# Patient Record
Sex: MALE | Race: WHITE | NOT HISPANIC OR LATINO
[De-identification: names, ages, dates, MRNs, and addresses within clinical notes are randomized per-mention and may not be internally consistent; named-entity substitution may affect disease eponyms.]

---

## 2017-04-06 ENCOUNTER — APPOINTMENT (OUTPATIENT)
Dept: OTOLARYNGOLOGY | Facility: CLINIC | Age: 77
End: 2017-04-06

## 2017-09-28 ENCOUNTER — OUTPATIENT (OUTPATIENT)
Dept: OUTPATIENT SERVICES | Facility: HOSPITAL | Age: 77
LOS: 1 days | Discharge: HOME | End: 2017-09-28

## 2017-09-28 DIAGNOSIS — I25.10 ATHEROSCLEROTIC HEART DISEASE OF NATIVE CORONARY ARTERY WITHOUT ANGINA PECTORIS: ICD-10-CM

## 2017-09-28 DIAGNOSIS — Z01.810 ENCOUNTER FOR PREPROCEDURAL CARDIOVASCULAR EXAMINATION: ICD-10-CM

## 2018-03-05 ENCOUNTER — OUTPATIENT (OUTPATIENT)
Dept: OUTPATIENT SERVICES | Facility: HOSPITAL | Age: 78
LOS: 1 days | Discharge: HOME | End: 2018-03-05

## 2018-03-05 DIAGNOSIS — Z79.899 OTHER LONG TERM (CURRENT) DRUG THERAPY: ICD-10-CM

## 2018-03-05 DIAGNOSIS — I25.10 ATHEROSCLEROTIC HEART DISEASE OF NATIVE CORONARY ARTERY WITHOUT ANGINA PECTORIS: ICD-10-CM

## 2018-03-05 DIAGNOSIS — I10 ESSENTIAL (PRIMARY) HYPERTENSION: ICD-10-CM

## 2018-03-05 DIAGNOSIS — E78.00 PURE HYPERCHOLESTEROLEMIA, UNSPECIFIED: ICD-10-CM

## 2018-09-05 ENCOUNTER — OUTPATIENT (OUTPATIENT)
Dept: OUTPATIENT SERVICES | Facility: HOSPITAL | Age: 78
LOS: 1 days | Discharge: HOME | End: 2018-09-05

## 2018-09-05 DIAGNOSIS — I25.10 ATHEROSCLEROTIC HEART DISEASE OF NATIVE CORONARY ARTERY WITHOUT ANGINA PECTORIS: ICD-10-CM

## 2018-09-05 DIAGNOSIS — Z01.810 ENCOUNTER FOR PREPROCEDURAL CARDIOVASCULAR EXAMINATION: ICD-10-CM

## 2018-09-05 DIAGNOSIS — Z79.899 OTHER LONG TERM (CURRENT) DRUG THERAPY: ICD-10-CM

## 2018-09-05 DIAGNOSIS — E78.00 PURE HYPERCHOLESTEROLEMIA, UNSPECIFIED: ICD-10-CM

## 2019-01-22 ENCOUNTER — EMERGENCY (EMERGENCY)
Facility: HOSPITAL | Age: 79
LOS: 0 days | Discharge: HOME | End: 2019-01-22
Attending: EMERGENCY MEDICINE | Admitting: EMERGENCY MEDICINE

## 2019-01-22 VITALS
OXYGEN SATURATION: 97 % | SYSTOLIC BLOOD PRESSURE: 266 MMHG | WEIGHT: 257.06 LBS | DIASTOLIC BLOOD PRESSURE: 127 MMHG | HEART RATE: 92 BPM | TEMPERATURE: 97 F | RESPIRATION RATE: 18 BRPM

## 2019-01-22 VITALS
OXYGEN SATURATION: 97 % | RESPIRATION RATE: 18 BRPM | HEART RATE: 84 BPM | DIASTOLIC BLOOD PRESSURE: 118 MMHG | SYSTOLIC BLOOD PRESSURE: 215 MMHG

## 2019-01-22 DIAGNOSIS — S61.411A LACERATION WITHOUT FOREIGN BODY OF RIGHT HAND, INITIAL ENCOUNTER: ICD-10-CM

## 2019-01-22 DIAGNOSIS — I10 ESSENTIAL (PRIMARY) HYPERTENSION: ICD-10-CM

## 2019-01-22 DIAGNOSIS — S09.90XA UNSPECIFIED INJURY OF HEAD, INITIAL ENCOUNTER: ICD-10-CM

## 2019-01-22 DIAGNOSIS — I25.10 ATHEROSCLEROTIC HEART DISEASE OF NATIVE CORONARY ARTERY WITHOUT ANGINA PECTORIS: ICD-10-CM
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
## 2019-01-22 DIAGNOSIS — Z23 ENCOUNTER FOR IMMUNIZATION: ICD-10-CM
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
## 2019-01-22 RX ORDER — ACETAMINOPHEN 500 MG
650 TABLET ORAL ONCE
Qty: 0 | Refills: 0 | Status: COMPLETED | OUTPATIENT
Start: 2019-01-22 | End: 2019-01-22

## 2019-01-22 RX ORDER — TETANUS TOXOID, REDUCED DIPHTHERIA TOXOID AND ACELLULAR PERTUSSIS VACCINE, ADSORBED 5; 2.5; 8; 8; 2.5 [IU]/.5ML; [IU]/.5ML; UG/.5ML; UG/.5ML; UG/.5ML
0.5 SUSPENSION INTRAMUSCULAR ONCE
Qty: 0 | Refills: 0 | Status: COMPLETED | OUTPATIENT
Start: 2019-01-22 | End: 2019-01-22

## 2019-01-22 RX ADMIN — Medication 650 MILLIGRAM(S): at 21:12

## 2019-01-22 RX ADMIN — TETANUS TOXOID, REDUCED DIPHTHERIA TOXOID AND ACELLULAR PERTUSSIS VACCINE, ADSORBED 0.5 MILLILITER(S): 5; 2.5; 8; 8; 2.5 SUSPENSION INTRAMUSCULAR at 21:12

## 2019-01-22 NOTE — ED ADULT TRIAGE NOTE - CHIEF COMPLAINT QUOTE
Pt came c/o mechanical fall 1 hour ago on concrete outside, hit his face, denies LOC, denies dizziness or headache, denies blood thinners.

## 2019-01-22 NOTE — ED PROVIDER NOTE - MEDICAL DECISION MAKING DETAILS
I personally evaluated the patient. I reviewed the Resident’s or Physician Assistant’s note (as assigned above), and agree with the findings and plan except as documented in my note. Patient iaging unremarkable. I have fully discussed the medical management and delivery of care with the patient. I have discussed any available labs, imaging and treatment options with the patient. Patient confirms understanding and has been given detailed return precautions. Patient instructed to return to the ED should symptoms persist or worsen. Patient has demonstrated capacity and has verbalized understanding. Patient is well appearing upon discharge.

## 2019-01-22 NOTE — ED PROVIDER NOTE - OBJECTIVE STATEMENT
77 yo M with PMHx of HTN and CAD s/p CABG presents to the ED c/o head injury s/p mechanical fall. Pt slipped on ice outside and fell forward hitting his head. Pt denies other areas of injury. Pt is not on anticoagulation medication. Pt denies fever, chills, nausea, vomiting, abdominal pain, headache, dizziness, weakness, chest pain, SOB, back pain, LOC, urinary symptoms, cough.

## 2019-01-22 NOTE — ED PROVIDER NOTE - PHYSICAL EXAMINATION
VITAL SIGNS: I have reviewed nursing notes and confirm.  CONSTITUTIONAL: Well-developed; well-nourished; in no acute distress.  SKIN: Skin exam is warm and dry, no acute rash.  HEAD: Normocephalic; (+) superficial abrasions to nose and forehead.   EYES: PERRL, EOM intact; conjunctiva and sclera clear.  ENT: No nasal discharge; airway clear. TMs clear. No septal hematoma.   NECK: Supple; non tender.  CARD: S1, S2 normal; no murmurs, gallops, or rubs. Regular rate and rhythm.  RESP: No wheezes, rales or rhonchi. Speaking in full sentences.   ABD: Normal bowel sounds; soft; non-distended; non-tender; No rebound or guarding.   BACK: No midline or paraspinal TTP.   EXT: Normal ROM. No clubbing, cyanosis or edema. (+) 3 cm laceration to medial aspect of right hand without active bleeding. FROM. Sensation intact. CR < 2 seconds.   NEURO: Alert, oriented. Grossly unremarkable. No focal deficits. CN II-XII intact. No dysmetria. No ataxia. Sensation intact and equal throughout. Strength 5/5 throughout. Gait steady.

## 2019-01-22 NOTE — ED PROVIDER NOTE - CARE PLAN
Principal Discharge DX:	Head injury Principal Discharge DX:	Head injury  Secondary Diagnosis:	Laceration of hand  Secondary Diagnosis:	Need for tetanus, diphtheria, and acellular pertussis (Tdap) vaccine

## 2019-01-22 NOTE — ED PROVIDER NOTE - ATTENDING CONTRIBUTION TO CARE
78 year old male, pmhx  cabg, comes in with complaint of mechanical trip and fall, + frontal head injury, no loc, no n/v/d, no cp/sob, no loc. patient has a laceration to the palmar aspect of the left hand near the fourth and fifth mcp.     CONSTITUTIONAL: Well-developed; well-nourished; in no acute distress. Sitting up and providing appropriate history and physical examination  TRAUMA: Primary and Secondary surveys intact, GCS 15, no midline CTLS spine tenderness, Pelvis stable, + moving all extremities, FAST Negative   SKIN: skin exam is warm and dry, no acute rash.  HEAD: Normocephalic; + left supra-orbital hematoma  EYES: PERRL, 3 mm bilateral, no nystagmus, EOM intact; conjunctiva and sclera clear.  ENT: No nasal discharge; airway clear.  NECK: Supple; non tender. + full passive ROM in all directions. No JVD  CARD: S1, S2 normal; no murmurs, gallops, or rubs. Regular rate and rhythm. + Symmetric Strong Pulses  RESP: No wheezes, rales or rhonchi. Good air movement bilaterally  ABD: soft; non-distended; non-tender. No Rebound, No Guarding, No signs of peritonitis, No CVA tenderness. No pulsatile abdominal mass. + Strong and Symmetric Pulses  EXT: Normal ROM. No clubbing, cyanosis or edema. Dp and Pt Pulses intact. Cap refill less than 3 seconds  NEURO: CN 2-12 intact, normal finger to nose, normal romberg, stable gait, no sensory or motor deficits, Alert, oriented, grossly unremarkable. No Focal deficits. GCS 15. NIH 0  PSYCH: Cooperative, appropriate.

## 2019-03-06 ENCOUNTER — OUTPATIENT (OUTPATIENT)
Dept: OUTPATIENT SERVICES | Facility: HOSPITAL | Age: 79
LOS: 1 days | Discharge: HOME | End: 2019-03-06

## 2019-03-06 DIAGNOSIS — Z01.810 ENCOUNTER FOR PREPROCEDURAL CARDIOVASCULAR EXAMINATION: ICD-10-CM

## 2019-03-06 DIAGNOSIS — I25.10 ATHEROSCLEROTIC HEART DISEASE OF NATIVE CORONARY ARTERY WITHOUT ANGINA PECTORIS: ICD-10-CM

## 2019-03-06 DIAGNOSIS — Z79.899 OTHER LONG TERM (CURRENT) DRUG THERAPY: ICD-10-CM

## 2019-03-06 DIAGNOSIS — E78.00 PURE HYPERCHOLESTEROLEMIA, UNSPECIFIED: ICD-10-CM

## 2019-06-20 ENCOUNTER — APPOINTMENT (OUTPATIENT)
Dept: NEUROSURGERY | Facility: CLINIC | Age: 79
End: 2019-06-20
Payer: MEDICARE

## 2019-06-20 VITALS — HEIGHT: 70 IN | BODY MASS INDEX: 36.51 KG/M2 | WEIGHT: 255 LBS

## 2019-06-20 DIAGNOSIS — M54.16 RADICULOPATHY, LUMBAR REGION: ICD-10-CM

## 2019-06-20 DIAGNOSIS — Z87.891 PERSONAL HISTORY OF NICOTINE DEPENDENCE: ICD-10-CM

## 2019-06-20 PROCEDURE — 99205 OFFICE O/P NEW HI 60 MIN: CPT

## 2019-06-21 NOTE — PLAN
[FreeTextEntry1] : I disucssed the CT with Mr. Silva in detail.  I believe his pain is from stenosis rather than the compression fracture.  An MRI was ordered.  HE will return to discuss the MRI results.  He was referred for PT and THERESE in the meantime.  I do not believe he would benefit from vertebral augmentation at this time.

## 2019-06-21 NOTE — HISTORY OF PRESENT ILLNESS
[FreeTextEntry1] : right leg pain [de-identified] : Mr. Silva is a 78 year-old male who presents with right leg pain.  It is intense and associated with burning numbness.  He denies weakness.  It is in the L5 distribution.  No bowel or bladder dysfunction.  He has mild back pain.  He had PT and THERESE over 10 years ago but nothing recently.  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

## 2019-11-19 ENCOUNTER — LABORATORY RESULT (OUTPATIENT)
Age: 79
End: 2019-11-19

## 2019-11-19 ENCOUNTER — OUTPATIENT (OUTPATIENT)
Dept: OUTPATIENT SERVICES | Facility: HOSPITAL | Age: 79
LOS: 1 days | Discharge: HOME | End: 2019-11-19

## 2019-11-19 DIAGNOSIS — Z01.810 ENCOUNTER FOR PREPROCEDURAL CARDIOVASCULAR EXAMINATION: ICD-10-CM

## 2019-11-19 DIAGNOSIS — I25.10 ATHEROSCLEROTIC HEART DISEASE OF NATIVE CORONARY ARTERY WITHOUT ANGINA PECTORIS: ICD-10-CM

## 2019-11-27 ENCOUNTER — APPOINTMENT (OUTPATIENT)
Dept: CARDIOLOGY | Facility: CLINIC | Age: 79
End: 2019-11-27
Payer: MEDICARE

## 2019-11-27 PROCEDURE — 93000 ELECTROCARDIOGRAM COMPLETE: CPT

## 2019-11-27 PROCEDURE — 99214 OFFICE O/P EST MOD 30 MIN: CPT

## 2020-09-01 ENCOUNTER — RECORD ABSTRACTING (OUTPATIENT)
Age: 80
End: 2020-09-01

## 2020-09-01 DIAGNOSIS — Z86.79 PERSONAL HISTORY OF OTHER DISEASES OF THE CIRCULATORY SYSTEM: ICD-10-CM

## 2020-09-16 ENCOUNTER — APPOINTMENT (OUTPATIENT)
Dept: CARDIOLOGY | Facility: CLINIC | Age: 80
End: 2020-09-16

## 2020-10-29 ENCOUNTER — RX RENEWAL (OUTPATIENT)
Age: 80
End: 2020-10-29

## 2021-01-24 ENCOUNTER — RX RENEWAL (OUTPATIENT)
Age: 81
End: 2021-01-24

## 2021-08-25 ENCOUNTER — NON-APPOINTMENT (OUTPATIENT)
Age: 81
End: 2021-08-25

## 2021-08-25 ENCOUNTER — APPOINTMENT (OUTPATIENT)
Dept: OTOLARYNGOLOGY | Facility: CLINIC | Age: 81
End: 2021-08-25
Payer: MEDICARE

## 2021-08-25 PROCEDURE — 99205 OFFICE O/P NEW HI 60 MIN: CPT | Mod: 25

## 2021-08-25 PROCEDURE — 92557 COMPREHENSIVE HEARING TEST: CPT

## 2021-08-25 PROCEDURE — 92550 TYMPANOMETRY & REFLEX THRESH: CPT

## 2021-08-25 NOTE — ASSESSMENT
[FreeTextEntry1] : I reviewed, interpreted, and discussed the Audiogram done today. Asymmetric SNHL. \par \par Sudden SNHL.\par \par Part of history and discussion with patient's wife. \par \par I discussed the benefits of oral steroids for patient's current situation, and made the patient aware of side effects of systemic corticosteroids. I recommended a low sugar and low salt diet while on steroid treatment, while alerting patient about potential temporary increase in sugar levels and blood pressure. Patient aware of risk of impact on sleep quality and mood temporarily while on the medication.\par \par RTC in 1 week.

## 2021-08-25 NOTE — HISTORY OF PRESENT ILLNESS
[de-identified] : Patient presents today c/o hearing loss.  Woke up this morning with right ear hearing loss.  No history of ear infections.  Denies any pressure.  Right ear has been feeling clogged for a few weeks.  No recent audiogram . PCP checked ears  and had a normal exam .  Recently changed BP medication by PCP. after two doses patient states hearing loss worsened.

## 2021-09-13 ENCOUNTER — APPOINTMENT (OUTPATIENT)
Dept: OTOLARYNGOLOGY | Facility: CLINIC | Age: 81
End: 2021-09-13

## 2021-09-13 ENCOUNTER — APPOINTMENT (OUTPATIENT)
Dept: NEUROLOGY | Facility: CLINIC | Age: 81
End: 2021-09-13
Payer: MEDICARE

## 2021-09-13 VITALS
SYSTOLIC BLOOD PRESSURE: 167 MMHG | TEMPERATURE: 97.4 F | BODY MASS INDEX: 35.07 KG/M2 | WEIGHT: 245 LBS | HEART RATE: 71 BPM | OXYGEN SATURATION: 99 % | DIASTOLIC BLOOD PRESSURE: 89 MMHG | HEIGHT: 70 IN

## 2021-09-13 PROCEDURE — 99205 OFFICE O/P NEW HI 60 MIN: CPT

## 2021-09-13 NOTE — REVIEW OF SYSTEMS
[As Noted in HPI] : as noted in HPI [Eyesight Problems] : eyesight problems [Loss Of Hearing] : hearing loss [Negative] : Heme/Lymph [FreeTextEntry9] : back pain

## 2021-09-13 NOTE — DISCUSSION/SUMMARY
[FreeTextEntry1] : Pt is a 81 yo M with PMHx of CABG x 4 (2006), porcine aortic valve replacement (2011), HTN, HLD, CKD, who is being evaluated for abnormal MRI brain results. Reviewed pt's imaging, there is subtle restricted diffusion of the inferior anterior right cerebellum and right lateral medulla, [id number] hyperintensity in this same distribution. 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 Symptoms/history consistent with stroke in this region as well. 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 MRA head/neck and 2D Echo ordered. Continue ASA and crestor. Received lab results from PCP office. HbA1c 5.5, . Will increase crestor to 20mg po daily. \par RTC in 3 months.

## 2021-09-13 NOTE — PHYSICAL EXAM
[General Appearance - Alert] : alert [General Appearance - In No Acute Distress] : in no acute distress [General Appearance - Well Nourished] : well nourished [General Appearance - Well Developed] : well developed [Oriented To Time, Place, And Person] : oriented to person, place, and time [Impaired Insight] : insight and judgment were intact [Affect] : the affect was normal [Person] : oriented to person [Place] : oriented to place [Time] : oriented to time [Naming Objects] : no difficulty naming common objects [Fluency] : fluency intact [Comprehension] : comprehension intact [Cranial Nerves Optic (II)] : visual acuity intact bilaterally,  visual fields full to confrontation, pupils equal round and reactive to light [Cranial Nerves Oculomotor (III)] : extraocular motion intact [Cranial Nerves Trigeminal (V)] : facial sensation intact symmetrically [Cranial Nerves Facial (VII)] : face symmetrical [Cranial Nerves Accessory (XI - Cranial And Spinal)] : head turning and shoulder shrug symmetric [Cranial Nerves Glossopharyngeal (IX)] : tongue and palate midline [Cranial Nerves Hypoglossal (XII)] : there was no tongue deviation with protrusion [Nystagmus] : ~T ~M nystagmus was seen [Motor Tone] : muscle tone was normal in all four extremities [Motor Strength] : muscle strength was normal in all four extremities [Motor Handedness Right-Handed] : the patient is right hand dominant [Sensation Tactile Decrease] : light touch was intact [Coordination - Dysmetria Impaired Finger-to-Nose Right Only] : present on the ride side [2+] : Patella left 2+ [Sclera] : the sclera and conjunctiva were normal [PERRL With Normal Accommodation] : pupils were equal in size, round, reactive to light, with normal accommodation [Outer Ear] : the ears and nose were normal in appearance [Hearing Loss Right Only] : diminished [Neck Appearance] : the appearance of the neck was normal [] : the neck was supple [Respiration, Rhythm And Depth] : normal respiratory rhythm and effort [Auscultation Breath Sounds / Voice Sounds] : lungs were clear to auscultation bilaterally [Heart Rate And Rhythm] : heart rate was normal and rhythm regular [Arterial Pulses Carotid] : carotid pulses were normal with no bruits [Skin Turgor] : normal skin turgor [Finger Rub Not Morgan] : finger rub was heard [Whisper Not Bryan] : whispered voice was heard [Paresis Pronator Drift Right-Sided] : no pronator drift on the right [Paresis Pronator Drift Left-Sided] : no pronator drift on the left [Motor Strength Upper Extremities Bilaterally] : strength was normal in both upper extremities [Motor Strength Lower Extremities Bilaterally] : strength was normal in both lower extremities [Coordination - Dysmetria Impaired Finger-to-Nose Left Only] : not present on the left side [Coordination Dysmetria Impaired Heel-Shin Using Right Heel] : not present on the ride side [Coordination Dysmetria Impaired Heel-to-Shin Using Left Heel] : not present on the left side [FreeTextEntry1] : bilateral rotary nystagmus, horizontal and vertical, worse with rightward conjugate gaze [FreeTextEntry8] : antalgic gait, negative Rhomberg [Hearing Loss Left Only] : normal

## 2021-09-13 NOTE — REASON FOR VISIT
[Initial Evaluation] : an initial evaluation [Spouse] : spouse [FreeTextEntry1] : stroke on MRI brain

## 2021-09-13 NOTE — HISTORY OF PRESENT ILLNESS
[FreeTextEntry1] : Pt is a 81 yo M with PMHx of CABG x 4 (2006), porcine aortic valve replacement (2011), HTN, HLD, CKD, who presents c/o abnormal MRI brain. States that he woke up on 7/28 with sudden right sided hearing loss, gait imbalance and vertical binocular diplopia. Symptoms have slowly improved since then, diplopia is nearly resolved, only occurs first thing in the morning. Hearing only returned about 10% in the right ear. Balance has improved, occasionally has difficulty, one fall a few weeks ago. Ambulates without a walking device. no known prior strokes. He was not on any ATP prior to MRI, was started on aspirin and crestor this past week. No known h/o afib/flutter. He saw an ENT specialist when this all first began, was given a steroid taper for possible viral etiology.

## 2021-10-20 ENCOUNTER — APPOINTMENT (OUTPATIENT)
Dept: CARDIOLOGY | Facility: CLINIC | Age: 81
End: 2021-10-20
Payer: MEDICARE

## 2021-10-20 VITALS
DIASTOLIC BLOOD PRESSURE: 80 MMHG | TEMPERATURE: 97.8 F | WEIGHT: 243 LBS | HEIGHT: 70 IN | SYSTOLIC BLOOD PRESSURE: 150 MMHG | OXYGEN SATURATION: 98 % | BODY MASS INDEX: 34.79 KG/M2 | HEART RATE: 77 BPM

## 2021-10-20 DIAGNOSIS — Z00.00 ENCOUNTER FOR GENERAL ADULT MEDICAL EXAMINATION W/OUT ABNORMAL FINDINGS: ICD-10-CM

## 2021-10-20 PROCEDURE — 93000 ELECTROCARDIOGRAM COMPLETE: CPT

## 2021-10-20 PROCEDURE — 99214 OFFICE O/P EST MOD 30 MIN: CPT

## 2021-10-20 RX ORDER — ROSUVASTATIN CALCIUM 20 MG/1
20 TABLET, FILM COATED ORAL DAILY
Qty: 30 | Refills: 2 | Status: DISCONTINUED | COMMUNITY
Start: 2021-09-13 | End: 2021-10-20

## 2021-10-20 RX ORDER — METOPROLOL SUCCINATE 50 MG/1
50 TABLET, EXTENDED RELEASE ORAL
Refills: 0 | Status: DISCONTINUED | COMMUNITY
End: 2021-10-20

## 2021-10-20 NOTE — DISCUSSION/SUMMARY
[FreeTextEntry1] : Pt is a 79 yo M with PMHx of CABG x 4 (2006), porcine aortic valve replacement (2011), HTN, HLD, CKD, who is being evaluated for abnormal MRI brain results. Reviewed pt's imaging, there is subtle restricted diffusion of the inferior anterior right cerebellum and right lateral medulla, [id number] hyperintensity in this same distribution. 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 Symptoms/history consistent with stroke in this region as well. 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 Continue ASA and crestor. Received lab results from PCP office. HbA1c 5.5, LDL is 85 and  as reported by Neurology.  Patient is now on crestor  20 mg po daily.\par Patient is at target goal with respect to his lipid levels.\par He was previously on vytorin 10/20 mg with good  lipid levels as well.\par 2D echo doppler results were reviewed from the Hospital with the patient.\par RV in 3/22.

## 2021-10-20 NOTE — REVIEW OF SYSTEMS
[Dizziness] : dizziness [Numbness (Hypoesthesia)] : numbness [Tingling (Paresthesia)] : tingling [Negative] : Heme/Lymph

## 2021-10-20 NOTE — PHYSICAL EXAM

## 2021-10-20 NOTE — REASON FOR VISIT
[FreeTextEntry1] : Patient presents for an over due follow up visit. He was diagnosed with a stroke and is seeing Neurology.

## 2021-10-20 NOTE — HISTORY OF PRESENT ILLNESS
[FreeTextEntry1] : Pt is a 79 yo M with PMHx of CABG x 4 (2006), porcine aortic valve replacement (2011), HTN, HLD, CKD, who presents c/o abnormal MRI brain. States that he woke up on 7/28 with sudden right sided hearing loss, gait imbalance and vertical binocular diplopia. Symptoms have slowly improved since then, diplopia is nearly resolved, only occurs first thing in the morning.  Balance has improved, occasionally has difficulty and requires a sitting stool in the shower. Ambulates without a walking device. No known prior strokes. He was not on any ATP  is not true. The patient was on ASA 81 mg. on his last Cardiology office visit in 2019 as well as a statin Vytorin.  No known h/o afib/flutter. He saw an ENT specialist when this all first began, was given a steroid taper for possible viral etiology.

## 2021-10-28 ENCOUNTER — RX RENEWAL (OUTPATIENT)
Age: 81
End: 2021-10-28

## 2021-11-17 ENCOUNTER — APPOINTMENT (OUTPATIENT)
Dept: OTOLARYNGOLOGY | Facility: CLINIC | Age: 81
End: 2021-11-17
Payer: MEDICARE

## 2021-11-17 DIAGNOSIS — H90.5 UNSPECIFIED SENSORINEURAL HEARING LOSS: ICD-10-CM

## 2021-11-17 DIAGNOSIS — R42 DIZZINESS AND GIDDINESS: ICD-10-CM

## 2021-11-17 PROCEDURE — 92550 TYMPANOMETRY & REFLEX THRESH: CPT

## 2021-11-17 PROCEDURE — 99214 OFFICE O/P EST MOD 30 MIN: CPT | Mod: 25

## 2021-11-17 PROCEDURE — 92557 COMPREHENSIVE HEARING TEST: CPT

## 2021-11-17 NOTE — HISTORY OF PRESENT ILLNESS
[FreeTextEntry1] : Patient presents today following up on hearing loss. Patient admits in between he has seen neurology. Patient had MRI done. He admits continues to have hearing loss in the right ear. He admits equilibrium is always off. He took steroids from Dr. Whitten which did not help at all. He admits slight increase in hearing since the last visit. Pt has dizziness and unsteadiness.

## 2021-12-12 ENCOUNTER — INPATIENT (INPATIENT)
Facility: HOSPITAL | Age: 81
LOS: 7 days | Discharge: HOME HEALTH | End: 2021-12-20
Attending: STUDENT IN AN ORGANIZED HEALTH CARE EDUCATION/TRAINING PROGRAM | Admitting: STUDENT IN AN ORGANIZED HEALTH CARE EDUCATION/TRAINING PROGRAM
Payer: MEDICARE

## 2021-12-12 VITALS
RESPIRATION RATE: 22 BRPM | OXYGEN SATURATION: 88 % | HEART RATE: 85 BPM | DIASTOLIC BLOOD PRESSURE: 75 MMHG | TEMPERATURE: 96 F | SYSTOLIC BLOOD PRESSURE: 154 MMHG

## 2021-12-12 LAB
ALBUMIN SERPL ELPH-MCNC: 3.3 G/DL — LOW (ref 3.5–5.2)
ALP SERPL-CCNC: 49 U/L — SIGNIFICANT CHANGE UP (ref 30–115)
ALT FLD-CCNC: 30 U/L — SIGNIFICANT CHANGE UP (ref 0–41)
ANION GAP SERPL CALC-SCNC: 18 MMOL/L — HIGH (ref 7–14)
AST SERPL-CCNC: 72 U/L — HIGH (ref 0–41)
BASE EXCESS BLDV CALC-SCNC: 0 MMOL/L — SIGNIFICANT CHANGE UP (ref -2–3)
BASOPHILS # BLD AUTO: 0 K/UL — SIGNIFICANT CHANGE UP (ref 0–0.2)
BASOPHILS NFR BLD AUTO: 0 % — SIGNIFICANT CHANGE UP (ref 0–1)
BILIRUB SERPL-MCNC: 0.5 MG/DL — SIGNIFICANT CHANGE UP (ref 0.2–1.2)
BUN SERPL-MCNC: 59 MG/DL — HIGH (ref 10–20)
CA-I SERPL-SCNC: 1.24 MMOL/L — SIGNIFICANT CHANGE UP (ref 1.15–1.33)
CALCIUM SERPL-MCNC: 9 MG/DL — SIGNIFICANT CHANGE UP (ref 8.5–10.1)
CHLORIDE SERPL-SCNC: 93 MMOL/L — LOW (ref 98–110)
CO2 SERPL-SCNC: 17 MMOL/L — SIGNIFICANT CHANGE UP (ref 17–32)
CREAT SERPL-MCNC: 2.3 MG/DL — HIGH (ref 0.7–1.5)
EOSINOPHIL # BLD AUTO: 0 K/UL — SIGNIFICANT CHANGE UP (ref 0–0.7)
EOSINOPHIL NFR BLD AUTO: 0 % — SIGNIFICANT CHANGE UP (ref 0–8)
GAS PNL BLDV: 124 MMOL/L — LOW (ref 136–145)
GAS PNL BLDV: SIGNIFICANT CHANGE UP
GAS PNL BLDV: SIGNIFICANT CHANGE UP
GIANT PLATELETS BLD QL SMEAR: PRESENT — SIGNIFICANT CHANGE UP
GLUCOSE SERPL-MCNC: 106 MG/DL — HIGH (ref 70–99)
HCO3 BLDV-SCNC: 24 MMOL/L — SIGNIFICANT CHANGE UP (ref 22–29)
HCT VFR BLD CALC: 37 % — LOW (ref 42–52)
HCT VFR BLDA CALC: 38 % — LOW (ref 39–51)
HGB BLD CALC-MCNC: 12.8 G/DL — SIGNIFICANT CHANGE UP (ref 12.6–17.4)
HGB BLD-MCNC: 12.8 G/DL — LOW (ref 14–18)
LACTATE BLDV-MCNC: 1.6 MMOL/L — SIGNIFICANT CHANGE UP (ref 0.5–2)
LYMPHOCYTES # BLD AUTO: 0.34 K/UL — LOW (ref 1.2–3.4)
LYMPHOCYTES # BLD AUTO: 6.1 % — LOW (ref 20.5–51.1)
MANUAL SMEAR VERIFICATION: SIGNIFICANT CHANGE UP
MCHC RBC-ENTMCNC: 30.1 PG — SIGNIFICANT CHANGE UP (ref 27–31)
MCHC RBC-ENTMCNC: 34.6 G/DL — SIGNIFICANT CHANGE UP (ref 32–37)
MCV RBC AUTO: 87.1 FL — SIGNIFICANT CHANGE UP (ref 80–94)
MONOCYTES # BLD AUTO: 0.34 K/UL — SIGNIFICANT CHANGE UP (ref 0.1–0.6)
MONOCYTES NFR BLD AUTO: 6.1 % — SIGNIFICANT CHANGE UP (ref 1.7–9.3)
NEUTROPHILS # BLD AUTO: 4.75 K/UL — SIGNIFICANT CHANGE UP (ref 1.4–6.5)
NEUTROPHILS NFR BLD AUTO: 84.3 % — HIGH (ref 42.2–75.2)
NT-PROBNP SERPL-SCNC: 2172 PG/ML — HIGH (ref 0–300)
PCO2 BLDV: 35 MMHG — LOW (ref 42–55)
PH BLDV: 7.44 — HIGH (ref 7.32–7.43)
PLAT MORPH BLD: NORMAL — SIGNIFICANT CHANGE UP
PLATELET # BLD AUTO: 165 K/UL — SIGNIFICANT CHANGE UP (ref 130–400)
PO2 BLDV: 28 MMHG — SIGNIFICANT CHANGE UP
POLYCHROMASIA BLD QL SMEAR: SLIGHT — SIGNIFICANT CHANGE UP
POTASSIUM BLDV-SCNC: 3.5 MMOL/L — SIGNIFICANT CHANGE UP (ref 3.5–5.1)
POTASSIUM SERPL-MCNC: 4.4 MMOL/L — SIGNIFICANT CHANGE UP (ref 3.5–5)
POTASSIUM SERPL-SCNC: 4.4 MMOL/L — SIGNIFICANT CHANGE UP (ref 3.5–5)
PROT SERPL-MCNC: 6.5 G/DL — SIGNIFICANT CHANGE UP (ref 6–8)
RAPID RVP RESULT: DETECTED
RBC # BLD: 4.25 M/UL — LOW (ref 4.7–6.1)
RBC # FLD: 13.6 % — SIGNIFICANT CHANGE UP (ref 11.5–14.5)
RBC BLD AUTO: NORMAL — SIGNIFICANT CHANGE UP
SAO2 % BLDV: 45.9 % — SIGNIFICANT CHANGE UP
SARS-COV-2 RNA SPEC QL NAA+PROBE: DETECTED
SODIUM SERPL-SCNC: 128 MMOL/L — LOW (ref 135–146)
TROPONIN T SERPL-MCNC: 0.06 NG/ML — CRITICAL HIGH
VARIANT LYMPHS # BLD: 3.5 % — SIGNIFICANT CHANGE UP (ref 0–5)
WBC # BLD: 5.63 K/UL — SIGNIFICANT CHANGE UP (ref 4.8–10.8)
WBC # FLD AUTO: 5.63 K/UL — SIGNIFICANT CHANGE UP (ref 4.8–10.8)

## 2021-12-12 PROCEDURE — 93308 TTE F-UP OR LMTD: CPT | Mod: 26

## 2021-12-12 PROCEDURE — 76604 US EXAM CHEST: CPT | Mod: 26

## 2021-12-12 PROCEDURE — 99497 ADVNCD CARE PLAN 30 MIN: CPT | Mod: 25

## 2021-12-12 PROCEDURE — 99223 1ST HOSP IP/OBS HIGH 75: CPT

## 2021-12-12 PROCEDURE — 99291 CRITICAL CARE FIRST HOUR: CPT | Mod: 25

## 2021-12-12 PROCEDURE — 93010 ELECTROCARDIOGRAM REPORT: CPT

## 2021-12-12 PROCEDURE — 71045 X-RAY EXAM CHEST 1 VIEW: CPT | Mod: 26

## 2021-12-12 RX ORDER — CLOPIDOGREL BISULFATE 75 MG/1
75 TABLET, FILM COATED ORAL DAILY
Refills: 0 | Status: DISCONTINUED | OUTPATIENT
Start: 2021-12-13 | End: 2021-12-13

## 2021-12-12 RX ORDER — SODIUM CHLORIDE 9 MG/ML
500 INJECTION INTRAMUSCULAR; INTRAVENOUS; SUBCUTANEOUS ONCE
Refills: 0 | Status: COMPLETED | OUTPATIENT
Start: 2021-12-12 | End: 2021-12-12

## 2021-12-12 RX ORDER — ACETAMINOPHEN 500 MG
975 TABLET ORAL ONCE
Refills: 0 | Status: COMPLETED | OUTPATIENT
Start: 2021-12-12 | End: 2021-12-12

## 2021-12-12 RX ORDER — MAGNESIUM SULFATE 500 MG/ML
2 VIAL (ML) INJECTION ONCE
Refills: 0 | Status: COMPLETED | OUTPATIENT
Start: 2021-12-12 | End: 2021-12-12

## 2021-12-12 RX ORDER — ASPIRIN/CALCIUM CARB/MAGNESIUM 324 MG
81 TABLET ORAL DAILY
Refills: 0 | Status: DISCONTINUED | OUTPATIENT
Start: 2021-12-12 | End: 2021-12-20

## 2021-12-12 RX ORDER — AMLODIPINE BESYLATE 2.5 MG/1
5 TABLET ORAL DAILY
Refills: 0 | Status: DISCONTINUED | OUTPATIENT
Start: 2021-12-12 | End: 2021-12-12

## 2021-12-12 RX ORDER — ROSUVASTATIN CALCIUM 5 MG/1
0 TABLET ORAL
Qty: 0 | Refills: 2 | DISCHARGE

## 2021-12-12 RX ORDER — INFLUENZA VIRUS VACCINE 15; 15; 15; 15 UG/.5ML; UG/.5ML; UG/.5ML; UG/.5ML
0.7 SUSPENSION INTRAMUSCULAR ONCE
Refills: 0 | Status: DISCONTINUED | OUTPATIENT
Start: 2021-12-12 | End: 2021-12-20

## 2021-12-12 RX ORDER — HEPARIN SODIUM 5000 [USP'U]/ML
5000 INJECTION INTRAVENOUS; SUBCUTANEOUS EVERY 8 HOURS
Refills: 0 | Status: DISCONTINUED | OUTPATIENT
Start: 2021-12-12 | End: 2021-12-13

## 2021-12-12 RX ORDER — CLOPIDOGREL BISULFATE 75 MG/1
0 TABLET, FILM COATED ORAL
Qty: 0 | Refills: 0 | DISCHARGE

## 2021-12-12 RX ORDER — DEXAMETHASONE 0.5 MG/5ML
6 ELIXIR ORAL DAILY
Refills: 0 | Status: DISCONTINUED | OUTPATIENT
Start: 2021-12-12 | End: 2021-12-20

## 2021-12-12 RX ORDER — ACETAMINOPHEN 500 MG
650 TABLET ORAL EVERY 4 HOURS
Refills: 0 | Status: DISCONTINUED | OUTPATIENT
Start: 2021-12-12 | End: 2021-12-20

## 2021-12-12 RX ORDER — METOPROLOL TARTRATE 50 MG
50 TABLET ORAL DAILY
Refills: 0 | Status: DISCONTINUED | OUTPATIENT
Start: 2021-12-12 | End: 2021-12-20

## 2021-12-12 RX ORDER — LATANOPROST 0.05 MG/ML
1 SOLUTION/ DROPS OPHTHALMIC; TOPICAL AT BEDTIME
Refills: 0 | Status: DISCONTINUED | OUTPATIENT
Start: 2021-12-12 | End: 2021-12-20

## 2021-12-12 RX ORDER — ATORVASTATIN CALCIUM 80 MG/1
20 TABLET, FILM COATED ORAL AT BEDTIME
Refills: 0 | Status: DISCONTINUED | OUTPATIENT
Start: 2021-12-12 | End: 2021-12-17

## 2021-12-12 RX ADMIN — HEPARIN SODIUM 5000 UNIT(S): 5000 INJECTION INTRAVENOUS; SUBCUTANEOUS at 21:19

## 2021-12-12 RX ADMIN — Medication 25 GRAM(S): at 13:03

## 2021-12-12 RX ADMIN — Medication 975 MILLIGRAM(S): at 13:28

## 2021-12-12 RX ADMIN — SODIUM CHLORIDE 500 MILLILITER(S): 9 INJECTION INTRAMUSCULAR; INTRAVENOUS; SUBCUTANEOUS at 13:40

## 2021-12-12 RX ADMIN — LATANOPROST 1 DROP(S): 0.05 SOLUTION/ DROPS OPHTHALMIC; TOPICAL at 21:21

## 2021-12-12 RX ADMIN — Medication 6 MILLIGRAM(S): at 17:14

## 2021-12-12 RX ADMIN — Medication 975 MILLIGRAM(S): at 11:52

## 2021-12-12 RX ADMIN — ATORVASTATIN CALCIUM 20 MILLIGRAM(S): 80 TABLET, FILM COATED ORAL at 21:19

## 2021-12-12 NOTE — H&P ADULT - NSHPPHYSICALEXAM_GEN_ALL_CORE
General: well developed, well nourished, NAD  Neuro: alert and oriented, no focal deficits, moves all extremities spontaneously  HEENT: NCAT, EOMI, anicteric, mucosa moist  Respiratory: + b/l rhonchi, no wheezing, + mild tachypnea  CVS: regular rate and rhythm  Abdomen: soft, nontender, nondistended  Extremities: no edema, sensation and movement grossly intact  Skin: warm, dry, appropriate color

## 2021-12-12 NOTE — H&P ADULT - ASSESSMENT
81 y/o M Pmh HTN, DLD, CAD s/p CABG x 4 (2006) on ASA, Aortic Valve replacement (2011) unvaccinated for Flu and COVID 19, CKD presented to the ED with complaint of progressive generalized fatigue, SOB, n/b diarrhea for the past ten days.    Acute Hypoxic Respiratory Failure 2/2 COVID PNA  Sepsis not present on admission  - Patient currently on 4L NC SPO2 95%  - CXR: b/l opacities L>R  - No leucocytosis, Lactate 1.6  - Start Decadron 6 mg daily  - Send inflammatory markers, bcx  - Tylenol PRN for fevers  - Guanfacine PRN for cough  - Supplemental O2, Maintain SPO2 >88%  - Supportive care    NICA on suspected CKD  HAGMA  Likely pre renal vs ATN  - Cr 2.3 on admission, baseline unknown  - per chart review Cr 1.3 in 2019 reported history of CKD  - f/u urine lytes  - s/p IV NS bolus in ED  - Monitor urine output   - hold HCTX    Troponemia likely NSTEMI Type II  Hx of CAD s/p CABG x4  Aortic Valve replacement  HTN  - troponin 0.06, EKG with increased T wave inversions, no recent EKG on file, follows Dr. Fish outpatient, consider cardiology consult should troponin trend up or with cp  - trend trops, repeat EKG, no active cp, likely due to demand ischemia  - c/w metoprolol succinate and amlodipine  - c/w ASA, and plavix (per recent outpatient cardio visit, patient should be on plavix)    Hyponatremia, asymptomatic  - Na 128, patient appears euvolemic on exam  - f/u serum osmolarity and urine lytes  - avoid overcorrection    CODE; full  Diet: DASH  DVT ppx: heparin subq  Dispo: acute   81 y/o M Pmh HTN, DLD, CAD s/p CABG x 4 (2006) on ASA, Aortic Valve replacement (2011) unvaccinated for Flu and COVID 19, CKD presented to the ED with complaint of progressive generalized fatigue, SOB, n/b diarrhea for the past ten days.    Acute Hypoxic Respiratory Failure 2/2 COVID PNA  Sepsis not present on admission  - Patient currently on 4L NC SPO2 95%  - CXR: b/l opacities L>R  - No leucocytosis, Lactate 1.6  - Start Decadron 6 mg daily  - Send inflammatory markers, bcx  - Tylenol PRN for fevers  - Guanfacine PRN for cough  - Supplemental O2, Maintain SPO2 >88%  - Supportive care    NICA on suspected CKD  HAGMA  Likely pre renal vs ATN  - Cr 2.3 on admission, baseline unknown  - per chart review Cr 1.3 in 2019 reported history of CKD  - f/u urine lytes  - s/p IV NS bolus in ED  - Monitor urine output   - hold HCTZ    Troponemia likely NSTEMI Type II  Hx of CAD s/p CABG x4  Aortic Valve replacement  HTN  - troponin 0.06, EKG with increased T wave inversions, no recent EKG on file, follows Dr. Fish outpatient, consider cardiology consult should troponin trend up or with cp  - trend trops, repeat EKG, no active cp, likely due to demand ischemia  - c/w metoprolol succinate and amlodipine  - c/w ASA, and plavix (per recent outpatient cardio visit, patient should be on plavix)    Hyponatremia, asymptomatic  - Na 128, patient appears euvolemic on exam  - f/u serum osmolarity and urine lytes  - avoid overcorrection    CODE; full  Diet: DASH  DVT ppx: heparin subq  Dispo: acute

## 2021-12-12 NOTE — ED PROVIDER NOTE - CLINICAL SUMMARY MEDICAL DECISION MAKING FREE TEXT BOX
.    Pt w/ SOB and Desat.  CXR + b/l infiltrate. + elevated trop, BNP, . Low Na  Clinical picture c/w COVID19.  Pt sating well on 4L NC, no acute ED events.  Pt admitted to Medicine for resp Failure/ COVID19 for cont O2 tx, other tx, specialist consult.    .

## 2021-12-12 NOTE — H&P ADULT - NSHPSOCIALHISTORY_GEN_ALL_CORE
Marital Status:  ( X  )    (   ) Single    (   )    (  )   Lives with: (  ) alone  (  ) children   (  ) spouse   (  ) parents  (  ) other  Recent Travel: No recent travel  Occupation:    Substance Use (street drugs): ( x ) never used  (  ) other:  Tobacco Usage:  ( x  ) never smoked   (   ) former smoker   (   ) current smoker  (     ) pack year  Alcohol Usage: None   Baseline mobility: independant

## 2021-12-12 NOTE — H&P ADULT - ATTENDING COMMENTS
Patient seen and examined on 12/12/2021 on 23: 38    HPI:  79 y/o gentleman with a past medical history of HTN, DLD, CAD s/p CABG, AV replacement, CKD III admitted for COVID PNA. Notes 10 days of progressive SOB, fatigue, diarrhea, and poor oral intake. Didn't note any significant fevers at home. Noted to be hypoxic here, CXR shows b/l infiltrates, O2 improved on 4L NC. Lab work remarkable for NICA, mild hyponatremia, and elevated trop. He denied any chest pain/pressure, palpitations. O2 improved with 4L NC.  Currently he reports feeling slightly better but still fatigued.    REVIEW OF SYSTEMS:  CONSTITUTIONAL:  + weakness, no fevers, chills, night sweats, weight loss  EYES/ENT: No visual changes. No vertigo or dysphagia  NECK: No neck pain or stiffness  RESPIRATORY: +cough, no wheezing, hemoptysis. +shortness of breath  CARDIOVASCULAR: No chest pain or palpitations. No lower extremity edema  GASTROINTESTINAL: No abdominal pain. No nausea, vomiting, + diarrhea, no hematemesis  GENITOURINARY: No dysuria or hematuria   NEUROLOGICAL: No focal numbness or weakness  SKIN: No rashes or itching  HEMATOLOGIC: No easy bruising or prolonged bleeding.      PHYSICAL EXAM:  GENERAL: NAD, well-developed, tired but Non-toxic, stated age   HEAD:  Atraumatic, Normocephalic  EYES: EOMI, Sclera White   NECK: Supple, No JVD  CHEST/LUNG: b/l crackles at lower lobes, L>R; No wheezing, rhonchi. Speaking and breathing comfortably on 4L NC, satting at 94%  HEART: Regular rate and rhythm; s1, s2, No murmurs, rubs, or gallops  ABDOMEN: Soft, Nontender, Nondistended; Bowel sounds present, No rebound or guarding noted   EXTREMITIES:  No lower extremity edema or calf tenderness to palpation.  No clubbing or cyanosis  PSYCH: AAOx3, pleasant, cooperative, not anxious  NEUROLOGY: 5/5 strength in all extremities, no downward drift. Sensation grossly intact.   SKIN: No rashes or lesions      ASSESSMENT AND PLAN:  Acute Hypoxic Respiratory Failure secondary to COVID-19 Pneumonia: SIRS not present on admission.   -ID consulted  -Dexamethasone 6mg qD. Kidney function too low for remdesivir (also had sx for 10 days)  -Follow up inflammatory markers (Ferritin, LDH, CRP, ESR, D-Dimer) and procalcitonin.   -Doubt superimposed bacterial pneumonia at this time. Supplemental O2 as needed.   -Check type and screen and COVID antibodies for possible plasma.   -Consider Tocilizumab if clinically worsening    Elevated D-Dimer: 4600   -Check Va LE Duplex   -Stable on 4L o2 currently and not tachy. Hold off on CT PE protocol given renal function   -If O2 oxygenation rapidly increasing, hypotensive, tachy etc then start heparin GGT until PE ruled out     NICA on CKD III, suspected pre-renal:     Hyponatremia   HAGMA (normal lacate)  -Cont with LR at 75 cc/hr   -Trend Cr, monitor strict I/O  -If worsening then check Urine Na, Cl, urea, urine Pr:Cr, Renal US and may need a Nephrology consult.   -Avoid nephrotoxic medications.  ARB and HCTZ held for now until renal function is stable.    Elevated trops: stable, likely secondary to COVID + renal function not ACS. No chest pain   CAD s/p CABG  AV replacement   HTN / HDL  -Cont DAPT, atorvastatin, and metoprolol  -ARB and HCTZ held for now given NICA   -AM EKG    DVT ppx: Heparin  GI ppx: Not indicated  GOC: Full code for now, though doesn't want to be intubated. Started GOC discussion. See note.     My note supersedes the residents in the event of a discrepancy. Patient seen and examined on 12/12/2021 on 23: 38    HPI:  79 y/o gentleman with a past medical history of HTN, DLD, CAD s/p CABG, AV replacement, CKD III admitted for COVID PNA. Notes 10 days of progressive SOB, fatigue, diarrhea, and poor oral intake. Didn't note any significant fevers at home. Noted to be hypoxic here, CXR shows b/l infiltrates, O2 improved on 4L NC. Lab work remarkable for NICA, mild hyponatremia, and elevated trop. He denied any chest pain/pressure, palpitations. O2 improved with 4L NC.  Currently he reports feeling slightly better but still fatigued.    REVIEW OF SYSTEMS:  CONSTITUTIONAL:  + weakness, no fevers, chills, night sweats, weight loss  EYES/ENT: No visual changes. No vertigo or dysphagia  NECK: No neck pain or stiffness  RESPIRATORY: +cough, no wheezing, hemoptysis. +shortness of breath  CARDIOVASCULAR: No chest pain or palpitations. No lower extremity edema  GASTROINTESTINAL: No abdominal pain. No nausea, vomiting, + diarrhea, no hematemesis  GENITOURINARY: No dysuria or hematuria   NEUROLOGICAL: No focal numbness or weakness  SKIN: No rashes or itching  HEMATOLOGIC: No easy bruising or prolonged bleeding.      PHYSICAL EXAM:  GENERAL: NAD, well-developed, tired but Non-toxic, stated age   HEAD:  Atraumatic, Normocephalic, dry mucus membrane   EYES: EOMI, Sclera White   NECK: Supple, No JVD  CHEST/LUNG: b/l crackles at lower lobes, L>R; No wheezing, rhonchi. Speaking and breathing comfortably on 4L NC, satting at 94%  HEART: Regular rate and rhythm; s1, s2, No murmurs, rubs, or gallops  ABDOMEN: Soft, Nontender, Nondistended; Bowel sounds present, No rebound or guarding noted   EXTREMITIES:  No lower extremity edema or calf tenderness to palpation.  No clubbing or cyanosis  PSYCH: AAOx3, pleasant, cooperative, not anxious  NEUROLOGY: 5/5 strength in all extremities, no downward drift. Sensation grossly intact.   SKIN: No rashes or lesions      ASSESSMENT AND PLAN:  Acute Hypoxic Respiratory Failure secondary to COVID-19 Pneumonia: SIRS not present on admission.   -ID consulted  -Dexamethasone 6mg qD. Kidney function too low for remdesivir (also had sx for 10 days)  -Follow up inflammatory markers (Ferritin, LDH, CRP, ESR, D-Dimer) and procalcitonin.   -Doubt superimposed bacterial pneumonia at this time. Supplemental O2 as needed.   -Check type and screen and COVID antibodies for possible plasma.   -Consider Tocilizumab if clinically worsening    Elevated D-Dimer: 4600   -Check Va LE Duplex   -Stable on 4L o2 currently and not tachy. Hold off on CT PE protocol given renal function   -If O2 oxygenation rapidly increasing, hypotensive, tachy etc then start heparin GGT until PE ruled out     NICA on CKD III, suspected pre-renal (+/-ATN from ARB)  Hyponatremia --> likely hypovolemic, poor solute intake, and HCTZ  HAGMA (normal lactate)  -Cont with LR at 75 cc/hr   -If bicarb continue to trend down the start sodium bicarb 650 q8  -Trend Cr, monitor strict I/O  -If worsening then check Urine Na, Cl, urea, urine Pr:Cr, Renal US and may need a Nephrology consult.   -Avoid nephrotoxic medications.  ARB and HCTZ held for now until renal function is stable.    Elevated trops: stable, likely secondary to COVID + renal function not ACS. No chest pain   CAD s/p CABG  AV replacement   HTN / HDL  -Cont DAPT, atorvastatin, and metoprolol  -ARB and HCTZ held for now given NICA   -AM EKG    DVT ppx: Heparin  GI ppx: Not indicated  GOC: Full code for now, though doesn't want to be intubated. Started GOC discussion. See note.     My note supersedes the residents in the event of a discrepancy.

## 2021-12-12 NOTE — H&P ADULT - NSHPLABSRESULTS_GEN_ALL_CORE
12.8   5.63  )-----------( 165      ( 12 Dec 2021 11:40 )             37.0       12-12    128<L>  |  93<L>  |  59<H>  ----------------------------<  106<H>  4.4   |  17  |  2.3<H>    Ca    9.0      12 Dec 2021 11:40    TPro  6.5  /  Alb  3.3<L>  /  TBili  0.5  /  DBili  x   /  AST  72<H>  /  ALT  30  /  AlkPhos  49  12-12                      Lactate Trend      CARDIAC MARKERS ( 12 Dec 2021 11:40 )  x     / 0.06 ng/mL / x     / x     / x            CAPILLARY BLOOD GLUCOSE      POCT Blood Glucose.: 113 mg/dL (12 Dec 2021 12:24)

## 2021-12-12 NOTE — ED PROVIDER NOTE - PHYSICAL EXAMINATION
CONSTITUTIONAL: tired  SKIN: Warm dry  HEAD: NCAT  EYES: NL inspection  ENT: Dry MM  NECK: Supple; non tender.  CARD: RRR  RESP: CTAB, RR 24-26, + mild increase WOB  ABD: S/NT no R/G  EXT: + pedal edema  NEURO: Grossly unremarkable  PSYCH: Cooperative, appropriate

## 2021-12-12 NOTE — H&P ADULT - NSHPREVIEWOFSYSTEMS_GEN_ALL_CORE
REVIEW OF SYSTEMS:    CONSTITUTIONAL: + fatigue wakeness  EYES/ENT: No visual changes;  No vertigo or throat pain   NECK: No pain or stiffness  RESPIRATORY: + shortness of breath  CARDIOVASCULAR: No chest pain or palpitations  GASTROINTESTINAL: No abdominal or epigastric pain. No nausea, vomiting, or hematemesis; No diarrhea or constipation. No melena or hematochezia.  GENITOURINARY: No dysuria, frequency or hematuria  NEUROLOGICAL: No numbness or weakness  SKIN: No itching, rashes

## 2021-12-12 NOTE — ED PROVIDER NOTE - OBJECTIVE STATEMENT
81 y/o M pmh HTN, DLD, CAD s/p CABG no AC, p/w progressive generalized fatigue, SOB, n/b diarrhea x 10d. No cough, change in taste or smell, travel or sick contact. No leg swelling or chest pain. No hx prior COVID infection nor vaccination. On home O2 that his friend bought for him. Desat 86% RA. 79 y/o M pmh HTN, DLD, CAD s/p CABG no AC, p/w progressive generalized fatigue, SOB, n/b diarrhea x 10d. + decreased sense of smell and taste, + decreased PO. No cough, travel or sick contact. No leg swelling or chest pain. No hx prior COVID infection nor vaccination. On home O2 that his friend bought for him. Desat 86% RA.

## 2021-12-12 NOTE — ED PROVIDER NOTE - CARE PLAN
1 Principal Discharge DX:	2019 novel coronavirus disease (COVID-19)  Secondary Diagnosis:	NICA (acute kidney injury)  Secondary Diagnosis:	Elevated troponin  Secondary Diagnosis:	Hyponatremia

## 2021-12-12 NOTE — ED ADULT TRIAGE NOTE - CHIEF COMPLAINT QUOTE
Patient BIBA from home for difficulty breathing. pt sat 89% on nasal cannula. 94% on non rebreather. fever at home

## 2021-12-12 NOTE — ED PROVIDER NOTE - NS ED ROS FT
Constitutional:  See HPI  Eyes:  No visual changes  ENMT: No neck pain or stiffness  Cardiac:  No chest pain  Respiratory:  See HPI  GI:  + diarrhea, no n/v or abdominal pain.  :  No dysuria, frequency or burning.  MS:  No back pain.  Neuro:  No headache   Skin:  No skin rash  Except as documented in the HPI,  all other systems are negative

## 2021-12-12 NOTE — ED PROCEDURE NOTE - ATTENDING CONTRIBUTION TO CARE
I supervised resident or ACP on key aspects of procedure and was available at any time during procedure. I personally supervised the study.  I reviewed the images and interpretation by the resident/ACP and have edited where appropriate.

## 2021-12-12 NOTE — PATIENT PROFILE ADULT - FALL HARM RISK - HARM RISK INTERVENTIONS

## 2021-12-12 NOTE — H&P ADULT - HISTORY OF PRESENT ILLNESS
81 y/o M Pmh HTN, DLD, CAD s/p CABG x 4 (2206) on ASA, Aortic Valve replacement (2011) unvaccinated for Flu and COVID 19, CKD presented to the ED with complaint of progressive generalized fatigue, SOB, n/b diarrhea for the past ten days. Patient also endorses decreased sense of smell and taste and overall decreased PO intake over the past week. He denies any fevers or cough, travel and or sick contacts. Patient endorses that his wife has been feeling fatigued as well without any other associated symptom He stated that he as not been evaluated by a physician in some time but has been compliant with his medications. No leg swelling or chest pain, but has been hypoxic per the wife as a family friend nurse has evaluated him couple of days ago and has brought him home Oxygen per family.     ED course: /72, HR: 83, SPO2 86% on room air, placed on 4L NC. CXR: B/L lung opacities L>R. COVID PCR +, Cr: 2.3, Na 129, AG 18, BNP 2176, Trop 0.06

## 2021-12-13 LAB
A1C WITH ESTIMATED AVERAGE GLUCOSE RESULT: 5.6 % — SIGNIFICANT CHANGE UP (ref 4–5.6)
ALBUMIN SERPL ELPH-MCNC: 3 G/DL — LOW (ref 3.5–5.2)
ALBUMIN SERPL ELPH-MCNC: 3.3 G/DL — LOW (ref 3.5–5.2)
ALP SERPL-CCNC: 46 U/L — SIGNIFICANT CHANGE UP (ref 30–115)
ALP SERPL-CCNC: 52 U/L — SIGNIFICANT CHANGE UP (ref 30–115)
ALT FLD-CCNC: 28 U/L — SIGNIFICANT CHANGE UP (ref 0–41)
ALT FLD-CCNC: 31 U/L — SIGNIFICANT CHANGE UP (ref 0–41)
ANION GAP SERPL CALC-SCNC: 17 MMOL/L — HIGH (ref 7–14)
ANION GAP SERPL CALC-SCNC: 19 MMOL/L — HIGH (ref 7–14)
ANION GAP SERPL CALC-SCNC: 20 MMOL/L — HIGH (ref 7–14)
APPEARANCE UR: CLEAR — SIGNIFICANT CHANGE UP
AST SERPL-CCNC: 49 U/L — HIGH (ref 0–41)
AST SERPL-CCNC: 60 U/L — HIGH (ref 0–41)
BACTERIA # UR AUTO: NEGATIVE — SIGNIFICANT CHANGE UP
BILIRUB SERPL-MCNC: 0.4 MG/DL — SIGNIFICANT CHANGE UP (ref 0.2–1.2)
BILIRUB SERPL-MCNC: 0.5 MG/DL — SIGNIFICANT CHANGE UP (ref 0.2–1.2)
BILIRUB UR-MCNC: NEGATIVE — SIGNIFICANT CHANGE UP
BUN SERPL-MCNC: 56 MG/DL — HIGH (ref 10–20)
BUN SERPL-MCNC: 56 MG/DL — HIGH (ref 10–20)
BUN SERPL-MCNC: 58 MG/DL — HIGH (ref 10–20)
CALCIUM SERPL-MCNC: 8.5 MG/DL — SIGNIFICANT CHANGE UP (ref 8.5–10.1)
CALCIUM SERPL-MCNC: 8.7 MG/DL — SIGNIFICANT CHANGE UP (ref 8.5–10.1)
CALCIUM SERPL-MCNC: 8.8 MG/DL — SIGNIFICANT CHANGE UP (ref 8.5–10.1)
CHLORIDE SERPL-SCNC: 94 MMOL/L — LOW (ref 98–110)
CHLORIDE SERPL-SCNC: 97 MMOL/L — LOW (ref 98–110)
CHLORIDE SERPL-SCNC: 97 MMOL/L — LOW (ref 98–110)
CK SERPL-CCNC: 354 U/L — HIGH (ref 0–225)
CO2 SERPL-SCNC: 14 MMOL/L — LOW (ref 17–32)
CO2 SERPL-SCNC: 16 MMOL/L — LOW (ref 17–32)
CO2 SERPL-SCNC: 16 MMOL/L — LOW (ref 17–32)
COLOR SPEC: YELLOW — SIGNIFICANT CHANGE UP
COMMENT - URINE: SIGNIFICANT CHANGE UP
CREAT ?TM UR-MCNC: 151 MG/DL — SIGNIFICANT CHANGE UP
CREAT SERPL-MCNC: 1.9 MG/DL — HIGH (ref 0.7–1.5)
CREAT SERPL-MCNC: 2 MG/DL — HIGH (ref 0.7–1.5)
CREAT SERPL-MCNC: 2.1 MG/DL — HIGH (ref 0.7–1.5)
CRP SERPL-MCNC: 85 MG/L — HIGH
D DIMER BLD IA.RAPID-MCNC: 4602 NG/ML DDU — HIGH (ref 0–230)
DIFF PNL FLD: ABNORMAL
EPI CELLS # UR: 4 /HPF — SIGNIFICANT CHANGE UP (ref 0–5)
ESTIMATED AVERAGE GLUCOSE: 114 MG/DL — SIGNIFICANT CHANGE UP (ref 68–114)
GLUCOSE SERPL-MCNC: 107 MG/DL — HIGH (ref 70–99)
GLUCOSE SERPL-MCNC: 108 MG/DL — HIGH (ref 70–99)
GLUCOSE SERPL-MCNC: 86 MG/DL — SIGNIFICANT CHANGE UP (ref 70–99)
GLUCOSE UR QL: NEGATIVE — SIGNIFICANT CHANGE UP
HYALINE CASTS # UR AUTO: 7 /LPF — SIGNIFICANT CHANGE UP (ref 0–7)
KETONES UR-MCNC: SIGNIFICANT CHANGE UP
LEUKOCYTE ESTERASE UR-ACNC: NEGATIVE — SIGNIFICANT CHANGE UP
MAGNESIUM SERPL-MCNC: 2.4 MG/DL — SIGNIFICANT CHANGE UP (ref 1.8–2.4)
NITRITE UR-MCNC: NEGATIVE — SIGNIFICANT CHANGE UP
OSMOLALITY SERPL: 282 MOS/KG — SIGNIFICANT CHANGE UP (ref 280–301)
OSMOLALITY SERPL: 285 MOS/KG — SIGNIFICANT CHANGE UP (ref 280–301)
OSMOLALITY UR: 558 MOS/KG — SIGNIFICANT CHANGE UP (ref 50–1200)
PH UR: 6 — SIGNIFICANT CHANGE UP (ref 5–8)
PHOSPHATE SERPL-MCNC: 3.4 MG/DL — SIGNIFICANT CHANGE UP (ref 2.1–4.9)
POTASSIUM SERPL-MCNC: 3.4 MMOL/L — LOW (ref 3.5–5)
POTASSIUM SERPL-MCNC: 3.5 MMOL/L — SIGNIFICANT CHANGE UP (ref 3.5–5)
POTASSIUM SERPL-MCNC: 3.7 MMOL/L — SIGNIFICANT CHANGE UP (ref 3.5–5)
POTASSIUM SERPL-SCNC: 3.4 MMOL/L — LOW (ref 3.5–5)
POTASSIUM SERPL-SCNC: 3.5 MMOL/L — SIGNIFICANT CHANGE UP (ref 3.5–5)
POTASSIUM SERPL-SCNC: 3.7 MMOL/L — SIGNIFICANT CHANGE UP (ref 3.5–5)
PROCALCITONIN SERPL-MCNC: 0.25 NG/ML — HIGH (ref 0.02–0.1)
PROT ?TM UR-MCNC: 87 MG/DLG/24H — SIGNIFICANT CHANGE UP
PROT SERPL-MCNC: 5.6 G/DL — LOW (ref 6–8)
PROT SERPL-MCNC: 6.1 G/DL — SIGNIFICANT CHANGE UP (ref 6–8)
PROT UR-MCNC: ABNORMAL
PROT/CREAT UR-RTO: 0.6 RATIO — HIGH (ref 0–0.2)
RBC CASTS # UR COMP ASSIST: 6 /HPF — HIGH (ref 0–4)
SODIUM SERPL-SCNC: 130 MMOL/L — LOW (ref 135–146)
SODIUM UR-SCNC: <20 MMOL/L — SIGNIFICANT CHANGE UP
SP GR SPEC: 1.02 — SIGNIFICANT CHANGE UP (ref 1.01–1.03)
TROPONIN T SERPL-MCNC: 0.05 NG/ML — CRITICAL HIGH
UROBILINOGEN FLD QL: SIGNIFICANT CHANGE UP
WBC UR QL: 8 /HPF — HIGH (ref 0–5)

## 2021-12-13 PROCEDURE — 78580 LUNG PERFUSION IMAGING: CPT | Mod: 26

## 2021-12-13 PROCEDURE — 76770 US EXAM ABDO BACK WALL COMP: CPT | Mod: 26

## 2021-12-13 PROCEDURE — 93970 EXTREMITY STUDY: CPT | Mod: 26

## 2021-12-13 PROCEDURE — 99233 SBSQ HOSP IP/OBS HIGH 50: CPT

## 2021-12-13 PROCEDURE — 71045 X-RAY EXAM CHEST 1 VIEW: CPT | Mod: 26

## 2021-12-13 RX ORDER — POTASSIUM CHLORIDE 20 MEQ
20 PACKET (EA) ORAL ONCE
Refills: 0 | Status: COMPLETED | OUTPATIENT
Start: 2021-12-13 | End: 2021-12-13

## 2021-12-13 RX ORDER — FUROSEMIDE 40 MG
40 TABLET ORAL DAILY
Refills: 0 | Status: DISCONTINUED | OUTPATIENT
Start: 2021-12-13 | End: 2021-12-16

## 2021-12-13 RX ORDER — TOCILIZUMAB 20 MG/ML
800 INJECTION, SOLUTION, CONCENTRATE INTRAVENOUS ONCE
Refills: 0 | Status: COMPLETED | OUTPATIENT
Start: 2021-12-13 | End: 2021-12-14

## 2021-12-13 RX ORDER — APIXABAN 2.5 MG/1
5 TABLET, FILM COATED ORAL EVERY 12 HOURS
Refills: 0 | Status: DISCONTINUED | OUTPATIENT
Start: 2021-12-13 | End: 2021-12-20

## 2021-12-13 RX ORDER — SODIUM BICARBONATE 1 MEQ/ML
650 SYRINGE (ML) INTRAVENOUS THREE TIMES A DAY
Refills: 0 | Status: DISCONTINUED | OUTPATIENT
Start: 2021-12-13 | End: 2021-12-14

## 2021-12-13 RX ORDER — SODIUM CHLORIDE 9 MG/ML
1000 INJECTION, SOLUTION INTRAVENOUS
Refills: 0 | Status: DISCONTINUED | OUTPATIENT
Start: 2021-12-13 | End: 2021-12-13

## 2021-12-13 RX ADMIN — APIXABAN 5 MILLIGRAM(S): 2.5 TABLET, FILM COATED ORAL at 12:27

## 2021-12-13 RX ADMIN — Medication 40 MILLIGRAM(S): at 12:35

## 2021-12-13 RX ADMIN — Medication 650 MILLIGRAM(S): at 12:36

## 2021-12-13 RX ADMIN — Medication 81 MILLIGRAM(S): at 12:24

## 2021-12-13 RX ADMIN — Medication 50 MILLIEQUIVALENT(S): at 13:04

## 2021-12-13 RX ADMIN — SODIUM CHLORIDE 75 MILLILITER(S): 9 INJECTION, SOLUTION INTRAVENOUS at 06:36

## 2021-12-13 RX ADMIN — Medication 50 MILLIGRAM(S): at 05:24

## 2021-12-13 RX ADMIN — HEPARIN SODIUM 5000 UNIT(S): 5000 INJECTION INTRAVENOUS; SUBCUTANEOUS at 05:23

## 2021-12-13 RX ADMIN — Medication 6 MILLIGRAM(S): at 05:23

## 2021-12-13 NOTE — GOALS OF CARE CONVERSATION - ADVANCED CARE PLANNING - CONVERSATION DETAILS
Discussed goals of care in the event of worsening COVID / and emergency   We discussed the need for intubation and resuscitation as a last resort should his COVID continue to progress.  I explained the risks and benefits of intubation as well as having to forgo cardiac resuscitation. He expressed he does not want to be intubated or resuscitated.
Discussed goals of care in the event of worsening COVID / and emergency   We discussed the need for intubation as a last resort should his COVID continue to progress. He expressed not wanted to be intubated. I explained the risks and benefits of intubation as well as having to forgo cardiac resuscitation should be DNI.    He understood but was ultimately unsure of what to do. We discussed the option for him to think more about this and discuss it further with his family before coming to a firm decision. MOLST at bedside for him to review should he need.

## 2021-12-13 NOTE — CONSULT NOTE ADULT - SUBJECTIVE AND OBJECTIVE BOX
REMA, SAÚL  80y, Male  Allergy: No Known Allergies      CHIEF COMPLAINT: COVID PNA (12 Dec 2021 16:27)      LOS  1d    HPI:  81 y/o M Pmh HTN, DLD, CAD s/p CABG x 4 (2206) on ASA, Aortic Valve replacement (2011) unvaccinated for Flu and COVID 19, CKD presented to the ED with complaint of progressive generalized fatigue, SOB, n/b diarrhea for the past ten days. Patient also endorses decreased sense of smell and taste and overall decreased PO intake over the past week. He denies any fevers or cough, travel and or sick contacts. Patient endorses that his wife has been feeling fatigued as well without any other associated symptom He stated that he as not been evaluated by a physician in some time but has been compliant with his medications. No leg swelling or chest pain, but has been hypoxic per the wife as a family friend nurse has evaluated him couple of days ago and has brought him home Oxygen per family.     ED course: /72, HR: 83, SPO2 86% on room air, placed on 4L NC. CXR: B/L lung opacities L>R. COVID PCR +, Cr: 2.3, Na 129, AG 18, BNP 2176, Trop 0.06 (12 Dec 2021 16:27)      INFECTIOUS DISEASE HISTORY:  History as above.   Currently on 4L NC.    PAST MEDICAL & SURGICAL HISTORY:  As above.     Chronic systolic congestive heart failure    HTN (hypertension)    Dyslipidemia        FAMILY HISTORY  Family Hx reviewed and non-contributory     SOCIAL HISTORY  Social History:  Marital Status:  ( X  )    (   ) Single    (   )    (  )   Lives with: (  ) alone  (  ) children   (  ) spouse   (  ) parents  (  ) other  Recent Travel: No recent travel  Occupation:    Substance Use (street drugs): ( x ) never used  (  ) other:  Tobacco Usage:  ( x  ) never smoked   (   ) former smoker   (   ) current smoker  (     ) pack year  Alcohol Usage: None   Baseline mobility: independant (12 Dec 2021 16:27)        ROS  General: Denies rigors, nightsweats  HEENT: Denies headache, rhinorrhea, sore throat, eye pain  CV: Denies CP, palpitations  PULM: Denies wheezing, hemoptysis  GI: Denies hematemesis, hematochezia, melena  : Denies discharge, hematuria  MSK: Denies arthralgias, myalgias  SKIN: Denies rash, lesions  NEURO: Denies paresthesias, weakness  PSYCH: Denies depression, anxiety    VITALS:  T(F): 96.6, Max: 100.2 (12-12-21 @ 11:48)  HR: 62  BP: 113/63  RR: 18Vital Signs Last 24 Hrs  T(C): 35.9 (13 Dec 2021 05:45), Max: 37.9 (12 Dec 2021 11:48)  T(F): 96.6 (13 Dec 2021 05:45), Max: 100.2 (12 Dec 2021 11:48)  HR: 62 (13 Dec 2021 05:45) (50 - 85)  BP: 113/63 (13 Dec 2021 05:45) (113/63 - 154/75)  BP(mean): --  RR: 18 (13 Dec 2021 05:45) (18 - 24)  SpO2: 91% (13 Dec 2021 08:16) (88% - 94%)    PHYSICAL EXAM:  Gen: NAD, resting in bed  HEENT: Normocephalic, atraumatic  Neck: supple, no lymphadenopathy  CV: Regular rate & regular rhythm  Lungs: decreased BS at bases, no fremitus  Abdomen: Soft, BS present  Ext: Warm, well perfused  Neuro: non focal, awake  Skin: no rash, no erythema  Lines: no phlebitis    TESTS & MEASUREMENTS:                        12.8   5.63  )-----------( 165      ( 12 Dec 2021 11:40 )             37.0     12-13    130<L>  |  97<L>  |  56<H>  ----------------------------<  107<H>  3.4<L>   |  16<L>  |  1.9<H>    Ca    8.7      13 Dec 2021 01:34  Mg     2.4     12-12    TPro  6.1  /  Alb  3.3<L>  /  TBili  0.5  /  DBili  x   /  AST  60<H>  /  ALT  31  /  AlkPhos  52  12-12    eGFR if Non African American: 33 mL/min/1.73M2 (12-13-21 @ 01:34)  eGFR if African American: 38 mL/min/1.73M2 (12-13-21 @ 01:34)  eGFR if Non African American: 29 mL/min/1.73M2 (12-12-21 @ 20:00)  eGFR if African American: 34 mL/min/1.73M2 (12-12-21 @ 20:00)  eGFR if Non African American: 26 mL/min/1.73M2 (12-12-21 @ 11:40)  eGFR if : 30 mL/min/1.73M2 (12-12-21 @ 11:40)    LIVER FUNCTIONS - ( 12 Dec 2021 20:00 )  Alb: 3.3 g/dL / Pro: 6.1 g/dL / ALK PHOS: 52 U/L / ALT: 31 U/L / AST: 60 U/L / GGT: x                 Blood Gas Venous - Lactate: 1.60 mmol/L (12-12-21 @ 12:10)      INFECTIOUS DISEASES TESTING      RADIOLOGY & ADDITIONAL TESTS:  I have personally reviewed the last Chest xray  CXR      CT      CARDIOLOGY TESTING  12 Lead ECG:   Ventricular Rate 77 BPM    Atrial Rate 250 BPM    QRS Duration 138 ms    Q-T Interval 444 ms    QTC Calculation(Bazett) 502 ms    R Axis 89 degrees    T Axis -73 degrees    Diagnosis Line Sinus rhythm with frequent Premature ventricular complexes  Left bundle branch block  Abnormal ECG    Confirmed by Shari Khan MD ([id number]) on 12/12/2021 6:35:14 PM (12-12-21 @ 12:43)      MEDICATIONS  apixaban 5 Oral every 12 hours  aspirin  chewable 81 Oral daily  atorvastatin 20 Oral at bedtime  clopidogrel Tablet 75 Oral daily  dexAMETHasone  Injectable 6 IV Push daily  influenza  Vaccine (HIGH DOSE) 0.7 IntraMuscular once  latanoprost 0.005% Ophthalmic Solution 1 Both EYES at bedtime  metoprolol succinate ER 50 Oral daily      Weight  Weight (kg): 104.326 (12-12-21 @ 11:48)    ANTIBIOTICS:      ALLERGIES:  No Known Allergies     REMA, SAÚL  80y, Male  Allergy: No Known Allergies      CHIEF COMPLAINT: COVID PNA (12 Dec 2021 16:27)      LOS  1d    HPI:  81 y/o M Pmh HTN, DLD, CAD s/p CABG x 4 (2206) on ASA, Aortic Valve replacement (2011) unvaccinated for Flu and COVID 19, CKD presented to the ED with complaint of progressive generalized fatigue, SOB, n/b diarrhea for the past ten days. Patient also endorses decreased sense of smell and taste and overall decreased PO intake over the past week. He denies any fevers or cough, travel and or sick contacts. Patient endorses that his wife has been feeling fatigued as well without any other associated symptom He stated that he as not been evaluated by a physician in some time but has been compliant with his medications. No leg swelling or chest pain, but has been hypoxic per the wife as a family friend nurse has evaluated him couple of days ago and has brought him home Oxygen per family.     ED course: /72, HR: 83, SPO2 86% on room air, placed on 4L NC. CXR: B/L lung opacities L>R. COVID PCR +, Cr: 2.3, Na 129, AG 18, BNP 2176, Trop 0.06 (12 Dec 2021 16:27)      INFECTIOUS DISEASE HISTORY:  History as above.   Currently on 4L NC.  Continues to feel short of breath.   Denies any fevers, nausea, vomiting.     PAST MEDICAL & SURGICAL HISTORY:  As above.     Chronic systolic congestive heart failure    HTN (hypertension)    Dyslipidemia        FAMILY HISTORY  Family Hx reviewed and non-contributory     SOCIAL HISTORY  Social History:  Marital Status:  ( X  )    (   ) Single    (   )    (  )   Lives with: (  ) alone  (  ) children   (  ) spouse   (  ) parents  (  ) other  Recent Travel: No recent travel  Occupation:    Substance Use (street drugs): ( x ) never used  (  ) other:  Tobacco Usage:  ( x  ) never smoked   (   ) former smoker   (   ) current smoker  (     ) pack year  Alcohol Usage: None   Baseline mobility: independant (12 Dec 2021 16:27)        ROS  General: Denies rigors, nightsweats  HEENT: Denies headache, rhinorrhea, sore throat, eye pain  CV: Denies CP, palpitations  PULM: Denies wheezing, hemoptysis  GI: Denies hematemesis, hematochezia, melena  : Denies discharge, hematuria  MSK: Denies arthralgias, myalgias  SKIN: Denies rash, lesions  NEURO: Denies paresthesias, weakness  PSYCH: Denies depression, anxiety    VITALS:  T(F): 96.6, Max: 100.2 (12-12-21 @ 11:48)  HR: 62  BP: 113/63  RR: 18Vital Signs Last 24 Hrs  T(C): 35.9 (13 Dec 2021 05:45), Max: 37.9 (12 Dec 2021 11:48)  T(F): 96.6 (13 Dec 2021 05:45), Max: 100.2 (12 Dec 2021 11:48)  HR: 62 (13 Dec 2021 05:45) (50 - 85)  BP: 113/63 (13 Dec 2021 05:45) (113/63 - 154/75)  BP(mean): --  RR: 18 (13 Dec 2021 05:45) (18 - 24)  SpO2: 91% (13 Dec 2021 08:16) (88% - 94%)    PHYSICAL EXAM:  Gen: NAD, resting in bed  HEENT: Normocephalic, atraumatic  Neck: supple, no lymphadenopathy  CV: Regular rate & regular rhythm  Lungs: decreased BS at bases, no fremitus  Abdomen: Soft, BS present  Ext: Warm, well perfused  Neuro: non focal, awake  Skin: no rash, no erythema  Lines: no phlebitis    TESTS & MEASUREMENTS:                        12.8   5.63  )-----------( 165      ( 12 Dec 2021 11:40 )             37.0     12-13    130<L>  |  97<L>  |  56<H>  ----------------------------<  107<H>  3.4<L>   |  16<L>  |  1.9<H>    Ca    8.7      13 Dec 2021 01:34  Mg     2.4     12-12    TPro  6.1  /  Alb  3.3<L>  /  TBili  0.5  /  DBili  x   /  AST  60<H>  /  ALT  31  /  AlkPhos  52  12-12    eGFR if Non African American: 33 mL/min/1.73M2 (12-13-21 @ 01:34)  eGFR if African American: 38 mL/min/1.73M2 (12-13-21 @ 01:34)  eGFR if Non African American: 29 mL/min/1.73M2 (12-12-21 @ 20:00)  eGFR if African American: 34 mL/min/1.73M2 (12-12-21 @ 20:00)  eGFR if Non African American: 26 mL/min/1.73M2 (12-12-21 @ 11:40)  eGFR if : 30 mL/min/1.73M2 (12-12-21 @ 11:40)    LIVER FUNCTIONS - ( 12 Dec 2021 20:00 )  Alb: 3.3 g/dL / Pro: 6.1 g/dL / ALK PHOS: 52 U/L / ALT: 31 U/L / AST: 60 U/L / GGT: x                 Blood Gas Venous - Lactate: 1.60 mmol/L (12-12-21 @ 12:10)      INFECTIOUS DISEASES TESTING      RADIOLOGY & ADDITIONAL TESTS:  I have personally reviewed the last Chest xray  CXR      CT      CARDIOLOGY TESTING  12 Lead ECG:   Ventricular Rate 77 BPM    Atrial Rate 250 BPM    QRS Duration 138 ms    Q-T Interval 444 ms    QTC Calculation(Bazett) 502 ms    R Axis 89 degrees    T Axis -73 degrees    Diagnosis Line Sinus rhythm with frequent Premature ventricular complexes  Left bundle branch block  Abnormal ECG    Confirmed by Shari Khan MD ([id number]) on 12/12/2021 6:35:14 PM (12-12-21 @ 12:43)      MEDICATIONS  apixaban 5 Oral every 12 hours  aspirin  chewable 81 Oral daily  atorvastatin 20 Oral at bedtime  clopidogrel Tablet 75 Oral daily  dexAMETHasone  Injectable 6 IV Push daily  influenza  Vaccine (HIGH DOSE) 0.7 IntraMuscular once  latanoprost 0.005% Ophthalmic Solution 1 Both EYES at bedtime  metoprolol succinate ER 50 Oral daily      Weight  Weight (kg): 104.326 (12-12-21 @ 11:48)    ANTIBIOTICS:      ALLERGIES:  No Known Allergies

## 2021-12-13 NOTE — CHART NOTE - NSCHARTNOTEFT_GEN_A_CORE
VQ scan with high probability of PE, pt already on therapeutic AC oxygen requirements remain stable.
Got a call from Dr. Thacker to give 1 dose of toci.   Giving it now.

## 2021-12-13 NOTE — CONSULT NOTE ADULT - ASSESSMENT
Acute kidney injury   chronic kidney disease stage 3  (baseline cr 1.3 in 2019)  COVID-19 PNA  hyponatremia  metabolic acidosis (renal failure) and respiratory alkalosis (PNA)  CAD / AVR      plan:    incomplete note   Acute kidney injury   chronic kidney disease stage 3  (baseline cr 1.3 in 2019, 1.45 10/2021)  proteinuria  normal renal sono   oLrie+ < 20, Uosm 558  COVID-19 PNA  hyponatremia from thiazide  metabolic acidosis (renal failure) and respiratory alkalosis (PNA)  CAD / AVR  HTN with low side BP's       plan:        Acute kidney injury due to ischemic kidney injury (COVID infection, ARB, diuretics)   chronic kidney disease stage 3  (baseline cr 1.3 in 2019, 1.45 10/2021)  proteinuria with few fine granular casts  normal renal sono   Lorie+ < 20, Uosm 558  COVID-19 PNA  hyponatremia from thiazide  metabolic acidosis (renal failure) and respiratory alkalosis (PNA)  CAD / AVR  HTN with low side BP's       plan:    keep off ARB and thiazide  off amlodipine, would only restart if SBP > 160's  start sodium bicarbonate 650mg po tid  small iv bolus and short course IVF given, now lasix 40mg po qd started by primary team  understanding avoiding volume overload complicating COVID PNA is important, careful with diuresis with close monitoring of renal function and lytes  KCl given  d/w Dr. Murphy and ID, Dr. Larkin

## 2021-12-13 NOTE — CONSULT NOTE ADULT - ASSESSMENT
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    IMPRESSION  #COVID-19, severe  - D-Dimer Assay, Quantitative: 4602: Manufacturers recommended Cut off for VTE is 230 ng/ml D-DU ng/mL DDU (12.12.21 @ 20:00)    #Hyponatremia  #CKD     #CAD s/p CABG  #s/p AVR 2011  #Abx allergy: NKDa      RECOMMENDATIONS  This is a preliminary incomplete pended note, all final recommendations to follow after interview and examination of the patient.    Spectra [phone]       ASSESSMENT  79 y/o M Pmh HTN, DLD, CAD s/p CABG x 4 (2206) on ASA, Aortic Valve replacement (2011) unvaccinated for Flu and COVID 19, CKD presented to the ED with complaint of progressive generalized fatigue, SOB, n/b diarrhea for the past ten days.    IMPRESSION  #COVID-19, severe  - D-Dimer Assay, Quantitative: 4602: Manufacturers recommended Cut off for VTE is 230 ng/ml D-DU ng/mL DDU (12.12.21 @ 20:00)    #Hyponatremia  #CKD     #CAD s/p CABG  #s/p AVR 2011  #Abx allergy: NKDa      RECOMMENDATIONS  - continue dex 6 mg daily   - monitor off antibiotics   - follow-up LE ultrasound   - trend inflammatory markers -- Procal may be elevated in setting of CKD -- do not suspec bacterial pneumonia at this time   - please call if with rapidly progressing hypoxia     Please call or message on [employer] Teams if with any questions.  Spectra [phone]

## 2021-12-13 NOTE — CONSULT NOTE ADULT - SUBJECTIVE AND OBJECTIVE BOX
NEPHROLOGY CONSULTATION NOTE    81 yo male with PMH as below, including HTN, CAD, CABG, s/p AVR 2011, CKD (Cr 1.3 2019), unvaccinated for COVID, presents with hypoxia and admitted with COVID-19 PNA.  Renal consulted for abnormal renal function on admission with electrolyte derangements.      PAST MEDICAL & SURGICAL HISTORY:  Chronic systolic congestive heart failure    HTN (hypertension)    Dyslipidemia      Allergies:  No Known Allergies    Home Medications Reviewed    SOCIAL HISTORY:  Denies ETOH,Smoking,   FAMILY HISTORY:        REVIEW OF SYSTEMS:  CONSTITUTIONAL: No weakness, fevers or chills  EYES/ENT: No visual changes;  No vertigo or throat pain   NECK: No pain or stiffness  RESPIRATORY: No cough, wheezing, hemoptysis; No shortness of breath  CARDIOVASCULAR: No chest pain or palpitations.  GASTROINTESTINAL: No abdominal or epigastric pain. No nausea, vomiting, or hematemesis; No diarrhea or constipation. No melena or hematochezia.  GENITOURINARY: No dysuria, frequency, foamy urine, urinary urgency, incontinence or hematuria  NEUROLOGICAL: No numbness or weakness  SKIN: No itching, burning, rashes, or lesions   VASCULAR: No bilateral lower extremity edema.   All other review of systems is negative unless indicated above.    PHYSICAL EXAM:  Constitutional: NAD  HEENT: anicteric sclera, oropharynx clear, MMM  Neck: No JVD  Respiratory: CTAB, no wheezes, rales or rhonchi  Cardiovascular: S1, S2, RRR  Gastrointestinal: BS+, soft, NT/ND  Extremities: No cyanosis or clubbing. No peripheral edema  Neurological: A/O x 3, no focal deficits  Psychiatric: Normal mood, normal affect  : No CVA tenderness. No covarrubias.   Skin: No rashes    Hospital Medications:   MEDICATIONS  (STANDING):  apixaban 5 milliGRAM(s) Oral every 12 hours  aspirin  chewable 81 milliGRAM(s) Oral daily  atorvastatin 20 milliGRAM(s) Oral at bedtime  clopidogrel Tablet 75 milliGRAM(s) Oral daily  dexAMETHasone  Injectable 6 milliGRAM(s) IV Push daily  influenza  Vaccine (HIGH DOSE) 0.7 milliLiter(s) IntraMuscular once  latanoprost 0.005% Ophthalmic Solution 1 Drop(s) Both EYES at bedtime  metoprolol succinate ER 50 milliGRAM(s) Oral daily  potassium chloride  20 mEq/100 mL IVPB 20 milliEquivalent(s) IV Intermittent once        VITALS:  T(F): 96.6 (12-13-21 @ 05:45), Max: 100.2 (12-12-21 @ 11:48)  HR: 62 (12-13-21 @ 05:45)  BP: 113/63 (12-13-21 @ 05:45)  RR: 18 (12-13-21 @ 05:45)  SpO2: 91% (12-13-21 @ 08:16)  Wt(kg): --      Weight (kg): 104.326 (12-12 @ 11:48)    LABS:  12-13    130<L>  |  97<L>  |  56<H>  ----------------------------<  108<H>  3.7   |  14<L>  |  2.0<H>    Ca    8.5      13 Dec 2021 07:30  Phos  3.4     12-13  Mg     2.4     12-12    TPro  5.6<L>  /  Alb  3.0<L>  /  TBili  0.4  /  DBili      /  AST  49<H>  /  ALT  28  /  AlkPhos  46  12-13                          12.8   5.63  )-----------( 165      ( 12 Dec 2021 11:40 )             37.0       Urine Studies:        RADIOLOGY & ADDITIONAL STUDIES:   NEPHROLOGY CONSULTATION NOTE    81 yo male with PMH as below, including HTN, CAD, CABG, s/p AVR 2011, CKD (Cr 1.3 2019), unvaccinated for COVID, presents with hypoxia and admitted with COVID-19 PNA.  Renal consulted for abnormal renal function on admission with electrolyte derangements.      PAST MEDICAL & SURGICAL HISTORY:  Chronic systolic congestive heart failure    HTN (hypertension)    Dyslipidemia      Allergies:  No Known Allergies    Home Medications Reviewed    SOCIAL HISTORY:  Denies ETOH,Smoking,   FAMILY HISTORY:        REVIEW OF SYSTEMS:  CONSTITUTIONAL: No weakness, fevers or chills  EYES/ENT: No visual changes;  No vertigo or throat pain   NECK: No pain or stiffness  RESPIRATORY: No cough, wheezing, hemoptysis; + shortness of breath  CARDIOVASCULAR: No chest pain or palpitations.  GASTROINTESTINAL: No abdominal or epigastric pain. No nausea, vomiting, or hematemesis; No diarrhea or constipation. No melena or hematochezia.  GENITOURINARY: No dysuria, frequency, foamy urine, urinary urgency, incontinence or hematuria  NEUROLOGICAL: No numbness or weakness  SKIN: No itching, burning, rashes, or lesions   VASCULAR: No bilateral lower extremity edema.   All other review of systems is negative unless indicated above.    PHYSICAL EXAM:  Constitutional: NAD  HEENT: anicteric sclera, oropharynx clear, MMM O2 NC  Neck: No JVD  Respiratory: CTAB   Cardiovascular: S1, S2, RRR  Gastrointestinal: BS+, soft, NT/ND  Extremities: No cyanosis or clubbing. + peripheral edema  Neurological: A/O x 3, no focal deficits  Psychiatric: Normal mood, normal affect  : No CVA tenderness. No covarrubias.   Skin: No rashes    Hospital Medications:   MEDICATIONS  (STANDING):  apixaban 5 milliGRAM(s) Oral every 12 hours  aspirin  chewable 81 milliGRAM(s) Oral daily  atorvastatin 20 milliGRAM(s) Oral at bedtime  clopidogrel Tablet 75 milliGRAM(s) Oral daily  dexAMETHasone  Injectable 6 milliGRAM(s) IV Push daily  influenza  Vaccine (HIGH DOSE) 0.7 milliLiter(s) IntraMuscular once  latanoprost 0.005% Ophthalmic Solution 1 Drop(s) Both EYES at bedtime  metoprolol succinate ER 50 milliGRAM(s) Oral daily  potassium chloride  20 mEq/100 mL IVPB 20 milliEquivalent(s) IV Intermittent once        VITALS:  T(F): 96.6 (12-13-21 @ 05:45), Max: 100.2 (12-12-21 @ 11:48)  HR: 62 (12-13-21 @ 05:45)  BP: 113/63 (12-13-21 @ 05:45)  RR: 18 (12-13-21 @ 05:45)  SpO2: 91% (12-13-21 @ 08:16)  Wt(kg): --      Weight (kg): 104.326 (12-12 @ 11:48)    LABS:  12-13    130<L>  |  97<L>  |  56<H>  ----------------------------<  108<H>  3.7   |  14<L>  |  2.0<H>    Ca    8.5      13 Dec 2021 07:30  Phos  3.4     12-13  Mg     2.4     12-12    TPro  5.6<L>  /  Alb  3.0<L>  /  TBili  0.4  /  DBili      /  AST  49<H>  /  ALT  28  /  AlkPhos  46  12-13                          12.8   5.63  )-----------( 165      ( 12 Dec 2021 11:40 )             37.0       Urine Studies:        RADIOLOGY & ADDITIONAL STUDIES:

## 2021-12-13 NOTE — PROGRESS NOTE ADULT - SUBJECTIVE AND OBJECTIVE BOX
----------Daily Progress Note----------    HISTORY OF PRESENT ILLNESS:  Patient is a 80y old Male who presents with a chief complaint of COVID PNA (13 Dec 2021 09:11)    Currently admitted to medicine with the primary diagnosis of 2019 novel coronavirus disease (COVID-19)    81 y/o M Pmh HTN, DLD, CAD s/p CABG x 4 (2206) on ASA, Aortic Valve replacement (2011) unvaccinated for Flu and COVID 19, CKD presented to the ED with complaint of progressive generalized fatigue, SOB, n/b diarrhea for the past ten days. Patient also endorses decreased sense of smell and taste and overall decreased PO intake over the past week. He denies any fevers or cough, travel and or sick contacts. Patient endorses that his wife has been feeling fatigued as well without any other associated symptom He stated that he as not been evaluated by a physician in some time but has been compliant with his medications. No leg swelling or chest pain, but has been hypoxic per the wife as a family friend nurse has evaluated him couple of days ago and has brought him home Oxygen per family.     ED course: /72, HR: 83, SPO2 86% on room air, placed on 4L NC. CXR: B/L lung opacities L>R. COVID PCR +, Cr: 2.3, Na 129, AG 18, BNP 2176, Trop 0.06       Today is hospital day 1d.     INTERVAL HOSPITAL COURSE / OVERNIGHT EVENTS:    Patient was examined and seen at bedside. This morning he is resting comfortably in bed and reports no new issues or overnight events.     Review of Systems: Patient is endorsing SOB. Patient denies any fever, chills, headache, dizziness. Patient denies chest pain, palpitation, SOB     <<<<<PAST MEDICAL & SURGICAL HISTORY>>>>>  Chronic systolic congestive heart failure    HTN (hypertension)    Dyslipidemia      ALLERGIES  No Known Allergies      Home Medications:  LATANOPROST  0.005 % SOLN:  (12 Dec 2021 16:26)  METOPROLOL ER SUCCINATE 50MG TABS: TAKE 1 TABLET BY MOUTH AT BEDTIME (12 Dec 2021 16:26)  OLMESARTAN MEDOXOMIL/AMLODIPINE/HYD ROCHLOROTHIAZIDE 40-5-25 MG TABS:  (12 Dec 2021 16:26)  rosuvastatin 5 mg oral tablet: 1 tab(s) orally once a day (12 Dec 2021 16:26)        MEDICATIONS  STANDING MEDICATIONS  apixaban 5 milliGRAM(s) Oral every 12 hours  aspirin  chewable 81 milliGRAM(s) Oral daily  atorvastatin 20 milliGRAM(s) Oral at bedtime  clopidogrel Tablet 75 milliGRAM(s) Oral daily  dexAMETHasone  Injectable 6 milliGRAM(s) IV Push daily  influenza  Vaccine (HIGH DOSE) 0.7 milliLiter(s) IntraMuscular once  latanoprost 0.005% Ophthalmic Solution 1 Drop(s) Both EYES at bedtime  metoprolol succinate ER 50 milliGRAM(s) Oral daily  potassium chloride  20 mEq/100 mL IVPB 20 milliEquivalent(s) IV Intermittent once    PRN MEDICATIONS  acetaminophen     Tablet .. 650 milliGRAM(s) Oral every 4 hours PRN    VITALS:  T(F): 96.6  HR: 62  BP: 113/63  RR: 18  SpO2: 91%    <<<<<LABS>>>>>                        12.8   5.63  )-----------( 165      ( 12 Dec 2021 11:40 )             37.0     12-13    130<L>  |  97<L>  |  56<H>  ----------------------------<  108<H>  3.7   |  14<L>  |  2.0<H>    Ca    8.5      13 Dec 2021 07:30  Phos  3.4     12-13  Mg     2.4     12-12    TPro  5.6<L>  /  Alb  3.0<L>  /  TBili  0.4  /  DBili  x   /  AST  49<H>  /  ALT  28  /  AlkPhos  46  12-13          Creatine Kinase, Serum: 354 U/L *H* (12-13-21 @ 07:30)  Troponin T, Serum: 0.05 ng/mL *HH* (12-12-21 @ 20:00)  Troponin T, Serum: 0.06 ng/mL *HH* (12-12-21 @ 11:40)    [id number]  CARDIAC MARKERS ( 13 Dec 2021 07:30 )  x     / x     / 354 U/L / x     / x      CARDIAC MARKERS ( 12 Dec 2021 20:00 )  x     / 0.05 ng/mL / x     / x     / x      CARDIAC MARKERS ( 12 Dec 2021 11:40 )  x     / 0.06 ng/mL / x     / x     / x            <<<<<RADIOLOGY>>>>>  < from: Xray Chest 1 View-PORTABLE IMMEDIATE (12.12.21 @ 12:53) >  Impression:    New bilateral lung opacities, left greater than right. Findings are   concerning for multifocal pneumonia        --- End of Report ---    < end of copied text >      <<<<<PHYSICAL EXAM>>>>>  GENERAL: Well developed, well nourished and in no acute distress. Resting comfortably in bed.  PULMONARY: wheezing on auscultation bilaterally. No rales, rhonchi  CARDIOVASCULAR: Regular rate and rhythm, S1-S2, no murmurs  GASTROINTESTINAL: Soft, non-tender, non-distended, no guarding.  SKIN/EXTREMITIES: 2+ pitting edema  NEUROLOGIC/MUSCULOSKELETAL: AOx4, grossly moving all extremities, no focal deficits.      ----------------------------------------------------------------------------------------------------------------------------------------------------------------------------------------------- ----------Daily Progress Note----------    HISTORY OF PRESENT ILLNESS:  Patient is a 80y old Male who presents with a chief complaint of COVID PNA (13 Dec 2021 09:11)    Currently admitted to medicine with the primary diagnosis of 2019 novel coronavirus disease (COVID-19)    79 y/o M Pmh HTN, DLD, CAD s/p CABG x 4 (2206) on ASA, Aortic Valve replacement (2011) unvaccinated for Flu and COVID 19, CKD presented to the ED with complaint of progressive generalized fatigue, SOB, n/b diarrhea for the past ten days. Patient also endorses decreased sense of smell and taste and overall decreased PO intake over the past week. He denies any fevers or cough, travel and or sick contacts. Patient endorses that his wife has been feeling fatigued as well without any other associated symptom He stated that he as not been evaluated by a physician in some time but has been compliant with his medications. No leg swelling or chest pain, but has been hypoxic per the wife as a family friend nurse has evaluated him couple of days ago and has brought him home Oxygen per family.     ED course: /72, HR: 83, SPO2 86% on room air, placed on 4L NC. CXR: B/L lung opacities L>R. COVID PCR +, Cr: 2.3, Na 129, AG 18, BNP 2176, Trop 0.06       Today is hospital day 1d.     INTERVAL HOSPITAL COURSE / OVERNIGHT EVENTS:    Patient was examined and seen at bedside. This morning he is resting comfortably in bed and reports no new issues or overnight events.     Review of Systems: Patient is endorsing SOB. Patient denies any fever, chills, headache, dizziness. Patient denies chest pain, palpitation, SOB     <<<<<PAST MEDICAL & SURGICAL HISTORY>>>>>  Chronic systolic congestive heart failure    HTN (hypertension)    Dyslipidemia      ALLERGIES  No Known Allergies      Home Medications:  LATANOPROST  0.005 % SOLN:  (12 Dec 2021 16:26)  METOPROLOL ER SUCCINATE 50MG TABS: TAKE 1 TABLET BY MOUTH AT BEDTIME (12 Dec 2021 16:26)  OLMESARTAN MEDOXOMIL/AMLODIPINE/HYD ROCHLOROTHIAZIDE 40-5-25 MG TABS:  (12 Dec 2021 16:26)  rosuvastatin 5 mg oral tablet: 1 tab(s) orally once a day (12 Dec 2021 16:26)        MEDICATIONS  STANDING MEDICATIONS  apixaban 5 milliGRAM(s) Oral every 12 hours  aspirin  chewable 81 milliGRAM(s) Oral daily  atorvastatin 20 milliGRAM(s) Oral at bedtime  clopidogrel Tablet 75 milliGRAM(s) Oral daily  dexAMETHasone  Injectable 6 milliGRAM(s) IV Push daily  influenza  Vaccine (HIGH DOSE) 0.7 milliLiter(s) IntraMuscular once  latanoprost 0.005% Ophthalmic Solution 1 Drop(s) Both EYES at bedtime  metoprolol succinate ER 50 milliGRAM(s) Oral daily  potassium chloride  20 mEq/100 mL IVPB 20 milliEquivalent(s) IV Intermittent once    PRN MEDICATIONS  acetaminophen     Tablet .. 650 milliGRAM(s) Oral every 4 hours PRN    VITALS:  T(F): 96.6  HR: 62  BP: 113/63  RR: 18  SpO2: 91%    <<<<<LABS>>>>>                        12.8   5.63  )-----------( 165      ( 12 Dec 2021 11:40 )             37.0     12-13    130<L>  |  97<L>  |  56<H>  ----------------------------<  108<H>  3.7   |  14<L>  |  2.0<H>    Ca    8.5      13 Dec 2021 07:30  Phos  3.4     12-13  Mg     2.4     12-12    TPro  5.6<L>  /  Alb  3.0<L>  /  TBili  0.4  /  DBili  x   /  AST  49<H>  /  ALT  28  /  AlkPhos  46  12-13          Creatine Kinase, Serum: 354 U/L *H* (12-13-21 @ 07:30)  Troponin T, Serum: 0.05 ng/mL *HH* (12-12-21 @ 20:00)  Troponin T, Serum: 0.06 ng/mL *HH* (12-12-21 @ 11:40)    [id number]  CARDIAC MARKERS ( 13 Dec 2021 07:30 )  x     / x     / 354 U/L / x     / x      CARDIAC MARKERS ( 12 Dec 2021 20:00 )  x     / 0.05 ng/mL / x     / x     / x      CARDIAC MARKERS ( 12 Dec 2021 11:40 )  x     / 0.06 ng/mL / x     / x     / x            <<<<<RADIOLOGY>>>>>  < from: Xray Chest 1 View-PORTABLE IMMEDIATE (12.12.21 @ 12:53) >  Impression:    New bilateral lung opacities, left greater than right. Findings are   concerning for multifocal pneumonia        --- End of Report ---    < end of copied text >    < from: US Retroperitoneal Complete (12.13.21 @ 12:11) >  IMPRESSION:    Normal renal ultrasound.        --- End of Report ---    < end of copied text >    <<<<<PHYSICAL EXAM>>>>>  GENERAL: Well developed, well nourished and in no acute distress. Resting comfortably in bed.  PULMONARY: wheezing on auscultation bilaterally. No rales, rhonchi  CARDIOVASCULAR: Regular rate and rhythm, S1-S2, no murmurs  GASTROINTESTINAL: Soft, non-tender, non-distended, no guarding.  SKIN/EXTREMITIES: 2+ pitting edema  NEUROLOGIC/MUSCULOSKELETAL: AOx4, grossly moving all extremities, no focal deficits.      -----------------------------------------------------------------------------------------------------------------------------------------------------------------------------------------------

## 2021-12-13 NOTE — PROGRESS NOTE ADULT - ASSESSMENT
79 y/o gentleman with a past medical history of HTN, DLD, CAD s/p CABG, AV replacement, CKD III admitted for COVID PNA. Notes 10 days of progressive SOB, fatigue, diarrhea, and poor oral intake. Didn't note any significant fevers at home. Noted to be hypoxic here, CXR shows b/l infiltrates, O2 improved on 4L NC. Lab work remarkable for NICA, mild hyponatremia, and elevated trop. He denied any chest pain/pressure, palpitations. O2 improved with 4L NC.    #Acute Hypoxic Respiratory Failure secondary to COVID-19 Pneumonia  - SIRS not present on admission.   - Symptoms more than 10 days wont be a candidate for Remdesivir  - c/w Dexamethasone 6mg QD.   - Follow up inflammatory markers (Ferritin, CRP, ESR, LDH) and procalcitonin.   - D-dimer elevated 4601  - F/u ID recs   - F/u chest xray     #Elevated D-Dimer: [id number]  - Stable on 4L o2 currently and not tachy.  - Hold off on CT PE protocol given renal function   - F/u LE duplex to r/o DVT   - F/u V/Q scan   - Starting patient on Eliquis 5mg BID (12/13/2021)    #NICA on CKD III, suspected pre-renal (+/-ATN from ARB)  #Hyponatremia --> likely hypovolemic  #HAGMA (normal lactate)  - s/p LR at 75 cc/hr, stopped 12/13  - If bicarb continue to trend down the start sodium bicarb 650 q8  - Trend Cr, monitor strict I/O  - ARB and HCTZ held for now until renal function is stable.  - Avoid nephrotoxic medications.    - F/u Urine osm, Urine lytes, Serum osm     Elevated trops: stable, likely secondary to COVID + renal function not ACS. No chest pain   CAD s/p CABG  AV replacement   HTN / HDL  -Cont DAPT, atorvastatin, and metoprolol  -ARB and HCTZ held for now given NICA   -AM EKG    DVT ppx: Heparin  GI ppx: Not indicated  GOC: Full code for now, though doesn't want to be intubated. Started GOC discussion. See note.     My note supersedes the residents in the event of a discrepancy.  81 y/o gentleman with a past medical history of HTN, DLD, CAD s/p CABG, AV replacement, CKD III admitted for COVID PNA. Notes 10 days of progressive SOB, fatigue, diarrhea, and poor oral intake. Didn't note any significant fevers at home. Noted to be hypoxic here, CXR shows b/l infiltrates, O2 improved on 4L NC. Lab work remarkable for NICA, mild hyponatremia, and elevated trop. He denied any chest pain/pressure, palpitations. O2 improved with 4L NC.    #Acute Hypoxic Respiratory Failure secondary to COVID-19 Pneumonia  - SIRS not present on admission.   - Symptoms more than 10 days wont be a candidate for Remdesivir  - c/w Dexamethasone 6mg QD.   - Follow up inflammatory markers (Ferritin, CRP, ESR, LDH) and procalcitonin.   - D-dimer elevated 4601  - F/u ID recs   - F/u chest xray     #Elevated D-Dimer: [id number]  - Stable on 4L o2 currently and not tachy.  - Hold off on CT PE protocol given renal function   - F/u LE duplex to r/o DVT   - F/u V/Q scan   - Starting patient on Eliquis 5mg BID (12/13/2021)    #NICA on CKD III, suspected pre-renal (+/-ATN from ARB)  #Hyponatremia --> likely hypovolemic  #HAGMA (normal lactate)  - s/p LR at 75 cc/hr, stopped 12/13  - If bicarb continue to trend down the start sodium bicarb 650 q8  - Trend Cr, monitor strict I/O  - ARB and HCTZ held for now until renal function is stable.  - Avoid nephrotoxic medications.    - F/u Urine osm, Urine lytes, Serum osm     #CAD s/p CABG  #AV replacement   #HTN / HDL  #Elevated trops: stable, likely secondary to COVID PNA   - No chest pain   - C/w DAPT, atorvastatin, and metoprolol  - ARB and HCTZ held for now given NICA     #Misc   - DVT prophylaxis: Eliquis   - GI prophylaxis: Not indicated  - Diet: DASH/TLC   - Activity status: AAT   - Code status: Full code for now, though doesn't want to be intubated. f/u on GOC   - Disposition: acute   Pending:  inflammatory markers (Ferritin, CRP, ESR, LDH) and procalcitonin, Urine osm, Urine lytes, Serum osm, chest xray, LE duplex to r/o DVT, V/Q scan, ID recs   81 y/o gentleman with a past medical history of HTN, DLD, CAD s/p CABG, AV replacement, CKD III admitted for COVID PNA. Notes 10 days of progressive SOB, fatigue, diarrhea, and poor oral intake. Didn't note any significant fevers at home. Noted to be hypoxic here, CXR shows b/l infiltrates, O2 improved on 4L NC. Lab work remarkable for NICA, mild hyponatremia, and elevated trop. He denied any chest pain/pressure, palpitations. O2 improved with 4L NC.    #Acute Hypoxic Respiratory Failure secondary to COVID-19 Pneumonia  - SIRS not present on admission.   - Symptoms more than 10 days wont be a candidate for Remdesivir  - c/w Dexamethasone 6mg QD.   - Follow up inflammatory markers (Ferritin, CRP, ESR, LDH) and procalcitonin.   - D-dimer elevated 4601  - F/u ID recs   - F/u chest xray     #Elevated D-Dimer: [id number]  - Stable on 4L o2 currently and not tachy.  - Hold off on CT PE protocol given renal function   - F/u LE duplex to r/o DVT   - F/u V/Q scan   - Starting patient on Eliquis 5mg BID (12/13/2021)    #NICA on CKD III, suspected pre-renal (+/-ATN from ARB)  #Hyponatremia --> likely hypovolemic  #HAGMA (normal lactate)  - s/p LR at 75 cc/hr, stopped 12/13  - If bicarb continue to trend down the start sodium bicarb 650 q8  - Trend Cr, monitor strict I/O  - ARB and HCTZ held for now until renal function is stable.  - Avoid nephrotoxic medications.    - F/u Urine osm, Urine lytes, Serum osm     #CAD s/p CABG  #AV replacement   #HTN / HDL  #Elevated trops: stable, likely secondary to COVID PNA   - No chest pain   - C/w DAPT, atorvastatin, and metoprolol  - ARB and HCTZ held for now given NICA     #Misc   - DVT prophylaxis: Eliquis   - GI prophylaxis: Not indicated  - Diet: DASH/TLC   - Activity status: AAT   - Code status: DNR/DNI  - Disposition: acute   Pending:  inflammatory markers (Ferritin, CRP, ESR, LDH) and procalcitonin, Urine osm, Urine lytes, Serum osm, chest xray, LE duplex to r/o DVT, V/Q scan, ID recs   79 y/o gentleman with a past medical history of HTN, DLD, CAD s/p CABG, AV replacement, CKD III admitted for COVID PNA. Notes 10 days of progressive SOB, fatigue, diarrhea, and poor oral intake. Didn't note any significant fevers at home. Noted to be hypoxic here, CXR shows b/l infiltrates, O2 improved on 4L NC. Lab work remarkable for NICA, mild hyponatremia, and elevated trop. He denied any chest pain/pressure, palpitations. O2 improved with 4L NC.    #Acute Hypoxic Respiratory Failure secondary to COVID-19 Pneumonia  - SIRS not present on admission.   - Symptoms more than 10 days wont be a candidate for Remdesivir  - c/w Dexamethasone 6mg QD.   - Follow up inflammatory markers (Ferritin, CRP, ESR, LDH) and procalcitonin.   - D-dimer elevated 4601  - F/u ID recs   - Chest xray 12/13 noted, starting patient on 40mg Lasix PO QD    #Elevated D-Dimer: [id number]  - Stable on 4L o2 currently and not tachy.  - Hold off on CT PE protocol given renal function   - F/u LE duplex to r/o DVT   - F/u V/Q scan   - Starting patient on Eliquis 5mg BID (12/13/2021)    #NICA on CKD III, suspected pre-renal (+/-ATN from ARB)  #Hyponatremia --> likely hypovolemic  #HAGMA (normal lactate)  - s/p LR at 75 cc/hr, stopped 12/13  - If bicarb continue to trend down the start sodium bicarb 650 q8  - Trend Cr, monitor strict I/O  - ARB and HCTZ held for now until renal function is stable.  - Avoid nephrotoxic medications.    - F/u Urine osm, Urine lytes  - Serum osm 282    #CAD s/p CABG  #AV replacement   #HTN / HDL  #Elevated trops: stable, likely secondary to COVID PNA   - No chest pain   - C/w DAPT, atorvastatin, and metoprolol  - ARB and HCTZ held for now given NICA     #Misc   - DVT prophylaxis: Eliquis   - GI prophylaxis: Not indicated  - Diet: DASH/TLC   - Activity status: AAT   - Code status: DNR/DNI  - Disposition: acute   Pending:  inflammatory markers (Ferritin, CRP, ESR, LDH) and procalcitonin, Urine osm, Urine lytes, LE duplex to r/o DVT, V/Q scan, ID recs   79 y/o gentleman with a past medical history of HTN, DLD, CAD s/p CABG, AV replacement, CKD III admitted for COVID PNA. Notes 10 days of progressive SOB, fatigue, diarrhea, and poor oral intake. Didn't note any significant fevers at home. Noted to be hypoxic here, CXR shows b/l infiltrates, O2 improved on 4L NC. Lab work remarkable for NICA, mild hyponatremia, and elevated trop. He denied any chest pain/pressure, palpitations. O2 improved with 4L NC.    #Acute Hypoxic Respiratory Failure secondary to COVID-19 Pneumonia  - SIRS not present on admission.   - Symptoms more than 10 days wont be a candidate for Remdesivir  - c/w Dexamethasone 6mg QD.   - Follow up inflammatory markers (Ferritin, CRP, ESR, LDH) and procalcitonin.   - D-dimer elevated 4601  - ID recs appreciated   - monitor off abx   - Chest xray 12/13 noted, starting patient on 40mg Lasix PO QD    #Elevated D-Dimer: [id number]  - Stable on 4L o2 currently and not tachy.  - Hold off on CT PE protocol given renal function   - F/u LE duplex to r/o DVT   - F/u V/Q scan   - Starting patient on Eliquis 5mg BID (12/13/2021)    #NICA on CKD III, suspected pre-renal (+/-ATN from ARB)  #Hyponatremia --> likely hypovolemic  #HAGMA (normal lactate)  - s/p LR at 75 cc/hr, stopped 12/13  - If bicarb continue to trend down the start sodium bicarb 650 q8  - Trend Cr, monitor strict I/O  - ARB and HCTZ held for now until renal function is stable.  - Avoid nephrotoxic medications.    - F/u Urine osm, Urine lytes  - Serum osm 282  - Retroperitoneal US wnl   - Nephro recs appreciated   - Starting sodium bicarb 650mg TID     #CAD s/p CABG  #AV replacement   #HTN / HDL  #Elevated trops: stable, likely secondary to COVID PNA   - No chest pain   - C/w DAPT, atorvastatin, and metoprolol  - ARB and HCTZ held for now given NICA     #Misc   - DVT prophylaxis: Eliquis   - GI prophylaxis: Not indicated  - Diet: DASH/TLC   - Activity status: AAT   - Code status: DNR/DNI  - Disposition: acute   Pending:  inflammatory markers (Ferritin, CRP, ESR, LDH) and procalcitonin, Urine osm, Urine lytes, LE duplex to r/o DVT, V/Q scan   81 y/o gentleman with a past medical history of HTN, DLD, CAD s/p CABG, AV replacement, CKD III admitted for COVID PNA. Notes 10 days of progressive SOB, fatigue, diarrhea, and poor oral intake. Didn't note any significant fevers at home. Noted to be hypoxic here, CXR shows b/l infiltrates, O2 improved on 4L NC. Lab work remarkable for NICA, mild hyponatremia, and elevated trop. He denied any chest pain/pressure, palpitations. O2 improved with 4L NC.    #Acute Hypoxic Respiratory Failure secondary to COVID-19 Pneumonia  - SIRS not present on admission.   - Symptoms more than 10 days wont be a candidate for Remdesivir  - c/w Dexamethasone 6mg QD.   - Follow up inflammatory markers (Ferritin, CRP, ESR, LDH) and procalcitonin.   - D-dimer elevated 4601  - ID recs appreciated   - monitor off abx   - Chest xray 12/13 noted, starting patient on 40mg Lasix PO QD    #Elevated D-Dimer: [id number]  - Stable on 4L o2 currently and not tachy.  - Hold off on CT PE protocol given renal function   - LE duplex is positive for DVT   - F/u V/Q scan   - Starting patient on Eliquis 5mg BID (12/13/2021)  - Spoke to patient's cardiologist Dr. Sebastian Mendoza. will hold Plavix while the patient is on Eliquis     #NICA on CKD III, suspected pre-renal (+/-ATN from ARB)  #Hyponatremia --> likely hypovolemic  #HAGMA (normal lactate)  - s/p LR at 75 cc/hr, stopped 12/13  - If bicarb continue to trend down the start sodium bicarb 650 q8  - Trend Cr, monitor strict I/O  - ARB and HCTZ held for now until renal function is stable.  - Avoid nephrotoxic medications.    - F/u Urine osm, Urine lytes  - Serum osm 282  - Retroperitoneal US wnl   - Nephro recs appreciated   - Starting sodium bicarb 650mg TID     #CAD s/p CABG  #AV replacement   #HTN / HDL  #Elevated trops: stable, likely secondary to COVID PNA   - No chest pain   - C/w DAPT, atorvastatin, and metoprolol  - ARB and HCTZ held for now given NICA     #Misc   - DVT prophylaxis: Eliquis   - GI prophylaxis: Not indicated  - Diet: DASH/TLC   - Activity status: AAT   - Code status: DNR/DNI  - Disposition: acute   Pending:  inflammatory markers (Ferritin, CRP, ESR, LDH) and procalcitonin, Urine osm, Urine lytes, V/Q scan

## 2021-12-14 ENCOUNTER — APPOINTMENT (OUTPATIENT)
Dept: NEUROLOGY | Facility: CLINIC | Age: 81
End: 2021-12-14

## 2021-12-14 LAB
ERYTHROCYTE [SEDIMENTATION RATE] IN BLOOD: 88 MM/HR — HIGH (ref 0–10)
HCT VFR BLD CALC: 36.4 % — LOW (ref 42–52)
HGB BLD-MCNC: 12.6 G/DL — LOW (ref 14–18)
MCHC RBC-ENTMCNC: 30.1 PG — SIGNIFICANT CHANGE UP (ref 27–31)
MCHC RBC-ENTMCNC: 34.6 G/DL — SIGNIFICANT CHANGE UP (ref 32–37)
MCV RBC AUTO: 87.1 FL — SIGNIFICANT CHANGE UP (ref 80–94)
NRBC # BLD: 0 /100 WBCS — SIGNIFICANT CHANGE UP (ref 0–0)
PLATELET # BLD AUTO: 197 K/UL — SIGNIFICANT CHANGE UP (ref 130–400)
RBC # BLD: 4.18 M/UL — LOW (ref 4.7–6.1)
RBC # FLD: 13.3 % — SIGNIFICANT CHANGE UP (ref 11.5–14.5)
WBC # BLD: 5.53 K/UL — SIGNIFICANT CHANGE UP (ref 4.8–10.8)
WBC # FLD AUTO: 5.53 K/UL — SIGNIFICANT CHANGE UP (ref 4.8–10.8)

## 2021-12-14 PROCEDURE — 71045 X-RAY EXAM CHEST 1 VIEW: CPT | Mod: 26

## 2021-12-14 PROCEDURE — 99233 SBSQ HOSP IP/OBS HIGH 50: CPT

## 2021-12-14 RX ORDER — POTASSIUM CHLORIDE 20 MEQ
40 PACKET (EA) ORAL ONCE
Refills: 0 | Status: COMPLETED | OUTPATIENT
Start: 2021-12-14 | End: 2021-12-14

## 2021-12-14 RX ORDER — SODIUM BICARBONATE 1 MEQ/ML
1300 SYRINGE (ML) INTRAVENOUS THREE TIMES A DAY
Refills: 0 | Status: DISCONTINUED | OUTPATIENT
Start: 2021-12-14 | End: 2021-12-17

## 2021-12-14 RX ADMIN — APIXABAN 5 MILLIGRAM(S): 2.5 TABLET, FILM COATED ORAL at 06:58

## 2021-12-14 RX ADMIN — Medication 1300 MILLIGRAM(S): at 21:31

## 2021-12-14 RX ADMIN — Medication 650 MILLIGRAM(S): at 06:58

## 2021-12-14 RX ADMIN — APIXABAN 5 MILLIGRAM(S): 2.5 TABLET, FILM COATED ORAL at 00:02

## 2021-12-14 RX ADMIN — Medication 6 MILLIGRAM(S): at 06:58

## 2021-12-14 RX ADMIN — ATORVASTATIN CALCIUM 20 MILLIGRAM(S): 80 TABLET, FILM COATED ORAL at 21:31

## 2021-12-14 RX ADMIN — TOCILIZUMAB 100 MILLIGRAM(S): 20 INJECTION, SOLUTION, CONCENTRATE INTRAVENOUS at 01:09

## 2021-12-14 RX ADMIN — Medication 650 MILLIGRAM(S): at 00:02

## 2021-12-14 RX ADMIN — Medication 40 MILLIGRAM(S): at 06:58

## 2021-12-14 RX ADMIN — Medication 1300 MILLIGRAM(S): at 13:17

## 2021-12-14 RX ADMIN — APIXABAN 5 MILLIGRAM(S): 2.5 TABLET, FILM COATED ORAL at 18:19

## 2021-12-14 RX ADMIN — Medication 40 MILLIEQUIVALENT(S): at 18:19

## 2021-12-14 RX ADMIN — Medication 50 MILLIGRAM(S): at 06:58

## 2021-12-14 RX ADMIN — LATANOPROST 1 DROP(S): 0.05 SOLUTION/ DROPS OPHTHALMIC; TOPICAL at 21:32

## 2021-12-14 RX ADMIN — Medication 81 MILLIGRAM(S): at 13:20

## 2021-12-14 RX ADMIN — ATORVASTATIN CALCIUM 20 MILLIGRAM(S): 80 TABLET, FILM COATED ORAL at 00:02

## 2021-12-14 RX ADMIN — LATANOPROST 1 DROP(S): 0.05 SOLUTION/ DROPS OPHTHALMIC; TOPICAL at 00:02

## 2021-12-14 NOTE — PROGRESS NOTE ADULT - ASSESSMENT
79 y/o gentleman with a past medical history of HTN, DLD, CAD s/p CABG, AV replacement, CKD III admitted for COVID PNA. Notes 10 days of progressive SOB, fatigue, diarrhea, and poor oral intake. Didn't note any significant fevers at home. Noted to be hypoxic here, CXR shows b/l infiltrates, O2 improved on 4L NC. Lab work remarkable for NICA, mild hyponatremia, and elevated trop. He denied any chest pain/pressure, palpitations. O2 improved with 4L NC.    #Acute Hypoxic Respiratory Failure secondary to COVID-19 Pneumonia  - SIRS not present on admission.   - Symptoms more than 10 days wont be a candidate for Remdesivir  - c/w Dexamethasone 6mg QD.   - Follow up inflammatory markers (Ferritin, ESR, LDH).   - D-dimer elevated 4601  - Procalcitonin .25, CRP 85  - ID recs appreciated   - monitor off abx   - Chest xray 12/13 noted, starting patient on 40mg Lasix PO QD  - s/p tocilizumab 800mg 12/13  - O2 requirement increased from 4L to 6L, now saturating 93% on 6L NC   - F/u repeat chest xray 12/14    #Elevated D-Dimer: 12737  - Stable on 4L o2 currently and not tachy.  - Hold off on CT PE protocol given renal function   - LE duplex is positive for DVT   - V/Q scan shows high probability for PE   - Starting patient on Eliquis 5mg BID (12/13/2021)  - Spoke to patient's cardiologist Dr. Sebastian Mendoza. will hold Plavix while the patient is on Eliquis     #NICA on CKD III, suspected pre-renal (+/-ATN from ARB)  #Hyponatremia --> likely hypovolemic  #HAGMA (normal lactate)  - s/p LR at 75 cc/hr, stopped 12/13  - If bicarb continue to trend down the start sodium bicarb 650 q8  - Trend Cr, monitor strict I/O  - ARB and HCTZ held for now until renal function is stable.  - Avoid nephrotoxic medications.    - F/u Urine osm, Urine lytes  - Serum osm 282  - Retroperitoneal US wnl   - Nephro recs appreciated   - Starting sodium bicarb 650mg TID     #CAD s/p CABG  #AV replacement   #HTN / HDL  #Elevated trops: stable, likely secondary to COVID PNA   - No chest pain   - C/w DAPT, atorvastatin, and metoprolol  - ARB and HCTZ held for now given NICA     #Misc   - DVT prophylaxis: Eliquis   - GI prophylaxis: Not indicated  - Diet: DASH/TLC   - Activity status: AAT   - Code status: DNR/DNI  - Disposition: acute   Pending:  inflammatory markers (Ferritin, ESR, LDH), Urine osm, Urine lytes, f/u repeat chest xray

## 2021-12-14 NOTE — PROGRESS NOTE ADULT - ASSESSMENT
Acute kidney injury due to ischemic kidney injury (COVID infection, ARB, diuretics)   chronic kidney disease stage 3  (baseline cr 1.3 in 2019, 1.45 10/2021)  proteinuria with few fine granular casts  normal renal sono   Lorie+ < 20, Uosm 558  COVID-19 PNA s/p toci / decadron   hyponatremia from thiazide  metabolic acidosis (renal failure) and respiratory alkalosis (PNA)  CAD / AVR  HTN   PE       plan:    cont sodium bicarbonate 650mg po tid  off ARB, thiazide and amlodipine  cont lasix 40mg po qd  KCl PO PRN  decadron / O2 / s/p toci  AC per primary team  full code

## 2021-12-14 NOTE — PHYSICAL THERAPY INITIAL EVALUATION ADULT - BALANCE DISTURBANCE, SYSTEM IMPAIRMENT CONTRIBUTE, REHAB EVAL
-chronic, stable  - holding home quinapril and amlodipine for now  - continue to monitor, AM team to assess need to restart BP meds musculoskeletal

## 2021-12-14 NOTE — PHYSICAL THERAPY INITIAL EVALUATION ADULT - DIAGNOSIS, PT EVAL
debility secondary 2019 novel coronavirus disease (COVID-19); NICA (acute kidney injury); Elevated troponin; Hyponatremia

## 2021-12-14 NOTE — PROGRESS NOTE ADULT - SUBJECTIVE AND OBJECTIVE BOX
----------Daily Progress Note----------    HISTORY OF PRESENT ILLNESS:  Patient is a 80y old Male who presents with a chief complaint of COVID PNA (13 Dec 2021 09:29)    Currently admitted to medicine with the primary diagnosis of 2019 novel coronavirus disease (COVID-19)      79 y/o M Pmh HTN, DLD, CAD s/p CABG x 4 () on ASA, Aortic Valve replacement () unvaccinated for Flu and COVID 19, CKD presented to the ED with complaint of progressive generalized fatigue, SOB, n/b diarrhea for the past ten days. Patient also endorses decreased sense of smell and taste and overall decreased PO intake over the past week. He denies any fevers or cough, travel and or sick contacts. Patient endorses that his wife has been feeling fatigued as well without any other associated symptom He stated that he as not been evaluated by a physician in some time but has been compliant with his medications. No leg swelling or chest pain, but has been hypoxic per the wife as a family friend nurse has evaluated him couple of days ago and has brought him home Oxygen per family.     ED course: /72, HR: 83, SPO2 86% on room air, placed on 4L NC. CXR: B/L lung opacities L>R. COVID PCR +, Cr: 2.3, Na 129, AG 18, BNP 2176, Trop 0.06         Today is hospital day 2d.     INTERVAL HOSPITAL COURSE / OVERNIGHT EVENTS:    Patient was examined and seen at bedside. This morning he is resting comfortably in bed and reports no new issues or overnight events.     Review of Systems: Patient is endorsing SOB on 4L NC. Patient denies any fever, chills, headache, dizziness. Patient denies chest pain, palpitation, SOB       <<<<<PAST MEDICAL & SURGICAL HISTORY>>>>>  Chronic systolic congestive heart failure    HTN (hypertension)    Dyslipidemia      ALLERGIES  No Known Allergies      Home Medications:  LATANOPROST  0.005 % SOLN:  (12 Dec 2021 16:26)  METOPROLOL ER SUCCINATE 50MG TABS: TAKE 1 TABLET BY MOUTH AT BEDTIME (12 Dec 2021 16:26)  OLMESARTAN MEDOXOMIL/AMLODIPINE/HYD ROCHLOROTHIAZIDE 40-5-25 MG TABS:  (12 Dec 2021 16:26)  rosuvastatin 5 mg oral tablet: 1 tab(s) orally once a day (12 Dec 2021 16:26)        MEDICATIONS  STANDING MEDICATIONS  apixaban 5 milliGRAM(s) Oral every 12 hours  aspirin  chewable 81 milliGRAM(s) Oral daily  atorvastatin 20 milliGRAM(s) Oral at bedtime  dexAMETHasone  Injectable 6 milliGRAM(s) IV Push daily  furosemide    Tablet 40 milliGRAM(s) Oral daily  influenza  Vaccine (HIGH DOSE) 0.7 milliLiter(s) IntraMuscular once  latanoprost 0.005% Ophthalmic Solution 1 Drop(s) Both EYES at bedtime  metoprolol succinate ER 50 milliGRAM(s) Oral daily  sodium bicarbonate 650 milliGRAM(s) Oral three times a day    PRN MEDICATIONS  acetaminophen     Tablet .. 650 milliGRAM(s) Oral every 4 hours PRN    VITALS:  T(F): 96  HR: 63  BP: 157/79  RR: 18  SpO2: 93%    <<<<<LABS>>>>>                        12.8   5.63  )-----------( 165      ( 12 Dec 2021 11:40 )             37.0     12-13    130<L>  |  97<L>  |  56<H>  ----------------------------<  108<H>  3.7   |  14<L>  |  2.0<H>    Ca    8.5      13 Dec 2021 07:30  Phos  3.4     12-13  Mg     2.4     12-12    TPro  5.6<L>  /  Alb  3.0<L>  /  TBili  0.4  /  DBili  x   /  AST  49<H>  /  ALT  28  /  AlkPhos  46  12-13      Urinalysis Basic - ( 13 Dec 2021 10:58 )    Color: Yellow / Appearance: Clear / S.021 / pH: x  Gluc: x / Ketone: Trace  / Bili: Negative / Urobili: <2 mg/dL   Blood: x / Protein: 100 mg/dL / Nitrite: Negative   Leuk Esterase: Negative / RBC: 6 /HPF / WBC 8 /HPF   Sq Epi: x / Non Sq Epi: 4 /HPF / Bacteria: Negative            Culture - Blood (collected 12 Dec 2021 13:28)  Source: .Blood Blood  Preliminary Report (13 Dec 2021 19:02):    No growth to date.    Culture - Blood (collected 12 Dec 2021 13:28)  Source: .Blood Blood  Preliminary Report (13 Dec 2021 19:02):    No growth to date.    [id number]  CARDIAC MARKERS ( 13 Dec 2021 07:30 )  x     / x     / 354 U/L / x     / x      CARDIAC MARKERS ( 12 Dec 2021 20:00 )  x     / 0.05 ng/mL / x     / x     / x      CARDIAC MARKERS ( 12 Dec 2021 11:40 )  x     / 0.06 ng/mL / x     / x     / x            <<<<<RADIOLOGY>>>>>  < from: Xray Chest 1 View-PORTABLE IMMEDIATE (21 @ 12:53) >  Impression:    New bilateral lung opacities, left greater than right. Findings are   concerning for multifocal pneumonia        --- End of Report ---    < end of copied text >    < from: US Retroperitoneal Complete (21 @ 12:11) >  IMPRESSION:    Normal renal ultrasound.        --- End of Report ---    < end of copied text >    < from: [facility] Duplex Lower Ext Vein Scan, Bilat (21 @ 15:02) >  FINDINGS:    RIGHT:  Normal compressibility of the RIGHT common femoral, femoral  veins.  Doppler examination shows normal spontaneous and phasic flow.  DVT noted in the right popliteal, peroneal and posterior tibial veins    LEFT:  Normal compressibility of the LEFT common femoral, femoral and popliteal   veins.  Doppler examination shows normal spontaneous and phasic flow.  Thrombus noted in the left posterior tibial vein    IMPRESSION:  Acute DVT noted in the right popliteal peroneal and posterior tibial vein.    Thrombus noted in the right posterior tibial vein.          --- End of Report ---    < end of copied text >    < from: NM Pulmonary Perfusion Scan (21 @ 16:21) >  IMPRESSION:    HIGH PROBABILITY FOR PULMONARY EMBOLISM.    SEGMENTAL IN CHARACTER PERFUSION DEFECTS IN THE POSTERIOR LATERAL AND   ANTERIOR BASAL SEGMENTS AND SUPERIOR SEGMENT RIGHT LOWER LOBE, MEDIAL   SEGMENT OF RIGHT MIDDLE LOBE, AND ANTERIOR SEGMENT LEFT UPPER LOBE NOT   MATCHED BY CHEST XRAY ABNORMALITIES.    --- End of Report ---    < end of copied text >      <<<<<PHYSICAL EXAM>>>>>  GENERAL: Well developed, well nourished and in no acute distress. Resting comfortably in bed.  PULMONARY: wheezing on auscultation bilaterally. No rales, rhonchi  CARDIOVASCULAR: Regular rate and rhythm, S1-S2, no murmurs  GASTROINTESTINAL: Soft, non-tender, non-distended, no guarding.  SKIN/EXTREMITIES: 2+ pitting edema  NEUROLOGIC/MUSCULOSKELETAL: AOx4, grossly moving all extremities, no focal deficits.        -----------------------------------------------------------------------------------------------------------------------------------------------------------------------------------------------

## 2021-12-14 NOTE — PROGRESS NOTE ADULT - ASSESSMENT
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    IMPRESSION  #COVID-19, severe  - D-Dimer Assay, Quantitative: 4602: Manufacturers recommended Cut off for VTE is 230 ng/ml D-DU ng/mL DDU (12.12.21 @ 20:00)  - C-Reactive Protein, Serum: 85 mg/L (12.12.21 @ 20:00)  - Procalcitonin, Serum: 0.25 (12.12.21 @ 20:00)  - VQ scan with high probability   - s/p tocilizumab 12/13     #Hyponatremia  #CKD     #CAD s/p CABG  #s/p AVR 2011  #Abx allergy: NKDa      RECOMMENDATIONS  - continue dex 6 mg daily   - monitor off antibiotics   - therapeutic anticoagulation   - wean O2 as tolerated  - repeat CRP in 48 hours     Please call or message on [employer] Teams if with any questions.  Spectra [phone]

## 2021-12-14 NOTE — PHYSICAL THERAPY INITIAL EVALUATION ADULT - GENERAL OBSERVATIONS, REHAB EVAL
Pt encountered in semifowler position in bed in NAD, +O2 6L via NC saturating 97% resting, no c/o pain and agreeable to participate with PT. Pt performed bed moblity wth CGA, transfer mobility with CGA, and ambulated 3 ft CGA using RW. Pt demonstrated limited mobility secondary inc SOB/poor endurance desating to 76%. Pt is resating back to 93% after resting and breathing ex. Pt left in bed as found, RN(Rina) aware.

## 2021-12-14 NOTE — PROGRESS NOTE ADULT - SUBJECTIVE AND OBJECTIVE BOX
REMA, SAÚL  80y, Male  Allergy: No Known Allergies      LOS  2d    CHIEF COMPLAINT: COVID PNA (14 Dec 2021 15:32)      INTERVAL EVENTS/HPI  - No acute events overnight  - T(F): , Max: 97 (21 @ 12:42)  - on 6L NC -- received tocilizumab last night   - WBC Count: 5.53 (21 @ 04:30)  WBC Count: 5.63 (21 @ 11:40)     - Creatinine, Serum: 1.8 (21 @ 04:30)  Creatinine, Serum: 2.0 (21 @ 07:30)       ROS  General: Denies rigors, nightsweats  HEENT: Denies headache, rhinorrhea, sore throat, eye pain  CV: Denies CP, palpitations  PULM: Denies wheezing, hemoptysis  GI: Denies hematemesis, hematochezia, melena  : Denies discharge, hematuria  MSK: Denies arthralgias, myalgias  SKIN: Denies rash, lesions  NEURO: Denies paresthesias, weakness  PSYCH: Denies depression, anxiety    VITALS:  T(F): 97, Max: 97 (21 @ 12:42)  HR: 70  BP: 148/71  RR: 18Vital Signs Last 24 Hrs  T(C): 36.1 (14 Dec 2021 12:42), Max: 36.1 (14 Dec 2021 12:42)  T(F): 97 (14 Dec 2021 12:42), Max: 97 (14 Dec 2021 12:42)  HR: 70 (14 Dec 2021 12:42) (63 - 70)  BP: 148/71 (14 Dec 2021 12:42) (133/65 - 157/79)  BP(mean): --  RR: 18 (14 Dec 2021 12:42) (18 - 18)  SpO2: 96% (14 Dec 2021 12:42) (85% - 99%)    PHYSICAL EXAM:  Gen: NAD, resting in bed  HEENT: Normocephalic, atraumatic  Neck: supple, no lymphadenopathy  CV: Regular rate & regular rhythm  Lungs: decreased BS at bases, no fremitus  Abdomen: Soft, BS present  Ext: Warm, well perfused  Neuro: non focal, awake  Skin: no rash, no erythema  Lines: no phlebitis    FH: Non-contributory  Social Hx: Non-contributory    TESTS & MEASUREMENTS:                        12.6   5.53  )-----------( 197      ( 14 Dec 2021 04:30 )             36.4         133<L>  |  97<L>  |  63<HH>  ----------------------------<  117<H>  3.4<L>   |  16<L>  |  1.8<H>    Ca    9.1      14 Dec 2021 04:30  Phos  3.4       Mg     2.7         TPro  6.2  /  Alb  3.4<L>  /  TBili  0.4  /  DBili  x   /  AST  38  /  ALT  30  /  AlkPhos  54      eGFR if Non African American: 35 mL/min/1.73M2 (21 @ 04:30)  eGFR if African American: 41 mL/min/1.73M2 (21 @ 04:30)    LIVER FUNCTIONS - ( 14 Dec 2021 04:30 )  Alb: 3.4 g/dL / Pro: 6.2 g/dL / ALK PHOS: 54 U/L / ALT: 30 U/L / AST: 38 U/L / GGT: x           Urinalysis Basic - ( 13 Dec 2021 10:58 )    Color: Yellow / Appearance: Clear / S.021 / pH: x  Gluc: x / Ketone: Trace  / Bili: Negative / Urobili: <2 mg/dL   Blood: x / Protein: 100 mg/dL / Nitrite: Negative   Leuk Esterase: Negative / RBC: 6 /HPF / WBC 8 /HPF   Sq Epi: x / Non Sq Epi: 4 /HPF / Bacteria: Negative        Culture - Blood (collected 21 @ 13:28)  Source: .Blood Blood  Preliminary Report (21 @ 19:02):    No growth to date.    Culture - Blood (collected 21 @ 13:28)  Source: .Blood Blood  Preliminary Report (21 @ 19:02):    No growth to date.        Blood Gas Venous - Lactate: 1.60 mmol/L (21 @ 12:10)      INFECTIOUS DISEASES TESTING  Procalcitonin, Serum: 0.25 (21 @ 20:00)  Rapid RVP Result: Detected (21 @ 11:40)      INFLAMMATORY MARKERS  Sedimentation Rate, Erythrocyte: 88 mm/Hr (21 @ 04:30)  C-Reactive Protein, Serum: 85 mg/L (21 @ 20:00)      RADIOLOGY & ADDITIONAL TESTS:  I have personally reviewed the last available Chest xray  CXR  Xray Chest 1 View- PORTABLE-Urgent:   ACC: [id number] EXAM:  XR CHEST PORTABLE URGENT 1V                          PROCEDURE DATE:  2021          INTERPRETATION:  STUDY INDICATION: worsening SOB    TECHNIQUE:  Portable frontal view of the chest obtained.    COMPARISON: 2021.    FINDINGS/  IMPRESSION:    Increased left mid to lower lung opacities. Apparently decreased right   basal opacity which may be in part secondary to differences in   diaphragmatic positioning.    Stable cardiomediastinal silhouette. Unchanged osseous structures.      --- End of Report ---            NOEMI MOREIRA MD; Attending Radiologist  This document has been electronically signed. Dec 14 2021  2:16PM (21 @ 10:47)      CT      CARDIOLOGY TESTING  12 Lead ECG:   Ventricular Rate 77 BPM    Atrial Rate 250 BPM    QRS Duration 138 ms    Q-T Interval 444 ms    QTC Calculation(Bazett) 502 ms    R Axis 89 degrees    T Axis -73 degrees    Diagnosis Line Sinus rhythm with frequent Premature ventricular complexes  Left bundle branch block  Abnormal ECG    Confirmed by Lyn Khan MDfer ([id number]) on 2021 6:35:14 PM (21 @ 12:43)      MEDICATIONS  apixaban 5 Oral every 12 hours  aspirin  chewable 81 Oral daily  atorvastatin 20 Oral at bedtime  dexAMETHasone  Injectable 6 IV Push daily  furosemide    Tablet 40 Oral daily  influenza  Vaccine (HIGH DOSE) 0.7 IntraMuscular once  latanoprost 0.005% Ophthalmic Solution 1 Both EYES at bedtime  metoprolol succinate ER 50 Oral daily  potassium chloride   Powder 40 Oral once  sodium bicarbonate 1300 Oral three times a day      WEIGHT  Weight (kg): 104.326 (21 @ 11:48)  Creatinine, Serum: 1.8 mg/dL (21 @ 04:30)      ANTIBIOTICS:      All available historical records have been reviewed

## 2021-12-14 NOTE — PROGRESS NOTE ADULT - SUBJECTIVE AND OBJECTIVE BOX
NEPHROLOGY FOLLOW UP NOTE    s/p toci  on 6l O2  no fever  BP's fair  no new complaints    PAST MEDICAL & SURGICAL HISTORY:  Chronic systolic congestive heart failure    HTN (hypertension)    Dyslipidemia      Allergies:  No Known Allergies    Home Medications Reviewed    SOCIAL HISTORY:  Denies ETOH,Smoking,   FAMILY HISTORY:        REVIEW OF SYSTEMS:  CONSTITUTIONAL: No weakness, fevers or chills  EYES/ENT: No visual changes;  No vertigo or throat pain   NECK: No pain or stiffness  RESPIRATORY: No cough, wheezing, hemoptysis; + shortness of breath  CARDIOVASCULAR: No chest pain or palpitations.  GASTROINTESTINAL: No abdominal or epigastric pain. No nausea, vomiting, or hematemesis; No diarrhea or constipation. No melena or hematochezia.  GENITOURINARY: No dysuria, frequency, foamy urine, urinary urgency, incontinence or hematuria  NEUROLOGICAL: No numbness or weakness  SKIN: No itching, burning, rashes, or lesions   VASCULAR: No bilateral lower extremity edema.   All other review of systems is negative unless indicated above.    PHYSICAL EXAM:  Constitutional: NAD  HEENT: anicteric sclera, oropharynx clear, MMM O2 NC  Neck: No JVD  Respiratory: CTAB   Cardiovascular: S1, S2, RRR  Gastrointestinal: BS+, soft, NT/ND  Extremities: No cyanosis or clubbing. + peripheral edema  Neurological: A/O x 3, no focal deficits  Psychiatric: Normal mood, normal affect  : No CVA tenderness. No covarrubias.   Skin: No rashes    Hospital Medications:   MEDICATIONS  (STANDING):  apixaban 5 milliGRAM(s) Oral every 12 hours  aspirin  chewable 81 milliGRAM(s) Oral daily  atorvastatin 20 milliGRAM(s) Oral at bedtime  dexAMETHasone  Injectable 6 milliGRAM(s) IV Push daily  furosemide    Tablet 40 milliGRAM(s) Oral daily  influenza  Vaccine (HIGH DOSE) 0.7 milliLiter(s) IntraMuscular once  latanoprost 0.005% Ophthalmic Solution 1 Drop(s) Both EYES at bedtime  metoprolol succinate ER 50 milliGRAM(s) Oral daily  potassium chloride   Powder 40 milliEquivalent(s) Oral once  sodium bicarbonate 1300 milliGRAM(s) Oral three times a day        VITALS:  T(F): 97 (21 @ 12:42), Max: 97 (21 @ 12:42)  HR: 70 (21 @ 12:42)  BP: 148/71 (21 @ 12:42)  RR: 18 (21 @ 12:42)  SpO2: 96% (21 @ 12:42)  Wt(kg): --        LABS:      133<L>  |  97<L>  |  63<HH>  ----------------------------<  117<H>  3.4<L>   |  16<L>  |  1.8<H>    Ca    9.1      14 Dec 2021 04:30  Phos  3.4       Mg     2.7         TPro  6.2  /  Alb  3.4<L>  /  TBili  0.4  /  DBili      /  AST  38  /  ALT  30  /  AlkPhos  54                            12.6   5.53  )-----------( 197      ( 14 Dec 2021 04:30 )             36.4       Urine Studies:  Urinalysis Basic - ( 13 Dec 2021 10:58 )    Color: Yellow / Appearance: Clear / S.021 / pH:   Gluc:  / Ketone: Trace  / Bili: Negative / Urobili: <2 mg/dL   Blood:  / Protein: 100 mg/dL / Nitrite: Negative   Leuk Esterase: Negative / RBC: 6 /HPF / WBC 8 /HPF   Sq Epi:  / Non Sq Epi: 4 /HPF / Bacteria: Negative      Creatinine, Random Urine: 151 mg/dL ( @ 10:58)  Protein/Creatinine Ratio Calculation: 0.6 Ratio ( @ 10:58)  Sodium, Random Urine: <20.0 mmoL/L ( @ 10:57)  Osmolality, Random Urine: 558 mos/kg ( @ 10:57)      RADIOLOGY & ADDITIONAL STUDIES:

## 2021-12-14 NOTE — PHYSICAL THERAPY INITIAL EVALUATION ADULT - PERTINENT HX OF CURRENT PROBLEM, REHAB EVAL
79 y/o M Pmh HTN, DLD, CAD s/p CABG x 4 (2006) on ASA, Aortic Valve replacement (2011) unvaccinated for Flu and COVID 19, CKD presented to the ED with complaint of progressive generalized fatigue, SOB, n/b diarrhea for the past ten days.

## 2021-12-15 ENCOUNTER — APPOINTMENT (OUTPATIENT)
Dept: NEUROLOGY | Facility: CLINIC | Age: 81
End: 2021-12-15

## 2021-12-15 LAB
ALBUMIN SERPL ELPH-MCNC: 3.2 G/DL — LOW (ref 3.5–5.2)
ALP SERPL-CCNC: 54 U/L — SIGNIFICANT CHANGE UP (ref 30–115)
ALT FLD-CCNC: 28 U/L — SIGNIFICANT CHANGE UP (ref 0–41)
ANION GAP SERPL CALC-SCNC: 19 MMOL/L — HIGH (ref 7–14)
AST SERPL-CCNC: 35 U/L — SIGNIFICANT CHANGE UP (ref 0–41)
BILIRUB SERPL-MCNC: 0.5 MG/DL — SIGNIFICANT CHANGE UP (ref 0.2–1.2)
BUN SERPL-MCNC: 70 MG/DL — CRITICAL HIGH (ref 10–20)
CALCIUM SERPL-MCNC: 8.9 MG/DL — SIGNIFICANT CHANGE UP (ref 8.5–10.1)
CHLORIDE SERPL-SCNC: 99 MMOL/L — SIGNIFICANT CHANGE UP (ref 98–110)
CO2 SERPL-SCNC: 19 MMOL/L — SIGNIFICANT CHANGE UP (ref 17–32)
CREAT SERPL-MCNC: 1.7 MG/DL — HIGH (ref 0.7–1.5)
D DIMER BLD IA.RAPID-MCNC: 2877 NG/ML DDU — HIGH (ref 0–230)
FERRITIN SERPL-MCNC: 3522 NG/ML — HIGH (ref 30–400)
GLUCOSE SERPL-MCNC: 102 MG/DL — HIGH (ref 70–99)
HCT VFR BLD CALC: 36.4 % — LOW (ref 42–52)
HGB BLD-MCNC: 12.6 G/DL — LOW (ref 14–18)
MAGNESIUM SERPL-MCNC: 2.6 MG/DL — HIGH (ref 1.8–2.4)
MCHC RBC-ENTMCNC: 30.1 PG — SIGNIFICANT CHANGE UP (ref 27–31)
MCHC RBC-ENTMCNC: 34.6 G/DL — SIGNIFICANT CHANGE UP (ref 32–37)
MCV RBC AUTO: 86.9 FL — SIGNIFICANT CHANGE UP (ref 80–94)
NRBC # BLD: 0 /100 WBCS — SIGNIFICANT CHANGE UP (ref 0–0)
PLATELET # BLD AUTO: 223 K/UL — SIGNIFICANT CHANGE UP (ref 130–400)
POTASSIUM SERPL-MCNC: 3.5 MMOL/L — SIGNIFICANT CHANGE UP (ref 3.5–5)
POTASSIUM SERPL-SCNC: 3.5 MMOL/L — SIGNIFICANT CHANGE UP (ref 3.5–5)
PROT SERPL-MCNC: 6.2 G/DL — SIGNIFICANT CHANGE UP (ref 6–8)
RBC # BLD: 4.19 M/UL — LOW (ref 4.7–6.1)
RBC # FLD: 13.4 % — SIGNIFICANT CHANGE UP (ref 11.5–14.5)
SODIUM SERPL-SCNC: 137 MMOL/L — SIGNIFICANT CHANGE UP (ref 135–146)
WBC # BLD: 7.86 K/UL — SIGNIFICANT CHANGE UP (ref 4.8–10.8)
WBC # FLD AUTO: 7.86 K/UL — SIGNIFICANT CHANGE UP (ref 4.8–10.8)

## 2021-12-15 PROCEDURE — 99233 SBSQ HOSP IP/OBS HIGH 50: CPT

## 2021-12-15 PROCEDURE — 71045 X-RAY EXAM CHEST 1 VIEW: CPT | Mod: 26

## 2021-12-15 RX ORDER — TOCILIZUMAB 20 MG/ML
800 INJECTION, SOLUTION, CONCENTRATE INTRAVENOUS ONCE
Refills: 0 | Status: COMPLETED | OUTPATIENT
Start: 2021-12-15 | End: 2021-12-15

## 2021-12-15 RX ADMIN — Medication 50 MILLIGRAM(S): at 05:30

## 2021-12-15 RX ADMIN — Medication 1300 MILLIGRAM(S): at 21:31

## 2021-12-15 RX ADMIN — Medication 40 MILLIGRAM(S): at 05:30

## 2021-12-15 RX ADMIN — Medication 81 MILLIGRAM(S): at 11:54

## 2021-12-15 RX ADMIN — Medication 6 MILLIGRAM(S): at 05:30

## 2021-12-15 RX ADMIN — LATANOPROST 1 DROP(S): 0.05 SOLUTION/ DROPS OPHTHALMIC; TOPICAL at 21:31

## 2021-12-15 RX ADMIN — APIXABAN 5 MILLIGRAM(S): 2.5 TABLET, FILM COATED ORAL at 05:29

## 2021-12-15 RX ADMIN — ATORVASTATIN CALCIUM 20 MILLIGRAM(S): 80 TABLET, FILM COATED ORAL at 21:32

## 2021-12-15 RX ADMIN — Medication 1300 MILLIGRAM(S): at 13:20

## 2021-12-15 RX ADMIN — TOCILIZUMAB 100 MILLIGRAM(S): 20 INJECTION, SOLUTION, CONCENTRATE INTRAVENOUS at 21:32

## 2021-12-15 RX ADMIN — APIXABAN 5 MILLIGRAM(S): 2.5 TABLET, FILM COATED ORAL at 17:04

## 2021-12-15 RX ADMIN — Medication 1300 MILLIGRAM(S): at 05:30

## 2021-12-15 NOTE — PROGRESS NOTE ADULT - SUBJECTIVE AND OBJECTIVE BOX
REMA, SAÚL  80y  Male      Patient is a 80y old  Male who presents with a chief complaint of COVID PNA.       INTERVAL HPI/OVERNIGHT EVENTS:      ******************************* REVIEW OF SYSTEMS:**********************************************    All other review of systems negative    *********************** VITALS ******************************************    T(F): 96.3 (12-15-21 @ 05:05)  HR: 66 (12-15-21 @ 05:05) (66 - 66)  BP: 144/60 (12-15-21 @ 05:05) (113/66 - 144/60)  RR: 18 (12-15-21 @ 05:05) (18 - 18)  SpO2: 93% (12-15-21 @ 08:31) (93% - 97%)            ******************************** PHYSICAL EXAM:**************************************************  GENERAL: NAD    PSYCH: no agitation, baseline mentation  HEENT:     NERVOUS SYSTEM:  Alert & Oriented X3,     PULMONARY: JACK, Decreased L>R    CARDIOVASCULAR: S1S2 RRR    GI: Soft, NT, ND; BS present.    EXTREMITIES:  2+ Peripheral Pulses, No clubbing, cyanosis, or edema    LYMPH: No lymphadenopathy noted    SKIN: No rashes or lesions      **************************** LABS *******************************************************                          12.6   7.86  )-----------( 223      ( 15 Dec 2021 04:30 )             36.4     12-15    137  |  99  |  70<HH>  ----------------------------<  102<H>  3.5   |  19  |  1.7<H>    Ca    8.9      15 Dec 2021 04:30  Mg     2.6     12-15    TPro  6.2  /  Alb  3.2<L>  /  TBili  0.5  /  DBili  x   /  AST  35  /  ALT  28  /  AlkPhos  54  12-15          Lactate Trend    CARDIAC MARKERS ( 14 Dec 2021 04:30 )  x     / 0.02 ng/mL / x     / x     / x          CAPILLARY BLOOD GLUCOSE              **************************Active Medications *******************************************  No Known Allergies      acetaminophen     Tablet .. 650 milliGRAM(s) Oral every 4 hours PRN  apixaban 5 milliGRAM(s) Oral every 12 hours  aspirin  chewable 81 milliGRAM(s) Oral daily  atorvastatin 20 milliGRAM(s) Oral at bedtime  dexAMETHasone  Injectable 6 milliGRAM(s) IV Push daily  furosemide    Tablet 40 milliGRAM(s) Oral daily  influenza  Vaccine (HIGH DOSE) 0.7 milliLiter(s) IntraMuscular once  latanoprost 0.005% Ophthalmic Solution 1 Drop(s) Both EYES at bedtime  metoprolol succinate ER 50 milliGRAM(s) Oral daily  sodium bicarbonate 1300 milliGRAM(s) Oral three times a day      ***************************************************  RADIOLOGY & ADDITIONAL TESTS:    Imaging Personally Reviewed:  [ ] YES  [ ] NO    HEALTH ISSUES - PROBLEM Dx:

## 2021-12-15 NOTE — PROGRESS NOTE ADULT - ASSESSMENT
79 y/o gentleman with a past medical history of HTN, DLD, CAD s/p CABG, AV replacement, CKD III admitted for COVID PNA. Notes 10 days of progressive SOB, fatigue, diarrhea, and poor oral intake. Didn't note any significant fevers at home. Noted to be hypoxic here, CXR shows b/l infiltrates, O2 improved on 4L NC. Lab work remarkable for NICA, mild hyponatremia, and elevated trop. He denied any chest pain/pressure, palpitations. O2 improved with 4L NC.    #Acute Hypoxic Respiratory Failure secondary to COVID-19 Pneumonia  - SIRS not present on admission.   - Symptoms more than 10 days wont be a candidate for Remdesivir  - c/w Dexamethasone 6mg QD.   - Ferritin 3522, ESR 88,  12/14  - D-dimer elevated 4601  - Procalcitonin .25, CRP 85  - ID recs appreciated   - monitor off abx   - Chest xray 12/13 noted, starting patient on 40mg Lasix PO QD  - s/p tocilizumab 800mg 12/13  - O2 requirement increased from 4L to 6L, now saturating 93% on 6L NC   - Repeat chest xray 12/14 shows Increased left mid to lower lung opacities.  - F/u inflammatory markers from 12/15    #Elevated D-Dimer: 64566  - Stable on 4L o2 currently and not tachy.  - Hold off on CT PE protocol given renal function   - LE duplex is positive for DVT   - V/Q scan shows high probability for PE   - Starting patient on Eliquis 5mg BID (12/13/2021)  - Spoke to patient's cardiologist Dr. Sebastian Mendoza. will hold Plavix while the patient is on Eliquis     #NICA on CKD III, suspected pre-renal (+/-ATN from ARB)  #Hyponatremia --> likely hypovolemic  #HAGMA (normal lactate)  - s/p LR at 75 cc/hr, stopped 12/13  - If bicarb continue to trend down the start sodium bicarb 650 q8  - Trend Cr, monitor strict I/O  - ARB and HCTZ held for now until renal function is stable.  - Avoid nephrotoxic medications.    - F/u Urine osm, Urine lytes  - Serum osm 282  - Retroperitoneal US wnl   - Nephro recs appreciated   - Starting sodium bicarb 1300mg TID     #CAD s/p CABG  #AV replacement   #HTN / HDL  #Elevated trops: stable, likely secondary to COVID PNA   - No chest pain   - C/w DAPT, atorvastatin, and metoprolol  - ARB and HCTZ held for now given NICA     #Misc   - DVT prophylaxis: Eliquis   - GI prophylaxis: Not indicated  - Diet: DASH/TLC   - Activity status: AAT   - Code status: DNR/DNI  - Disposition: acute   Pending: Inflammatory markers from 12/15   79 y/o gentleman with a past medical history of HTN, DLD, CAD s/p CABG, AV replacement, CKD III admitted for COVID PNA. Notes 10 days of progressive SOB, fatigue, diarrhea, and poor oral intake. Didn't note any significant fevers at home. Noted to be hypoxic here, CXR shows b/l infiltrates, O2 improved on 4L NC. Lab work remarkable for NICA, mild hyponatremia, and elevated trop. He denied any chest pain/pressure, palpitations. O2 improved with 4L NC.    #Acute Hypoxic Respiratory Failure secondary to COVID-19 Pneumonia  - SIRS not present on admission.   - Symptoms more than 10 days wont be a candidate for Remdesivir  - c/w Dexamethasone 6mg QD.   - Ferritin 3522, ESR 88,  12/14  - D-dimer elevated 4601 >2877  - Procalcitonin .25, CRP 85  - ID recs appreciated   - monitor off abx   - Chest xray 12/13 noted, starting patient on 40mg Lasix PO QD  - s/p tocilizumab 800mg 12/13  - O2 requirement increased from 4L to 6L, now saturating 93% on 6L NC   - Repeat chest xray 12/14 shows Increased left mid to lower lung opacities.  - F/u inflammatory markers from 12/15    #Elevated D-Dimer: 13399  - Stable on 4L o2 currently and not tachy.  - Hold off on CT PE protocol given renal function   - LE duplex is positive for DVT   - V/Q scan shows high probability for PE   - Starting patient on Eliquis 5mg BID (12/13/2021)  - Spoke to patient's cardiologist Dr. Sebastian Mendoza. will hold Plavix while the patient is on Eliquis     #NICA on CKD III, suspected pre-renal (+/-ATN from ARB)  #Hyponatremia --> likely hypovolemic  #HAGMA (normal lactate)  - s/p LR at 75 cc/hr, stopped 12/13  - If bicarb continue to trend down the start sodium bicarb 650 q8  - Trend Cr, monitor strict I/O  - ARB and HCTZ held for now until renal function is stable.  - Avoid nephrotoxic medications.    - F/u Urine osm, Urine lytes  - Serum osm 282  - Retroperitoneal US wnl   - Nephro recs appreciated   - Starting sodium bicarb 1300mg TID     #CAD s/p CABG  #AV replacement   #HTN / HDL  #Elevated trops: stable, likely secondary to COVID PNA   - No chest pain   - C/w DAPT, atorvastatin, and metoprolol  - ARB and HCTZ held for now given NICA     #Misc   - DVT prophylaxis: Eliquis   - GI prophylaxis: Not indicated  - Diet: DASH/TLC   - Activity status: AAT   - Code status: DNR/DNI  - Disposition: acute   Pending: Inflammatory markers from 12/15

## 2021-12-15 NOTE — PROGRESS NOTE ADULT - SUBJECTIVE AND OBJECTIVE BOX
----------Daily Progress Note----------    HISTORY OF PRESENT ILLNESS:  Patient is a 80y old Male who presents with a chief complaint of COVID PNA (14 Dec 2021 15:50)    Currently admitted to medicine with the primary diagnosis of 2019 novel coronavirus disease (COVID-19)    81 y/o M Pmh HTN, DLD, CAD s/p CABG x 4 () on ASA, Aortic Valve replacement () unvaccinated for Flu and COVID 19, CKD presented to the ED with complaint of progressive generalized fatigue, SOB, n/b diarrhea for the past ten days. Patient also endorses decreased sense of smell and taste and overall decreased PO intake over the past week. He denies any fevers or cough, travel and or sick contacts. Patient endorses that his wife has been feeling fatigued as well without any other associated symptom He stated that he as not been evaluated by a physician in some time but has been compliant with his medications. No leg swelling or chest pain, but has been hypoxic per the wife as a family friend nurse has evaluated him couple of days ago and has brought him home Oxygen per family.     ED course: /72, HR: 83, SPO2 86% on room air, placed on 4L NC. CXR: B/L lung opacities L>R. COVID PCR +, Cr: 2.3, Na 129, AG 18, BNP 2176, Trop 0.06      Today is hospital day 3d.     INTERVAL HOSPITAL COURSE / OVERNIGHT EVENTS:    Patient was examined and seen at bedside. This morning he is resting comfortably in bed and reports no new issues or overnight events.     Review of Systems: Patient is endorsing SOB on 6L NC. Patient denies any fever, chills, headache, dizziness. Patient denies chest pain, palpitation, SOB       <<<<<PAST MEDICAL & SURGICAL HISTORY>>>>>  Chronic systolic congestive heart failure    HTN (hypertension)    Dyslipidemia      ALLERGIES  No Known Allergies      Home Medications:  LATANOPROST  0.005 % SOLN:  (12 Dec 2021 16:26)  METOPROLOL ER SUCCINATE 50MG TABS: TAKE 1 TABLET BY MOUTH AT BEDTIME (12 Dec 2021 16:26)  OLMESARTAN MEDOXOMIL/AMLODIPINE/HYD ROCHLOROTHIAZIDE 40-5-25 MG TABS:  (12 Dec 2021 16:26)  rosuvastatin 5 mg oral tablet: 1 tab(s) orally once a day (12 Dec 2021 16:26)        MEDICATIONS  STANDING MEDICATIONS  apixaban 5 milliGRAM(s) Oral every 12 hours  aspirin  chewable 81 milliGRAM(s) Oral daily  atorvastatin 20 milliGRAM(s) Oral at bedtime  dexAMETHasone  Injectable 6 milliGRAM(s) IV Push daily  furosemide    Tablet 40 milliGRAM(s) Oral daily  influenza  Vaccine (HIGH DOSE) 0.7 milliLiter(s) IntraMuscular once  latanoprost 0.005% Ophthalmic Solution 1 Drop(s) Both EYES at bedtime  metoprolol succinate ER 50 milliGRAM(s) Oral daily  sodium bicarbonate 1300 milliGRAM(s) Oral three times a day    PRN MEDICATIONS  acetaminophen     Tablet .. 650 milliGRAM(s) Oral every 4 hours PRN    VITALS:  T(F): 96.3  HR: 66  BP: 144/60  RR: 18  SpO2: 93%    <<<<<LABS>>>>>                        12.6   7.86  )-----------( 223      ( 15 Dec 2021 04:30 )             36.4     12-15    137  |  99  |  70<HH>  ----------------------------<  102<H>  3.5   |  19  |  1.7<H>    Ca    8.9      15 Dec 2021 04:30  Mg     2.6     12-15    TPro  6.2  /  Alb  3.2<L>  /  TBili  0.5  /  DBili  x   /  AST  35  /  ALT  28  /  AlkPhos  54  12-15      Urinalysis Basic - ( 13 Dec 2021 10:58 )    Color: Yellow / Appearance: Clear / S.021 / pH: x  Gluc: x / Ketone: Trace  / Bili: Negative / Urobili: <2 mg/dL   Blood: x / Protein: 100 mg/dL / Nitrite: Negative   Leuk Esterase: Negative / RBC: 6 /HPF / WBC 8 /HPF   Sq Epi: x / Non Sq Epi: 4 /HPF / Bacteria: Negative            Culture - Blood (collected 12 Dec 2021 13:28)  Source: .Blood Blood  Preliminary Report (13 Dec 2021 19:02):    No growth to date.    Culture - Blood (collected 12 Dec 2021 13:28)  Source: .Blood Blood  Preliminary Report (13 Dec 2021 19:02):    No growth to date.    [id number]  CARDIAC MARKERS ( 14 Dec 2021 04:30 )  x     / 0.02 ng/mL / x     / x     / x            <<<<<RADIOLOGY>>>>>    < from: Xray Chest 1 View-PORTABLE IMMEDIATE (21 @ 12:53) >  Impression:    New bilateral lung opacities, left greater than right. Findings are   concerning for multifocal pneumonia        --- End of Report ---    < end of copied text >    < from: US Retroperitoneal Complete (21 @ 12:11) >  IMPRESSION:    Normal renal ultrasound.        --- End of Report ---    < end of copied text >    < from: [facility] Duplex Lower Ext Vein Scan, Bilat (21 @ 15:02) >  FINDINGS:    RIGHT:  Normal compressibility of the RIGHT common femoral, femoral  veins.  Doppler examination shows normal spontaneous and phasic flow.  DVT noted in the right popliteal, peroneal and posterior tibial veins    LEFT:  Normal compressibility of the LEFT common femoral, femoral and popliteal   veins.  Doppler examination shows normal spontaneous and phasic flow.  Thrombus noted in the left posterior tibial vein    IMPRESSION:  Acute DVT noted in the right popliteal peroneal and posterior tibial vein.    Thrombus noted in the right posterior tibial vein.          --- End of Report ---    < end of copied text >    < from: [facility] Pulmonary Perfusion Scan (21 @ 16:21) >  IMPRESSION:    HIGH PROBABILITY FOR PULMONARY EMBOLISM.    SEGMENTAL IN CHARACTER PERFUSION DEFECTS IN THE POSTERIOR LATERAL AND   ANTERIOR BASAL SEGMENTS AND SUPERIOR SEGMENT RIGHT LOWER LOBE, MEDIAL   SEGMENT OF RIGHT MIDDLE LOBE, AND ANTERIOR SEGMENT LEFT UPPER LOBE NOT   MATCHED BY CHEST XRAY ABNORMALITIES.    --- End of Report ---    < end of copied text >      < from: Xray Chest 1 View- PORTABLE-Urgent (Xray Chest 1 View- PORTABLE-Urgent .) (21 @ 10:47) >  IMPRESSION:    Increased left mid to lower lung opacities. Apparently decreased right   basal opacity which may be in part secondary to differences in   diaphragmatic positioning.    Stable cardiomediastinal silhouette. Unchanged osseous structures.      --- End of Report ---    < end of copied text >    <<<<<PHYSICAL EXAM>>>>>  GENERAL: Well developed, well nourished and in no acute distress. Resting comfortably in bed.  PULMONARY: wheezing on auscultation bilaterally. No rales, rhonchi  CARDIOVASCULAR: Regular rate and rhythm, S1-S2, no murmurs  GASTROINTESTINAL: Soft, non-tender, non-distended, no guarding.  SKIN/EXTREMITIES: trace edema  NEUROLOGIC/MUSCULOSKELETAL: AOx4, grossly moving all extremities, no focal deficits.      -----------------------------------------------------------------------------------------------------------------------------------------------------------------------------------------------

## 2021-12-15 NOTE — PROGRESS NOTE ADULT - SUBJECTIVE AND OBJECTIVE BOX
NEPHROLOGY FOLLOW UP NOTE    cr better  still on high O2 requirements  no fevers  BP's acceptable    PAST MEDICAL & SURGICAL HISTORY:  Chronic systolic congestive heart failure    HTN (hypertension)    Dyslipidemia      Allergies:  No Known Allergies    Home Medications Reviewed    SOCIAL HISTORY:  Denies ETOH,Smoking,   FAMILY HISTORY:        REVIEW OF SYSTEMS:  CONSTITUTIONAL: No weakness, fevers or chills  EYES/ENT: No visual changes;  No vertigo or throat pain   NECK: No pain or stiffness  RESPIRATORY: No cough, wheezing, hemoptysis; + shortness of breath  CARDIOVASCULAR: No chest pain or palpitations.  GASTROINTESTINAL: No abdominal or epigastric pain. No nausea, vomiting, or hematemesis; No diarrhea or constipation. No melena or hematochezia.  GENITOURINARY: No dysuria, frequency, foamy urine, urinary urgency, incontinence or hematuria  NEUROLOGICAL: No numbness or weakness  SKIN: No itching, burning, rashes, or lesions   VASCULAR: No bilateral lower extremity edema.   All other review of systems is negative unless indicated above.    PHYSICAL EXAM:  Constitutional: NAD  HEENT: anicteric sclera, oropharynx clear, MMM O2 NC  Neck: No JVD  Respiratory: CTAB   Cardiovascular: S1, S2, RRR  Gastrointestinal: BS+, soft, NT/ND  Extremities: No cyanosis or clubbing. + peripheral edema  Neurological: A/O x 3, no focal deficits  Psychiatric: Normal mood, normal affect  : No CVA tenderness. No covarrubias.   Skin: No rashes    Hospital Medications:   MEDICATIONS  (STANDING):  apixaban 5 milliGRAM(s) Oral every 12 hours  aspirin  chewable 81 milliGRAM(s) Oral daily  atorvastatin 20 milliGRAM(s) Oral at bedtime  dexAMETHasone  Injectable 6 milliGRAM(s) IV Push daily  furosemide    Tablet 40 milliGRAM(s) Oral daily  influenza  Vaccine (HIGH DOSE) 0.7 milliLiter(s) IntraMuscular once  latanoprost 0.005% Ophthalmic Solution 1 Drop(s) Both EYES at bedtime  metoprolol succinate ER 50 milliGRAM(s) Oral daily  sodium bicarbonate 1300 milliGRAM(s) Oral three times a day        VITALS:  T(F): 98.6 (12-15-21 @ 12:44), Max: 98.6 (12-15-21 @ 12:44)  HR: 62 (12-15-21 @ 12:44)  BP: 119/60 (12-15-21 @ 12:44)  RR: 18 (12-15-21 @ 12:44)  SpO2: 93% (12-15-21 @ 08:31)  Wt(kg): --        LABS:  12-15    137  |  99  |  70<HH>  ----------------------------<  102<H>  3.5   |  19  |  1.7<H>    Ca    8.9      15 Dec 2021 04:30  Mg     2.6     12-15    TPro  6.2  /  Alb  3.2<L>  /  TBili  0.5  /  DBili      /  AST  35  /  ALT  28  /  AlkPhos  54  12-15                          12.6   7.86  )-----------( 223      ( 15 Dec 2021 04:30 )             36.4       Urine Studies:  Urinalysis Basic - ( 13 Dec 2021 10:58 )    Color: Yellow / Appearance: Clear / S.021 / pH:   Gluc:  / Ketone: Trace  / Bili: Negative / Urobili: <2 mg/dL   Blood:  / Protein: 100 mg/dL / Nitrite: Negative   Leuk Esterase: Negative / RBC: 6 /HPF / WBC 8 /HPF   Sq Epi:  / Non Sq Epi: 4 /HPF / Bacteria: Negative      Creatinine, Random Urine: 151 mg/dL ( @ 10:58)  Protein/Creatinine Ratio Calculation: 0.6 Ratio ( @ 10:58)  Sodium, Random Urine: <20.0 mmoL/L ( @ 10:57)  Osmolality, Random Urine: 558 mos/kg ( @ 10:57)      RADIOLOGY & ADDITIONAL STUDIES:

## 2021-12-15 NOTE — PROGRESS NOTE ADULT - ASSESSMENT
Acute kidney injury    - improving  chronic kidney disease stage 3  (baseline cr 1.45 10/2021)  COVID-19 PNA s/p toci / decadron   hyponatremia   metabolic acidosis    CAD / AVR  HTN   PE       plan:    cont sodium bicarbonate 650mg po tid  off ARB, thiazide and amlodipine  cont lasix 40mg po qd  decadron / O2 / s/p toci  AC per primary team  covid isolation / protocol

## 2021-12-15 NOTE — PROGRESS NOTE ADULT - ASSESSMENT
79 y/o gentleman with a past medical history of HTN, DLD, CAD s/p CABG, AV replacement, CKD III admitted for COVID PNA. Notes 10 days of progressive SOB, fatigue, diarrhea, and poor oral intake. Didn't note any significant fevers at home. Noted to be hypoxic here, CXR shows b/l infiltrates, O2 improved on 4L NC. Lab work remarkable for NICA, mild hyponatremia, and elevated trop. He denied any chest pain/pressure, palpitations. O2 improved with 4L NC.    #Acute Hypoxic Respiratory Failure secondary to COVID-19 Pneumonia  - SIRS not present on admission.   - Symptoms more than 10 days wont be a candidate for Remdesivir  - c/w Dexamethasone 6mg QD.   - Ferritin 3522, ESR 88,  12/14  - D-dimer elevated 4601  - Procalcitonin .25, CRP 85  - ID recs appreciated   - monitor off abx   - Chest xray 12/13 noted, starting patient on 40mg Lasix PO QD  - s/p tocilizumab 800mg 12/13  - O2 requirement increased from 4L to 6L, now saturating 93% on 6L NC   - Repeat chest xray 12/14 shows Increased left mid to lower lung opacities.  - F/u inflammatory markers from 12/15    #Elevated D-Dimer: 4600 >> 2877  - Hold off on CT PE protocol given renal function   - LE duplex is positive for DVT   - V/Q scan shows high probability for PE   - Starting patient on Eliquis 5mg BID (12/13/2021)  - Spoke to patient's cardiologist Dr. Sebastian Mendoza. will hold Plavix while the patient is on Eliquis     #NICA on CKD III, suspected pre-renal (+/-ATN from ARB)   >> Improving  2.0 >> 1.8 >> 1.7   #Hyponatremia --> likely hypovolemic  #HAGMA (normal lactate)  - s/p LR at 75 cc/hr, stopped 12/13  - If bicarb continue to trend down the start sodium bicarb 650 q8  - Trend Cr, monitor strict I/O  - ARB and HCTZ held for now until renal function is stable.  - Avoid nephrotoxic medications.    - F/u Urine osm, Urine lytes  - Serum osm 282  - Retroperitoneal US wnl   - Nephro recs appreciated   - Starting sodium bicarb 1300mg TID     #CAD s/p CABG  #AV replacement   #HTN / HDL  #Elevated trops: stable, likely secondary to COVID PNA   - No chest pain   - C/w DAPT, atorvastatin, and metoprolol  - ARB and HCTZ held for now given NICA     #Misc   - DVT prophylaxis: Eliquis   - GI prophylaxis: Not indicated  - Diet: DASH/TLC   - Activity status: AAT   - Code status: DNR/DNI    #Progress Note Handoff  Pending (specify):  AHRF/ NICA/   Family discussion:  Disposition: Home_

## 2021-12-16 LAB
ALBUMIN SERPL ELPH-MCNC: 3.4 G/DL — LOW (ref 3.5–5.2)
ALP SERPL-CCNC: 59 U/L — SIGNIFICANT CHANGE UP (ref 30–115)
ALT FLD-CCNC: 65 U/L — HIGH (ref 0–41)
ANION GAP SERPL CALC-SCNC: 23 MMOL/L — HIGH (ref 7–14)
AST SERPL-CCNC: 80 U/L — HIGH (ref 0–41)
BILIRUB SERPL-MCNC: 0.6 MG/DL — SIGNIFICANT CHANGE UP (ref 0.2–1.2)
BUN SERPL-MCNC: 71 MG/DL — CRITICAL HIGH (ref 10–20)
CALCIUM SERPL-MCNC: 9.5 MG/DL — SIGNIFICANT CHANGE UP (ref 8.5–10.1)
CHLORIDE SERPL-SCNC: 103 MMOL/L — SIGNIFICANT CHANGE UP (ref 98–110)
CO2 SERPL-SCNC: 21 MMOL/L — SIGNIFICANT CHANGE UP (ref 17–32)
CREAT SERPL-MCNC: 1.9 MG/DL — HIGH (ref 0.7–1.5)
CRP SERPL-MCNC: 42 MG/L — HIGH
D DIMER BLD IA.RAPID-MCNC: 3961 NG/ML DDU — HIGH (ref 0–230)
FERRITIN SERPL-MCNC: 2797 NG/ML — HIGH (ref 30–400)
GLUCOSE SERPL-MCNC: 119 MG/DL — HIGH (ref 70–99)
HCT VFR BLD CALC: 39 % — LOW (ref 42–52)
HGB BLD-MCNC: 13.3 G/DL — LOW (ref 14–18)
MAGNESIUM SERPL-MCNC: 2.4 MG/DL — SIGNIFICANT CHANGE UP (ref 1.8–2.4)
MCHC RBC-ENTMCNC: 30 PG — SIGNIFICANT CHANGE UP (ref 27–31)
MCHC RBC-ENTMCNC: 34.1 G/DL — SIGNIFICANT CHANGE UP (ref 32–37)
MCV RBC AUTO: 88 FL — SIGNIFICANT CHANGE UP (ref 80–94)
NRBC # BLD: 0 /100 WBCS — SIGNIFICANT CHANGE UP (ref 0–0)
PLATELET # BLD AUTO: 267 K/UL — SIGNIFICANT CHANGE UP (ref 130–400)
POTASSIUM SERPL-MCNC: 3.9 MMOL/L — SIGNIFICANT CHANGE UP (ref 3.5–5)
POTASSIUM SERPL-SCNC: 3.9 MMOL/L — SIGNIFICANT CHANGE UP (ref 3.5–5)
PROCALCITONIN SERPL-MCNC: 0.21 NG/ML — HIGH (ref 0.02–0.1)
PROT SERPL-MCNC: 6.5 G/DL — SIGNIFICANT CHANGE UP (ref 6–8)
RBC # BLD: 4.43 M/UL — LOW (ref 4.7–6.1)
RBC # FLD: 13.4 % — SIGNIFICANT CHANGE UP (ref 11.5–14.5)
SODIUM SERPL-SCNC: 147 MMOL/L — HIGH (ref 135–146)
WBC # BLD: 6.6 K/UL — SIGNIFICANT CHANGE UP (ref 4.8–10.8)
WBC # FLD AUTO: 6.6 K/UL — SIGNIFICANT CHANGE UP (ref 4.8–10.8)

## 2021-12-16 PROCEDURE — 99233 SBSQ HOSP IP/OBS HIGH 50: CPT

## 2021-12-16 RX ADMIN — Medication 1300 MILLIGRAM(S): at 21:34

## 2021-12-16 RX ADMIN — APIXABAN 5 MILLIGRAM(S): 2.5 TABLET, FILM COATED ORAL at 05:40

## 2021-12-16 RX ADMIN — Medication 40 MILLIGRAM(S): at 05:40

## 2021-12-16 RX ADMIN — ATORVASTATIN CALCIUM 20 MILLIGRAM(S): 80 TABLET, FILM COATED ORAL at 21:34

## 2021-12-16 RX ADMIN — Medication 1300 MILLIGRAM(S): at 05:40

## 2021-12-16 RX ADMIN — LATANOPROST 1 DROP(S): 0.05 SOLUTION/ DROPS OPHTHALMIC; TOPICAL at 21:46

## 2021-12-16 RX ADMIN — APIXABAN 5 MILLIGRAM(S): 2.5 TABLET, FILM COATED ORAL at 17:33

## 2021-12-16 RX ADMIN — Medication 50 MILLIGRAM(S): at 05:41

## 2021-12-16 RX ADMIN — Medication 6 MILLIGRAM(S): at 05:41

## 2021-12-16 RX ADMIN — Medication 1300 MILLIGRAM(S): at 13:21

## 2021-12-16 RX ADMIN — Medication 81 MILLIGRAM(S): at 12:13

## 2021-12-16 NOTE — PROGRESS NOTE ADULT - SUBJECTIVE AND OBJECTIVE BOX
----------Daily Progress Note----------    HISTORY OF PRESENT ILLNESS:  Patient is a 80y old Male who presents with a chief complaint of COVID PNA (15 Dec 2021 17:24)    Currently admitted to medicine with the primary diagnosis of 2019 novel coronavirus disease (COVID-19)    81 y/o M Pmh HTN, DLD, CAD s/p CABG x 4 (2206) on ASA, Aortic Valve replacement (2011) unvaccinated for Flu and COVID 19, CKD presented to the ED with complaint of progressive generalized fatigue, SOB, n/b diarrhea for the past ten days. Patient also endorses decreased sense of smell and taste and overall decreased PO intake over the past week. He denies any fevers or cough, travel and or sick contacts. Patient endorses that his wife has been feeling fatigued as well without any other associated symptom He stated that he as not been evaluated by a physician in some time but has been compliant with his medications. No leg swelling or chest pain, but has been hypoxic per the wife as a family friend nurse has evaluated him couple of days ago and has brought him home Oxygen per family.     ED course: /72, HR: 83, SPO2 86% on room air, placed on 4L NC. CXR: B/L lung opacities L>R. COVID PCR +, Cr: 2.3, Na 129, AG 18, BNP 2176, Trop 0.06       Today is hospital day 4d.     INTERVAL HOSPITAL COURSE / OVERNIGHT EVENTS:    Patient was examined and seen at bedside. This morning he is resting comfortably in bed and reports no new issues or overnight events.     Review of Systems: Patient is endorsing SOB on 6L NC. Patient denies any fever, chills, headache, dizziness. Patient denies chest pain, palpitation, SOB       <<<<<PAST MEDICAL & SURGICAL HISTORY>>>>>  Chronic systolic congestive heart failure    HTN (hypertension)    Dyslipidemia      ALLERGIES  No Known Allergies      Home Medications:  LATANOPROST  0.005 % SOLN:  (12 Dec 2021 16:26)  METOPROLOL ER SUCCINATE 50MG TABS: TAKE 1 TABLET BY MOUTH AT BEDTIME (12 Dec 2021 16:26)  OLMESARTAN MEDOXOMIL/AMLODIPINE/HYD ROCHLOROTHIAZIDE 40-5-25 MG TABS:  (12 Dec 2021 16:26)  rosuvastatin 5 mg oral tablet: 1 tab(s) orally once a day (12 Dec 2021 16:26)        MEDICATIONS  STANDING MEDICATIONS  apixaban 5 milliGRAM(s) Oral every 12 hours  aspirin  chewable 81 milliGRAM(s) Oral daily  atorvastatin 20 milliGRAM(s) Oral at bedtime  dexAMETHasone  Injectable 6 milliGRAM(s) IV Push daily  furosemide    Tablet 40 milliGRAM(s) Oral daily  influenza  Vaccine (HIGH DOSE) 0.7 milliLiter(s) IntraMuscular once  latanoprost 0.005% Ophthalmic Solution 1 Drop(s) Both EYES at bedtime  metoprolol succinate ER 50 milliGRAM(s) Oral daily  sodium bicarbonate 1300 milliGRAM(s) Oral three times a day    PRN MEDICATIONS  acetaminophen     Tablet .. 650 milliGRAM(s) Oral every 4 hours PRN    VITALS:  T(F): 96.7  HR: 79  BP: 167/71  RR: 18  SpO2: 93%    <<<<<LABS>>>>>                        12.6   7.86  )-----------( 223      ( 15 Dec 2021 04:30 )             36.4     12-15    137  |  99  |  70<HH>  ----------------------------<  102<H>  3.5   |  19  |  1.7<H>    Ca    8.9      15 Dec 2021 04:30  Mg     2.6     12-15    TPro  6.2  /  Alb  3.2<L>  /  TBili  0.5  /  DBili  x   /  AST  35  /  ALT  28  /  AlkPhos  54  12-15            [id number]        <<<<<RADIOLOGY>>>>>    < from: Xray Chest 1 View-PORTABLE IMMEDIATE (12.12.21 @ 12:53) >  Impression:    New bilateral lung opacities, left greater than right. Findings are   concerning for multifocal pneumonia        --- End of Report ---    < end of copied text >    < from: US Retroperitoneal Complete (12.13.21 @ 12:11) >  IMPRESSION:    Normal renal ultrasound.        --- End of Report ---    < end of copied text >    < from: [facility] Duplex Lower Ext Vein Scan, Bilat (12.13.21 @ 15:02) >  FINDINGS:    RIGHT:  Normal compressibility of the RIGHT common femoral, femoral  veins.  Doppler examination shows normal spontaneous and phasic flow.  DVT noted in the right popliteal, peroneal and posterior tibial veins    LEFT:  Normal compressibility of the LEFT common femoral, femoral and popliteal   veins.  Doppler examination shows normal spontaneous and phasic flow.  Thrombus noted in the left posterior tibial vein    IMPRESSION:  Acute DVT noted in the right popliteal peroneal and posterior tibial vein.    Thrombus noted in the right posterior tibial vein.          --- End of Report ---    < end of copied text >    < from: NM Pulmonary Perfusion Scan (12.13.21 @ 16:21) >  IMPRESSION:    HIGH PROBABILITY FOR PULMONARY EMBOLISM.    SEGMENTAL IN CHARACTER PERFUSION DEFECTS IN THE POSTERIOR LATERAL AND   ANTERIOR BASAL SEGMENTS AND SUPERIOR SEGMENT RIGHT LOWER LOBE, MEDIAL   SEGMENT OF RIGHT MIDDLE LOBE, AND ANTERIOR SEGMENT LEFT UPPER LOBE NOT   MATCHED BY CHEST XRAY ABNORMALITIES.    --- End of Report ---    < end of copied text >      < from: Xray Chest 1 View- PORTABLE-Urgent (Xray Chest 1 View- PORTABLE-Urgent .) (12.14.21 @ 10:47) >  IMPRESSION:    Increased left mid to lower lung opacities. Apparently decreased right   basal opacity which may be in part secondary to differences in   diaphragmatic positioning.    Stable cardiomediastinal silhouette. Unchanged osseous structures.      --- End of Report ---    < end of copied text >    <<<<<PHYSICAL EXAM>>>>>  GENERAL: Well developed, well nourished and in no acute distress. Resting comfortably in bed.  PULMONARY: wheezing on auscultation bilaterally. No rales, rhonchi  CARDIOVASCULAR: Regular rate and rhythm, S1-S2, no murmurs  GASTROINTESTINAL: Soft, non-tender, non-distended, no guarding.  SKIN/EXTREMITIES: trace edema  NEUROLOGIC/MUSCULOSKELETAL: AOx4, grossly moving all extremities, no focal deficits.      -----------------------------------------------------------------------------------------------------------------------------------------------------------------------------------------------

## 2021-12-16 NOTE — PROGRESS NOTE ADULT - ASSESSMENT
81 y/o gentleman with a past medical history of HTN, DLD, CAD s/p CABG, AV replacement, CKD III admitted for COVID PNA. Notes 10 days of progressive SOB, fatigue, diarrhea, and poor oral intake. Didn't note any significant fevers at home. Noted to be hypoxic here, CXR shows b/l infiltrates, O2 improved on 4L NC. Lab work remarkable for NICA, mild hyponatremia, and elevated trop. He denied any chest pain/pressure, palpitations. O2 improved with 4L NC.    #Acute Hypoxic Respiratory Failure secondary to COVID-19 Pneumonia  - SIRS not present on admission.   - Symptoms more than 10 days wont be a candidate for Remdesivir  - c/w Dexamethasone 6mg QD.   - Ferritin 3522>2797, ESR 88,  12/14  - D-dimer elevated 4601 >2877  - Procalcitonin .25>.21, CRP 85>.42  - ID recs appreciated   - monitor off abx   - Chest xray 12/13 noted, starting patient on 40mg Lasix PO QD  - s/p tocilizumab x2 800mg 12/13, 12/15  - O2 requirement increased from 4L to 6L, now saturating 93% on 6L NC   - Repeat chest xray 12/14 shows Increased left mid to lower lung opacities.  - Trend Inflammatory markers Q48    #Elevated D-Dimer: 23576  - Stable on 4L o2 currently and not tachy.  - Hold off on CT PE protocol given renal function   - LE duplex is positive for DVT   - V/Q scan shows high probability for PE   - Starting patient on Eliquis 5mg BID (12/13/2021)  - Spoke to patient's cardiologist Dr. Sebastian Mendoza. will hold Plavix while the patient is on Eliquis     #NICA on CKD III, suspected pre-renal (+/-ATN from ARB)  #Hyponatremia --> likely hypovolemic  #HAGMA (normal lactate)  - s/p LR at 75 cc/hr, stopped 12/13  - If bicarb continue to trend down the start sodium bicarb 650 q8  - Trend Cr, monitor strict I/O  - ARB and HCTZ held for now until renal function is stable.  - Avoid nephrotoxic medications.    - F/u Urine osm, Urine lytes  - Serum osm 282  - Retroperitoneal US wnl   - Nephro recs appreciated   - Starting sodium bicarb 1300mg TID     #CAD s/p CABG  #AV replacement   #HTN / HDL  #Elevated trops: stable, likely secondary to COVID PNA   - No chest pain   - C/w DAPT, atorvastatin, and metoprolol  - ARB and HCTZ held for now given NICA     #Misc   - DVT prophylaxis: Eliquis   - GI prophylaxis: Not indicated  - Diet: DASH/TLC   - Activity status: AAT   - Code status: DNR/DNI  - Disposition: acute   Pending: Inflammatory markers from 12/15   79 y/o gentleman with a past medical history of HTN, DLD, CAD s/p CABG, AV replacement, CKD III admitted for COVID PNA. Notes 10 days of progressive SOB, fatigue, diarrhea, and poor oral intake. Didn't note any significant fevers at home. Noted to be hypoxic here, CXR shows b/l infiltrates, O2 improved on 4L NC. Lab work remarkable for NICA, mild hyponatremia, and elevated trop. He denied any chest pain/pressure, palpitations. O2 improved with 4L NC.    #Acute Hypoxic Respiratory Failure secondary to COVID-19 Pneumonia  - SIRS not present on admission.   - Symptoms more than 10 days wont be a candidate for Remdesivir  - c/w Dexamethasone 6mg QD.   - Ferritin 3522>2797, ESR 88,  12/14  - D-dimer elevated 4601 >2877  - Procalcitonin .25>.21, CRP 85>.42  - ID recs appreciated   - monitor off abx   - Chest xray 12/13 noted, starting patient on 40mg Lasix PO QD  - s/p tocilizumab x2 800mg 12/13, 12/15  - O2 requirement increased from 4L to 6L, now saturating 93% on 6L NC   - Repeat chest xray 12/14 shows Increased left mid to lower lung opacities.  - Trend Inflammatory markers Q48    #Elevated D-Dimer: 66566  - Stable on 4L o2 currently and not tachy.  - Hold off on CT PE protocol given renal function   - LE duplex is positive for DVT   - V/Q scan shows high probability for PE   - Starting patient on Eliquis 5mg BID (12/13/2021)  - Spoke to patient's cardiologist Dr. Sebastian Mendoza. will hold Plavix while the patient is on Eliquis     #NICA on CKD III, suspected pre-renal (+/-ATN from ARB)  #Hyponatremia --> likely hypovolemic  #HAGMA (normal lactate)  - s/p LR at 75 cc/hr, stopped 12/13  - If bicarb continue to trend down the start sodium bicarb 650 q8  - Trend Cr, monitor strict I/O  - ARB and HCTZ held for now until renal function is stable.  - Avoid nephrotoxic medications.    - F/u Urine osm, Urine lytes  - Serum osm 282  - Retroperitoneal US wnl   - Nephro recs appreciated   - Starting sodium bicarb 1300mg TID     #CAD s/p CABG  #AV replacement   #HTN / HDL  #Elevated trops: stable, likely secondary to COVID PNA   - No chest pain   - C/w DAPT, atorvastatin, and metoprolol  - ARB and HCTZ held for now given NICA     #Misc   - DVT prophylaxis: Eliquis   - GI prophylaxis: Not indicated  - Diet: DASH/TLC   - Activity status: AAT   - Code status: DNR/DNI  - Disposition: acute   Pending: clinical improvement  79 y/o gentleman with a past medical history of HTN, DLD, CAD s/p CABG, AV replacement, CKD III admitted for COVID PNA. Notes 10 days of progressive SOB, fatigue, diarrhea, and poor oral intake. Didn't note any significant fevers at home. Noted to be hypoxic here, CXR shows b/l infiltrates, O2 improved on 4L NC. Lab work remarkable for NICA, mild hyponatremia, and elevated trop. He denied any chest pain/pressure, palpitations. O2 improved with 4L NC.    #Acute Hypoxic Respiratory Failure secondary to COVID-19 Pneumonia  - SIRS not present on admission.   - Symptoms more than 10 days wont be a candidate for Remdesivir  - c/w Dexamethasone 6mg QD.   - Ferritin 3522>2797, ESR 88,  12/14  - D-dimer elevated 4601 >2877  - Procalcitonin .25>.21, CRP 85>.42  - ID recs appreciated   - monitor off abx   - Chest xray 12/13 noted, starting patient on 40mg Lasix PO QD  - s/p tocilizumab x2 800mg 12/13, 12/15  - O2 requirement increased from 4L to 6L, now saturating 93% on 6L NC   - Repeat chest xray 12/14 shows Increased left mid to lower lung opacities.  - Trend Inflammatory markers Q48  - F/u inflammatory marker from 12/16    #Elevated D-Dimer: 23941  - Stable on 4L o2 currently and not tachy.  - Hold off on CT PE protocol given renal function   - LE duplex is positive for DVT   - V/Q scan shows high probability for PE   - Starting patient on Eliquis 5mg BID (12/13/2021)  - Spoke to patient's cardiologist Dr. Sebastian Mendoza. will hold Plavix while the patient is on Eliquis     #NICA on CKD III, suspected pre-renal (+/-ATN from ARB)  #Hyponatremia --> likely hypovolemic  #HAGMA (normal lactate)  - s/p LR at 75 cc/hr, stopped 12/13  - If bicarb continue to trend down the start sodium bicarb 650 q8  - Trend Cr, monitor strict I/O  - ARB and HCTZ held for now until renal function is stable.  - Avoid nephrotoxic medications.    - F/u Urine osm, Urine lytes  - Serum osm 282  - Retroperitoneal US wnl   - Nephro recs appreciated   - Starting sodium bicarb 1300mg TID     #CAD s/p CABG  #AV replacement   #HTN / HDL  #Elevated trops: stable, likely secondary to COVID PNA   - No chest pain   - C/w DAPT, atorvastatin, and metoprolol  - ARB and HCTZ held for now given NICA     #Misc   - DVT prophylaxis: Eliquis   - GI prophylaxis: Not indicated  - Diet: DASH/TLC   - Activity status: AAT   - Code status: DNR/DNI  - Disposition: acute   Pending: clinical improvement, inflammatory marker from 12/16

## 2021-12-16 NOTE — PROGRESS NOTE ADULT - ASSESSMENT
Acute kidney injury    - improving  chronic kidney disease stage 3  (baseline cr 1.45 10/2021)  COVID-19 PNA s/p toci / decadron   hyponatremia   metabolic acidosis    CAD / AVR  HTN   PE       plan:    cont sodium bicarbonate 650mg po tid  off ARB, thiazide and amlodipine  dc lasix 40mg po qd  decadron /  s/p toci  AC per primary team  covid isolation / protocol   wean O2 as tolerated / monitor sats  f/u ID  d/w PMD and Hospitalist

## 2021-12-16 NOTE — PROGRESS NOTE ADULT - SUBJECTIVE AND OBJECTIVE BOX
NEPHROLOGY FOLLOW UP NOTE    still on O2 6l NC  no fever  on po lasix    PAST MEDICAL & SURGICAL HISTORY:  Chronic systolic congestive heart failure    HTN (hypertension)    Dyslipidemia      Allergies:  No Known Allergies    Home Medications Reviewed    SOCIAL HISTORY:  Denies ETOH,Smoking,   FAMILY HISTORY:        REVIEW OF SYSTEMS:  CONSTITUTIONAL: No weakness, fevers or chills  EYES/ENT: No visual changes;  No vertigo or throat pain   NECK: No pain or stiffness  RESPIRATORY: No cough, wheezing, hemoptysis; + shortness of breath  CARDIOVASCULAR: No chest pain or palpitations.  GASTROINTESTINAL: No abdominal or epigastric pain. No nausea, vomiting, or hematemesis; No diarrhea or constipation. No melena or hematochezia.  GENITOURINARY: No dysuria, frequency, foamy urine, urinary urgency, incontinence or hematuria  NEUROLOGICAL: No numbness or weakness  SKIN: No itching, burning, rashes, or lesions   VASCULAR: No bilateral lower extremity edema.   All other review of systems is negative unless indicated above.    PHYSICAL EXAM:  Constitutional: NAD  HEENT: anicteric sclera, oropharynx clear, MMM O2 NC  Neck: No JVD  Respiratory: CTAB   Cardiovascular: S1, S2, RRR  Gastrointestinal: BS+, soft, NT/ND  Extremities: No cyanosis or clubbing. + peripheral edema  Neurological: A/O x 3, no focal deficits  Psychiatric: Normal mood, normal affect  : No CVA tenderness. No covarrubias.   Skin: No rashes    Hospital Medications:   MEDICATIONS  (STANDING):  apixaban 5 milliGRAM(s) Oral every 12 hours  aspirin  chewable 81 milliGRAM(s) Oral daily  atorvastatin 20 milliGRAM(s) Oral at bedtime  dexAMETHasone  Injectable 6 milliGRAM(s) IV Push daily  influenza  Vaccine (HIGH DOSE) 0.7 milliLiter(s) IntraMuscular once  latanoprost 0.005% Ophthalmic Solution 1 Drop(s) Both EYES at bedtime  metoprolol succinate ER 50 milliGRAM(s) Oral daily  sodium bicarbonate 1300 milliGRAM(s) Oral three times a day        VITALS:  T(F): 97.5 (21 @ 13:00), Max: 98.6 (12-15-21 @ 20:30)  HR: 80 (21 @ 13:00)  BP: 138/68 (21 @ 13:00)  RR: 20 (21 @ 13:00)  SpO2: 98% (21 @ 13:00)  Wt(kg): --        LABS:      147<H>  |  103  |  71<HH>  ----------------------------<  119<H>  3.9   |  21  |  1.9<H>    Ca    9.5      16 Dec 2021 04:30  Mg     2.4         TPro  6.5  /  Alb  3.4<L>  /  TBili  0.6  /  DBili      /  AST  80<H>  /  ALT  65<H>  /  AlkPhos  59                            13.3   6.60  )-----------( 267      ( 16 Dec 2021 04:30 )             39.0       Urine Studies:  Urinalysis Basic - ( 13 Dec 2021 10:58 )    Color: Yellow / Appearance: Clear / S.021 / pH:   Gluc:  / Ketone: Trace  / Bili: Negative / Urobili: <2 mg/dL   Blood:  / Protein: 100 mg/dL / Nitrite: Negative   Leuk Esterase: Negative / RBC: 6 /HPF / WBC 8 /HPF   Sq Epi:  / Non Sq Epi: 4 /HPF / Bacteria: Negative      Creatinine, Random Urine: 151 mg/dL ( @ 10:58)  Protein/Creatinine Ratio Calculation: 0.6 Ratio ( @ 10:58)  Sodium, Random Urine: <20.0 mmoL/L ( @ 10:57)  Osmolality, Random Urine: 558 mos/kg ( @ 10:57)      RADIOLOGY & ADDITIONAL STUDIES:

## 2021-12-16 NOTE — PROGRESS NOTE ADULT - ASSESSMENT
ASSESSMENT  81 y/o M Pmh HTN, DLD, CAD s/p CABG x 4 (2206) on ASA, Aortic Valve replacement (2011) unvaccinated for Flu and COVID 19, CKD presented to the ED with complaint of progressive generalized fatigue, SOB, n/b diarrhea for the past ten days.    IMPRESSION  #COVID-19, severe  - D-Dimer Assay, Quantitative: 4602: Manufacturers recommended Cut off for VTE is 230 ng/ml D-DU ng/mL DDU (12.12.21 @ 20:00)  - C-Reactive Protein, Serum: 85 mg/L (12.12.21 @ 20:00)  - Procalcitonin, Serum: 0.25 (12.12.21 @ 20:00)  - VQ scan with high probability   - s/p tocilizumab 12/13, 12/15    #Hyponatremia  #CKD     #CAD s/p CABG  #s/p AVR 2011  #Abx allergy: NKDa      RECOMMENDATIONS  - continue dex 6 mg daily   - monitor off antibiotics   - therapeutic anticoagulation   - wean O2 as tolerated  - repeat CRP in 48 hours     Please call or message on Microsoft Teams if with any questions.  Spectra [phone]       ASSESSMENT  79 y/o M Pmh HTN, DLD, CAD s/p CABG x 4 (2206) on ASA, Aortic Valve replacement (2011) unvaccinated for Flu and COVID 19, CKD presented to the ED with complaint of progressive generalized fatigue, SOB, n/b diarrhea for the past ten days.    IMPRESSION  #COVID-19, severe  - D-Dimer Assay, Quantitative: 4602: Manufacturers recommended Cut off for VTE is 230 ng/ml D-DU ng/mL DDU (12.12.21 @ 20:00)  - C-Reactive Protein, Serum: 85 mg/L (12.12.21 @ 20:00)  - Procalcitonin, Serum: 0.25 (12.12.21 @ 20:00)  - VQ scan with high probability   - s/p tocilizumab 12/13, 12/15    #Hyponatremia  #CKD     #CAD s/p CABG  #s/p AVR 2011  #Abx allergy: NKDa      RECOMMENDATIONS  - continue dex 6 mg daily   - monitor off antibiotics   - therapeutic anticoagulation   - wean O2 as tolerated  - supportive care otherwise     Please call or message on Microsoft Teams if with any questions.  Spectra [phone]

## 2021-12-17 LAB
ALBUMIN SERPL ELPH-MCNC: 3.4 G/DL — LOW (ref 3.5–5.2)
ALP SERPL-CCNC: 56 U/L — SIGNIFICANT CHANGE UP (ref 30–115)
ALT FLD-CCNC: 140 U/L — HIGH (ref 0–41)
ANION GAP SERPL CALC-SCNC: 22 MMOL/L — HIGH (ref 7–14)
AST SERPL-CCNC: 123 U/L — HIGH (ref 0–41)
BILIRUB SERPL-MCNC: 0.6 MG/DL — SIGNIFICANT CHANGE UP (ref 0.2–1.2)
BUN SERPL-MCNC: 78 MG/DL — CRITICAL HIGH (ref 10–20)
CALCIUM SERPL-MCNC: 9.6 MG/DL — SIGNIFICANT CHANGE UP (ref 8.5–10.1)
CHLORIDE SERPL-SCNC: 102 MMOL/L — SIGNIFICANT CHANGE UP (ref 98–110)
CO2 SERPL-SCNC: 23 MMOL/L — SIGNIFICANT CHANGE UP (ref 17–32)
CREAT SERPL-MCNC: 2.1 MG/DL — HIGH (ref 0.7–1.5)
CRP SERPL-MCNC: 22 MG/L — HIGH
CULTURE RESULTS: SIGNIFICANT CHANGE UP
CULTURE RESULTS: SIGNIFICANT CHANGE UP
FERRITIN SERPL-MCNC: 2280 NG/ML — HIGH (ref 30–400)
GLUCOSE SERPL-MCNC: 113 MG/DL — HIGH (ref 70–99)
HCT VFR BLD CALC: 37.4 % — LOW (ref 42–52)
HGB BLD-MCNC: 12.5 G/DL — LOW (ref 14–18)
MAGNESIUM SERPL-MCNC: 2.4 MG/DL — SIGNIFICANT CHANGE UP (ref 1.8–2.4)
MCHC RBC-ENTMCNC: 29.8 PG — SIGNIFICANT CHANGE UP (ref 27–31)
MCHC RBC-ENTMCNC: 33.4 G/DL — SIGNIFICANT CHANGE UP (ref 32–37)
MCV RBC AUTO: 89.3 FL — SIGNIFICANT CHANGE UP (ref 80–94)
NRBC # BLD: 0 /100 WBCS — SIGNIFICANT CHANGE UP (ref 0–0)
PLATELET # BLD AUTO: 268 K/UL — SIGNIFICANT CHANGE UP (ref 130–400)
POTASSIUM SERPL-MCNC: 3.8 MMOL/L — SIGNIFICANT CHANGE UP (ref 3.5–5)
POTASSIUM SERPL-SCNC: 3.8 MMOL/L — SIGNIFICANT CHANGE UP (ref 3.5–5)
PROT SERPL-MCNC: 6.2 G/DL — SIGNIFICANT CHANGE UP (ref 6–8)
RBC # BLD: 4.19 M/UL — LOW (ref 4.7–6.1)
RBC # FLD: 13.3 % — SIGNIFICANT CHANGE UP (ref 11.5–14.5)
SODIUM SERPL-SCNC: 147 MMOL/L — HIGH (ref 135–146)
SPECIMEN SOURCE: SIGNIFICANT CHANGE UP
SPECIMEN SOURCE: SIGNIFICANT CHANGE UP
WBC # BLD: 7.48 K/UL — SIGNIFICANT CHANGE UP (ref 4.8–10.8)
WBC # FLD AUTO: 7.48 K/UL — SIGNIFICANT CHANGE UP (ref 4.8–10.8)

## 2021-12-17 PROCEDURE — 99233 SBSQ HOSP IP/OBS HIGH 50: CPT

## 2021-12-17 RX ORDER — SODIUM BICARBONATE 1 MEQ/ML
650 SYRINGE (ML) INTRAVENOUS THREE TIMES A DAY
Refills: 0 | Status: DISCONTINUED | OUTPATIENT
Start: 2021-12-17 | End: 2021-12-17

## 2021-12-17 RX ORDER — SODIUM BICARBONATE 1 MEQ/ML
650 SYRINGE (ML) INTRAVENOUS
Refills: 0 | Status: DISCONTINUED | OUTPATIENT
Start: 2021-12-17 | End: 2021-12-20

## 2021-12-17 RX ORDER — SODIUM CHLORIDE 9 MG/ML
1000 INJECTION, SOLUTION INTRAVENOUS
Refills: 0 | Status: DISCONTINUED | OUTPATIENT
Start: 2021-12-17 | End: 2021-12-18

## 2021-12-17 RX ADMIN — LATANOPROST 1 DROP(S): 0.05 SOLUTION/ DROPS OPHTHALMIC; TOPICAL at 21:40

## 2021-12-17 RX ADMIN — Medication 50 MILLIGRAM(S): at 05:24

## 2021-12-17 RX ADMIN — APIXABAN 5 MILLIGRAM(S): 2.5 TABLET, FILM COATED ORAL at 17:16

## 2021-12-17 RX ADMIN — SODIUM CHLORIDE 75 MILLILITER(S): 9 INJECTION, SOLUTION INTRAVENOUS at 13:16

## 2021-12-17 RX ADMIN — Medication 1300 MILLIGRAM(S): at 05:24

## 2021-12-17 RX ADMIN — Medication 81 MILLIGRAM(S): at 11:58

## 2021-12-17 RX ADMIN — APIXABAN 5 MILLIGRAM(S): 2.5 TABLET, FILM COATED ORAL at 05:24

## 2021-12-17 RX ADMIN — Medication 650 MILLIGRAM(S): at 13:13

## 2021-12-17 RX ADMIN — Medication 6 MILLIGRAM(S): at 05:24

## 2021-12-17 NOTE — PROGRESS NOTE ADULT - ASSESSMENT
Acute kidney injury   chronic kidney disease stage 3  (baseline cr 1.45 10/2021)  hypernatremia  COVID-19 PNA s/p toci / decadron   worsening LFT's  metabolic acidosis    CAD / AVR  HTN   PE       plan:    D5W @ 75 cc/hr given, dc after 24 hours  lower sodium bicarbonate 650mg po bid  off ARB, thiazide and amlodipine  off lasix   decadron /  s/p toci  trend inflammatory markers and LFT's  dc statin  AC per primary team  covid isolation / protocol   wean O2 as tolerated / monitor sats  f/u ID  d/w PMD and ID

## 2021-12-17 NOTE — PROGRESS NOTE ADULT - SUBJECTIVE AND OBJECTIVE BOX
----------Daily Progress Note----------    HISTORY OF PRESENT ILLNESS:  Patient is a 80y old Male who presents with a chief complaint of COVID PNA (16 Dec 2021 16:36)    Currently admitted to medicine with the primary diagnosis of 2019 novel coronavirus disease (COVID-19)    79 y/o M Pmh HTN, DLD, CAD s/p CABG x 4 (2206) on ASA, Aortic Valve replacement (2011) unvaccinated for Flu and COVID 19, CKD presented to the ED with complaint of progressive generalized fatigue, SOB, n/b diarrhea for the past ten days. Patient also endorses decreased sense of smell and taste and overall decreased PO intake over the past week. He denies any fevers or cough, travel and or sick contacts. Patient endorses that his wife has been feeling fatigued as well without any other associated symptom He stated that he as not been evaluated by a physician in some time but has been compliant with his medications. No leg swelling or chest pain, but has been hypoxic per the wife as a family friend nurse has evaluated him couple of days ago and has brought him home Oxygen per family.     ED course: /72, HR: 83, SPO2 86% on room air, placed on 4L NC. CXR: B/L lung opacities L>R. COVID PCR +, Cr: 2.3, Na 129, AG 18, BNP 2176, Trop 0.06       Today is hospital day 5d.     INTERVAL HOSPITAL COURSE / OVERNIGHT EVENTS:    Patient was examined and seen at bedside. This morning he is resting comfortably in bed and reports no new issues or overnight events.     Review of Systems: Patient is endorsing SOB on 6L NC. Patient denies any fever, chills, headache, dizziness. Patient denies chest pain, palpitation, SOB     <<<<<PAST MEDICAL & SURGICAL HISTORY>>>>>  Chronic systolic congestive heart failure    HTN (hypertension)    Dyslipidemia      ALLERGIES  No Known Allergies      Home Medications:  LATANOPROST  0.005 % SOLN:  (12 Dec 2021 16:26)  METOPROLOL ER SUCCINATE 50MG TABS: TAKE 1 TABLET BY MOUTH AT BEDTIME (12 Dec 2021 16:26)  OLMESARTAN MEDOXOMIL/AMLODIPINE/HYD ROCHLOROTHIAZIDE 40-5-25 MG TABS:  (12 Dec 2021 16:26)  rosuvastatin 5 mg oral tablet: 1 tab(s) orally once a day (12 Dec 2021 16:26)        MEDICATIONS  STANDING MEDICATIONS  apixaban 5 milliGRAM(s) Oral every 12 hours  aspirin  chewable 81 milliGRAM(s) Oral daily  atorvastatin 20 milliGRAM(s) Oral at bedtime  dexAMETHasone  Injectable 6 milliGRAM(s) IV Push daily  influenza  Vaccine (HIGH DOSE) 0.7 milliLiter(s) IntraMuscular once  latanoprost 0.005% Ophthalmic Solution 1 Drop(s) Both EYES at bedtime  metoprolol succinate ER 50 milliGRAM(s) Oral daily  sodium bicarbonate 650 milliGRAM(s) Oral three times a day    PRN MEDICATIONS  acetaminophen     Tablet .. 650 milliGRAM(s) Oral every 4 hours PRN    VITALS:  T(F): 97.6  HR: 74  BP: 151/68  RR: 19  SpO2: 96%    <<<<<LABS>>>>>                        13.3   6.60  )-----------( 267      ( 16 Dec 2021 04:30 )             39.0     12-16    147<H>  |  103  |  71<HH>  ----------------------------<  119<H>  3.9   |  21  |  1.9<H>    Ca    9.5      16 Dec 2021 04:30  Mg     2.4     12-16    TPro  6.5  /  Alb  3.4<L>  /  TBili  0.6  /  DBili  x   /  AST  80<H>  /  ALT  65<H>  /  AlkPhos  59  12-16            [id number]        <<<<<RADIOLOGY>>>>>      < from: Xray Chest 1 View-PORTABLE IMMEDIATE (12.12.21 @ 12:53) >  Impression:    New bilateral lung opacities, left greater than right. Findings are   concerning for multifocal pneumonia        --- End of Report ---    < end of copied text >    < from: US Retroperitoneal Complete (12.13.21 @ 12:11) >  IMPRESSION:    Normal renal ultrasound.        --- End of Report ---    < end of copied text >    < from: [facility] Duplex Lower Ext Vein Scan, Bilat (12.13.21 @ 15:02) >  FINDINGS:    RIGHT:  Normal compressibility of the RIGHT common femoral, femoral  veins.  Doppler examination shows normal spontaneous and phasic flow.  DVT noted in the right popliteal, peroneal and posterior tibial veins    LEFT:  Normal compressibility of the LEFT common femoral, femoral and popliteal   veins.  Doppler examination shows normal spontaneous and phasic flow.  Thrombus noted in the left posterior tibial vein    IMPRESSION:  Acute DVT noted in the right popliteal peroneal and posterior tibial vein.    Thrombus noted in the right posterior tibial vein.          --- End of Report ---    < end of copied text >    < from: NM Pulmonary Perfusion Scan (12.13.21 @ 16:21) >  IMPRESSION:    HIGH PROBABILITY FOR PULMONARY EMBOLISM.    SEGMENTAL IN CHARACTER PERFUSION DEFECTS IN THE POSTERIOR LATERAL AND   ANTERIOR BASAL SEGMENTS AND SUPERIOR SEGMENT RIGHT LOWER LOBE, MEDIAL   SEGMENT OF RIGHT MIDDLE LOBE, AND ANTERIOR SEGMENT LEFT UPPER LOBE NOT   MATCHED BY CHEST XRAY ABNORMALITIES.    --- End of Report ---    < end of copied text >      < from: Xray Chest 1 View- PORTABLE-Urgent (Xray Chest 1 View- PORTABLE-Urgent .) (12.14.21 @ 10:47) >  IMPRESSION:    Increased left mid to lower lung opacities. Apparently decreased right   basal opacity which may be in part secondary to differences in   diaphragmatic positioning.    Stable cardiomediastinal silhouette. Unchanged osseous structures.      --- End of Report ---    < end of copied text >    <<<<<PHYSICAL EXAM>>>>>  GENERAL: Well developed, well nourished and in no acute distress. Resting comfortably in bed.  PULMONARY: wheezing on auscultation bilaterally. No rales, rhonchi  CARDIOVASCULAR: Regular rate and rhythm, S1-S2, no murmurs  GASTROINTESTINAL: Soft, non-tender, non-distended, no guarding.  SKIN/EXTREMITIES: trace edema  NEUROLOGIC/MUSCULOSKELETAL: AOx4, grossly moving all extremities, no focal deficits.      -----------------------------------------------------------------------------------------------------------------------------------------------------------------------------------------------

## 2021-12-17 NOTE — PROGRESS NOTE ADULT - ASSESSMENT
81 y/o gentleman with a past medical history of HTN, DLD, CAD s/p CABG, AV replacement, CKD III admitted for COVID PNA. Notes 10 days of progressive SOB, fatigue, diarrhea, and poor oral intake. Didn't note any significant fevers at home. Noted to be hypoxic here, CXR shows b/l infiltrates, O2 improved on 4L NC. Lab work remarkable for NICA, mild hyponatremia, and elevated trop. He denied any chest pain/pressure, palpitations. O2 improved with 4L NC.    #Acute Hypoxic Respiratory Failure secondary to COVID-19 Pneumonia  - SIRS not present on admission.   - Symptoms more than 10 days wont be a candidate for Remdesivir  - c/w Dexamethasone 6mg QD.   - Ferritin 3522>2797 >2280, ESR 88,  12/14  - D-dimer elevated 4601 >2877>3961  - Procalcitonin .25>.21, CRP 85>.42>.22  - ID recs appreciated   - monitor off abx   - Chest xray 12/13 noted  - s/p 40mg Lasix PO QD, discontinued 12/15  - s/p tocilizumab x2 800mg 12/13, 12/15  - O2 requirement increased from 4L to 6L, now saturating 93% on 6L NC   - Repeat chest xray 12/14 shows Increased left mid to lower lung opacities.  - Trend Inflammatory markers Q48  - F/u inflammatory marker from 12/18    #Elevated D-Dimer: 45319  - Hold off on CT PE protocol given renal function   - LE duplex is positive for DVT   - V/Q scan shows high probability for PE   - Starting patient on Eliquis 5mg BID (12/13/2021)  - Spoke to patient's cardiologist Dr. Sebastian Mendoza. will hold Plavix while the patient is on Eliquis     #NICA on CKD III, suspected pre-renal (+/-ATN from ARB)  #Hyponatremia --> likely hypovolemic  #HAGMA (normal lactate)  - s/p LR at 75 cc/hr, stopped 12/13  - Trend Cr, monitor strict I/O  - ARB and HCTZ held for now until renal function is stable.  - Avoid nephrotoxic medications.    - Serum osm 282  - Retroperitoneal US wnl   - Nephro recs appreciated   - Starting sodium bicarb 650mg TID   - Stopped Lasix   - Monitor cr and Sodium     #CAD s/p CABG  #AV replacement   #HTN / HDL  #Elevated trops: stable, likely secondary to COVID PNA   - No chest pain   - C/w DAPT, atorvastatin, and metoprolol  - ARB and HCTZ held for now given NICA     #Misc   - DVT prophylaxis: Eliquis   - GI prophylaxis: Not indicated  - Diet: DASH/TLC   - Activity status: AAT   - Code status: DNR/DNI  - Disposition: acute   Pending: clinical improvement, inflammatory marker from 12/18, monitor Na and Cr

## 2021-12-17 NOTE — PROGRESS NOTE ADULT - SUBJECTIVE AND OBJECTIVE BOX
NEPHROLOGY FOLLOW UP NOTE    pt seen and examined  still on 6l o2  wants to go home  no fever  voiding on own    PAST MEDICAL & SURGICAL HISTORY:  Chronic systolic congestive heart failure    HTN (hypertension)    Dyslipidemia      Allergies:  No Known Allergies    Home Medications Reviewed    SOCIAL HISTORY:  Denies ETOH,Smoking,   FAMILY HISTORY:        REVIEW OF SYSTEMS:  CONSTITUTIONAL: No weakness, fevers or chills  EYES/ENT: No visual changes;  No vertigo or throat pain   NECK: No pain or stiffness  RESPIRATORY: No cough, wheezing, hemoptysis; + shortness of breath  CARDIOVASCULAR: No chest pain or palpitations.  GASTROINTESTINAL: No abdominal or epigastric pain. No nausea, vomiting, or hematemesis; No diarrhea or constipation. No melena or hematochezia.  GENITOURINARY: No dysuria, frequency, foamy urine, urinary urgency, incontinence or hematuria  NEUROLOGICAL: No numbness or weakness  SKIN: No itching, burning, rashes, or lesions   VASCULAR: No bilateral lower extremity edema.   All other review of systems is negative unless indicated above.    PHYSICAL EXAM:  Constitutional: NAD  HEENT: anicteric sclera, oropharynx clear, MMM O2 NC  Neck: No JVD  Respiratory: CTAB   Cardiovascular: S1, S2, RRR  Gastrointestinal: BS+, soft, NT/ND  Extremities: No cyanosis or clubbing. + peripheral edema  Neurological: A/O x 3, no focal deficits  Psychiatric: Normal mood, normal affect  : No CVA tenderness. No covarrubias.   Skin: No rashes    Hospital Medications:   MEDICATIONS  (STANDING):  apixaban 5 milliGRAM(s) Oral every 12 hours  aspirin  chewable 81 milliGRAM(s) Oral daily  dexAMETHasone  Injectable 6 milliGRAM(s) IV Push daily  dextrose 5%. 1000 milliLiter(s) (75 mL/Hr) IV Continuous <Continuous>  influenza  Vaccine (HIGH DOSE) 0.7 milliLiter(s) IntraMuscular once  latanoprost 0.005% Ophthalmic Solution 1 Drop(s) Both EYES at bedtime  metoprolol succinate ER 50 milliGRAM(s) Oral daily  sodium bicarbonate 650 milliGRAM(s) Oral three times a day        VITALS:  T(F): 96.1 (21 @ 13:00), Max: 97.6 (21 @ 05:00)  HR: 71 (21 @ 13:00)  BP: 143/68 (21 @ 13:00)  RR: 20 (21 @ 13:00)  SpO2: 100% (21 @ 13:00)  Wt(kg): --        LABS:      147<H>  |  102  |  78<HH>  ----------------------------<  113<H>  3.8   |  23  |  2.1<H>    Ca    9.6      17 Dec 2021 04:30  Mg     2.4         TPro  6.2  /  Alb  3.4<L>  /  TBili  0.6  /  DBili      /  AST  123<H>  /  ALT  140<H>  /  AlkPhos  56                            12.5   7.48  )-----------( 268      ( 17 Dec 2021 04:30 )             37.4       Urine Studies:  Urinalysis Basic - ( 13 Dec 2021 10:58 )    Color: Yellow / Appearance: Clear / S.021 / pH:   Gluc:  / Ketone: Trace  / Bili: Negative / Urobili: <2 mg/dL   Blood:  / Protein: 100 mg/dL / Nitrite: Negative   Leuk Esterase: Negative / RBC: 6 /HPF / WBC 8 /HPF   Sq Epi:  / Non Sq Epi: 4 /HPF / Bacteria: Negative      Creatinine, Random Urine: 151 mg/dL ( @ 10:58)  Protein/Creatinine Ratio Calculation: 0.6 Ratio ( @ 10:58)  Sodium, Random Urine: <20.0 mmoL/L ( @ 10:57)  Osmolality, Random Urine: 558 mos/kg ( @ 10:57)      RADIOLOGY & ADDITIONAL STUDIES:

## 2021-12-18 ENCOUNTER — TRANSCRIPTION ENCOUNTER (OUTPATIENT)
Age: 81
End: 2021-12-18

## 2021-12-18 LAB
ALBUMIN SERPL ELPH-MCNC: 3.4 G/DL — LOW (ref 3.5–5.2)
ALP SERPL-CCNC: 52 U/L — SIGNIFICANT CHANGE UP (ref 30–115)
ALT FLD-CCNC: 147 U/L — HIGH (ref 0–41)
ANION GAP SERPL CALC-SCNC: 19 MMOL/L — HIGH (ref 7–14)
AST SERPL-CCNC: 84 U/L — HIGH (ref 0–41)
BILIRUB SERPL-MCNC: 0.6 MG/DL — SIGNIFICANT CHANGE UP (ref 0.2–1.2)
BUN SERPL-MCNC: 79 MG/DL — CRITICAL HIGH (ref 10–20)
CALCIUM SERPL-MCNC: 9.6 MG/DL — SIGNIFICANT CHANGE UP (ref 8.5–10.1)
CHLORIDE SERPL-SCNC: 100 MMOL/L — SIGNIFICANT CHANGE UP (ref 98–110)
CO2 SERPL-SCNC: 22 MMOL/L — SIGNIFICANT CHANGE UP (ref 17–32)
CREAT SERPL-MCNC: 1.8 MG/DL — HIGH (ref 0.7–1.5)
CRP SERPL-MCNC: 8 MG/L — HIGH
D DIMER BLD IA.RAPID-MCNC: 2646 NG/ML DDU — HIGH (ref 0–230)
FERRITIN SERPL-MCNC: 1432 NG/ML — HIGH (ref 30–400)
GLUCOSE SERPL-MCNC: 101 MG/DL — HIGH (ref 70–99)
HCT VFR BLD CALC: 38.1 % — LOW (ref 42–52)
HGB BLD-MCNC: 12.8 G/DL — LOW (ref 14–18)
MAGNESIUM SERPL-MCNC: 2.3 MG/DL — SIGNIFICANT CHANGE UP (ref 1.8–2.4)
MCHC RBC-ENTMCNC: 30.1 PG — SIGNIFICANT CHANGE UP (ref 27–31)
MCHC RBC-ENTMCNC: 33.6 G/DL — SIGNIFICANT CHANGE UP (ref 32–37)
MCV RBC AUTO: 89.6 FL — SIGNIFICANT CHANGE UP (ref 80–94)
NRBC # BLD: 0 /100 WBCS — SIGNIFICANT CHANGE UP (ref 0–0)
PLATELET # BLD AUTO: 268 K/UL — SIGNIFICANT CHANGE UP (ref 130–400)
POTASSIUM SERPL-MCNC: 3.5 MMOL/L — SIGNIFICANT CHANGE UP (ref 3.5–5)
POTASSIUM SERPL-SCNC: 3.5 MMOL/L — SIGNIFICANT CHANGE UP (ref 3.5–5)
PROT SERPL-MCNC: 6.1 G/DL — SIGNIFICANT CHANGE UP (ref 6–8)
RBC # BLD: 4.25 M/UL — LOW (ref 4.7–6.1)
RBC # FLD: 13.4 % — SIGNIFICANT CHANGE UP (ref 11.5–14.5)
SODIUM SERPL-SCNC: 141 MMOL/L — SIGNIFICANT CHANGE UP (ref 135–146)
WBC # BLD: 7.26 K/UL — SIGNIFICANT CHANGE UP (ref 4.8–10.8)
WBC # FLD AUTO: 7.26 K/UL — SIGNIFICANT CHANGE UP (ref 4.8–10.8)

## 2021-12-18 PROCEDURE — 99233 SBSQ HOSP IP/OBS HIGH 50: CPT

## 2021-12-18 RX ORDER — AMLODIPINE BESYLATE 2.5 MG/1
5 TABLET ORAL DAILY
Refills: 0 | Status: DISCONTINUED | OUTPATIENT
Start: 2021-12-18 | End: 2021-12-20

## 2021-12-18 RX ADMIN — Medication 650 MILLIGRAM(S): at 06:23

## 2021-12-18 RX ADMIN — Medication 50 MILLIGRAM(S): at 06:24

## 2021-12-18 RX ADMIN — APIXABAN 5 MILLIGRAM(S): 2.5 TABLET, FILM COATED ORAL at 06:24

## 2021-12-18 RX ADMIN — APIXABAN 5 MILLIGRAM(S): 2.5 TABLET, FILM COATED ORAL at 17:24

## 2021-12-18 RX ADMIN — Medication 81 MILLIGRAM(S): at 12:35

## 2021-12-18 RX ADMIN — Medication 6 MILLIGRAM(S): at 06:23

## 2021-12-18 RX ADMIN — LATANOPROST 1 DROP(S): 0.05 SOLUTION/ DROPS OPHTHALMIC; TOPICAL at 21:37

## 2021-12-18 RX ADMIN — Medication 650 MILLIGRAM(S): at 17:24

## 2021-12-18 NOTE — PROGRESS NOTE ADULT - ASSESSMENT
Acute kidney injury   chronic kidney disease stage 3  (baseline cr 1.45 10/2021)  hypernatremia  COVID-19 PNA s/p toci / decadron   worsening LFT's  metabolic acidosis    CAD / AVR  HTN   PE       plan:    dc IVF  cont sodium bicarbonate 650mg po bid  keep off ARB, thiazide, lasix  would resume amlodipine  off statin due to LFT rise, but now improving  AC per primary team  wean O2 and dc plan with home O2  d/w ID / hospitalist

## 2021-12-18 NOTE — PROGRESS NOTE ADULT - SUBJECTIVE AND OBJECTIVE BOX
----------Daily Progress Note----------    HISTORY OF PRESENT ILLNESS:  Patient is a 81y old Male who presents with a chief complaint of COVID PNA (17 Dec 2021 15:41)    Currently admitted to medicine with the primary diagnosis of 2019 novel coronavirus disease (COVID-19)  79 y/o M Pmh HTN, DLD, CAD s/p CABG x 4 (2206) on ASA, Aortic Valve replacement (2011) unvaccinated for Flu and COVID 19, CKD presented to the ED with complaint of progressive generalized fatigue, SOB, n/b diarrhea for the past ten days. Patient also endorses decreased sense of smell and taste and overall decreased PO intake over the past week. He denies any fevers or cough, travel and or sick contacts. Patient endorses that his wife has been feeling fatigued as well without any other associated symptom He stated that he as not been evaluated by a physician in some time but has been compliant with his medications. No leg swelling or chest pain, but has been hypoxic per the wife as a family friend nurse has evaluated him couple of days ago and has brought him home Oxygen per family.     ED course: /72, HR: 83, SPO2 86% on room air, placed on 4L NC. CXR: B/L lung opacities L>R. COVID PCR +, Cr: 2.3, Na 129, AG 18, BNP 2176, Trop 0.06        Today is hospital day 6d.     INTERVAL HOSPITAL COURSE / OVERNIGHT EVENTS:      Patient was examined and seen at bedside. This morning he is resting comfortably in bed and reports no new issues or overnight events.     Review of Systems: Patient is endorsing SOB on 6L NC. Patient denies any fever, chills, headache, dizziness. Patient denies chest pain, palpitation, SOB     <<<<<PAST MEDICAL & SURGICAL HISTORY>>>>>  Chronic systolic congestive heart failure    HTN (hypertension)    Dyslipidemia      ALLERGIES  No Known Allergies      Home Medications:  LATANOPROST  0.005 % SOLN:  (12 Dec 2021 16:26)  METOPROLOL ER SUCCINATE 50MG TABS: TAKE 1 TABLET BY MOUTH AT BEDTIME (12 Dec 2021 16:26)  OLMESARTAN MEDOXOMIL/AMLODIPINE/HYD ROCHLOROTHIAZIDE 40-5-25 MG TABS:  (12 Dec 2021 16:26)  rosuvastatin 5 mg oral tablet: 1 tab(s) orally once a day (12 Dec 2021 16:26)        MEDICATIONS  STANDING MEDICATIONS  apixaban 5 milliGRAM(s) Oral every 12 hours  aspirin  chewable 81 milliGRAM(s) Oral daily  dexAMETHasone  Injectable 6 milliGRAM(s) IV Push daily  dextrose 5%. 1000 milliLiter(s) IV Continuous <Continuous>  influenza  Vaccine (HIGH DOSE) 0.7 milliLiter(s) IntraMuscular once  latanoprost 0.005% Ophthalmic Solution 1 Drop(s) Both EYES at bedtime  metoprolol succinate ER 50 milliGRAM(s) Oral daily  sodium bicarbonate 650 milliGRAM(s) Oral two times a day    PRN MEDICATIONS  acetaminophen     Tablet .. 650 milliGRAM(s) Oral every 4 hours PRN    VITALS:  T(F): 96.6  HR: 68  BP: 159/79  RR: 18  SpO2: 97%    <<<<<LABS>>>>>                        12.8   7.26  )-----------( 268      ( 18 Dec 2021 04:30 )             38.1     12-17    147<H>  |  102  |  78<HH>  ----------------------------<  113<H>  3.8   |  23  |  2.1<H>    Ca    9.6      17 Dec 2021 04:30  Mg     2.4     12-17    TPro  6.2  /  Alb  3.4<L>  /  TBili  0.6  /  DBili  x   /  AST  123<H>  /  ALT  140<H>  /  AlkPhos  56  12-17            [id number]        <<<<<RADIOLOGY>>>>>    < from: Xray Chest 1 View-PORTABLE IMMEDIATE (12.12.21 @ 12:53) >  Impression:    New bilateral lung opacities, left greater than right. Findings are   concerning for multifocal pneumonia        --- End of Report ---    < end of copied text >    < from: US Retroperitoneal Complete (12.13.21 @ 12:11) >  IMPRESSION:    Normal renal ultrasound.        --- End of Report ---    < end of copied text >    < from: [facility] Duplex Lower Ext Vein Scan, Bilat (12.13.21 @ 15:02) >  FINDINGS:    RIGHT:  Normal compressibility of the RIGHT common femoral, femoral  veins.  Doppler examination shows normal spontaneous and phasic flow.  DVT noted in the right popliteal, peroneal and posterior tibial veins    LEFT:  Normal compressibility of the LEFT common femoral, femoral and popliteal   veins.  Doppler examination shows normal spontaneous and phasic flow.  Thrombus noted in the left posterior tibial vein    IMPRESSION:  Acute DVT noted in the right popliteal peroneal and posterior tibial vein.    Thrombus noted in the right posterior tibial vein.          --- End of Report ---    < end of copied text >    < from: NM Pulmonary Perfusion Scan (12.13.21 @ 16:21) >  IMPRESSION:    HIGH PROBABILITY FOR PULMONARY EMBOLISM.    SEGMENTAL IN CHARACTER PERFUSION DEFECTS IN THE POSTERIOR LATERAL AND   ANTERIOR BASAL SEGMENTS AND SUPERIOR SEGMENT RIGHT LOWER LOBE, MEDIAL   SEGMENT OF RIGHT MIDDLE LOBE, AND ANTERIOR SEGMENT LEFT UPPER LOBE NOT   MATCHED BY CHEST XRAY ABNORMALITIES.    --- End of Report ---    < end of copied text >      < from: Xray Chest 1 View- PORTABLE-Urgent (Xray Chest 1 View- PORTABLE-Urgent .) (12.14.21 @ 10:47) >  IMPRESSION:    Increased left mid to lower lung opacities. Apparently decreased right   basal opacity which may be in part secondary to differences in   diaphragmatic positioning.    Stable cardiomediastinal silhouette. Unchanged osseous structures.      --- End of Report ---    < end of copied text >    <<<<<PHYSICAL EXAM>>>>>  GENERAL: Well developed, well nourished and in no acute distress. Resting comfortably in bed.  PULMONARY: wheezing on auscultation bilaterally. No rales, rhonchi  CARDIOVASCULAR: Regular rate and rhythm, S1-S2, no murmurs  GASTROINTESTINAL: Soft, non-tender, non-distended, no guarding.  SKIN/EXTREMITIES: trace edema  NEUROLOGIC/MUSCULOSKELETAL: AOx4, grossly moving all extremities, no focal deficits.        -----------------------------------------------------------------------------------------------------------------------------------------------------------------------------------------------

## 2021-12-18 NOTE — PROGRESS NOTE ADULT - ATTENDING COMMENTS
81 y/o male with a past medical history of HTN, DLD, CAD s/p CABG, AV replacement, CKD III admitted for COVID PNA. Notes 10 days of progressive SOB, fatigue, diarrhea, and poor oral intake. Didn't note any significant fevers at home. Noted to be hypoxic here, CXR shows b/l infiltrates, O2 improved on 4L NC. Lab work remarkable for NICA, mild hyponatremia, and elevated trop. He denied any chest pain/pressure, palpitations. O2 improved with 4L NC. Covid positive.     # Acute Hypoxic Respiratory Failure secondary to COVID-19 Pneumonia: SIRS not present on admission.   -ID on board/ unvaccinated /maintain isolation/repeated CXR- 12/13- worsening pulmonary congestion  -Dexamethasone 6mg IV once daily  -s/p post Toci dose on 12/13(approved by ID)  -trend  inflammatory markers (Ferritin- , LDH-512, CRP- 85, [id number]) and procalcitonin- 0.25.   -12/13- on 4 L sat 91%  12/14- on 6 L sat 96%, at rest      Elevated D-Dimer: 4600  c/w covid induced thrombosis state  -Acute DVT / V/Q scan- high prob. for PE  -started on  Eliquis tx.        NICA on CKD III, suspected pre-renal (+/-ATN from ARB)  Hyponatremia -- improved sodium levels to 133  - start  lasix tx. po once daily,, strict I and O chart  -renal US- normal  Mild hypokalemia- supplemented      Elevated trops: stable, likely secondary to COVID + renal function not ACS. No chest pain   CARDIAC MARKERS ( 14 Dec 2021 04:30 )  x     / 0.02 ng/mL / x     / x     / x      CARDIAC MARKERS ( 12 Dec 2021 20:00 )  x     / 0.05 ng/mL / x     / x     / x      CARDIAC MARKERS ( 12 Dec 2021 11:40 )  x     / 0.06 ng/mL / x     / x     / x            CAD s/p CABG  AV replacement   HTN / HDL  -Cont DAPT( ok by Cardiology to hold plavix, meanwhile pt. is on full dose a/c tx. , continue asa, atorvastatin, and metoprolol  -ARB held due to NICA       DVT ppx: Eliquis     Code status: full code    #Progress Note Handoff: trend inflammatory markers, monitor renal function, dexa tx.   Family discussion: yes, medical team Disposition: Home___once medically stable    Total time spent to complete patient's bedside assessment, review medical chart, discuss medical plan of care with covering medical team was more than 35 minutes.
as below
81 y/o male with a past medical history of HTN, DLD, CAD s/p CABG, AV replacement, CKD III admitted for COVID PNA. Notes 10 days of progressive SOB, fatigue, diarrhea, and poor oral intake. Didn't note any significant fevers at home. Noted to be hypoxic here, CXR shows b/l infiltrates, O2 improved on 4L NC. Lab work remarkable for NICA, mild hyponatremia, and elevated trop. He denied any chest pain/pressure, palpitations. O2 improved with 4L NC. Covid positive.     # Acute Hypoxic Respiratory Failure secondary to COVID-19 Pneumonia: SIRS not present on admission.   -ID consulted/ unvaccinated /maintain isolation/repeated CXR- 12/13- worsening pulmonary congestion  -Dexamethasone 6mg IV once daily  -trend  inflammatory markers (Ferritin, LDH, CRP, ESR, D-Dimer) and procalcitonin.   -12/13- on 4 L sat 91%      Elevated D-Dimer: 4600  c/w covid induced thrombosis state  -Check Va LE Duplex /obtain V/Q scan( cant do CTA due to advanced CKD)  -start on  Eliquis tx. , monitor Ddimer level        NICA on CKD III, suspected pre-renal (+/-ATN from ARB)  Hyponatremia --> likely due to volume OD status, obtain serum and urine osm, urine sodium levels  -d/c IVF  - start IV lasix tx,, strict I and O chart      Elevated trops: stable, likely secondary to COVID + renal function not ACS. No chest pain   CARDIAC MARKERS ( 13 Dec 2021 07:30 )  x     / x     / 354 U/L / x     / x      CARDIAC MARKERS ( 12 Dec 2021 20:00 )  x     / 0.05 ng/mL / x     / x     / x      CARDIAC MARKERS ( 12 Dec 2021 11:40 )  x     / 0.06 ng/mL / x     / x     / x          CAD s/p CABG  AV replacement   HTN / HDL  -Cont DAPT, atorvastatin, and metoprolol  -ARB and HCTZ held for now given NICA       DVT ppx: Eliquis     Code status: full code    #Progress Note Handoff: trend inflammatory markers, monitor renal function, dexa tx.   Family discussion: yes, medical team Disposition: Home___once medically stable    Total time spent to complete patient's bedside assessment, review medical chart, discuss medical plan of care with covering medical team was more than 35 minutes
# Covid PNA-- s/p dexa  tocix2-- 1213 and 12/15  not a candidate for RDV  on 6L nasal cannula-- 98% try to reduce to 4L    # Ac DVT and PE ON V/Q scan on eliquis  # Ckd 3--creatinine 1.9-- DC lasix fro now as per nephrology.  # hypernatremia-- holding lasix  # aortic valve replacement--bioprosthetic.    monitor for clinical improvement.
REVIEW OF SYSTEMS and PHYSICAL EXAM as above    ASSESSMENT AND PLAN:    #Acute Hypoxic Respiratory Failure secondary to COVID-19 Pneumonia  - pt is sating 96% on 5 L NC this AM  - c/w Dexamethasone 6mg QD (day 7)  - s/p tocilizumab x2 800mg 12/13, 12/15  - Trend Inflammatory markers Q48, F/u inflammatory marker from 12/18    # Acute DVT noted in the right popliteal peroneal and posterior tibial vein.  # high probability for PE on V/Q scan   - Started patient on Eliquis 5mg BID (12/13/2021)  - per charts, cardiologist Dr. Sebastian Mendoza agreed to hold Plavix while the patient is on Eliquis     #NICA on CKD III  / Hyponatremia  / HAGMA    - Nephrology is following , ARB/  HCTZ / lasix on hold   - Avoid nephrotoxic medications.    - c/w sodium bicarb 650mg BID     #CAD s/p CABG / AV replacement / HTN- C/w DAPT, and metoprolol, restart amlodipine 5 mg , Statin on hold for worsening LFTs     Rest as above  discussed with patient and house staff

## 2021-12-18 NOTE — DISCHARGE NOTE NURSING/CASE MANAGEMENT/SOCIAL WORK - NSDCPEFALRISK_GEN_ALL_CORE
For information on Fall & Injury Prevention, visit: [email or web address] OR  [email or web address] OR  https://www.cdc.gov/steadi/patient.html

## 2021-12-18 NOTE — PROGRESS NOTE ADULT - SUBJECTIVE AND OBJECTIVE BOX
NEPHROLOGY FOLLOW UP NOTE    cr better  on D5W  still on O2  bp's fair  wants to go home    PAST MEDICAL & SURGICAL HISTORY:  Chronic systolic congestive heart failure    HTN (hypertension)    Dyslipidemia      Allergies:  No Known Allergies    Home Medications Reviewed    SOCIAL HISTORY:  Denies ETOH,Smoking,   FAMILY HISTORY:        REVIEW OF SYSTEMS:  CONSTITUTIONAL: No weakness, fevers or chills  EYES/ENT: No visual changes;  No vertigo or throat pain   NECK: No pain or stiffness  RESPIRATORY: No cough, wheezing, hemoptysis; + shortness of breath  CARDIOVASCULAR: No chest pain or palpitations.  GASTROINTESTINAL: No abdominal or epigastric pain. No nausea, vomiting, or hematemesis; No diarrhea or constipation. No melena or hematochezia.  GENITOURINARY: No dysuria, frequency, foamy urine, urinary urgency, incontinence or hematuria  NEUROLOGICAL: No numbness or weakness  SKIN: No itching, burning, rashes, or lesions   VASCULAR: No bilateral lower extremity edema.   All other review of systems is negative unless indicated above.    PHYSICAL EXAM:  Constitutional: NAD  HEENT: anicteric sclera, oropharynx clear, MMM O2 NC  Neck: No JVD  Respiratory: CTAB   Cardiovascular: S1, S2, RRR  Gastrointestinal: BS+, soft, NT/ND  Extremities: No cyanosis or clubbing. + peripheral edema  Neurological: A/O x 3, no focal deficits  Psychiatric: Normal mood, normal affect  : No CVA tenderness. No covarrubias.   Skin: No rashes    Hospital Medications:   MEDICATIONS  (STANDING):  apixaban 5 milliGRAM(s) Oral every 12 hours  aspirin  chewable 81 milliGRAM(s) Oral daily  dexAMETHasone  Injectable 6 milliGRAM(s) IV Push daily  influenza  Vaccine (HIGH DOSE) 0.7 milliLiter(s) IntraMuscular once  latanoprost 0.005% Ophthalmic Solution 1 Drop(s) Both EYES at bedtime  metoprolol succinate ER 50 milliGRAM(s) Oral daily  sodium bicarbonate 650 milliGRAM(s) Oral two times a day        VITALS:  T(F): 96.6 (21 @ 05:42), Max: 96.6 (21 @ 05:42)  HR: 68 (21 @ 05:42)  BP: 159/79 (21 @ 05:42)  RR: 18 (21 @ 05:42)  SpO2: 97% (21 @ 05:42)  Wt(kg): --        LABS:      141  |  100  |  79<HH>  ----------------------------<  101<H>  3.5   |  22  |  1.8<H>    Ca    9.6      18 Dec 2021 04:30  Mg     2.3         TPro  6.1  /  Alb  3.4<L>  /  TBili  0.6  /  DBili      /  AST  84<H>  /  ALT  147<H>  /  AlkPhos  52                            12.8   7.26  )-----------( 268      ( 18 Dec 2021 04:30 )             38.1       Urine Studies:  Urinalysis Basic - ( 13 Dec 2021 10:58 )    Color: Yellow / Appearance: Clear / S.021 / pH:   Gluc:  / Ketone: Trace  / Bili: Negative / Urobili: <2 mg/dL   Blood:  / Protein: 100 mg/dL / Nitrite: Negative   Leuk Esterase: Negative / RBC: 6 /HPF / WBC 8 /HPF   Sq Epi:  / Non Sq Epi: 4 /HPF / Bacteria: Negative      Creatinine, Random Urine: 151 mg/dL ( @ 10:58)  Protein/Creatinine Ratio Calculation: 0.6 Ratio ( @ 10:58)  Sodium, Random Urine: <20.0 mmoL/L ( @ 10:57)  Osmolality, Random Urine: 558 mos/kg ( @ 10:57)      RADIOLOGY & ADDITIONAL STUDIES:

## 2021-12-18 NOTE — PROGRESS NOTE ADULT - ASSESSMENT
79 y/o gentleman with a past medical history of HTN, DLD, CAD s/p CABG, AV replacement, CKD III admitted for COVID PNA. Notes 10 days of progressive SOB, fatigue, diarrhea, and poor oral intake. Didn't note any significant fevers at home. Noted to be hypoxic here, CXR shows b/l infiltrates, O2 improved on 4L NC. Lab work remarkable for NICA, mild hyponatremia, and elevated trop. He denied any chest pain/pressure, palpitations. O2 improved with 4L NC.    #Acute Hypoxic Respiratory Failure secondary to COVID-19 Pneumonia  - SIRS not present on admission.   - Symptoms more than 10 days wont be a candidate for Remdesivir  - c/w Dexamethasone 6mg QD.   - Ferritin 3522>2797 >2280, ESR 88,  12/14  - D-dimer elevated 4601 >2877>3961  - Procalcitonin .25>.21, CRP 85>.42>.22  - ID recs appreciated   - monitor off abx   - Chest xray 12/13 noted  - s/p 40mg Lasix PO QD, discontinued 12/15  - s/p tocilizumab x2 800mg 12/13, 12/15  - O2 requirement increased from 4L to 6L, now saturating 93% on 6L NC   - Repeat chest xray 12/14 shows Increased left mid to lower lung opacities.  - Trend Inflammatory markers Q48  - F/u inflammatory marker from 12/18    #Elevated D-Dimer: 96640  - Hold off on CT PE protocol given renal function   - LE duplex is positive for DVT   - V/Q scan shows high probability for PE   - Starting patient on Eliquis 5mg BID (12/13/2021)  - Spoke to patient's cardiologist Dr. Sebastian Mendoza. will hold Plavix while the patient is on Eliquis     #NICA on CKD III, suspected pre-renal (+/-ATN from ARB)  #Hyponatremia --> likely hypovolemic  #HAGMA (normal lactate)  - s/p LR at 75 cc/hr, stopped 12/13  - Trend Cr, monitor strict I/O  - ARB and HCTZ held for now until renal function is stable.  - Avoid nephrotoxic medications.    - Serum osm 282  - Retroperitoneal US wnl   - Nephro recs appreciated   - Starting sodium bicarb 650mg BID   - Stopped Lasix   - Monitor cr and Sodium     #CAD s/p CABG  #AV replacement   #HTN / HDL  #Elevated trops: stable, likely secondary to COVID PNA   - No chest pain   - C/w DAPT, and metoprolol, Statin was d/ronal per nephro  - ARB and HCTZ held for now given NICA     #Misc   - DVT prophylaxis: Eliquis   - GI prophylaxis: Not indicated  - Diet: DASH/TLC   - Activity status: AAT   - Code status: DNR/DNI  - Disposition: acute   Pending: clinical improvement, inflammatory marker from 12/18, monitor Na and Cr

## 2021-12-18 NOTE — DISCHARGE NOTE NURSING/CASE MANAGEMENT/SOCIAL WORK - PATIENT PORTAL LINK FT
You can access the FollowMyHealth Patient Portal offered by [facility] by registering at the following website: [email or web address] By joining [email or web address] FollowMyHealth portal, you will also be able to view your health information using other applications (apps) compatible with our system.

## 2021-12-18 NOTE — DISCHARGE NOTE NURSING/CASE MANAGEMENT/SOCIAL WORK - NSDCVIVACCINE_GEN_ALL_CORE_FT
Tdap; 22-Jan-2019 21:12; Nathaniel Rodriguez (JACQUELINE); [employer]; [id number] (Exp. Date: 02-Nov-2020); IntraMuscular; Deltoid Right.; 0.5 milliLiter(s); VIS (VIS Published: 09-May-2013, VIS Presented: 22-Jan-2019);

## 2021-12-19 LAB
ALBUMIN SERPL ELPH-MCNC: 3.6 G/DL — SIGNIFICANT CHANGE UP (ref 3.5–5.2)
ALP SERPL-CCNC: 57 U/L — SIGNIFICANT CHANGE UP (ref 30–115)
ALT FLD-CCNC: 127 U/L — HIGH (ref 0–41)
ANION GAP SERPL CALC-SCNC: 18 MMOL/L — HIGH (ref 7–14)
AST SERPL-CCNC: 55 U/L — HIGH (ref 0–41)
BILIRUB SERPL-MCNC: 0.6 MG/DL — SIGNIFICANT CHANGE UP (ref 0.2–1.2)
BUN SERPL-MCNC: 67 MG/DL — CRITICAL HIGH (ref 10–20)
CALCIUM SERPL-MCNC: 9.6 MG/DL — SIGNIFICANT CHANGE UP (ref 8.5–10.1)
CHLORIDE SERPL-SCNC: 100 MMOL/L — SIGNIFICANT CHANGE UP (ref 98–110)
CO2 SERPL-SCNC: 23 MMOL/L — SIGNIFICANT CHANGE UP (ref 17–32)
CREAT SERPL-MCNC: 1.7 MG/DL — HIGH (ref 0.7–1.5)
GLUCOSE SERPL-MCNC: 91 MG/DL — SIGNIFICANT CHANGE UP (ref 70–99)
HCT VFR BLD CALC: 39.9 % — LOW (ref 42–52)
HGB BLD-MCNC: 13.1 G/DL — LOW (ref 14–18)
MAGNESIUM SERPL-MCNC: 2.5 MG/DL — HIGH (ref 1.8–2.4)
MCHC RBC-ENTMCNC: 30.1 PG — SIGNIFICANT CHANGE UP (ref 27–31)
MCHC RBC-ENTMCNC: 32.8 G/DL — SIGNIFICANT CHANGE UP (ref 32–37)
MCV RBC AUTO: 91.7 FL — SIGNIFICANT CHANGE UP (ref 80–94)
NRBC # BLD: 0 /100 WBCS — SIGNIFICANT CHANGE UP (ref 0–0)
PLATELET # BLD AUTO: 299 K/UL — SIGNIFICANT CHANGE UP (ref 130–400)
POTASSIUM SERPL-MCNC: 4.1 MMOL/L — SIGNIFICANT CHANGE UP (ref 3.5–5)
POTASSIUM SERPL-SCNC: 4.1 MMOL/L — SIGNIFICANT CHANGE UP (ref 3.5–5)
PROT SERPL-MCNC: 6.3 G/DL — SIGNIFICANT CHANGE UP (ref 6–8)
RBC # BLD: 4.35 M/UL — LOW (ref 4.7–6.1)
RBC # FLD: 13.2 % — SIGNIFICANT CHANGE UP (ref 11.5–14.5)
SODIUM SERPL-SCNC: 141 MMOL/L — SIGNIFICANT CHANGE UP (ref 135–146)
WBC # BLD: 11.08 K/UL — HIGH (ref 4.8–10.8)
WBC # FLD AUTO: 11.08 K/UL — HIGH (ref 4.8–10.8)

## 2021-12-19 PROCEDURE — 99233 SBSQ HOSP IP/OBS HIGH 50: CPT

## 2021-12-19 RX ADMIN — Medication 650 MILLIGRAM(S): at 18:11

## 2021-12-19 RX ADMIN — APIXABAN 5 MILLIGRAM(S): 2.5 TABLET, FILM COATED ORAL at 18:10

## 2021-12-19 RX ADMIN — APIXABAN 5 MILLIGRAM(S): 2.5 TABLET, FILM COATED ORAL at 06:14

## 2021-12-19 RX ADMIN — AMLODIPINE BESYLATE 5 MILLIGRAM(S): 2.5 TABLET ORAL at 06:14

## 2021-12-19 RX ADMIN — LATANOPROST 1 DROP(S): 0.05 SOLUTION/ DROPS OPHTHALMIC; TOPICAL at 21:48

## 2021-12-19 RX ADMIN — Medication 650 MILLIGRAM(S): at 09:44

## 2021-12-19 RX ADMIN — Medication 50 MILLIGRAM(S): at 06:14

## 2021-12-19 RX ADMIN — Medication 81 MILLIGRAM(S): at 11:54

## 2021-12-19 RX ADMIN — Medication 6 MILLIGRAM(S): at 06:17

## 2021-12-19 NOTE — PROGRESS NOTE ADULT - SUBJECTIVE AND OBJECTIVE BOX
NEPHROLOGY FOLLOW UP NOTE    pt anxious to go home  still on O2  cr better  tolerating PO  no fever      PAST MEDICAL & SURGICAL HISTORY:  Chronic systolic congestive heart failure    HTN (hypertension)    Dyslipidemia      Allergies:  No Known Allergies    Home Medications Reviewed    SOCIAL HISTORY:  Denies ETOH,Smoking,   FAMILY HISTORY:        REVIEW OF SYSTEMS:  CONSTITUTIONAL: No weakness, fevers or chills  EYES/ENT: No visual changes;  No vertigo or throat pain   NECK: No pain or stiffness  RESPIRATORY: No cough, wheezing, hemoptysis; + shortness of breath  CARDIOVASCULAR: No chest pain or palpitations.  GASTROINTESTINAL: No abdominal or epigastric pain. No nausea, vomiting, or hematemesis; No diarrhea or constipation. No melena or hematochezia.  GENITOURINARY: No dysuria, frequency, foamy urine, urinary urgency, incontinence or hematuria  NEUROLOGICAL: No numbness or weakness  SKIN: No itching, burning, rashes, or lesions   VASCULAR: No bilateral lower extremity edema.   All other review of systems is negative unless indicated above.    PHYSICAL EXAM:  Constitutional: NAD  HEENT: anicteric sclera, oropharynx clear, MMM O2 NC  Neck: No JVD  Respiratory: CTAB   Cardiovascular: S1, S2, RRR  Gastrointestinal: BS+, soft, NT/ND  Extremities: No cyanosis or clubbing. + peripheral edema  Neurological: A/O x 3, no focal deficits  Psychiatric: Normal mood, normal affect  : No CVA tenderness. No covarrubias.   Skin: No rashes    Hospital Medications:   MEDICATIONS  (STANDING):  amLODIPine   Tablet 5 milliGRAM(s) Oral daily  apixaban 5 milliGRAM(s) Oral every 12 hours  aspirin  chewable 81 milliGRAM(s) Oral daily  dexAMETHasone  Injectable 6 milliGRAM(s) IV Push daily  influenza  Vaccine (HIGH DOSE) 0.7 milliLiter(s) IntraMuscular once  latanoprost 0.005% Ophthalmic Solution 1 Drop(s) Both EYES at bedtime  metoprolol succinate ER 50 milliGRAM(s) Oral daily  sodium bicarbonate 650 milliGRAM(s) Oral two times a day        VITALS:  T(F): 98 (21 @ 05:23), Max: 98 (21 @ 05:23)  HR: 73 (21 @ 05:23)  BP: 153/63 (21 @ 05:23)  RR: 18 (21 @ 05:23)  SpO2: 93% (21 @ 20:40)  Wt(kg): --        LABS:      141  |  100  |  79<HH>  ----------------------------<  101<H>  3.5   |  22  |  1.8<H>    Ca    9.6      18 Dec 2021 04:30  Mg     2.3         TPro  6.1  /  Alb  3.4<L>  /  TBili  0.6  /  DBili      /  AST  84<H>  /  ALT  147<H>  /  AlkPhos  52                            13.1   11.08 )-----------( 299      ( 19 Dec 2021 04:30 )             39.9       Urine Studies:  Urinalysis Basic - ( 13 Dec 2021 10:58 )    Color: Yellow / Appearance: Clear / S.021 / pH:   Gluc:  / Ketone: Trace  / Bili: Negative / Urobili: <2 mg/dL   Blood:  / Protein: 100 mg/dL / Nitrite: Negative   Leuk Esterase: Negative / RBC: 6 /HPF / WBC 8 /HPF   Sq Epi:  / Non Sq Epi: 4 /HPF / Bacteria: Negative      Creatinine, Random Urine: 151 mg/dL ( @ 10:58)  Protein/Creatinine Ratio Calculation: 0.6 Ratio ( @ 10:58)  Sodium, Random Urine: <20.0 mmoL/L ( @ 10:57)  Osmolality, Random Urine: 558 mos/kg ( @ 10:57)      RADIOLOGY & ADDITIONAL STUDIES:

## 2021-12-19 NOTE — PROGRESS NOTE ADULT - SUBJECTIVE AND OBJECTIVE BOX
SUBJECTIVE:    Patient is a 81y old Male who presents with a chief complaint of COVID PNA (19 Dec 2021 10:19)  Currently admitted to medicine with the primary diagnosis of 2019 novel coronavirus disease (COVID-19)  Today is hospital day 7d. This morning he is resting comfortably in bed and reports no new issues or overnight events.     PAST MEDICAL & SURGICAL HISTORY  Chronic systolic congestive heart failure    HTN (hypertension)    Dyslipidemia      SOCIAL HISTORY:  Negative for smoking/alcohol/drug use.     ALLERGIES:  No Known Allergies    MEDICATIONS:  STANDING MEDICATIONS  amLODIPine   Tablet 5 milliGRAM(s) Oral daily  apixaban 5 milliGRAM(s) Oral every 12 hours  aspirin  chewable 81 milliGRAM(s) Oral daily  dexAMETHasone  Injectable 6 milliGRAM(s) IV Push daily  influenza  Vaccine (HIGH DOSE) 0.7 milliLiter(s) IntraMuscular once  latanoprost 0.005% Ophthalmic Solution 1 Drop(s) Both EYES at bedtime  metoprolol succinate ER 50 milliGRAM(s) Oral daily  sodium bicarbonate 650 milliGRAM(s) Oral two times a day    PRN MEDICATIONS  acetaminophen     Tablet .. 650 milliGRAM(s) Oral every 4 hours PRN    VITALS:   T(F): 98  HR: 73  BP: 153/63  RR: 18  SpO2: 93%    LABS:                        13.1   11.08 )-----------( 299      ( 19 Dec 2021 04:30 )             39.9     12-19    141  |  100  |  x   ----------------------------<  91  4.1   |  23  |  1.7<H>    Ca    9.6      19 Dec 2021 04:30  Mg     2.5     12-19    TPro  6.3  /  Alb  3.6  /  TBili  0.6  /  DBili  x   /  AST  55<H>  /  ALT  127<H>  /  AlkPhos  57  12-19      RADIOLOGY:    PHYSICAL EXAM:  GEN: No acute distress  LUNGS: Clear to auscultation bilaterally   HEART: S1/S2 present. RRR.   ABD: Soft, non-tender, non-distended. Bowel sounds present  EXT: No LE edema  NEURO: AAOX3

## 2021-12-19 NOTE — PROGRESS NOTE ADULT - ASSESSMENT
79 y/o gentleman with a past medical history of HTN, DLD, CAD s/p CABG, AV replacement, CKD III admitted for COVID PNA. Notes 10 days of progressive SOB, fatigue, diarrhea, and poor oral intake. Didn't note any significant fevers at home. Noted to be hypoxic here, CXR shows b/l infiltrates, Lab work remarkable for NICA, mild hyponatremia, and elevated trop.    #Acute Hypoxic Respiratory Failure secondary to COVID-19 Pneumonia  - pt is sating 96% on 5-6 L NC this AM  - wean off oxygen back to baseline of 2L, target SaO2 94%  - c/w Dexamethasone 6mg QD (day 8)  - s/p tocilizumab x2 800mg 12/13, 12/15  - Trend Inflammatory markers Q48, F/u inflammatory marker from 12/18    # Acute DVT noted in the right popliteal peroneal and posterior tibial vein.  # high probability for PE on V/Q scan   - Started patient on Eliquis 5mg BID (12/13/2021)  - per charts, cardiologist Dr. Sebastian eMndoza agreed to hold Plavix while the patient is on Eliquis     #NICA on CKD III  / Hyponatremia  / HAGMA : stable   - Nephrology is following , ARB/  HCTZ / lasix on hold   - Avoid nephrotoxic medications.    - c/w sodium bicarb 650mg BID     #CAD s/p CABG / AV replacement / HTN  - C/w ASA, metoprolol, and amlodipine 5 mg ,   - Statin on hold for worsening LFTs (stable), restart on d/c     DVT prophylaxis: Eliquis   GI prophylaxis: Not indicated  Diet: DASH/TLC   Activity status: AAT   Code status: DNR/DNI  Disposition: acute, wean off oxygen back to baseline, plan to go home with oxygen and home PT

## 2021-12-19 NOTE — PROGRESS NOTE ADULT - TIME BILLING
I have personally seen and examined this patient.    I have reviewed all pertinent clinical information and reviewed all relevant imaging and diagnostic studies personally.   I counseled the patient about diagnostic testing and treatment plan. All questions were answered.   I discussed recommendations with the primary team.
#Acute hypoxic resp failure, due to covid  cxr bl opacities  c/b suspected PE, R post tibial, pop, peroneal DVT  v/q high prob pe  requiring 6l nc  eliquis  decadron 6  s/p toci 12/13, 12/15  appreciate id  #NICA on CKD III  baseline scr 1.45 per renal  no hydro on us  trend  d5w 75 cc/hr  arb, lasix on hold    #Progress Note Handoff:  Pending (specify):  Consults_________, Tests________, Test Results_______, Other___hypoxia______  Family discussion: d/w pt at bedside re: treatment plan, primary dx  Disposition: Home___/SNF___/Other________/Unknown at this time___x_____
I have personally seen and examined this patient.    I have reviewed all pertinent clinical information and reviewed all relevant imaging and diagnostic studies personally.   I counseled the patient about diagnostic testing and treatment plan. All questions were answered.   I discussed recommendations with the primary team.
time spent on review of labs and imaging studies, obtaining history, personally examining patient, counselling and communicating with patient/ family, entering orders for medications/tests/etc, discussions with other health care providers, documentation in electronic health records, independent interpretation of labs, imaging/procedure results and care coordination.
time spent on review of labs and imaging studies, obtaining history, personally examining patient, counselling and communicating with patient/ family, entering orders for medications/tests/etc, discussions with other health care providers, documentation in electronic health records, independent interpretation of labs, imaging/procedure results and care coordination.

## 2021-12-19 NOTE — PROGRESS NOTE ADULT - ASSESSMENT
Acute kidney injury   chronic kidney disease stage 3  (baseline cr 1.45 10/2021)  hypernatremia / metabolic acidosis resolved  COVID-19 PNA s/p toci / decadron   CAD / AVR  HTN   PE       plan:    cont sodium bicarbonate 650mg po bid  cont amlodipine 5mg po qd  keep off ARB, thiazide  AC per primary team  physical therapy  dc planning to home with O2  outpt renal f/u

## 2021-12-20 ENCOUNTER — TRANSCRIPTION ENCOUNTER (OUTPATIENT)
Age: 81
End: 2021-12-20

## 2021-12-20 VITALS
SYSTOLIC BLOOD PRESSURE: 157 MMHG | OXYGEN SATURATION: 93 % | HEART RATE: 84 BPM | DIASTOLIC BLOOD PRESSURE: 84 MMHG | TEMPERATURE: 96 F | RESPIRATION RATE: 16 BRPM

## 2021-12-20 LAB
ALBUMIN SERPL ELPH-MCNC: 3.4 G/DL — LOW (ref 3.5–5.2)
ALP SERPL-CCNC: 51 U/L — SIGNIFICANT CHANGE UP (ref 30–115)
ALT FLD-CCNC: 106 U/L — HIGH (ref 0–41)
ANION GAP SERPL CALC-SCNC: 16 MMOL/L — HIGH (ref 7–14)
AST SERPL-CCNC: 42 U/L — HIGH (ref 0–41)
BILIRUB SERPL-MCNC: 0.6 MG/DL — SIGNIFICANT CHANGE UP (ref 0.2–1.2)
BUN SERPL-MCNC: 57 MG/DL — HIGH (ref 10–20)
CALCIUM SERPL-MCNC: 9.6 MG/DL — SIGNIFICANT CHANGE UP (ref 8.5–10.1)
CHLORIDE SERPL-SCNC: 101 MMOL/L — SIGNIFICANT CHANGE UP (ref 98–110)
CO2 SERPL-SCNC: 24 MMOL/L — SIGNIFICANT CHANGE UP (ref 17–32)
CREAT SERPL-MCNC: 1.4 MG/DL — SIGNIFICANT CHANGE UP (ref 0.7–1.5)
D DIMER BLD IA.RAPID-MCNC: 2334 NG/ML DDU — HIGH (ref 0–230)
GLUCOSE SERPL-MCNC: 91 MG/DL — SIGNIFICANT CHANGE UP (ref 70–99)
HCT VFR BLD CALC: 38.9 % — LOW (ref 42–52)
HGB BLD-MCNC: 12.8 G/DL — LOW (ref 14–18)
MAGNESIUM SERPL-MCNC: 2.1 MG/DL — SIGNIFICANT CHANGE UP (ref 1.8–2.4)
MCHC RBC-ENTMCNC: 30.2 PG — SIGNIFICANT CHANGE UP (ref 27–31)
MCHC RBC-ENTMCNC: 32.9 G/DL — SIGNIFICANT CHANGE UP (ref 32–37)
MCV RBC AUTO: 91.7 FL — SIGNIFICANT CHANGE UP (ref 80–94)
NRBC # BLD: 0 /100 WBCS — SIGNIFICANT CHANGE UP (ref 0–0)
PLATELET # BLD AUTO: 250 K/UL — SIGNIFICANT CHANGE UP (ref 130–400)
POTASSIUM SERPL-MCNC: 3.8 MMOL/L — SIGNIFICANT CHANGE UP (ref 3.5–5)
POTASSIUM SERPL-SCNC: 3.8 MMOL/L — SIGNIFICANT CHANGE UP (ref 3.5–5)
PROT SERPL-MCNC: 5.9 G/DL — LOW (ref 6–8)
RBC # BLD: 4.24 M/UL — LOW (ref 4.7–6.1)
RBC # FLD: 13.2 % — SIGNIFICANT CHANGE UP (ref 11.5–14.5)
SODIUM SERPL-SCNC: 141 MMOL/L — SIGNIFICANT CHANGE UP (ref 135–146)
WBC # BLD: 9.19 K/UL — SIGNIFICANT CHANGE UP (ref 4.8–10.8)
WBC # FLD AUTO: 9.19 K/UL — SIGNIFICANT CHANGE UP (ref 4.8–10.8)

## 2021-12-20 PROCEDURE — 99239 HOSP IP/OBS DSCHRG MGMT >30: CPT

## 2021-12-20 RX ORDER — APIXABAN 2.5 MG/1
1 TABLET, FILM COATED ORAL
Qty: 60 | Refills: 2
Start: 2021-12-20 | End: 2022-03-19

## 2021-12-20 RX ORDER — APIXABAN 2.5 MG/1
1 TABLET, FILM COATED ORAL
Qty: 60 | Refills: 0
Start: 2021-12-20 | End: 2022-01-18

## 2021-12-20 RX ORDER — SODIUM BICARBONATE 1 MEQ/ML
1 SYRINGE (ML) INTRAVENOUS
Qty: 30 | Refills: 0
Start: 2021-12-20

## 2021-12-20 RX ORDER — AMLODIPINE BESYLATE 2.5 MG/1
1 TABLET ORAL
Qty: 30 | Refills: 0
Start: 2021-12-20 | End: 2022-01-18

## 2021-12-20 RX ORDER — ASPIRIN/CALCIUM CARB/MAGNESIUM 324 MG
1 TABLET ORAL
Qty: 30 | Refills: 0
Start: 2021-12-20 | End: 2022-01-18

## 2021-12-20 RX ORDER — OLMESARTAN MEDOXOMIL / AMLODIPINE BESYLATE / HYDROCHLOROTHIAZIDE 40; 10; 25 MG/1; MG/1; MG/1
0 TABLET, FILM COATED ORAL
Qty: 0 | Refills: 0 | DISCHARGE

## 2021-12-20 RX ADMIN — Medication 650 MILLIGRAM(S): at 17:02

## 2021-12-20 RX ADMIN — APIXABAN 5 MILLIGRAM(S): 2.5 TABLET, FILM COATED ORAL at 05:11

## 2021-12-20 RX ADMIN — Medication 6 MILLIGRAM(S): at 05:11

## 2021-12-20 RX ADMIN — Medication 50 MILLIGRAM(S): at 05:11

## 2021-12-20 RX ADMIN — APIXABAN 5 MILLIGRAM(S): 2.5 TABLET, FILM COATED ORAL at 17:02

## 2021-12-20 RX ADMIN — Medication 650 MILLIGRAM(S): at 05:11

## 2021-12-20 RX ADMIN — Medication 81 MILLIGRAM(S): at 11:56

## 2021-12-20 RX ADMIN — AMLODIPINE BESYLATE 5 MILLIGRAM(S): 2.5 TABLET ORAL at 05:10

## 2021-12-20 NOTE — PROGRESS NOTE ADULT - SUBJECTIVE AND OBJECTIVE BOX
NEPHROLOGY FOLLOW UP NOTE    less O2 requirements  no fever  improving        PAST MEDICAL & SURGICAL HISTORY:  Chronic systolic congestive heart failure    HTN (hypertension)    Dyslipidemia      Allergies:  No Known Allergies    Home Medications Reviewed    SOCIAL HISTORY:  Denies ETOH,Smoking,   FAMILY HISTORY:        REVIEW OF SYSTEMS:  CONSTITUTIONAL: No weakness, fevers or chills  EYES/ENT: No visual changes;  No vertigo or throat pain   NECK: No pain or stiffness  RESPIRATORY: No cough, wheezing, hemoptysis; + shortness of breath  CARDIOVASCULAR: No chest pain or palpitations.  GASTROINTESTINAL: No abdominal or epigastric pain. No nausea, vomiting, or hematemesis; No diarrhea or constipation. No melena or hematochezia.  GENITOURINARY: No dysuria, frequency, foamy urine, urinary urgency, incontinence or hematuria  NEUROLOGICAL: No numbness or weakness  SKIN: No itching, burning, rashes, or lesions   VASCULAR: No bilateral lower extremity edema.   All other review of systems is negative unless indicated above.    PHYSICAL EXAM:  Constitutional: NAD  HEENT: anicteric sclera, oropharynx clear, MMM O2 NC  Neck: No JVD  Respiratory: CTAB   Cardiovascular: S1, S2, RRR  Gastrointestinal: BS+, soft, NT/ND  Extremities: No cyanosis or clubbing. + peripheral edema  Neurological: A/O x 3, no focal deficits  Psychiatric: Normal mood, normal affect  : No CVA tenderness. No covarrubias.   Skin: No rashes    Hospital Medications:   MEDICATIONS  (STANDING):  amLODIPine   Tablet 5 milliGRAM(s) Oral daily  apixaban 5 milliGRAM(s) Oral every 12 hours  aspirin  chewable 81 milliGRAM(s) Oral daily  dexAMETHasone  Injectable 6 milliGRAM(s) IV Push daily  influenza  Vaccine (HIGH DOSE) 0.7 milliLiter(s) IntraMuscular once  latanoprost 0.005% Ophthalmic Solution 1 Drop(s) Both EYES at bedtime  metoprolol succinate ER 50 milliGRAM(s) Oral daily  sodium bicarbonate 650 milliGRAM(s) Oral two times a day        VITALS:  T(F): 96.2 (12-20-21 @ 13:20), Max: 97 (12-20-21 @ 05:31)  HR: 84 (12-20-21 @ 13:20)  BP: 157/84 (12-20-21 @ 13:20)  RR: 16 (12-20-21 @ 13:20)  SpO2: 93% (12-20-21 @ 13:20)  Wt(kg): --      Weight (kg): 104.326 (12-20 @ 15:05)    LABS:  12-20    141  |  101  |  57<H>  ----------------------------<  91  3.8   |  24  |  1.4    Ca    9.6      20 Dec 2021 06:58  Mg     2.1     12-20    TPro  5.9<L>  /  Alb  3.4<L>  /  TBili  0.6  /  DBili      /  AST  42<H>  /  ALT  106<H>  /  AlkPhos  51  12-20                          12.8   9.19  )-----------( 250      ( 20 Dec 2021 06:58 )             38.9       Urine Studies:        RADIOLOGY & ADDITIONAL STUDIES:

## 2021-12-20 NOTE — PROGRESS NOTE ADULT - REASON FOR ADMISSION
COVID PNA

## 2021-12-20 NOTE — DISCHARGE NOTE PROVIDER - NSDCCPCAREPLAN_GEN_ALL_CORE_FT
PRINCIPAL DISCHARGE DIAGNOSIS  Diagnosis: 2019 novel coronavirus disease (COVID-19)  Assessment and Plan of Treatment: Coronavirus disease 2019 (COVID-19) is a respiratory illness  that can spread from person to person. The virus that causes  COVID-19 is a novel coronavirus that was first identified during  an investigation into an outbreak in [place], [place].  The virus that causes COVID-19 probably emerged from an  animal source, but is now spreading from person to person.  The virus is thought to spread mainly between people who  are in close contact with one another (within about 6 feet)  through respiratory droplets produced when an infected  person coughs or sneezes. It also may be possible that a person  can get COVID-19 by touching a surface or object that has  the virus on it and then touching their own mouth, nose, or  possibly their eyes, but this is not thought to be the main  way the virus spreads.  Please stay home and avoid contact with others for at least a week after symptoms resolve and follow government guidelines.   Patients with COVID-19 have had mild to severe respiratory  illness with symptoms of  • fever  • cough  • shortness of breath  People can help protect themselves from respiratory illness with  everyday preventive actions.    • Avoid close contact with people who are sick.  • Avoid touching your eyes, nose, and mouth with  unwashed hands.  • Wash your hands often with soap and water for at least 20   seconds. Use an alcohol-based hand  that contains at  least 60% alcohol if soap and water are not available.   Stay home when you are sick.  • Cover your cough or sneeze with a tissue, then throw the  tissue in the trash.  • Clean and disinfect frequently touched objects  and surfaces.  Call 911 and inform them you are covid positive before you decide to go to the emergency room if you have chest pain, difficulty breathing, high fevers, worsening of your symptoms, feel unwell, or have nausea and vomiting.        SECONDARY DISCHARGE DIAGNOSES  Diagnosis: NICA (acute kidney injury)  Assessment and Plan of Treatment: You were noted to have a temporary insult to your kidney function either at the time that you arrived at the hospital or during your stay here. We have monitored your kidney function with blood work during your time here and you are at a level that no longer requires continued hospital level care, but we do recommend that you follow up to continually have your kidney function checked. You can follow up with your primary care doctor, or, if recommended in the discharge paperwork, you should follow up with a Kidney specialist called a Nephrologist.      Diagnosis: Elevated troponin  Assessment and Plan of Treatment:     Diagnosis: Hyponatremia  Assessment and Plan of Treatment:      PRINCIPAL DISCHARGE DIAGNOSIS  Diagnosis: 2019 novel coronavirus disease (COVID-19)  Assessment and Plan of Treatment: Coronavirus disease 2019 (COVID-19) is a respiratory illness  that can spread from person to person. The virus that causes  COVID-19 is a novel coronavirus that was first identified during  an investigation into an outbreak in [place], [place].  The virus that causes COVID-19 probably emerged from an  animal source, but is now spreading from person to person.  The virus is thought to spread mainly between people who  are in close contact with one another (within about 6 feet)  through respiratory droplets produced when an infected  person coughs or sneezes. It also may be possible that a person  can get COVID-19 by touching a surface or object that has  the virus on it and then touching their own mouth, nose, or  possibly their eyes, but this is not thought to be the main  way the virus spreads.  Please stay home and avoid contact with others for at least a week after symptoms resolve and follow government guidelines.   Patients with COVID-19 have had mild to severe respiratory  illness with symptoms of  • fever  • cough  • shortness of breath  People can help protect themselves from respiratory illness with  everyday preventive actions.    Call 911 and inform them you are covid positive before you decide to go to the emergency room if you have chest pain, difficulty breathing, high fevers, worsening of your symptoms, feel unwell, or have nausea and vomiting.  Covid PNA: s/p Toci x 2. complete dexa course.   Eliquis started for new diagnosis DVT/PE. To be taken for at least 3-4 months.  1st month sample provided. f/u PCP for further supplies.   Walking with roller walker.   d/c home w/ Home PT, Home O2.      SECONDARY DISCHARGE DIAGNOSES  Diagnosis: NICA (acute kidney injury)  Assessment and Plan of Treatment: You were noted to have a temporary insult to your kidney function either at the time that you arrived at the hospital or during your stay here. We have monitored your kidney function with blood work during your time here and you are at a level that no longer requires continued hospital level care, but we do recommend that you follow up to continually have your kidney function checked. You can follow up with your primary care doctor, or, if recommended in the discharge paperwork, you should follow up with a Kidney specialist called a Nephrologist.      Diagnosis: Elevated troponin  Assessment and Plan of Treatment:     Diagnosis: Hyponatremia  Assessment and Plan of Treatment:

## 2021-12-20 NOTE — DISCHARGE NOTE PROVIDER - NSDCMRMEDTOKEN_GEN_ALL_CORE_FT
amLODIPine 5 mg oral tablet: 1 tab(s) orally once a day  apixaban 5 mg oral tablet: 1 tab(s) orally every 12 hours  aspirin 81 mg oral tablet, chewable: 1 tab(s) orally once a day  LATANOPROST  0.005 % SOLN:   METOPROLOL ER SUCCINATE 50MG TABS: TAKE 1 TABLET BY MOUTH AT BEDTIME  OLMESARTAN MEDOXOMIL/AMLODIPINE/HYD ROCHLOROTHIAZIDE 40-5-25 MG TABS:   rosuvastatin 5 mg oral tablet: 1 tab(s) orally once a day   amLODIPine 5 mg oral tablet: 1 tab(s) orally once a day  apixaban 5 mg oral tablet: 1 tab(s) orally every 12 hours  aspirin 81 mg oral tablet, chewable: 1 tab(s) orally once a day  LATANOPROST  0.005 % SOLN:   METOPROLOL ER SUCCINATE 50MG TABS: TAKE 1 TABLET BY MOUTH AT BEDTIME  predniSONE 20 mg oral tablet: 2 tab(s) orally once a day   rosuvastatin 5 mg oral tablet: 1 tab(s) orally once a day  sodium bicarbonate 650 mg oral tablet: 1 tab(s) orally 2 times a day

## 2021-12-20 NOTE — DISCHARGE NOTE PROVIDER - CARE PROVIDER_API CALL
Brando Murphy)  Internal Medicine  [address] [place] [address]  Phone: [phone]  Fax: [phone]  Follow Up Time:    Brando Murphy)  Internal Medicine  [address] [place] [address]  Phone: [phone]  Fax: [phone]  Follow Up Time:     José Luis Pimentel)  Internal Medicine; Nephrology  [address] [place] [address]  Phone: [phone]  Fax: [phone]  Follow Up Time:

## 2021-12-20 NOTE — PROGRESS NOTE ADULT - PROVIDER SPECIALTY LIST ADULT
Internal Medicine
Hospitalist
Internal Medicine
Internal Medicine
Nephrology
Nephrology
Hospitalist
Infectious Disease
Infectious Disease
Internal Medicine
Nephrology

## 2021-12-20 NOTE — DISCHARGE NOTE PROVIDER - HOSPITAL COURSE
81 y/o gentleman with a past medical history of HTN, DLD, CAD s/p CABG, AV replacement, CKD III admitted for COVID PNA. Notes 10 days of progressive SOB, fatigue, diarrhea, and poor oral intake. Didn't note any significant fevers at home. Noted to be hypoxic here, CXR shows b/l infiltrates, Patient received IV steroids, and 2 doses of tociluzumab on 12/13 and 12/15. Patient desaturates to 87% with ambulation and needs to be discharged with home oxygen. Patient found to have acute DVT in right pop, peroneal and posterior tibial vein and started on eliquis.  cardiologist Dr. Sebastian Mendoza agreed to hold Plavix while the patient is on eliquis      81 y/o gentleman with a past medical history of HTN, DLD, CAD s/p CABG, AV replacement, CKD III admitted for COVID PNA. Notes 10 days of progressive SOB, fatigue, diarrhea, and poor oral intake. Didn't note any significant fevers at home. Noted to be hypoxic here, CXR shows b/l infiltrates, Patient received IV steroids, and 2 doses of tociluzumab on 12/13 and 12/15. Patient desaturates to 87% with ambulation and needs to be discharged with home oxygen. Patient found to have acute DVT in right pop, peroneal and posterior tibial vein and started on eliquis.  cardiologist Dr. Sebastian Mendoza agreed to hold Plavix while the patient is on eliquis     Attending Note:  Patient seen and examined independently. I personally had a face-to-face encounter with the patient, examined the patient myself, personally reviewed labs & Radiology images,  and reviewed the plan of care with the housestaff. Agree with resident's note but my note supersedes that of the resident in the matters hereby listed.   Covid PNA: s/p Toci x 2. complete dexa course.   Eliquis started for new diagnosis DVT/PE. To be taken for at least 3-4 months.  1st month sample provided. f/u PCP for further supplies.   Walking with roller walker.   d/c home w/ Home PT, Home O2.

## 2021-12-20 NOTE — DISCHARGE NOTE PROVIDER - PROVIDER TOKENS
PROVIDER:[TOKEN:[[id number]]] PROVIDER:[TOKEN:[[id number]]],PROVIDER:[TOKEN:[[id number]]]

## 2021-12-20 NOTE — PROGRESS NOTE ADULT - ASSESSMENT
Acute kidney injury   chronic kidney disease stage 3  (baseline cr 1.45 10/2021)  hypernatremia / metabolic acidosis resolved  COVID-19 PNA s/p toci / decadron   CAD / AVR  HTN   PE       plan:    cont sodium bicarbonate 650mg po bid  cont amlodipine 5mg po qd  off ARB, thiazide, may resume later as outpt  AC per primary team  physical therapy  dc planning to home with O2  outpt renal f/u

## 2021-12-21 LAB
CRP SERPL-MCNC: <3 MG/L — SIGNIFICANT CHANGE UP
FERRITIN SERPL-MCNC: 1218 NG/ML — HIGH (ref 30–400)

## 2022-01-03 DIAGNOSIS — I25.10 ATHEROSCLEROTIC HEART DISEASE OF NATIVE CORONARY ARTERY WITHOUT ANGINA PECTORIS: ICD-10-CM

## 2022-01-03 DIAGNOSIS — I50.22 CHRONIC SYSTOLIC (CONGESTIVE) HEART FAILURE: ICD-10-CM

## 2022-01-03 DIAGNOSIS — I82.431 ACUTE EMBOLISM AND THROMBOSIS OF RIGHT POPLITEAL VEIN: ICD-10-CM

## 2022-01-03 DIAGNOSIS — E87.3 ALKALOSIS: ICD-10-CM

## 2022-01-03 DIAGNOSIS — E78.5 HYPERLIPIDEMIA, UNSPECIFIED: ICD-10-CM

## 2022-01-03 DIAGNOSIS — E87.6 HYPOKALEMIA: ICD-10-CM

## 2022-01-03 DIAGNOSIS — E87.1 HYPO-OSMOLALITY AND HYPONATREMIA: ICD-10-CM

## 2022-01-03 DIAGNOSIS — J12.82 PNEUMONIA DUE TO CORONAVIRUS DISEASE 2019: ICD-10-CM

## 2022-01-03 DIAGNOSIS — J96.01 ACUTE RESPIRATORY FAILURE WITH HYPOXIA: ICD-10-CM

## 2022-01-03 DIAGNOSIS — N17.9 ACUTE KIDNEY FAILURE, UNSPECIFIED: ICD-10-CM

## 2022-01-03 DIAGNOSIS — I82.451 ACUTE EMBOLISM AND THROMBOSIS OF RIGHT PERONEAL VEIN: ICD-10-CM

## 2022-01-03 DIAGNOSIS — Z87.891 PERSONAL HISTORY OF NICOTINE DEPENDENCE: ICD-10-CM

## 2022-01-03 DIAGNOSIS — E87.2 ACIDOSIS: ICD-10-CM

## 2022-01-03 DIAGNOSIS — I26.99 OTHER PULMONARY EMBOLISM WITHOUT ACUTE COR PULMONALE: ICD-10-CM

## 2022-01-03 DIAGNOSIS — I13.0 HYPERTENSIVE HEART AND CHRONIC KIDNEY DISEASE WITH HEART FAILURE AND STAGE 1 THROUGH STAGE 4 CHRONIC KIDNEY DISEASE, OR UNSPECIFIED CHRONIC KIDNEY DISEASE: ICD-10-CM

## 2022-01-03 DIAGNOSIS — N18.30 CHRONIC KIDNEY DISEASE, STAGE 3 UNSPECIFIED: ICD-10-CM

## 2022-01-03 DIAGNOSIS — I82.441 ACUTE EMBOLISM AND THROMBOSIS OF RIGHT TIBIAL VEIN: ICD-10-CM

## 2022-01-03 DIAGNOSIS — Z66 DO NOT RESUSCITATE: ICD-10-CM

## 2022-01-03 DIAGNOSIS — Z95.1 PRESENCE OF AORTOCORONARY BYPASS GRAFT: ICD-10-CM

## 2022-01-03 DIAGNOSIS — U07.1 COVID-19: ICD-10-CM

## 2022-01-03 DIAGNOSIS — Z95.2 PRESENCE OF PROSTHETIC HEART VALVE: ICD-10-CM

## 2022-01-03 DIAGNOSIS — I44.7 LEFT BUNDLE-BRANCH BLOCK, UNSPECIFIED: ICD-10-CM

## 2022-01-06 PROBLEM — I50.22 CHRONIC SYSTOLIC (CONGESTIVE) HEART FAILURE: Chronic | Status: ACTIVE | Noted: 2021-12-12

## 2022-01-06 PROBLEM — E78.5 HYPERLIPIDEMIA, UNSPECIFIED: Chronic | Status: ACTIVE | Noted: 2021-12-12

## 2022-01-06 PROBLEM — I10 ESSENTIAL (PRIMARY) HYPERTENSION: Chronic | Status: ACTIVE | Noted: 2021-12-12

## 2022-01-25 ENCOUNTER — NON-APPOINTMENT (OUTPATIENT)
Age: 82
End: 2022-01-25

## 2022-01-26 ENCOUNTER — RX RENEWAL (OUTPATIENT)
Age: 82
End: 2022-01-26

## 2022-01-27 ENCOUNTER — INPATIENT (INPATIENT)
Facility: HOSPITAL | Age: 82
LOS: 7 days | Discharge: HOME HEALTH | End: 2022-02-04
Attending: STUDENT IN AN ORGANIZED HEALTH CARE EDUCATION/TRAINING PROGRAM | Admitting: STUDENT IN AN ORGANIZED HEALTH CARE EDUCATION/TRAINING PROGRAM
Payer: MEDICARE

## 2022-01-27 VITALS
OXYGEN SATURATION: 97 % | RESPIRATION RATE: 18 BRPM | TEMPERATURE: 96 F | SYSTOLIC BLOOD PRESSURE: 111 MMHG | DIASTOLIC BLOOD PRESSURE: 71 MMHG | HEART RATE: 150 BPM

## 2022-01-27 DIAGNOSIS — R09.89 OTHER SPECIFIED SYMPTOMS AND SIGNS INVOLVING THE CIRCULATORY AND RESPIRATORY SYSTEMS: ICD-10-CM

## 2022-01-27 LAB
ALBUMIN SERPL ELPH-MCNC: 3.6 G/DL — SIGNIFICANT CHANGE UP (ref 3.5–5.2)
ALP SERPL-CCNC: 45 U/L — SIGNIFICANT CHANGE UP (ref 30–115)
ALT FLD-CCNC: 47 U/L — HIGH (ref 0–41)
ANION GAP SERPL CALC-SCNC: 18 MMOL/L — HIGH (ref 7–14)
AST SERPL-CCNC: 28 U/L — SIGNIFICANT CHANGE UP (ref 0–41)
BASE EXCESS BLDV CALC-SCNC: -0.4 MMOL/L — SIGNIFICANT CHANGE UP (ref -2–3)
BASOPHILS # BLD AUTO: 0.04 K/UL — SIGNIFICANT CHANGE UP (ref 0–0.2)
BASOPHILS NFR BLD AUTO: 0.6 % — SIGNIFICANT CHANGE UP (ref 0–1)
BILIRUB SERPL-MCNC: 0.6 MG/DL — SIGNIFICANT CHANGE UP (ref 0.2–1.2)
BUN SERPL-MCNC: 46 MG/DL — HIGH (ref 10–20)
CA-I SERPL-SCNC: 1.23 MMOL/L — SIGNIFICANT CHANGE UP (ref 1.15–1.33)
CALCIUM SERPL-MCNC: 9.6 MG/DL — SIGNIFICANT CHANGE UP (ref 8.5–10.1)
CHLORIDE SERPL-SCNC: 105 MMOL/L — SIGNIFICANT CHANGE UP (ref 98–110)
CO2 SERPL-SCNC: 18 MMOL/L — SIGNIFICANT CHANGE UP (ref 17–32)
CREAT SERPL-MCNC: 2.4 MG/DL — HIGH (ref 0.7–1.5)
EOSINOPHIL # BLD AUTO: 0.02 K/UL — SIGNIFICANT CHANGE UP (ref 0–0.7)
EOSINOPHIL NFR BLD AUTO: 0.3 % — SIGNIFICANT CHANGE UP (ref 0–8)
GAS PNL BLDV: 137 MMOL/L — SIGNIFICANT CHANGE UP (ref 136–145)
GAS PNL BLDV: SIGNIFICANT CHANGE UP
GLUCOSE SERPL-MCNC: 120 MG/DL — HIGH (ref 70–99)
HCO3 BLDV-SCNC: 25 MMOL/L — SIGNIFICANT CHANGE UP (ref 22–29)
HCT VFR BLD CALC: 37.1 % — LOW (ref 42–52)
HCT VFR BLDA CALC: 38 % — LOW (ref 39–51)
HGB BLD CALC-MCNC: 12.6 G/DL — SIGNIFICANT CHANGE UP (ref 12.6–17.4)
HGB BLD-MCNC: 12.1 G/DL — LOW (ref 14–18)
IMM GRANULOCYTES NFR BLD AUTO: 0.4 % — HIGH (ref 0.1–0.3)
LACTATE BLDV-MCNC: 2.3 MMOL/L — HIGH (ref 0.5–2)
LIDOCAIN IGE QN: 16 U/L — SIGNIFICANT CHANGE UP (ref 7–60)
LYMPHOCYTES # BLD AUTO: 1.02 K/UL — LOW (ref 1.2–3.4)
LYMPHOCYTES # BLD AUTO: 14.3 % — LOW (ref 20.5–51.1)
MAGNESIUM SERPL-MCNC: 2.1 MG/DL — SIGNIFICANT CHANGE UP (ref 1.8–2.4)
MCHC RBC-ENTMCNC: 31.2 PG — HIGH (ref 27–31)
MCHC RBC-ENTMCNC: 32.6 G/DL — SIGNIFICANT CHANGE UP (ref 32–37)
MCV RBC AUTO: 95.6 FL — HIGH (ref 80–94)
MONOCYTES # BLD AUTO: 0.65 K/UL — HIGH (ref 0.1–0.6)
MONOCYTES NFR BLD AUTO: 9.1 % — SIGNIFICANT CHANGE UP (ref 1.7–9.3)
NEUTROPHILS # BLD AUTO: 5.38 K/UL — SIGNIFICANT CHANGE UP (ref 1.4–6.5)
NEUTROPHILS NFR BLD AUTO: 75.3 % — HIGH (ref 42.2–75.2)
NRBC # BLD: 0 /100 WBCS — SIGNIFICANT CHANGE UP (ref 0–0)
NT-PROBNP SERPL-SCNC: 8852 PG/ML — HIGH (ref 0–300)
PCO2 BLDV: 42 MMHG — SIGNIFICANT CHANGE UP (ref 42–55)
PH BLDV: 7.38 — SIGNIFICANT CHANGE UP (ref 7.32–7.43)
PLATELET # BLD AUTO: 147 K/UL — SIGNIFICANT CHANGE UP (ref 130–400)
PO2 BLDV: 37 MMHG — SIGNIFICANT CHANGE UP
POTASSIUM BLDV-SCNC: 3.3 MMOL/L — LOW (ref 3.5–5.1)
POTASSIUM SERPL-MCNC: 3.9 MMOL/L — SIGNIFICANT CHANGE UP (ref 3.5–5)
POTASSIUM SERPL-SCNC: 3.9 MMOL/L — SIGNIFICANT CHANGE UP (ref 3.5–5)
PROT SERPL-MCNC: 5.7 G/DL — LOW (ref 6–8)
RBC # BLD: 3.88 M/UL — LOW (ref 4.7–6.1)
RBC # FLD: 17.4 % — HIGH (ref 11.5–14.5)
SAO2 % BLDV: 62 % — SIGNIFICANT CHANGE UP
SARS-COV-2 RNA SPEC QL NAA+PROBE: SIGNIFICANT CHANGE UP
SODIUM SERPL-SCNC: 141 MMOL/L — SIGNIFICANT CHANGE UP (ref 135–146)
TROPONIN T SERPL-MCNC: 0.06 NG/ML — CRITICAL HIGH
WBC # BLD: 7.14 K/UL — SIGNIFICANT CHANGE UP (ref 4.8–10.8)
WBC # FLD AUTO: 7.14 K/UL — SIGNIFICANT CHANGE UP (ref 4.8–10.8)

## 2022-01-27 PROCEDURE — 93308 TTE F-UP OR LMTD: CPT | Mod: 26

## 2022-01-27 PROCEDURE — 71045 X-RAY EXAM CHEST 1 VIEW: CPT | Mod: 26

## 2022-01-27 PROCEDURE — 93010 ELECTROCARDIOGRAM REPORT: CPT | Mod: 76

## 2022-01-27 PROCEDURE — 99285 EMERGENCY DEPT VISIT HI MDM: CPT | Mod: 25

## 2022-01-27 PROCEDURE — 76604 US EXAM CHEST: CPT | Mod: 26

## 2022-01-27 RX ORDER — APIXABAN 2.5 MG/1
2.5 TABLET, FILM COATED ORAL
Refills: 0 | Status: DISCONTINUED | OUTPATIENT
Start: 2022-01-28 | End: 2022-01-31

## 2022-01-27 RX ORDER — BUMETANIDE 0.25 MG/ML
1 INJECTION INTRAMUSCULAR; INTRAVENOUS ONCE
Refills: 0 | Status: COMPLETED | OUTPATIENT
Start: 2022-01-27 | End: 2022-01-27

## 2022-01-27 RX ORDER — FUROSEMIDE 40 MG
20 TABLET ORAL ONCE
Refills: 0 | Status: COMPLETED | OUTPATIENT
Start: 2022-01-27 | End: 2022-01-27

## 2022-01-27 RX ORDER — FUROSEMIDE 40 MG
80 TABLET ORAL ONCE
Refills: 0 | Status: DISCONTINUED | OUTPATIENT
Start: 2022-01-27 | End: 2022-01-27

## 2022-01-27 RX ORDER — METOPROLOL TARTRATE 50 MG
25 TABLET ORAL
Qty: 0 | Refills: 0 | DISCHARGE

## 2022-01-27 RX ORDER — BUMETANIDE 0.25 MG/ML
2 INJECTION INTRAMUSCULAR; INTRAVENOUS
Refills: 0 | Status: DISCONTINUED | OUTPATIENT
Start: 2022-01-27 | End: 2022-01-31

## 2022-01-27 RX ORDER — INFLUENZA VIRUS VACCINE 15; 15; 15; 15 UG/.5ML; UG/.5ML; UG/.5ML; UG/.5ML
0.7 SUSPENSION INTRAMUSCULAR ONCE
Refills: 0 | Status: DISCONTINUED | OUTPATIENT
Start: 2022-01-27 | End: 2022-01-31

## 2022-01-27 RX ORDER — METOPROLOL TARTRATE 50 MG
12.5 TABLET ORAL
Refills: 0 | Status: DISCONTINUED | OUTPATIENT
Start: 2022-01-27 | End: 2022-01-28

## 2022-01-27 RX ORDER — ATORVASTATIN CALCIUM 80 MG/1
20 TABLET, FILM COATED ORAL AT BEDTIME
Refills: 0 | Status: DISCONTINUED | OUTPATIENT
Start: 2022-01-27 | End: 2022-02-04

## 2022-01-27 RX ORDER — DILTIAZEM HCL 120 MG
5 CAPSULE, EXT RELEASE 24 HR ORAL
Qty: 125 | Refills: 0 | Status: DISCONTINUED | OUTPATIENT
Start: 2022-01-27 | End: 2022-01-27

## 2022-01-27 RX ORDER — ASPIRIN/CALCIUM CARB/MAGNESIUM 324 MG
81 TABLET ORAL DAILY
Refills: 0 | Status: DISCONTINUED | OUTPATIENT
Start: 2022-01-27 | End: 2022-02-04

## 2022-01-27 RX ORDER — DILTIAZEM HCL 120 MG
15 CAPSULE, EXT RELEASE 24 HR ORAL
Qty: 125 | Refills: 0 | Status: DISCONTINUED | OUTPATIENT
Start: 2022-01-27 | End: 2022-01-28

## 2022-01-27 RX ORDER — APIXABAN 2.5 MG/1
2.5 TABLET, FILM COATED ORAL ONCE
Refills: 0 | Status: COMPLETED | OUTPATIENT
Start: 2022-01-27 | End: 2022-01-27

## 2022-01-27 RX ORDER — PANTOPRAZOLE SODIUM 20 MG/1
40 TABLET, DELAYED RELEASE ORAL
Refills: 0 | Status: DISCONTINUED | OUTPATIENT
Start: 2022-01-27 | End: 2022-02-04

## 2022-01-27 RX ORDER — METOPROLOL TARTRATE 50 MG
0 TABLET ORAL
Qty: 0 | Refills: 0 | DISCHARGE

## 2022-01-27 RX ORDER — DILTIAZEM HCL 120 MG
20 CAPSULE, EXT RELEASE 24 HR ORAL ONCE
Refills: 0 | Status: COMPLETED | OUTPATIENT
Start: 2022-01-27 | End: 2022-01-27

## 2022-01-27 RX ORDER — CHLORHEXIDINE GLUCONATE 213 G/1000ML
1 SOLUTION TOPICAL
Refills: 0 | Status: DISCONTINUED | OUTPATIENT
Start: 2022-01-27 | End: 2022-02-04

## 2022-01-27 RX ORDER — METOPROLOL TARTRATE 50 MG
25 TABLET ORAL
Refills: 0 | Status: DISCONTINUED | OUTPATIENT
Start: 2022-01-27 | End: 2022-01-28

## 2022-01-27 RX ORDER — ACETAMINOPHEN 500 MG
650 TABLET ORAL EVERY 6 HOURS
Refills: 0 | Status: DISCONTINUED | OUTPATIENT
Start: 2022-01-27 | End: 2022-02-04

## 2022-01-27 RX ADMIN — BUMETANIDE 2 MILLIGRAM(S): 0.25 INJECTION INTRAMUSCULAR; INTRAVENOUS at 23:18

## 2022-01-27 RX ADMIN — Medication 15 MG/HR: at 18:17

## 2022-01-27 RX ADMIN — APIXABAN 2.5 MILLIGRAM(S): 2.5 TABLET, FILM COATED ORAL at 21:22

## 2022-01-27 RX ADMIN — ATORVASTATIN CALCIUM 20 MILLIGRAM(S): 80 TABLET, FILM COATED ORAL at 23:19

## 2022-01-27 RX ADMIN — Medication 20 MILLIGRAM(S): at 13:14

## 2022-01-27 RX ADMIN — Medication 5 MG/HR: at 14:44

## 2022-01-27 RX ADMIN — Medication 20 MILLIGRAM(S): at 15:51

## 2022-01-27 RX ADMIN — BUMETANIDE 1 MILLIGRAM(S): 0.25 INJECTION INTRAMUSCULAR; INTRAVENOUS at 21:23

## 2022-01-27 NOTE — CONSULT NOTE ADULT - ASSESSMENT
Acute kidney injury / Cardiorenal syndrome   chronic kidney disease stage 3  prior normal renal sono (12/21) and subnephrotic proteinuria   acute on chronic systolic CHF  Afib with RVR / s/p AVR (procine) / CAD / CABG  DVT / PE on eliquis  recent severe COVID PNA  HTN         plan:    hold ARB (olmesartan)  bumex 2mg iv q12h  monitor UOP with urinal / avoid covarrubias  obtain UA, U protein / Cr - quantify proteinuria  trend renal function, lytes with iv diuresis   nl renal sono on 12/21, repeat if renal function worsens  check SPEP, SFLC, C3, C4, ANCA, AUDRA, PO4, PTH  eliquis renal dosed  may need alkali  therapy, will monitor HCO3   cardio eval / consider heart failure and EP eval  d/w wife, Dr. Murphy and Dr. Clancy

## 2022-01-27 NOTE — ED ADULT NURSE NOTE - NSICDXPASTMEDICALHX_GEN_ALL_CORE_FT
PAST MEDICAL HISTORY:  Chronic systolic congestive heart failure     Dyslipidemia     HTN (hypertension)

## 2022-01-27 NOTE — ED PROVIDER NOTE - ATTENDING CONTRIBUTION TO CARE
82 y/o male with hx of HTN, CAD s/p CABG x4 in 2006, CHF, s/p AVR 2011, CKD III, recent admission at [facility] from 12/12-12/20 for COVID Pneumonia s/p IV Dexamethasone and IV RDV, also found to have Acute Right Peroneal, Popliteal, and PT DVT and discharged on Eliquis presents to the ED from PMD office for new onset afib with RVR.  Noted with HR in the 150's and irregular.  Denies palpitations but reports a 2 week history of worsening shortness of breath on minimal exertion, worsening bilateral leg swelling, orthopnea, and PND.   Denies chest pain, palpitations, light headedness, nausea, or pre/syncope.  No fever, chills, night sweats, cough, rhinorrhea, sore throat, urinary symptoms, abdominal pain, headache, or vomiting. PE:  agree with above.  A/P:  New Onset Afib.  Rate control, already anticoagulated.  ADMIT TELE.

## 2022-01-27 NOTE — ED PROVIDER NOTE - CLINICAL SUMMARY MEDICAL DECISION MAKING FREE TEXT BOX
bpm -> confirmed afib with RVR on ECG -> s/p cardizem drip at 15/hour -> most recent .  Discussed with cardiology.  ADMIT TELE.

## 2022-01-27 NOTE — ED ADULT NURSE NOTE - CHIEF COMPLAINT QUOTE
BIBA- c/o SOB, weakness x 2 weeks. Denies any chest pain. As per EMS, pt was in Rapid AFIB upon arrival to scene, was given Cardizem 13 mg IV. Pt currently in AFIB RVR in triage. [facility]

## 2022-01-27 NOTE — ED ADULT NURSE NOTE - OBJECTIVE STATEMENT
82 y/o male brought in from [facility] for rapid AFIB and lower extremity edema.

## 2022-01-27 NOTE — H&P ADULT - ASSESSMENT
Assessment and Plan  Case of an 81 year old male patient with history of CAD s/p CABG, CHF, s/p AVR, HTN, CKD III, and recently diagnosed right LE DVT who was referred to the ED by Dr Murphy for new onset afib with RVR in setting of volume overload, found to be tachycardic in afib with RVR on presentation s/p cardizem drip, found to have elevated pro BNP and evidence of NICA on labs and congested lungs on imaging s/p IV diuretics, to be admitted to  for telemetry monitoring and further investigations and management of acute on chronic CHF exacerbation in setting of new onset afib with RVR. Currently still tachycardic  bpm.      Acute on Chronic CHF Exacerbation  s/p AVR 2011  * No TTE in chart  * Cardiologist Dr Sebastian Mendoza  * Home med PO Lasix 40mg QD   * Pro BNP 8852 01/27  * PE leg swelling and diffuse crackles  * CXR 01/27 diffuse bilateral interstitial opacities  * S/P IV Lasix 20mg x1 and IV Bumex 1mg x1 doses in ED    - Cardiology Team on board on 3C  - Monitor accurate in/out  - Daily weight (standing)  - Monitor SaO2 and O2 requirements: Currently SAO2 98% on RA  - Start IV Bumex 1mg BID. S/P IV Lasix 20mg x1 and IV Bumex 1mg x1 doses in ED  - Salt restricted diet and Fluid restriction to 1.2L per 24 hours  - Monitor telemetry   - Trend electrolytes and keep K>4 and Mg>2  - Check TTE  - Check Iron Studies  - VA duplex LE       New Onset Atrial Fibrillation with RVR  In Setting of Acute CHF Decompensation  * Referred from Dr Murphy's office for 's bpm and ECG showing afib with RVR  * ED Vitals 111/71mmHg and  bpm  * ED ECG afib with RVR  * S/P ELiquis 2.5mg x1 dose  * S/P Initiation of Cardizem drip at a rate of 15/hour with improvement in HR to 115 bpm    - Cardiology Team on board on 3C  - Monitor HR  - Monitor telemetry  - Check TSH  - Keep K>4 and Mg>2  - For AC: resume home eliquis (ordered 2.5mg BID; takes 5mg in AM and 2.5mg in PM at home) originally prescribed for DVT  - For rate control: continue Cardizem drip at rate 15 for now. Resume home dose of lopressor 25mg in AM and 12.5mg in PM (originally prescribed for HTN)      Acute Kidney Injury on CKD III  Likely Cardiorenal in Setting of CHF Decompensation  BUN:CR almost 20:1 but Less Likely Pre-Renal  * Baseline Cr 1.3  * ED BUN 46, Cr 2.4  * S/P IV diuretics as above    - Trend BUN and Cr daily  - Start IV diuretics as above for CHF decompensation  - Check urine studies  - Avoid nephrotoxic agents  - Hold Olmesartan 20mg QD for now      Elevated Troponin in Setting of History of CAD s/p CABG  Possibly in Setting of NICA  * Hx CAD s/p CABG x4 in 2006  * Home Lopressor 25mg AM and 12.5mg PM, Aspirin 81mg QD, Rosuvastatin 5mg QD    * Cardiologist Dr Sebastian Mendoza  * No active chest pain  * Troponin 0.06  * ECG noted with afib with RVR    - Cardiology Team on board on 3C  - Trend CE  - Check lipid profile  - Repeat ECG if patient complains of chest pain  - Resume Lopressor 25mg AM and 12.5mg PM, Aspirin 81mg QD, and Statin       Recent Right Peroneal, Popliteal, and Posterior Tibial DVT  * Recently found to have Acute Right Peroneal, Popliteal, and PT DVT during recent admission on 12/12  * VA duplex LE Venous from 12/12 noted  * Discharged on Eliquis -> Most recent home dose per wife is 5mg in AM and 2.5mg in PM (not 5mg BID) due to nose bleed  - Resume AC as ordered   - Repeat VA duplex LE  - PT/OT       HTN  * Home meds Lopressor as above, Olmesartan 20mg QD  * ED /71 mmHg  - Monitor BP closely  - Resume Lopressor 25mg in AM and 12.5mg in PM  - Hold home Olmesartan 20mg QD in setting of NIAC      Recent admission from 12/12-12/20 for COVID Pneumonia   * s/p IV Dexamethasone and IV RDV  * COVID 01/27 negative  - Resolved      Others  - DVT Prophylaxis: as detailed above  - GI Prophylaxis: Pantoprazole 40mg PO QD  - Diet: DASH/ TLC  - Code Status:  - Med rec confirmed with wife over phone      Barriers to learning: NO  Discharge Planning: Patient will be discharged once stable   Plan was communicated with patient, wife over phone, and medical team      Leeann Vale MD  PGY - 1 Internal Medicine   [facility]   Assessment and Plan  Case of an 81 year old male patient with history of CAD s/p CABG, CHF, s/p AVR, HTN, CKD III, and recently diagnosed right LE DVT who was referred to the ED by Dr Murphy for new onset afib with RVR in setting of volume overload, found to be tachycardic in afib with RVR on presentation s/p cardizem drip, found to have elevated pro BNP and evidence of NICA on labs and congested lungs on imaging s/p IV diuretics, to be admitted to  for telemetry monitoring and further investigations and management of acute on chronic CHF exacerbation in setting of new onset afib with RVR. Currently still tachycardic  bpm.      Acute on Chronic CHF Exacerbation  s/p AVR 2011  * No TTE in chart  * Cardiologist Dr Sebastian Mendoza  * Home med PO Lasix 40mg QD   * Pro BNP 8852 01/27  * PE leg swelling and diffuse crackles  * CXR 01/27 diffuse bilateral interstitial opacities  * S/P IV Lasix 20mg x1 and IV Bumex 1mg x1 doses in ED    - Cardiology Team on board on 3C  - Monitor accurate in/out  - Daily weight (standing)  - Monitor SaO2 and O2 requirements: Currently SAO2 98% on RA  - Start IV Bumex 1mg BID. S/P IV Lasix 20mg x1 and IV Bumex 1mg x1 doses in ED  - Salt restricted diet and Fluid restriction to 1.2L per 24 hours  - Monitor telemetry   - Trend electrolytes and keep K>4 and Mg>2  - Check TTE  - Check Iron Studies  - VA duplex LE       New Onset Atrial Fibrillation with RVR  In Setting of Acute CHF Decompensation  * Referred from Dr Murphy's office for 's bpm and ECG showing afib with RVR  * ED Vitals 111/71mmHg and  bpm  * ED ECG afib with RVR  * S/P ELiquis 2.5mg x1 dose  * S/P Initiation of Cardizem drip at a rate of 15/hour with improvement in HR to 115 bpm    - Cardiology Team on board on 3C  - Monitor HR  - Monitor telemetry  - Check TSH  - Keep K>4 and Mg>2  - For AC: resume home eliquis (ordered 2.5mg BID; takes 5mg in AM and 2.5mg in PM at home) originally prescribed for DVT  - For rate control: continue Cardizem drip at rate 15 for now. Resume home dose of lopressor 25mg in AM and 12.5mg in PM (originally prescribed for HTN)      Acute Kidney Injury on CKD III  Likely Cardiorenal in Setting of CHF Decompensation  BUN:CR almost 20:1 but Less Likely Pre-Renal  * Baseline Cr 1.3  * ED BUN 46, Cr 2.4  * S/P IV diuretics as above    - Trend BUN and Cr daily  - Start IV diuretics as above for CHF decompensation  - Check urine studies  - Avoid nephrotoxic agents  - Hold Olmesartan 20mg QD for now      Elevated Troponin in Setting of History of CAD s/p CABG  Possibly in Setting of NICA  * Hx CAD s/p CABG x4 in 2006  * Home Lopressor 25mg AM and 12.5mg PM, Aspirin 81mg QD, Rosuvastatin 5mg QD    * Cardiologist Dr Sebastian Mendoza  * No active chest pain  * Troponin 0.06  * ECG noted with afib with RVR    - Cardiology Team on board on 3C  - Trend CE  - Check lipid profile  - Repeat ECG if patient complains of chest pain  - Resume Lopressor 25mg AM and 12.5mg PM, Aspirin 81mg QD, and Statin       Recent Right Peroneal, Popliteal, and Posterior Tibial DVT  * Recently found to have Acute Right Peroneal, Popliteal, and PT DVT during recent admission on 12/12  * VA duplex LE Venous from 12/12 noted  * Discharged on Eliquis -> Most recent home dose per wife is 5mg in AM and 2.5mg in PM (not 5mg BID) due to nose bleed  - Resume AC as ordered   - Repeat VA duplex LE  - PT/OT       HTN  * Home meds Lopressor as above, Olmesartan 20mg QD  * ED /71 mmHg  - Monitor BP closely  - Resume Lopressor 25mg in AM and 12.5mg in PM  - Hold home Olmesartan 20mg QD in setting of NICA      Recent admission from 12/12-12/20 for COVID Pneumonia   * s/p IV Dexamethasone and IV RDV  * COVID 01/27 negative  - Resolved      Others  - DVT Prophylaxis: as detailed above  - GI Prophylaxis: Pantoprazole 40mg PO QD  - Diet: DASH/ TLC  - Code Status: DNR DNI   - Med rec confirmed with wife over phone      Barriers to learning: NO  Discharge Planning: Patient will be discharged once stable   Plan was communicated with patient, wife over phone, and medical team      Leeann Vale MD  PGY - 1 Internal Medicine   [facility]

## 2022-01-27 NOTE — ED ADULT NURSE NOTE - NSIMPLEMENTINTERV_GEN_ALL_ED
Implemented All Fall with Harm Risk Interventions:  [place] to call system. Call bell, personal items and telephone within reach. Instruct patient to call for assistance. Room bathroom lighting operational. Non-slip footwear when patient is off stretcher. Physically safe environment: no spills, clutter or unnecessary equipment. Stretcher in lowest position, wheels locked, appropriate side rails in place. Provide visual cue, wrist band, yellow gown, etc. Monitor gait and stability. Monitor for mental status changes and reorient to person, place, and time. Review medications for side effects contributing to fall risk. Reinforce activity limits and safety measures with patient and family. Provide visual clues: red socks.

## 2022-01-27 NOTE — H&P ADULT - NSHPPHYSICALEXAM_GEN_ALL_CORE
- Physical Exam in ED  * General Appearance: Alert, cooperative, interactive, oriented to time, place, and person, in no acute distress  * Head: Normocephalic, without obvious abnormality, atraumatic  * Eyes: PERRL, conjunctiva/corneas clear, EOM's intact, fundi benign, both eyes  * Neck: Supple, symmetrical, trachea midline, no adenopathy;   * Thyroid:  No enlargement/tenderness/nodules; no carotid bruit or JVD  * Lungs: Respirations unlabored, Good bilateral air entry, audible crackles bilaterally, no audible wheezes or rhonchi  * Heart: Regular Rate and Rhythm, normal S1 and S2, no audible murmur, rub, or gallop  * Abdomen: Symmetric, non-distended, no scar, soft, non-tender, bowel sounds active all four quadrants, no masses, no organomegaly (no hepatosplenomegaly)  * Extremities: Extremities normal, atraumatic, no cyanosis, +3 lower extremity pitting edema bilaterally, adequate dorsalis pedis pulses  * Pulses: 2+ and symmetric all extremities  * Skin: Skin color, texture, turgor normal, no rashes or lesions  * Lymph nodes: Cervical, supraclavicular, and axillary nodes normal  * Neurologic: CNII-XII intact, normal strength, sensation and reflexes  throughout

## 2022-01-27 NOTE — ED ADULT NURSE REASSESSMENT NOTE - NS ED NURSE REASSESS COMMENT FT1
Educated patient on medical treatment plan and medications. patient verbalized understanding on topics covered during teaching.

## 2022-01-27 NOTE — ED PROVIDER NOTE - PHYSICAL EXAMINATION
CONSTITUTIONAL: well-appearing, in NAD  SKIN: Warm dry, normal skin turgor  HEAD: NCAT  EYES: EOMI, PERRLA, no scleral icterus, conjunctiva pink  ENT: normal pharynx with no erythema or exudates  NECK: Supple; non tender. Full ROM.  CARD: tachycardic, irregular rhythm, no murmurs.  RESP: clear to ausculation b/l. No crackles or wheezing.  ABD: soft, non-tender, non-distended, no rebound or guarding.  EXT: Full ROM, no bony tenderness, no pedal edema, no calf tenderness  NEURO: normal motor. normal sensory. CN II-XII intact. Cerebellar testing normal. Normal gait.  PSYCH: Cooperative, appropriate.

## 2022-01-27 NOTE — PATIENT PROFILE ADULT - FALL HARM RISK - HARM RISK INTERVENTIONS

## 2022-01-27 NOTE — ED ADULT TRIAGE NOTE - CHIEF COMPLAINT QUOTE
BIBA- c/o SOB, weakness x 2 weeks. Denies any chest pain. As per EMS, pt was in Rapid AFIB upon arrival, was given Cardizem 13 mg IV. BIBA- c/o SOB, weakness x 2 weeks. Denies any chest pain. As per EMS, pt was in Rapid AFIB upon arrival to scene, was given Cardizem 13 mg IV. Pt currently in AFIB RVR in triage. Moved to [facility].

## 2022-01-27 NOTE — ED PROVIDER NOTE - CARE PLAN
1 Principal Discharge DX:	Atrial fibrillation with rapid ventricular response  Secondary Diagnosis:	NICA (acute kidney injury)  Secondary Diagnosis:	Elevated troponin  Secondary Diagnosis:	Elevated brain natriuretic peptide (BNP) level

## 2022-01-27 NOTE — ED PROVIDER NOTE - OBJECTIVE STATEMENT
82 yo M with PMH of HTN, HLD, CAD s/p CABG x 4 on ASA, Aortic Valve replacement (2011) on eliquis, recent covid infection Dec BIBEMS for new onset afib. Patient was at his PCPs office when he had EKG showing afib RVR. Patient endorses progressively worsening b/l LE edema over the past two weeks. Denies fever, cold/flu symptoms, headache, vision changes, dizziness, n/w/t, CP, SOB, NVD, dysuria, hematuria, melena, hematochezia.

## 2022-01-27 NOTE — H&P ADULT - DOES THIS PATIENT HAVE A HISTORY OF OR HAS BEEN DX WITH HEART FAILURE?
yes
no nausea/no vomiting/no diarrhea/no constipation/no change in bowel habits/no abdominal pain/no melena/no hematochezia

## 2022-01-27 NOTE — H&P ADULT - HISTORY OF PRESENT ILLNESS
History of Present Illness    Mr. Silva is an 81 year old male patient known to have:  - Baseline AO3, comes from home, lives with wife  - CAD s/p CABG x4 in 2006. Cardiologist Dr Sebastian Mendoza  - CHF (no TTE in chart) and s/p AVR 2011. Cardiologist Dr Sebastian Mendoza. Home med PO Lasix 40mg QD per wife  - CKD III (Baseline Cr 1.3)  - Recent admission from 12/12-12/20 for COVID Pneumonia s/p IV Dexamethasone and IV RDV  - Recently found to have Acute Right Peroneal, Popliteal, and PT DVT during recent admission on 12/12. Discharged on Eliquis. Most recent home dose per wife is 5mg in AM and 2.5mg in PM (not 5mg BID) due to nose bleed  - HTN      He presented to the ED on 01/27 after being referred by Dr Murphy for new onset afib with RVR.  History goes back to 01/27 when the patient was following up with Dr Murphy.  During the visit, patient was found to be volume overloaded.  His HR was in 150bpm and an ECG performed revealed new onset Afib with RVR.  As a result, patient was referred by Dr Murphy to ED for evaluation.  On history taking, patient reports a 2 week history of worsening shortness of breath on minimal exertion (even walking few steps make him feel out of breath).   This has been associated with worsening bilateral leg swelling, orthopnea (sleeps in a seated position), and PNDs in the absence of any chest pain, palpitations, light headedness, nausea, or pre/syncope.      On review of systems, patient denies any recent fever, chills, night sweats, URTI symptoms (cough, rhinorrhea, sore throat), urinary symptoms (urinary frequency, urgency, intermittence, dysuria, foul smelling urine, cloudy urine), change in bowel movements (diarrhea or constipation), abdominal pain, headache, or vomiting.   No sick contacts.  No recent travel or exposure to recent travelers.      Upon presentation to the ED, the patient was tachycardic in afib with RVR as below  Vital Signs in ED   - /71 mmHg  -  bpm -> confirmed afib with RVR on ECG -> s/p cardizem drip at 15/hour -> most recent  during my evaluation  - RR 18 bpm  - T 35.6  - SaO2 97% on RA      Investigations   Laboratory Workup  - CBC:                        12.1   7.14  )-----------( 147      ( 27 Jan 2022 13:01 )             37.1     - Chemistry:  01-27    141  |  105  |  46<H>  ----------------------------<  120<H>  3.9   |  18  |  2.4<H>    Ca    9.6      27 Jan 2022 13:01  Mg     2.1     01-27    TPro  5.7<L>  /  Alb  3.6  /  TBili  0.6  /  DBili  x   /  AST  28  /  ALT  47<H>  /  AlkPhos  45  01-27      - Cardiac Markers:  CARDIAC MARKERS ( 27 Jan 2022 13:01 )  x     / 0.06 ng/mL / x     / x     / x          Microbiological Workup  * COVID negative      Radiological Workup  * CXR 01/27 bilateral opacities      - Patient was given IV Lasix 20mg x1 and IV Bumex 1mg x1 doses for volume overload in ED  - He was also given Eliquis 2.5mg x1 and started on IV Cardizem drip at 15/hour for afib with RVR  - He will be admitted to 3C for further monitoring, investigations, and management of acute on chronic CHF exacerbation in setting of new onset afib with RVR

## 2022-01-27 NOTE — H&P ADULT - ATTENDING COMMENTS
Mr. Silva is an 81yoM with CAD sp CABG, bioprosthetic AVR (2011), HTN, CKD (baseline Cr 1.3), and recent hospitalization in 12/2021 for COVID-19 infection c/b RLE DVT and PE for which patient required 6L nc and has since been weaned off who presented with dyspnea, orthopnea, PND, and edema, found to be in Afib with RVR.     Patient was seen by his PCP Dr. Murphy, and reported has an echo and ECG and was sent to the hospital. Start on diltiazem gtt by the ED, remains tachycardic.     Plan:   - Start Bumex 1 mg BID  - Continue diltiazem gtt  - TTE tomorrow  - Check A1c, lipid panel, TSH

## 2022-01-28 LAB
A1C WITH ESTIMATED AVERAGE GLUCOSE RESULT: 5.8 % — HIGH (ref 4–5.6)
ALBUMIN SERPL ELPH-MCNC: 3.6 G/DL — SIGNIFICANT CHANGE UP (ref 3.5–5.2)
ALBUMIN SERPL ELPH-MCNC: 3.6 G/DL — SIGNIFICANT CHANGE UP (ref 3.5–5.2)
ALP SERPL-CCNC: 51 U/L — SIGNIFICANT CHANGE UP (ref 30–115)
ALP SERPL-CCNC: 51 U/L — SIGNIFICANT CHANGE UP (ref 30–115)
ALT FLD-CCNC: 34 U/L — SIGNIFICANT CHANGE UP (ref 0–41)
ALT FLD-CCNC: 35 U/L — SIGNIFICANT CHANGE UP (ref 0–41)
ANION GAP SERPL CALC-SCNC: 17 MMOL/L — HIGH (ref 7–14)
ANION GAP SERPL CALC-SCNC: 18 MMOL/L — HIGH (ref 7–14)
ANION GAP SERPL CALC-SCNC: 20 MMOL/L — HIGH (ref 7–14)
APPEARANCE UR: CLEAR — SIGNIFICANT CHANGE UP
AST SERPL-CCNC: 15 U/L — SIGNIFICANT CHANGE UP (ref 0–41)
AST SERPL-CCNC: 16 U/L — SIGNIFICANT CHANGE UP (ref 0–41)
BACTERIA # UR AUTO: NEGATIVE — SIGNIFICANT CHANGE UP
BILIRUB SERPL-MCNC: 0.5 MG/DL — SIGNIFICANT CHANGE UP (ref 0.2–1.2)
BILIRUB SERPL-MCNC: 0.6 MG/DL — SIGNIFICANT CHANGE UP (ref 0.2–1.2)
BILIRUB UR-MCNC: NEGATIVE — SIGNIFICANT CHANGE UP
BUN SERPL-MCNC: 43 MG/DL — HIGH (ref 10–20)
BUN SERPL-MCNC: 44 MG/DL — HIGH (ref 10–20)
BUN SERPL-MCNC: 44 MG/DL — HIGH (ref 10–20)
CALCIUM SERPL-MCNC: 9.2 MG/DL — SIGNIFICANT CHANGE UP (ref 8.5–10.1)
CALCIUM SERPL-MCNC: 9.4 MG/DL — SIGNIFICANT CHANGE UP (ref 8.5–10.1)
CALCIUM SERPL-MCNC: 9.4 MG/DL — SIGNIFICANT CHANGE UP (ref 8.5–10.1)
CHLORIDE SERPL-SCNC: 101 MMOL/L — SIGNIFICANT CHANGE UP (ref 98–110)
CHLORIDE SERPL-SCNC: 104 MMOL/L — SIGNIFICANT CHANGE UP (ref 98–110)
CHLORIDE SERPL-SCNC: 106 MMOL/L — SIGNIFICANT CHANGE UP (ref 98–110)
CHLORIDE UR-SCNC: 107 — SIGNIFICANT CHANGE UP
CHOLEST SERPL-MCNC: 140 MG/DL — SIGNIFICANT CHANGE UP
CK MB CFR SERPL CALC: 2.5 NG/ML — SIGNIFICANT CHANGE UP (ref 0.6–6.3)
CK SERPL-CCNC: 30 U/L — SIGNIFICANT CHANGE UP (ref 0–225)
CO2 SERPL-SCNC: 18 MMOL/L — SIGNIFICANT CHANGE UP (ref 17–32)
CO2 SERPL-SCNC: 19 MMOL/L — SIGNIFICANT CHANGE UP (ref 17–32)
CO2 SERPL-SCNC: 19 MMOL/L — SIGNIFICANT CHANGE UP (ref 17–32)
COLOR SPEC: COLORLESS — SIGNIFICANT CHANGE UP
CREAT ?TM UR-MCNC: 23 MG/DL — SIGNIFICANT CHANGE UP
CREAT ?TM UR-MCNC: 25 MG/DL — SIGNIFICANT CHANGE UP
CREAT SERPL-MCNC: 2.1 MG/DL — HIGH (ref 0.7–1.5)
CREAT SERPL-MCNC: 2.2 MG/DL — HIGH (ref 0.7–1.5)
CREAT SERPL-MCNC: 2.3 MG/DL — HIGH (ref 0.7–1.5)
D DIMER BLD IA.RAPID-MCNC: 165 NG/ML DDU — SIGNIFICANT CHANGE UP (ref 0–230)
DIFF PNL FLD: ABNORMAL
EPI CELLS # UR: 0 /HPF — SIGNIFICANT CHANGE UP (ref 0–5)
ESTIMATED AVERAGE GLUCOSE: 120 MG/DL — HIGH (ref 68–114)
GLUCOSE SERPL-MCNC: 107 MG/DL — HIGH (ref 70–99)
GLUCOSE SERPL-MCNC: 113 MG/DL — HIGH (ref 70–99)
GLUCOSE SERPL-MCNC: 133 MG/DL — HIGH (ref 70–99)
GLUCOSE UR QL: NEGATIVE — SIGNIFICANT CHANGE UP
HCT VFR BLD CALC: 37.1 % — LOW (ref 42–52)
HDLC SERPL-MCNC: 37 MG/DL — LOW
HGB BLD-MCNC: 12 G/DL — LOW (ref 14–18)
HYALINE CASTS # UR AUTO: 2 /LPF — SIGNIFICANT CHANGE UP (ref 0–7)
IRON SATN MFR SERPL: 15 % — SIGNIFICANT CHANGE UP (ref 15–50)
IRON SATN MFR SERPL: 34 UG/DL — LOW (ref 35–150)
KETONES UR-MCNC: NEGATIVE — SIGNIFICANT CHANGE UP
LACTATE SERPL-SCNC: 1.8 MMOL/L — SIGNIFICANT CHANGE UP (ref 0.7–2)
LEUKOCYTE ESTERASE UR-ACNC: NEGATIVE — SIGNIFICANT CHANGE UP
LIPID PNL WITH DIRECT LDL SERPL: 90 MG/DL — SIGNIFICANT CHANGE UP
MAGNESIUM SERPL-MCNC: 2 MG/DL — SIGNIFICANT CHANGE UP (ref 1.8–2.4)
MCHC RBC-ENTMCNC: 30.8 PG — SIGNIFICANT CHANGE UP (ref 27–31)
MCHC RBC-ENTMCNC: 32.3 G/DL — SIGNIFICANT CHANGE UP (ref 32–37)
MCV RBC AUTO: 95.1 FL — HIGH (ref 80–94)
NITRITE UR-MCNC: NEGATIVE — SIGNIFICANT CHANGE UP
NON HDL CHOLESTEROL: 103 MG/DL — SIGNIFICANT CHANGE UP
NRBC # BLD: 0 /100 WBCS — SIGNIFICANT CHANGE UP (ref 0–0)
PH UR: 6 — SIGNIFICANT CHANGE UP (ref 5–8)
PHOSPHATE SERPL-MCNC: 4.4 MG/DL — SIGNIFICANT CHANGE UP (ref 2.1–4.9)
PLATELET # BLD AUTO: 157 K/UL — SIGNIFICANT CHANGE UP (ref 130–400)
POTASSIUM SERPL-MCNC: 3.3 MMOL/L — LOW (ref 3.5–5)
POTASSIUM SERPL-MCNC: 3.6 MMOL/L — SIGNIFICANT CHANGE UP (ref 3.5–5)
POTASSIUM SERPL-MCNC: 4 MMOL/L — SIGNIFICANT CHANGE UP (ref 3.5–5)
POTASSIUM SERPL-SCNC: 3.3 MMOL/L — LOW (ref 3.5–5)
POTASSIUM SERPL-SCNC: 3.6 MMOL/L — SIGNIFICANT CHANGE UP (ref 3.5–5)
POTASSIUM SERPL-SCNC: 4 MMOL/L — SIGNIFICANT CHANGE UP (ref 3.5–5)
PROT ?TM UR-MCNC: <5 MG/DLG/24H — SIGNIFICANT CHANGE UP
PROT ?TM UR-MCNC: <5 MG/DLG/24H — SIGNIFICANT CHANGE UP
PROT SERPL-MCNC: 5.2 G/DL — LOW (ref 6–8)
PROT SERPL-MCNC: 5.5 G/DL — LOW (ref 6–8)
PROT UR-MCNC: NEGATIVE — SIGNIFICANT CHANGE UP
PROT/CREAT UR-RTO: <0.2 RATIO — SIGNIFICANT CHANGE UP (ref 0–0.2)
PROT/CREAT UR-RTO: <0.2 RATIO — SIGNIFICANT CHANGE UP (ref 0–0.2)
RBC # BLD: 3.9 M/UL — LOW (ref 4.7–6.1)
RBC # FLD: 17.4 % — HIGH (ref 11.5–14.5)
RBC CASTS # UR COMP ASSIST: 17 /HPF — HIGH (ref 0–4)
SODIUM SERPL-SCNC: 138 MMOL/L — SIGNIFICANT CHANGE UP (ref 135–146)
SODIUM SERPL-SCNC: 140 MMOL/L — SIGNIFICANT CHANGE UP (ref 135–146)
SODIUM SERPL-SCNC: 144 MMOL/L — SIGNIFICANT CHANGE UP (ref 135–146)
SODIUM UR-SCNC: 103 MMOL/L — SIGNIFICANT CHANGE UP
SP GR SPEC: 1.01 — SIGNIFICANT CHANGE UP (ref 1.01–1.03)
TIBC SERPL-MCNC: 229 UG/DL — SIGNIFICANT CHANGE UP (ref 220–430)
TRIGL SERPL-MCNC: 102 MG/DL — SIGNIFICANT CHANGE UP
TROPONIN T SERPL-MCNC: 0.07 NG/ML — CRITICAL HIGH
TSH SERPL-MCNC: 1.56 UIU/ML — SIGNIFICANT CHANGE UP (ref 0.27–4.2)
UIBC SERPL-MCNC: 195 UG/DL — SIGNIFICANT CHANGE UP (ref 110–370)
UROBILINOGEN FLD QL: SIGNIFICANT CHANGE UP
UUN UR-MCNC: 210 MG/DL — SIGNIFICANT CHANGE UP
WBC # BLD: 8.38 K/UL — SIGNIFICANT CHANGE UP (ref 4.8–10.8)
WBC # FLD AUTO: 8.38 K/UL — SIGNIFICANT CHANGE UP (ref 4.8–10.8)
WBC UR QL: 1 /HPF — SIGNIFICANT CHANGE UP (ref 0–5)

## 2022-01-28 PROCEDURE — 99222 1ST HOSP IP/OBS MODERATE 55: CPT

## 2022-01-28 PROCEDURE — 93320 DOPPLER ECHO COMPLETE: CPT | Mod: 26

## 2022-01-28 PROCEDURE — 93312 ECHO TRANSESOPHAGEAL: CPT | Mod: 26

## 2022-01-28 PROCEDURE — 93325 DOPPLER ECHO COLOR FLOW MAPG: CPT | Mod: 26

## 2022-01-28 PROCEDURE — 93010 ELECTROCARDIOGRAM REPORT: CPT

## 2022-01-28 RX ORDER — DIGOXIN 250 MCG
125 TABLET ORAL EVERY 6 HOURS
Refills: 0 | Status: COMPLETED | OUTPATIENT
Start: 2022-01-28 | End: 2022-01-29

## 2022-01-28 RX ORDER — POTASSIUM CHLORIDE 20 MEQ
40 PACKET (EA) ORAL ONCE
Refills: 0 | Status: COMPLETED | OUTPATIENT
Start: 2022-01-28 | End: 2022-01-28

## 2022-01-28 RX ORDER — TAMSULOSIN HYDROCHLORIDE 0.4 MG/1
0.4 CAPSULE ORAL AT BEDTIME
Refills: 0 | Status: DISCONTINUED | OUTPATIENT
Start: 2022-01-28 | End: 2022-02-04

## 2022-01-28 RX ADMIN — APIXABAN 2.5 MILLIGRAM(S): 2.5 TABLET, FILM COATED ORAL at 06:04

## 2022-01-28 RX ADMIN — Medication 25 MILLIGRAM(S): at 06:03

## 2022-01-28 RX ADMIN — Medication 125 MICROGRAM(S): at 20:19

## 2022-01-28 RX ADMIN — TAMSULOSIN HYDROCHLORIDE 0.4 MILLIGRAM(S): 0.4 CAPSULE ORAL at 21:40

## 2022-01-28 RX ADMIN — BUMETANIDE 2 MILLIGRAM(S): 0.25 INJECTION INTRAMUSCULAR; INTRAVENOUS at 06:02

## 2022-01-28 RX ADMIN — APIXABAN 2.5 MILLIGRAM(S): 2.5 TABLET, FILM COATED ORAL at 18:58

## 2022-01-28 RX ADMIN — Medication 650 MILLIGRAM(S): at 00:40

## 2022-01-28 RX ADMIN — Medication 40 MILLIEQUIVALENT(S): at 19:02

## 2022-01-28 RX ADMIN — BUMETANIDE 2 MILLIGRAM(S): 0.25 INJECTION INTRAMUSCULAR; INTRAVENOUS at 19:19

## 2022-01-28 RX ADMIN — ATORVASTATIN CALCIUM 20 MILLIGRAM(S): 80 TABLET, FILM COATED ORAL at 21:42

## 2022-01-28 RX ADMIN — Medication 81 MILLIGRAM(S): at 12:44

## 2022-01-28 RX ADMIN — Medication 650 MILLIGRAM(S): at 00:10

## 2022-01-28 NOTE — PROGRESS NOTE ADULT - ASSESSMENT
Acute kidney injury / Cardiorenal syndrome   chronic kidney disease stage 3  prior normal renal sono (12/21)  no proteinuria  acute on chronic systolic CHF  Afib with RVR / s/p AVR (procine) / CAD / CABG  DVT / PE on eliquis  recent severe COVID PNA  HTN         plan:      cont bumex 2mg iv q12h  metolazone 5mg po qd  KCl 40meq po x 1  off ARB  monitor UOP / keep negative balance as BP allows  trend renal function, lytes with iv diuresis   full code  d/w team

## 2022-01-28 NOTE — PROGRESS NOTE ADULT - SUBJECTIVE AND OBJECTIVE BOX
NEPHROLOGY FOLLOW UP NOTE    pt seen and examined  covarrubias placed  bp's on low side  on iv bumex  small diuresis  still edematous     PAST MEDICAL & SURGICAL HISTORY:  Chronic systolic congestive heart failure    HTN (hypertension)    Dyslipidemia      Allergies:  No Known Allergies    Home Medications Reviewed    SOCIAL HISTORY:  Denies ETOH,Smoking, drugs    FAMILY HISTORY:  no renal disease        REVIEW OF SYSTEMS:  CONSTITUTIONAL: No weakness, fevers or chills  EYES/ENT: No visual changes;  No vertigo or throat pain   NECK: No pain or stiffness  RESPIRATORY: No cough, wheezing, hemoptysis; + shortness of breath  CARDIOVASCULAR: No chest pain or palpitations.  GASTROINTESTINAL: No abdominal or epigastric pain. No nausea, vomiting, or hematemesis; No diarrhea or constipation. No melena or hematochezia.  GENITOURINARY: No dysuria, frequency, foamy urine, urinary urgency, incontinence or hematuria  NEUROLOGICAL: No numbness or weakness  SKIN: No itching, burning, rashes, or lesions   VASCULAR: + bilateral lower extremity edema.   All other review of systems is negative unless indicated above.    PHYSICAL EXAM:  Constitutional: NAD  HEENT: anicteric sclera, oropharynx clear, MMM  Neck: + JVD  Respiratory: decreased BS b/l with bibasilar crackles  Cardiovascular: S1, S2, irreg, tachy  Gastrointestinal: BS+, soft, NT/ND  Extremities: No cyanosis or clubbing. 2-3+ peripheral edema  Neurological: A/O x 3, no focal deficits  Psychiatric: Normal mood, normal affect  : No CVA tenderness. + covarrubias.   Skin: No rashes    advance care planning and directives reviewed     Hospital Medications:   MEDICATIONS  (STANDING):  apixaban 2.5 milliGRAM(s) Oral two times a day  aspirin  chewable 81 milliGRAM(s) Oral daily  atorvastatin 20 milliGRAM(s) Oral at bedtime  buMETAnide Injectable 2 milliGRAM(s) IV Push two times a day  chlorhexidine 4% Liquid 1 Application(s) Topical <User Schedule>  diltiazem Infusion 15 mG/Hr (15 mL/Hr) IV Continuous <Continuous>  influenza  Vaccine (HIGH DOSE) 0.7 milliLiter(s) IntraMuscular once  metolazone 5 milliGRAM(s) Oral once  pantoprazole    Tablet 40 milliGRAM(s) Oral before breakfast  potassium chloride   Powder 40 milliEquivalent(s) Oral once  tamsulosin 0.4 milliGRAM(s) Oral at bedtime        VITALS:  T(F): 95.6 (22 @ 12:00), Max: 97.1 (22 @ 22:53)  HR: 129 (22 @ 15:00)  BP: 110/60 (22 @ 15:00)  RR: 16 (22 @ 15:00)  SpO2: 97% (22 @ 15:00)  Wt(kg): --     @ 07: @ 07:00  --------------------------------------------------------  IN: 120 mL / OUT: 800 mL / NET: -680 mL     @ 07: @ 16:49  --------------------------------------------------------  IN: 365 mL / OUT: 500 mL / NET: -135 mL          LABS:      144  |  106  |  44<H>  ----------------------------<  133<H>  3.6   |  18  |  2.1<H>    Ca    9.4      2022 04:30  Phos  4.4       Mg     2.0         TPro  5.7<L>  /  Alb  3.6  /  TBili  0.6  /  DBili      /  AST    /  ALT  47<H>  /  AlkPhos  45                            12.0   8.38  )-----------( 157      ( 2022 04:30 )             37.1       Urine Studies:  Urinalysis Basic - ( 2022 01:00 )    Color: Colorless / Appearance: Clear / S.009 / pH:   Gluc:  / Ketone: Negative  / Bili: Negative / Urobili: <2 mg/dL   Blood:  / Protein: Negative / Nitrite: Negative   Leuk Esterase: Negative / RBC: 17 /HPF / WBC 1 /HPF   Sq Epi:  / Non Sq Epi: 0 /HPF / Bacteria: Negative      Sodium, Random Urine: 103.0 mmoL/L ( @ 01:00)  Chloride, Random Urine: 107 ( @ :00)  Creatinine, Random Urine: 23 mg/dL ( @ :00)  Protein/Creatinine Ratio Calculation: <0.2 Ratio ( @ :00)  Creatinine, Random Urine: 25 mg/dL ( @ 01:00)  Protein/Creatinine Ratio Calculation: <0.2 Ratio ( @ 01:00)      RADIOLOGY & ADDITIONAL STUDIES:

## 2022-01-28 NOTE — PHYSICAL THERAPY INITIAL EVALUATION ADULT - DIAGNOSIS, PT EVAL
debilitation secondary Atrial fibrillation with rapid ventricular response; NICA (acute kidney injury); Elevated troponin; Elevated brain natriuretic peptide (BNP) level

## 2022-01-28 NOTE — PHYSICAL THERAPY INITIAL EVALUATION ADULT - GENERAL OBSERVATIONS, REHAB EVAL
Pt encountered in semi-rueda position in bed in NAD, +IV, +tele, +covarrubias, no c/o pain and agreeable with PT. Pt performed bed mobility supine/sit to EOB with Min A, transfer mobility with Min A,  and ambulate 10 ft Min A using RW. Pt demonstrated limited ambulation secondary inc fatigue and weakness. Pt left seated in chair in NAD, +chair alarm/callbell, JACQUELINE Espinosa aware.

## 2022-01-28 NOTE — PHYSICAL THERAPY INITIAL EVALUATION ADULT - PERTINENT HX OF CURRENT PROBLEM, REHAB EVAL
Pt is 82 y/o, male h/o CAD s/p CABG, CHF, s/p AVR, HTN, CKD III; recently diagnosed Rt LE DVT who was referred to ED by Dr Murphy for new onset afib with RVR in setting of volume overload, found to be tachycardic in afib with RVR on presentation s/p cardizem drip, found to have elevated pro BNP and evidence of NICA on labs and congested lungs on imaging s/p IV diuretics; admitted to 3C for telemetry monitoring.

## 2022-01-28 NOTE — PROGRESS NOTE ADULT - SUBJECTIVE AND OBJECTIVE BOX
SAÚL HODGSON 81y Male  MRN#: [id number]   CODE STATUS:________    Hospital Day: 1d    Pt is currently admitted with the primary diagnosis of     SUBJECTIVE  Hospital Course    Overnight events     Subjective complaints     Present Today:   - Albright:  No [  ], Yes [   ] : Indication:     - Type of IV Access:       .. CVC/Piccline:  No [  ], Yes [   ] : Indication:       .. Midline: No [  ], Yes [   ] : Indication:                                              OBJECTIVE  PAST MEDICAL & SURGICAL HISTORY  Chronic systolic congestive heart failure    HTN (hypertension)    Dyslipidemia                                                ALLERGIES:  No Known Allergies                           HOME MEDICATIONS  Home Medications:  Eliquis 2.5 mg oral tablet: 1 tab(s) orally once a day at night (2022 21:53)  Eliquis 5 mg oral tablet: 1 tab(s) orally once a day in morning (2022 21:53)  LATANOPROST  0.005 % SOLN:  (12 Dec 2021 16:26)  Metoprolol Tartrate 25 mg oral tablet: 1 tab(s) orally once a day in morning (2022 21:54)  Metoprolol Tartrate 25 mg oral tablet: 12.5 milligram(s) orally once a day at night (2022 21:55)  olmesartan 20 mg oral tablet: 1 tab(s) orally once a day (2022 21:56)  rosuvastatin 5 mg oral tablet: 1 tab(s) orally once a day (12 Dec 2021 16:26)                           MEDICATIONS:  STANDING MEDICATIONS  apixaban 2.5 milliGRAM(s) Oral two times a day  aspirin  chewable 81 milliGRAM(s) Oral daily  atorvastatin 20 milliGRAM(s) Oral at bedtime  buMETAnide Injectable 2 milliGRAM(s) IV Push two times a day  chlorhexidine 4% Liquid 1 Application(s) Topical <User Schedule>  diltiazem Infusion 15 mG/Hr IV Continuous <Continuous>  influenza  Vaccine (HIGH DOSE) 0.7 milliLiter(s) IntraMuscular once  pantoprazole    Tablet 40 milliGRAM(s) Oral before breakfast    PRN MEDICATIONS  acetaminophen     Tablet .. 650 milliGRAM(s) Oral every 6 hours PRN                                            ------------------------------------------------------------  VITAL SIGNS: Last 24 Hours  T(C): 35.3 (2022 06:00), Max: 36.2 (2022 22:53)  T(F): 95.5 (2022 06:00), Max: 97.1 (2022 22:53)  HR: 116 (2022 08:00) (77 - 151)  BP: 90/60 (2022 08:00) (90/60 - 139/82)  BP(mean): --  RR: 18 (2022 08:00) (16 - 18)  SpO2: 96% (2022 22:50) (96% - 100%)      22 @ 07:01  -  22 @ 07:00  --------------------------------------------------------  IN: 120 mL / OUT: 800 mL / NET: -680 mL    22 @ 07:01  -  22 @ 10:10  --------------------------------------------------------  IN: 165 mL / OUT: 0 mL / NET: 165 mL                                               LABS:                        12.0   8.38  )-----------( 157      ( 2022 04:30 )             37.1         141  |  105  |  46<H>  ----------------------------<  120<H>  3.9   |  18  |  2.4<H>    Ca    9.6      2022 13:01  Mg     2.1         TPro  5.7<L>  /  Alb  3.6  /  TBili  0.6  /  DBili  x   /  AST  28  /  ALT  47<H>  /  AlkPhos  45        Urinalysis Basic - ( 2022 01:00 )    Color: Colorless / Appearance: Clear / S.009 / pH: x  Gluc: x / Ketone: Negative  / Bili: Negative / Urobili: <2 mg/dL   Blood: x / Protein: Negative / Nitrite: Negative   Leuk Esterase: Negative / RBC: 17 /HPF / WBC 1 /HPF   Sq Epi: x / Non Sq Epi: 0 /HPF / Bacteria: Negative        Troponin T, Serum: 0.06 ng/mL *HH* (22 @ 13:01)          CARDIAC MARKERS ( 2022 13:01 )  x     / 0.06 ng/mL / x     / x     / x                                                  RADIOLOGY:        PHYSICAL EXAM:  GENERAL: NAD, lying in bed comfortably  HEAD:  Atraumatic, Normocephalic  EYES: EOMI, PERRLA, conjunctiva and sclera clear  ENT: Moist mucous membranes  NECK: Supple, No JVD  CHEST/LUNG: Clear to auscultation bilaterally; No rales, rhonchi, wheezing, or rubs. Unlabored respirations  HEART: Regular rate and rhythm; No murmurs, rubs, or gallops  ABDOMEN: BSx4; Soft, nontender, nondistended  EXTREMITIES:  2+ Peripheral Pulses, brisk capillary refill. No clubbing, cyanosis, or edema  NERVOUS SYSTEM:  A&Ox3, no focal deficits   SKIN: No rashes or lesions                                         ASSESSMENT & PLAN    Past medical history and hospital course                                                                                                           ----------------------------------------------------  # DVT prophylaxis     # GI prophylaxis     # Diet     # Activity Score (AM-PAC)    # Code status     # Disposition                                                                              --------------------------------------------------------    # Handoff      SAÚL HODGSON 81y Male  MRN#: [id number]   CODE STATUS:________    Hospital Day: 1d    Pt is currently admitted with the primary diagnosis of     SUBJECTIVE  Hospital Course  Mr. Hodgson is an 81 year old male patient known to have:  - Baseline AO3, comes from home, lives with wife  - CAD s/p CABG x4 in . Cardiologist Dr Sebastian Mendoza  - CHF (no TTE in chart) and s/p AVR . Cardiologist Dr Sebastian Mendoza. Home med PO Lasix 40mg QD per wife  - CKD III (Baseline Cr 1.3)  - Recent admission from - for COVID Pneumonia s/p IV Dexamethasone and IV RDV  - Recently found to have Acute Right Peroneal, Popliteal, and PT DVT during recent admission on . Discharged on Eliquis. Most recent home dose per wife is 5mg in AM and 2.5mg in PM (not 5mg BID) due to nose bleed  - HTN    He presented to the ED on  after being referred by Dr Murphy for new onset afib with RVR.  History goes back to  when the patient was following up with Dr Murphy.  During the visit, patient was found to be volume overloaded.  His HR was in 150bpm and an ECG performed revealed new onset Afib with RVR.  As a result, patient was referred by Dr Murphy to ED for evaluation.  On history taking, patient reports a 2 week history of worsening shortness of breath on minimal exertion (even walking few steps make him feel out of breath).   This has been associated with worsening bilateral leg swelling, orthopnea (sleeps in a seated position), and PNDs in the absence of any chest pain, palpitations, light headedness, nausea, or pre/syncope.  On review of systems, patient denies any recent fever, chills, night sweats, URTI symptoms (cough, rhinorrhea, sore throat), urinary symptoms (urinary frequency, urgency, intermittence, dysuria, foul smelling urine, cloudy urine), change in bowel movements (diarrhea or constipation), abdominal pain, headache, or vomiting.     Overnight events   No overnight events   On telemetry: A fib with RVR with abberancy - uncontrolled     Subjective complaints   Patient is feeling fine, On RA satting well, ANOx3, has no medical complaints in general.  ROS negative for SOB, chest pain, lightheadedness, palpitations, abdominal pain/distention   reports only LE swelling     Present Today:   - Albright:  No [ X ], Yes [   ] : Indication:                                            OBJECTIVE  PAST MEDICAL & SURGICAL HISTORY  Chronic systolic congestive heart failure    HTN (hypertension)    Dyslipidemia                                                ALLERGIES:  No Known Allergies                           HOME MEDICATIONS  Home Medications:  Eliquis 2.5 mg oral tablet: 1 tab(s) orally once a day at night (2022 21:53)  Eliquis 5 mg oral tablet: 1 tab(s) orally once a day in morning (2022 21:53)  LATANOPROST  0.005 % SOLN:  (12 Dec 2021 16:26)  Metoprolol Tartrate 25 mg oral tablet: 1 tab(s) orally once a day in morning (2022 21:54)  Metoprolol Tartrate 25 mg oral tablet: 12.5 milligram(s) orally once a day at night (2022 21:55)  olmesartan 20 mg oral tablet: 1 tab(s) orally once a day (2022 21:56)  rosuvastatin 5 mg oral tablet: 1 tab(s) orally once a day (12 Dec 2021 16:26)                           MEDICATIONS:  STANDING MEDICATIONS  apixaban 2.5 milliGRAM(s) Oral two times a day  aspirin  chewable 81 milliGRAM(s) Oral daily  atorvastatin 20 milliGRAM(s) Oral at bedtime  buMETAnide Injectable 2 milliGRAM(s) IV Push two times a day  chlorhexidine 4% Liquid 1 Application(s) Topical <User Schedule>  diltiazem Infusion 15 mG/Hr IV Continuous <Continuous>  influenza  Vaccine (HIGH DOSE) 0.7 milliLiter(s) IntraMuscular once  pantoprazole    Tablet 40 milliGRAM(s) Oral before breakfast    PRN MEDICATIONS  acetaminophen     Tablet .. 650 milliGRAM(s) Oral every 6 hours PRN                                            ------------------------------------------------------------  VITAL SIGNS: Last 24 Hours  T(C): 35.3 (2022 06:00), Max: 36.2 (2022 22:53)  T(F): 95.5 (2022 06:00), Max: 97.1 (2022 22:53)  HR: 116 (2022 08:00) (77 - 151)  BP: 90/60 (2022 08:00) (90/60 - 139/82)  BP(mean): --  RR: 18 (2022 08:00) (16 - 18)  SpO2: 96% (2022 22:50) (96% - 100%)      22 @ 07:01  -  22 @ 07:00  --------------------------------------------------------  IN: 120 mL / OUT: 800 mL / NET: -680 mL    22 @ 07:01  -  22 @ 10:10  --------------------------------------------------------  IN: 165 mL / OUT: 0 mL / NET: 165 mL                                               LABS:                        12.0   8.38  )-----------( 157      ( 2022 04:30 )             37.1         141  |  105  |  46<H>  ----------------------------<  120<H>  3.9   |  18  |  2.4<H>    Ca    9.6      2022 13:01  Mg     2.1         TPro  5.7<L>  /  Alb  3.6  /  TBili  0.6  /  DBili  x   /  AST  28  /  ALT  47<H>  /  AlkPhos  45        Urinalysis Basic - ( 2022 01:00 )    Color: Colorless / Appearance: Clear / S.009 / pH: x  Gluc: x / Ketone: Negative  / Bili: Negative / Urobili: <2 mg/dL   Blood: x / Protein: Negative / Nitrite: Negative   Leuk Esterase: Negative / RBC: 17 /HPF / WBC 1 /HPF   Sq Epi: x / Non Sq Epi: 0 /HPF / Bacteria: Negative        Troponin T, Serum: 0.06 ng/mL *HH* (22 @ 13:01)          CARDIAC MARKERS ( 2022 13:01 )  x     / 0.06 ng/mL / x     / x     / x                                                  RADIOLOGY:    F/u echo TTE     ACC: [id number] EXAM:  XR CHEST PORTABLE IMMED 1V                          PROCEDURE DATE:  2022          INTERPRETATION:  Clinical History / Reason for exam: Pain    Comparison : Chest radiograph December 15, 2021.    Technique/Positioning: Low lung volume.    Findings:    Support devices: None.    Cardiac/mediastinum/hilum: Status post a median sternotomy. Heart is   magnified.    Lung parenchyma/Pleura: Bilateral opacities. No pneumothorax is seen.    Skeleton/soft tissues: Stable.    Impression:    Low lung volume.    Status post a median sternotomy.    Bilateral opacities.    --- End of Report ---      PHYSICAL EXAM:  GENERAL: NAD, lying in bed comfortably  NECK: Supple, No JVD  CHEST/LUNG: Clear to auscultation bilaterally; diffuse bilateral crackles + wheezing   HEART: Irregular rate and rhythm; No murmurs, rubs, or gallops  ABDOMEN: BSx4; Soft, nontender, nondistended  EXTREMITIES:  2+ Peripheral Pulses,. 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 Currently still tachycardic  bpm.      Acute on Chronic CHF Exacerbation  s/p AVR   * No TTE in chart  * Cardiologist Dr Sebastian Mendoza  * Home med PO Lasix 40mg QD   * Pro BNP 8852   * PE leg swelling and diffuse crackles  * CXR  diffuse bilateral interstitial opacities  * S/P IV Lasix 20mg x1 and IV Bumex 1mg x1 doses in ED    - Cardiology Team on board on 3C  - Monitor accurate in/out  - Daily weight (standing)  - Monitor SaO2 and O2 requirements: Currently SAO2 98% on RA  - Start IV Bumex 1mg BID. S/P IV Lasix 20mg x1 and IV Bumex 1mg x1 doses in ED  - Salt restricted diet and Fluid restriction to 1.2L per 24 hours  - Monitor telemetry   - Trend electrolytes and keep K>4 and Mg>2  - Check TTE  - Check Iron Studies  - VA duplex LE       New Onset Atrial Fibrillation with RVR  In Setting of Acute CHF Decompensation  * Referred from Dr Murphy's office for 's bpm and ECG showing afib with RVR  * ED Vitals 111/71mmHg and  bpm  * ED ECG afib with RVR  * S/P ELiquis 2.5mg x1 dose  * S/P Initiation of Cardizem drip at a rate of 15/hour with improvement in HR to 115 bpm    - Cardiology Team on board on 3C  - Monitor HR  - Monitor telemetry  - Check TSH  - Keep K>4 and Mg>2  - For AC: resume home eliquis (ordered 2.5mg BID; takes 5mg in AM and 2.5mg in PM at home) originally prescribed for DVT  - For rate control: continue Cardizem drip at rate 15 for now. Resume home dose of lopressor 25mg in AM and 12.5mg in PM (originally prescribed for HTN)      Acute Kidney Injury on CKD III  Likely Cardiorenal in Setting of CHF Decompensation  BUN:CR almost 20:1 but Less Likely Pre-Renal  * Baseline Cr 1.3  * ED BUN 46, Cr 2.4  * S/P IV diuretics as above    - Trend BUN and Cr daily  - Start IV diuretics as above for CHF decompensation  - Check urine studies  - Avoid nephrotoxic agents  - Hold Olmesartan 20mg QD for now      Elevated Troponin in Setting of History of CAD s/p CABG  Possibly in Setting of NICA  * Hx CAD s/p CABG x4 in   * Home Lopressor 25mg AM and 12.5mg PM, Aspirin 81mg QD, Rosuvastatin 5mg QD    * Cardiologist Dr Sebastian Mendoza  * No active chest pain  * Troponin 0.06  * ECG noted with afib with RVR    - Cardiology Team on board on   - Trend CE  - Check lipid profile  - Repeat ECG if patient complains of chest pain  - Resume Lopressor 25mg AM and 12.5mg PM, Aspirin 81mg QD, and Statin       Recent Right Peroneal, Popliteal, and Posterior Tibial DVT  * Recently found to have Acute Right Peroneal, Popliteal, and PT DVT during recent admission on   * VA duplex LE Venous from  noted  * Discharged on Eliquis -> Most recent home dose per wife is 5mg in AM and 2.5mg in PM (not 5mg BID) due to nose bleed  - Resume AC as ordered   - Repeat VA duplex LE  - PT/OT       HTN  * Home meds Lopressor as above, Olmesartan 20mg QD  * ED /71 mmHg  - Monitor BP closely  - Resume Lopressor 25mg in AM and 12.5mg in PM  - Hold home Olmesartan 20mg QD in setting of NICA      Recent admission from - for COVID Pneumonia   * s/p IV Dexamethasone and IV RDV  * COVID  negative  - Resolved      Others  - DVT Prophylaxis: as detailed above  - GI Prophylaxis: Pantoprazole 40mg PO QD  - Diet: DASH/ TLC  - Code Status: DNR DNI   - Med rec confirmed with wife over phone                                                                              ----------------------------------------------------  # DVT prophylaxis - Eliquis 2.5     # GI prophylaxis - Pantoprazole 40mg     # Diet - DASH     # Activity - OOBTC - IAT     # Code status - Full code     # Disposition                                                                              --------------------------------------------------------    # Handoff      SAÚL HODGSON 81y Male  MRN#: [id number]   CODE STATUS:________    Hospital Day: 1d    Pt is currently admitted with the primary diagnosis of SOB with possible volume overload 2/2 CHFE    SUBJECTIVE  Hospital Course  Mr. Hodgson is an 81 year old male patient known to have:  - Baseline AO3, comes from home, lives with wife  - CAD s/p CABG x4 in . Cardiologist Dr Sebastian Mendoza  - CHF (no TTE in chart) and s/p AVR . Cardiologist Dr Sebastian Mendoza. Home med PO Lasix 40mg QD per wife  - CKD III (Baseline Cr 1.3)  - Recent admission from - for COVID Pneumonia s/p IV Dexamethasone and IV RDV  - Recently found to have Acute Right Peroneal, Popliteal, and PT DVT during recent admission on . Discharged on Eliquis. Most recent home dose per wife is 5mg in AM and 2.5mg in PM (not 5mg BID) due to nose bleed  - HTN    He presented to the ED on  after being referred by Dr Murphy for new onset afib with RVR.  History goes back to  when the patient was following up with Dr Murphy.  During the visit, patient was found to be volume overloaded.  His HR was in 150bpm and an ECG performed revealed new onset Afib with RVR.  As a result, patient was referred by Dr Murphy to ED for evaluation.  On history taking, patient reports a 2 week history of worsening shortness of breath on minimal exertion (even walking few steps make him feel out of breath).   This has been associated with worsening bilateral leg swelling, orthopnea (sleeps in a seated position), and PNDs in the absence of any chest pain, palpitations, light headedness, nausea, or pre/syncope.  On review of systems, patient denies any recent fever, chills, night sweats, URTI symptoms (cough, rhinorrhea, sore throat), urinary symptoms (urinary frequency, urgency, intermittence, dysuria, foul smelling urine, cloudy urine), change in bowel movements (diarrhea or constipation), abdominal pain, headache, or vomiting.     Overnight events   No overnight events   On telemetry: A fib with RVR with abberancy - uncontrolled     Subjective complaints   Patient is feeling fine, On RA satting well, ANOx3, has no medical complaints in general.  ROS negative for SOB, chest pain, lightheadedness, palpitations, abdominal pain/distention   reports only LE swelling     Present Today:   - Albright:  No [ X ], Yes [   ] : Indication:                                            OBJECTIVE  PAST MEDICAL & SURGICAL HISTORY  Chronic systolic congestive heart failure    HTN (hypertension)    Dyslipidemia                                                ALLERGIES:  No Known Allergies                           HOME MEDICATIONS  Home Medications:  Eliquis 2.5 mg oral tablet: 1 tab(s) orally once a day at night (2022 21:53)  Eliquis 5 mg oral tablet: 1 tab(s) orally once a day in morning (2022 21:53)  LATANOPROST  0.005 % SOLN:  (12 Dec 2021 16:26)  Metoprolol Tartrate 25 mg oral tablet: 1 tab(s) orally once a day in morning (2022 21:54)  Metoprolol Tartrate 25 mg oral tablet: 12.5 milligram(s) orally once a day at night (2022 21:55)  olmesartan 20 mg oral tablet: 1 tab(s) orally once a day (2022 21:56)  rosuvastatin 5 mg oral tablet: 1 tab(s) orally once a day (12 Dec 2021 16:26)                           MEDICATIONS:  STANDING MEDICATIONS  apixaban 2.5 milliGRAM(s) Oral two times a day  aspirin  chewable 81 milliGRAM(s) Oral daily  atorvastatin 20 milliGRAM(s) Oral at bedtime  buMETAnide Injectable 2 milliGRAM(s) IV Push two times a day  chlorhexidine 4% Liquid 1 Application(s) Topical <User Schedule>  diltiazem Infusion 15 mG/Hr IV Continuous <Continuous>  influenza  Vaccine (HIGH DOSE) 0.7 milliLiter(s) IntraMuscular once  pantoprazole    Tablet 40 milliGRAM(s) Oral before breakfast    PRN MEDICATIONS  acetaminophen     Tablet .. 650 milliGRAM(s) Oral every 6 hours PRN                                            ------------------------------------------------------------  VITAL SIGNS: Last 24 Hours  T(C): 35.3 (2022 06:00), Max: 36.2 (2022 22:53)  T(F): 95.5 (2022 06:00), Max: 97.1 (2022 22:53)  HR: 116 (2022 08:00) (77 - 151)  BP: 90/60 (2022 08:00) (90/60 - 139/82)  BP(mean): --  RR: 18 (2022 08:00) (16 - 18)  SpO2: 96% (2022 22:50) (96% - 100%)      22 @ 07:01  -  22 @ 07:00  --------------------------------------------------------  IN: 120 mL / OUT: 800 mL / NET: -680 mL    22 @ 07:01  -  22 @ 10:10  --------------------------------------------------------  IN: 165 mL / OUT: 0 mL / NET: 165 mL                                               LABS:                        12.0   8.38  )-----------( 157      ( 2022 04:30 )             37.1         141  |  105  |  46<H>  ----------------------------<  120<H>  3.9   |  18  |  2.4<H>    Ca    9.6      2022 13:01  Mg     2.1         TPro  5.7<L>  /  Alb  3.6  /  TBili  0.6  /  DBili  x   /  AST  28  /  ALT  47<H>  /  AlkPhos  45        Urinalysis Basic - ( 2022 01:00 )    Color: Colorless / Appearance: Clear / S.009 / pH: x  Gluc: x / Ketone: Negative  / Bili: Negative / Urobili: <2 mg/dL   Blood: x / Protein: Negative / Nitrite: Negative   Leuk Esterase: Negative / RBC: 17 /HPF / WBC 1 /HPF   Sq Epi: x / Non Sq Epi: 0 /HPF / Bacteria: Negative        Troponin T, Serum: 0.06 ng/mL *HH* (22 @ 13:01)          CARDIAC MARKERS ( 2022 13:01 )  x     / 0.06 ng/mL / x     / x     / x                                                  RADIOLOGY:    F/u echo TTE     ACC: [id number] EXAM:  XR CHEST PORTABLE IMMED 1V                          PROCEDURE DATE:  2022          INTERPRETATION:  Clinical History / Reason for exam: Pain    Comparison : Chest radiograph December 15, 2021.    Technique/Positioning: Low lung volume.    Findings:    Support devices: None.    Cardiac/mediastinum/hilum: Status post a median sternotomy. Heart is   magnified.    Lung parenchyma/Pleura: Bilateral opacities. No pneumothorax is seen.    Skeleton/soft tissues: Stable.    Impression:    Low lung volume.    Status post a median sternotomy.    Bilateral opacities.    --- End of Report ---      PHYSICAL EXAM:  GENERAL: NAD, lying in bed comfortably  NECK: Supple, No JVD  CHEST/LUNG: Clear to auscultation bilaterally; diffuse bilateral crackles + wheezing   HEART: Irregular rate and rhythm; No murmurs, rubs, or gallops  ABDOMEN: BSx4; Soft, nontender, nondistended  EXTREMITIES:  2+ Peripheral Pulses,. 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 Currently still tachycardic  bpm.    #SOB with volume overload   #Possible Acute on Chronic CHF Exacerbation  s/p AVR   * No TTE in chart  * Cardiologist Dr Sebastian Mendoza  * Home med PO Lasix 40mg QD   * Pro BNP 8852   * PE leg swelling and diffuse crackles  * CXR  diffuse bilateral interstitial opacities  * S/P IV Lasix 20mg x1 and IV Bumex 1mg x1 doses in ED  - Cardiology Team on board on 3C  - Monitor accurate in/out  - Daily weight (standing)  - Monitor SaO2 and O2 requirements: Currently SAO2 98% on RA  - started on Bumex 2 IV BID   - Salt restricted diet and Fluid restriction to 1.2L per 24 hours  - Monitor telemetry   - Trend electrolytes and keep K>4 and Mg>2  - Check TTE  - Check Iron Studies  - Add metolazone if urine output <1.5L   - Add tamsulosin   - Daily weight --> (do standing) --> 111kg today     New Onset Atrial Fibrillation with RVR  In Setting of Acute CHF Decompensation  * Referred from Dr Murphy's office for 's bpm and ECG showing afib with RVR  * ED Vitals 111/71mmHg and  bpm  * ED ECG afib with RVR  * S/P ELiquis 2.5mg x1 dose  * S/P Initiation of Cardizem drip at a rate of 15/hour with improvement in HR to 115 bpm  - Cardiology Team on board on 3C  - Monitor HR  - Monitor telemetry  - Check TSH  - Keep K>4 and Mg>2  - For AC: resume home eliquis (ordered 2.5mg BID; takes 5mg in AM and 2.5mg in PM at home) originally prescribed for DVT  - For rate control: continue Cardizem drip at rate 15 for now. 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  * Hx CAD s/p CABG x4 in 2006  * Home Lopressor 25mg AM and 12.5mg PM, Aspirin 81mg QD, Rosuvastatin 5mg QD  * Cardiologist Dr Sebastian Mendoza  * No active chest pain  * Troponin 0.06 - no trending needed   * ECG noted with afib with RVR    - Cardiology Team on board on 3C  - Trend CE  - Check lipid profile  - Repeat ECG if patient complains of chest pain  - Resume Lopressor 25mg AM and 12.5mg PM, Aspirin 81mg QD, and Statin       Recent Right Peroneal, Popliteal, and Posterior Tibial DVT  * Recently found to have Acute Right Peroneal, Popliteal, and PT DVT during recent admission on   * [facility] duplex LE Venous from  noted  * Discharged on Eliquis -> Most recent home dose per wife is 5mg in AM and 2.5mg in PM (not 5mg BID) due to nose bleed  - Resume AC as ordered   - PT/OT       HTN  * Home meds Lopressor as above, Olmesartan 20mg QD  * ED /71 mmHg  - Monitor BP closely  - Resume Lopressor 25mg in AM and 12.5mg in PM  - Hold home Olmesartan 20mg QD in setting of NICA      Recent admission from - for COVID Pneumonia   * s/p IV Dexamethasone and IV RDV  * COVID  negative  - Resolved      Others  - DVT Prophylaxis: as detailed above  - GI Prophylaxis: Pantoprazole 40mg PO QD  - Diet: DASH/ TLC  - Code Status: DNR DNI   - Med rec confirmed with wife over phone                                                                              ----------------------------------------------------  # DVT prophylaxis - Eliquis 2.5     # GI prophylaxis - Pantoprazole 40mg     # Diet - DASH     # Activity - OOBTC - IAT     # Code status - Full code     # Disposition                                                                              --------------------------------------------------------    # Handoff

## 2022-01-28 NOTE — PROGRESS NOTE ADULT - ATTENDING COMMENTS
Mr. Silva is an 81yoM with CAD sp CABG, bioprosthetic AVR (2011), HTN, CKD (baseline Cr 1.3), and recent hospitalization in 12/2021 for COVID-19 infection c/b RLE DVT and PE for which patient required 6L nc and has since been weaned off who presented with dyspnea, orthopnea, PND, and edema, found to be in Afib with RVR.     Patient received Bumex 1 mg IV yesterday with 800cc UOP. Bumex increased to 2 mg IV BID today. Only 500cc UOP recorded with one dose of Bumex 2 mg IV, will start metolazone 5 mg daily.     On morning rounds, I spoke to Dr. Murphy who stated the patient had an EF near 39% in the office with ECG showing Afib with RVR. 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 Diltiazem gtt discontinued. Patient will be started on digoxin IV for Afib with RVR, while undergoing aggressive diuresis.     Plan:  - Discontinue diltiazem gtt  - Start digoxin 250 mcg IV q8h x 3  - Check lactate tomorrow AM  - BMP and Mg BID  - Strict I/Os and daily standing weights  - Replete for goal K > 4 and Mg > 2   - Check digoxin level on 1/30 AM  - Avoid CCB or Bb given the patient's severe BiV failure, will prioritize diuresis for rate control  - On apixaban 2.5 mg BID for Afib and history of clots, patient likely needs a higher dose for therapeutic AC, will consider switching to Xarelto or checking factor Xa levels over the weekened  - Continue ASA 81 mg daily and atorvastatin 20 mg nightly  - A1c 5.8, LDL 90, TSH 1.56

## 2022-01-29 LAB
ALBUMIN SERPL ELPH-MCNC: 3.2 G/DL — LOW (ref 3.5–5.2)
ALP SERPL-CCNC: 43 U/L — SIGNIFICANT CHANGE UP (ref 30–115)
ALT FLD-CCNC: 28 U/L — SIGNIFICANT CHANGE UP (ref 0–41)
ANION GAP SERPL CALC-SCNC: 12 MMOL/L — SIGNIFICANT CHANGE UP (ref 7–14)
ANION GAP SERPL CALC-SCNC: 14 MMOL/L — SIGNIFICANT CHANGE UP (ref 7–14)
AST SERPL-CCNC: 12 U/L — SIGNIFICANT CHANGE UP (ref 0–41)
BASOPHILS # BLD AUTO: 0.03 K/UL — SIGNIFICANT CHANGE UP (ref 0–0.2)
BASOPHILS NFR BLD AUTO: 0.5 % — SIGNIFICANT CHANGE UP (ref 0–1)
BILIRUB SERPL-MCNC: 0.7 MG/DL — SIGNIFICANT CHANGE UP (ref 0.2–1.2)
BUN SERPL-MCNC: 41 MG/DL — HIGH (ref 10–20)
BUN SERPL-MCNC: 42 MG/DL — HIGH (ref 10–20)
CALCIUM SERPL-MCNC: 9.3 MG/DL — SIGNIFICANT CHANGE UP (ref 8.5–10.1)
CALCIUM SERPL-MCNC: 9.6 MG/DL — SIGNIFICANT CHANGE UP (ref 8.4–10.5)
CALCIUM SERPL-MCNC: 9.8 MG/DL — SIGNIFICANT CHANGE UP (ref 8.5–10.1)
CHLORIDE SERPL-SCNC: 102 MMOL/L — SIGNIFICANT CHANGE UP (ref 98–110)
CHLORIDE SERPL-SCNC: 104 MMOL/L — SIGNIFICANT CHANGE UP (ref 98–110)
CO2 SERPL-SCNC: 25 MMOL/L — SIGNIFICANT CHANGE UP (ref 17–32)
CO2 SERPL-SCNC: 27 MMOL/L — SIGNIFICANT CHANGE UP (ref 17–32)
CREAT SERPL-MCNC: 2.2 MG/DL — HIGH (ref 0.7–1.5)
CREAT SERPL-MCNC: 2.4 MG/DL — HIGH (ref 0.7–1.5)
EOSINOPHIL # BLD AUTO: 0.04 K/UL — SIGNIFICANT CHANGE UP (ref 0–0.7)
EOSINOPHIL NFR BLD AUTO: 0.7 % — SIGNIFICANT CHANGE UP (ref 0–8)
FERRITIN SERPL-MCNC: 223 NG/ML — SIGNIFICANT CHANGE UP (ref 30–400)
GLUCOSE SERPL-MCNC: 102 MG/DL — HIGH (ref 70–99)
GLUCOSE SERPL-MCNC: 124 MG/DL — HIGH (ref 70–99)
HCT VFR BLD CALC: 33.3 % — LOW (ref 42–52)
HGB BLD-MCNC: 10.8 G/DL — LOW (ref 14–18)
IMM GRANULOCYTES NFR BLD AUTO: 0.5 % — HIGH (ref 0.1–0.3)
LACTATE SERPL-SCNC: 0.9 MMOL/L — SIGNIFICANT CHANGE UP (ref 0.7–2)
LYMPHOCYTES # BLD AUTO: 1.07 K/UL — LOW (ref 1.2–3.4)
LYMPHOCYTES # BLD AUTO: 17.8 % — LOW (ref 20.5–51.1)
MAGNESIUM SERPL-MCNC: 1.9 MG/DL — SIGNIFICANT CHANGE UP (ref 1.8–2.4)
MAGNESIUM SERPL-MCNC: 2.3 MG/DL — SIGNIFICANT CHANGE UP (ref 1.8–2.4)
MCHC RBC-ENTMCNC: 30.7 PG — SIGNIFICANT CHANGE UP (ref 27–31)
MCHC RBC-ENTMCNC: 32.4 G/DL — SIGNIFICANT CHANGE UP (ref 32–37)
MCV RBC AUTO: 94.6 FL — HIGH (ref 80–94)
MONOCYTES # BLD AUTO: 0.86 K/UL — HIGH (ref 0.1–0.6)
MONOCYTES NFR BLD AUTO: 14.3 % — HIGH (ref 1.7–9.3)
NEUTROPHILS # BLD AUTO: 3.99 K/UL — SIGNIFICANT CHANGE UP (ref 1.4–6.5)
NEUTROPHILS NFR BLD AUTO: 66.2 % — SIGNIFICANT CHANGE UP (ref 42.2–75.2)
NRBC # BLD: 0 /100 WBCS — SIGNIFICANT CHANGE UP (ref 0–0)
PLATELET # BLD AUTO: 136 K/UL — SIGNIFICANT CHANGE UP (ref 130–400)
POTASSIUM SERPL-MCNC: 3.2 MMOL/L — LOW (ref 3.5–5)
POTASSIUM SERPL-MCNC: 4 MMOL/L — SIGNIFICANT CHANGE UP (ref 3.5–5)
POTASSIUM SERPL-SCNC: 3.2 MMOL/L — LOW (ref 3.5–5)
POTASSIUM SERPL-SCNC: 4 MMOL/L — SIGNIFICANT CHANGE UP (ref 3.5–5)
PROT SERPL-MCNC: 5.1 G/DL — LOW (ref 6–8)
PTH-INTACT FLD-MCNC: 40 PG/ML — SIGNIFICANT CHANGE UP (ref 15–65)
RBC # BLD: 3.52 M/UL — LOW (ref 4.7–6.1)
RBC # FLD: 17.5 % — HIGH (ref 11.5–14.5)
SODIUM SERPL-SCNC: 139 MMOL/L — SIGNIFICANT CHANGE UP (ref 135–146)
SODIUM SERPL-SCNC: 145 MMOL/L — SIGNIFICANT CHANGE UP (ref 135–146)
TROPONIN T SERPL-MCNC: 0.07 NG/ML — CRITICAL HIGH
WBC # BLD: 6.02 K/UL — SIGNIFICANT CHANGE UP (ref 4.8–10.8)
WBC # FLD AUTO: 6.02 K/UL — SIGNIFICANT CHANGE UP (ref 4.8–10.8)

## 2022-01-29 PROCEDURE — 93010 ELECTROCARDIOGRAM REPORT: CPT | Mod: 76

## 2022-01-29 RX ORDER — MAGNESIUM SULFATE 500 MG/ML
2 VIAL (ML) INJECTION ONCE
Refills: 0 | Status: COMPLETED | OUTPATIENT
Start: 2022-01-29 | End: 2022-01-29

## 2022-01-29 RX ORDER — DIGOXIN 250 MCG
125 TABLET ORAL DAILY
Refills: 0 | Status: DISCONTINUED | OUTPATIENT
Start: 2022-01-30 | End: 2022-02-02

## 2022-01-29 RX ORDER — IRON SUCROSE 20 MG/ML
100 INJECTION, SOLUTION INTRAVENOUS EVERY 24 HOURS
Refills: 0 | Status: DISCONTINUED | OUTPATIENT
Start: 2022-01-29 | End: 2022-01-31

## 2022-01-29 RX ORDER — POTASSIUM CHLORIDE 20 MEQ
40 PACKET (EA) ORAL ONCE
Refills: 0 | Status: COMPLETED | OUTPATIENT
Start: 2022-01-29 | End: 2022-01-29

## 2022-01-29 RX ORDER — DIGOXIN 250 MCG
125 TABLET ORAL ONCE
Refills: 0 | Status: COMPLETED | OUTPATIENT
Start: 2022-01-29 | End: 2022-01-29

## 2022-01-29 RX ORDER — IRON SUCROSE 20 MG/ML
100 INJECTION, SOLUTION INTRAVENOUS EVERY 24 HOURS
Refills: 0 | Status: DISCONTINUED | OUTPATIENT
Start: 2022-01-29 | End: 2022-01-29

## 2022-01-29 RX ADMIN — Medication 125 MICROGRAM(S): at 07:58

## 2022-01-29 RX ADMIN — Medication 81 MILLIGRAM(S): at 11:02

## 2022-01-29 RX ADMIN — Medication 125 MICROGRAM(S): at 01:50

## 2022-01-29 RX ADMIN — APIXABAN 2.5 MILLIGRAM(S): 2.5 TABLET, FILM COATED ORAL at 06:04

## 2022-01-29 RX ADMIN — BUMETANIDE 2 MILLIGRAM(S): 0.25 INJECTION INTRAMUSCULAR; INTRAVENOUS at 17:02

## 2022-01-29 RX ADMIN — Medication 40 MILLIEQUIVALENT(S): at 11:01

## 2022-01-29 RX ADMIN — ATORVASTATIN CALCIUM 20 MILLIGRAM(S): 80 TABLET, FILM COATED ORAL at 22:00

## 2022-01-29 RX ADMIN — TAMSULOSIN HYDROCHLORIDE 0.4 MILLIGRAM(S): 0.4 CAPSULE ORAL at 22:00

## 2022-01-29 RX ADMIN — BUMETANIDE 2 MILLIGRAM(S): 0.25 INJECTION INTRAMUSCULAR; INTRAVENOUS at 06:04

## 2022-01-29 RX ADMIN — Medication 125 MICROGRAM(S): at 22:00

## 2022-01-29 RX ADMIN — IRON SUCROSE 210 MILLIGRAM(S): 20 INJECTION, SOLUTION INTRAVENOUS at 13:38

## 2022-01-29 RX ADMIN — PANTOPRAZOLE SODIUM 40 MILLIGRAM(S): 20 TABLET, DELAYED RELEASE ORAL at 06:03

## 2022-01-29 RX ADMIN — CHLORHEXIDINE GLUCONATE 1 APPLICATION(S): 213 SOLUTION TOPICAL at 06:03

## 2022-01-29 RX ADMIN — APIXABAN 2.5 MILLIGRAM(S): 2.5 TABLET, FILM COATED ORAL at 17:02

## 2022-01-29 RX ADMIN — Medication 25 GRAM(S): at 11:02

## 2022-01-29 NOTE — PROGRESS NOTE ADULT - ASSESSMENT
Acute kidney injury / Cardiorenal syndrome   chronic kidney disease stage 3  prior normal renal sono (12/21)  no proteinuria  acute on chronic systolic CHF  Afib with RVR / s/p AVR (procine) / CAD / CABG  DVT / PE on eliquis  recent severe COVID PNA  HTN     plan:    cont bumex 2mg iv q12h  metolazone 5mg po qd  KCl 40meq po x 1  monitor UOP / keep negative balance as BP allows  trend renal function, lytes with iv diuresis

## 2022-01-29 NOTE — PROGRESS NOTE ADULT - SUBJECTIVE AND OBJECTIVE BOX
[facility] FOLLOW UP NOTE  --------------------------------------------------------------------------------  24 hour events/subjective: Patient examined. Appears comfortable.    PAST HISTORY  --------------------------------------------------------------------------------  No significant changes to PMH, PSH, FHx, SHx, unless otherwise noted    ALLERGIES & MEDICATIONS  --------------------------------------------------------------------------------  Allergies    No Known Allergies    Standing Inpatient Medications  apixaban 2.5 milliGRAM(s) Oral two times a day  aspirin  chewable 81 milliGRAM(s) Oral daily  atorvastatin 20 milliGRAM(s) Oral at bedtime  buMETAnide Injectable 2 milliGRAM(s) IV Push two times a day  chlorhexidine 4% Liquid 1 Application(s) Topical <User Schedule>  influenza  Vaccine (HIGH DOSE) 0.7 milliLiter(s) IntraMuscular once  metolazone 5 milliGRAM(s) Oral daily  pantoprazole    Tablet 40 milliGRAM(s) Oral before breakfast  tamsulosin 0.4 milliGRAM(s) Oral at bedtime    PRN Inpatient Medications  acetaminophen  Tablet .. 650 milliGRAM(s) Oral every 6 hours PRN    VITALS/PHYSICAL EXAM  --------------------------------------------------------------------------------  T(C): 35.6 (01-29-22 @ 04:55), Max: 36.3 (01-28-22 @ 20:43)  HR: 140 (01-29-22 @ 07:41) (69 - 144)  BP: 105/60 (01-29-22 @ 07:41) (100/62 - 120/65)  RR: 18 (01-29-22 @ 07:41) (16 - 18)  SpO2: 93% (01-29-22 @ 07:41) (93% - 97%)    01-28-22 @ 07:01  -  01-29-22 @ 07:00  --------------------------------------------------------  IN: 705 mL / OUT: 1600 mL / NET: -895 mL    Physical Exam:  	Gen: NAD  	Pulm: CTA B/L  	CV: RRR, S1S2  	Abd: +BS, soft, nontender/nondistended  	: No suprapubic tenderness  	LE: Warm, edema    LABS/STUDIES  --------------------------------------------------------------------------------              10.8   6.02  >-----------<  136      [01-29-22 @ 04:30]              33.3     139  |  102  |  42  ----------------------------<  102      [01-29-22 @ 04:30]  3.2   |  25  |  2.2        Ca     9.3     [01-29-22 @ 04:30]      Mg     1.9     [01-29-22 @ 04:30]      Phos  4.4     [01-28-22 @ 04:30]    TPro  5.1  /  Alb  3.2  /  TBili  0.7  /  DBili  x   /  AST  12  /  ALT  28  /  AlkPhos  43  [01-29-22 @ 04:30]    Troponin 0.07      [01-29-22 @ 04:30]  CK 30      [01-28-22 @ 04:30]    Creatinine Trend:  SCr 2.2 [01-29 @ 04:30]  SCr 2.3 [01-28 @ 20:00]  SCr 2.2 [01-28 @ 16:00]  SCr 2.1 [01-28 @ 04:30]  SCr 2.4 [01-27 @ 13:01]    Urinalysis - [01-28-22 @ 01:00]      Color Colorless / Appearance Clear / SG 1.009 / pH 6.0      Gluc Negative / Ketone Negative  / Bili Negative / Urobili <2 mg/dL       Blood Moderate / Protein Negative / Leuk Est Negative / Nitrite Negative      RBC 17 / WBC 1 / Hyaline 2 / Gran  / Sq Epi  / Non Sq Epi 0 / Bacteria Negative    Urine Creatinine 23      [01-28-22 @ 01:00]  Urine Protein <5      [01-28-22 @ 01:00]  Urine Sodium 103.0      [01-28-22 @ 01:00]  Urine Urea Nitrogen 210      [01-28-22 @ 01:00]  Urine Chloride 107      [01-28-22 @ 01:00]    Iron 34, TIBC 229, %sat 15      [01-28-22 @ 16:00]  Ferritin 223      [01-28-22 @ 16:00]  PTH -- (Ca 9.6)      [01-28-22 @ 04:30]   40  TSH 1.56      [01-28-22 @ 04:30]  Lipid: chol 140, , HDL 37, LDL --      [01-28-22 @ 04:30]

## 2022-01-29 NOTE — PROGRESS NOTE ADULT - ATTENDING COMMENTS
81M with CAD s/p CABG, bioprosth AVR, CKD (b/l 1.3), recent COVID infection with DVT/PE presented with SOB, edema and AFib with RVR.       Diuresing with Bumex and metolazone. He remains volume up today. Standing weight 106.6 kg yesterday -> 106 kg today.     Cr 2.2 ->2.4 in 24 hours    Plan  - S/p digoxin 250 mcg IV q8h x 3, begin digoxin 125 mcg daily  - Continue Bumex 2 mg IV BID and metolazone 5 mg daily  - May have to discontinue metolazone if Cr continues to rise  - BMP and Mg BID  - Replete for goal K > 4 and Mg > 2   - Check digoxin level on 1/30 AM  - Strict I/Os and daily standing weights  - Avoid CCB or Bb given the patient's severe BiV failure, will prioritize diuresis for rate control  - On apixaban 2.5 mg BID for Afib and history of clots  - Continue ASA 81 mg daily and atorvastatin 20 mg nightly

## 2022-01-29 NOTE — PROGRESS NOTE ADULT - SUBJECTIVE AND OBJECTIVE BOX
SAÚL HODSGON 81y Male  MRN#: [id number]   CODE STATUS:________    Hospital Day: 2d    Pt is currently admitted with the primary diagnosis of     SUBJECTIVE  Hospital Course    Overnight events     Subjective complaints     Present Today:   - Albright:  No [  ], Yes [   ] : Indication:     - Type of IV Access:       .. CVC/Piccline:  No [  ], Yes [   ] : Indication:       .. Midline: No [  ], Yes [   ] : Indication:                                              OBJECTIVE  PAST MEDICAL & SURGICAL HISTORY  Chronic systolic congestive heart failure    HTN (hypertension)    Dyslipidemia                                                ALLERGIES:  No Known Allergies                           HOME MEDICATIONS  Home Medications:  Eliquis 2.5 mg oral tablet: 1 tab(s) orally once a day at night (2022 21:53)  Eliquis 5 mg oral tablet: 1 tab(s) orally once a day in morning (2022 21:53)  LATANOPROST  0.005 % SOLN:  (12 Dec 2021 16:26)  Metoprolol Tartrate 25 mg oral tablet: 1 tab(s) orally once a day in morning (2022 21:54)  Metoprolol Tartrate 25 mg oral tablet: 12.5 milligram(s) orally once a day at night (2022 21:55)  olmesartan 20 mg oral tablet: 1 tab(s) orally once a day (2022 21:56)  rosuvastatin 5 mg oral tablet: 1 tab(s) orally once a day (12 Dec 2021 16:26)                           MEDICATIONS:  STANDING MEDICATIONS  apixaban 2.5 milliGRAM(s) Oral two times a day  aspirin  chewable 81 milliGRAM(s) Oral daily  atorvastatin 20 milliGRAM(s) Oral at bedtime  buMETAnide Injectable 2 milliGRAM(s) IV Push two times a day  chlorhexidine 4% Liquid 1 Application(s) Topical <User Schedule>  influenza  Vaccine (HIGH DOSE) 0.7 milliLiter(s) IntraMuscular once  magnesium sulfate  IVPB 2 Gram(s) IV Intermittent once  metolazone 5 milliGRAM(s) Oral daily  pantoprazole    Tablet 40 milliGRAM(s) Oral before breakfast  potassium chloride    Tablet ER 40 milliEquivalent(s) Oral once  tamsulosin 0.4 milliGRAM(s) Oral at bedtime    PRN MEDICATIONS  acetaminophen     Tablet .. 650 milliGRAM(s) Oral every 6 hours PRN                                            ------------------------------------------------------------  VITAL SIGNS: Last 24 Hours  T(C): 35.6 (2022 04:55), Max: 36.3 (2022 20:43)  T(F): 96 (2022 04:55), Max: 97.4 (2022 20:43)  HR: 140 (2022 07:41) (69 - 144)  BP: 105/60 (2022 07:41) (100/62 - 120/65)  BP(mean): 72 (2022 07:41) (72 - 72)  RR: 18 (2022 07:41) (16 - 18)  SpO2: 93% (2022 07:41) (93% - 97%)      22 @ 07:01  -  22 @ 07:00  --------------------------------------------------------  IN: 705 mL / OUT: 1600 mL / NET: -895 mL                                               LABS:                        10.8   6.02  )-----------( 136      ( 2022 04:30 )             33.3         139  |  102  |  42<H>  ----------------------------<  102<H>  3.2<L>   |  25  |  2.2<H>    Ca    9.3      2022 04:30  Phos  4.4       Mg     1.9         TPro  5.1<L>  /  Alb  3.2<L>  /  TBili  0.7  /  DBili  x   /  AST  12  /  ALT  28  /  AlkPhos  43        Urinalysis Basic - ( 2022 01:00 )    Color: Colorless / Appearance: Clear / S.009 / pH: x  Gluc: x / Ketone: Negative  / Bili: Negative / Urobili: <2 mg/dL   Blood: x / Protein: Negative / Nitrite: Negative   Leuk Esterase: Negative / RBC: 17 /HPF / WBC 1 /HPF   Sq Epi: x / Non Sq Epi: 0 /HPF / Bacteria: Negative        Troponin T, Serum: 0.07 ng/mL *HH* (22 @ 04:30)  Lactate, Blood: 0.9 mmol/L (22 @ 04:30)          CARDIAC MARKERS ( 2022 04:30 )  x     / 0.07 ng/mL / x     / x     / x      CARDIAC MARKERS ( 2022 04:30 )  x     / 0.07 ng/mL / 30 U/L / x     / 2.5 ng/mL  CARDIAC MARKERS ( 2022 13:01 )  x     / 0.06 ng/mL / x     / x     / x                                                  RADIOLOGY:        PHYSICAL EXAM:  GENERAL: NAD, lying in bed comfortably  HEAD:  Atraumatic, Normocephalic  EYES: EOMI, PERRLA, conjunctiva and sclera clear  ENT: Moist mucous membranes  NECK: Supple, No JVD  CHEST/LUNG: Clear to auscultation bilaterally; No rales, rhonchi, wheezing, or rubs. Unlabored respirations  HEART: Regular rate and rhythm; No murmurs, rubs, or gallops  ABDOMEN: BSx4; Soft, nontender, nondistended  EXTREMITIES:  2+ Peripheral Pulses, brisk capillary refill. No clubbing, cyanosis, or edema  NERVOUS SYSTEM:  A&Ox3, no focal deficits   SKIN: No rashes or lesions                                         ASSESSMENT & PLAN    Past medical history and hospital course                                                                                                           ----------------------------------------------------  # DVT prophylaxis     # GI prophylaxis     # Diet     # Activity Score (AM-PAC)    # Code status     # Disposition                                                                              --------------------------------------------------------    # Handoff      SAÚL HODGSON 81y Male  MRN#: [id number]   CODE STATUS:________    Hospital Day: 2d    Pt is currently admitted with the primary diagnosis of SOB with possible volume overload 2/2 CHFE    SUBJECTIVE  Hospital Course  Mr. Hodgson is an 81 year old male patient known to have:  - Baseline AO3, comes from home, lives with wife  - CAD s/p CABG x4 in . Cardiologist Dr Sebastian Mendoza  - CHF (no TTE in chart) and s/p AVR . Cardiologist Dr Sebastian Mendoza. Home med PO Lasix 40mg QD per wife  - CKD III (Baseline Cr 1.3)  - Recent admission from - for COVID Pneumonia s/p IV Dexamethasone and IV RDV  - Recently found to have Acute Right Peroneal, Popliteal, and PT DVT during recent admission on . Discharged on Eliquis. Most recent home dose per wife is 5mg in AM and 2.5mg in PM (not 5mg BID) due to nose bleed  - HTN    He presented to the ED on  after being referred by Dr Murphy for new onset afib with RVR.  History goes back to  when the patient was following up with Dr Murphy.  During the visit, patient was found to be volume overloaded.  His HR was in 150bpm and an ECG performed revealed new onset Afib with RVR.  As a result, patient was referred by Dr Murphy to ED for evaluation.  On history taking, patient reports a 2 week history of worsening shortness of breath on minimal exertion (even walking few steps make him feel out of breath).   This has been associated with worsening bilateral leg swelling, orthopnea (sleeps in a seated position), and PNDs in the absence of any chest pain, palpitations, light headedness, nausea, or pre/syncope.  On review of systems, patient denies any recent fever, chills, night sweats, URTI symptoms (cough, rhinorrhea, sore throat), urinary symptoms (urinary frequency, urgency, intermittence, dysuria, foul smelling urine, cloudy urine), change in bowel movements (diarrhea or constipation), abdominal pain, headache, or vomiting.     Overnight events   No overnight events   On telemetry: A fib with RVR with abberancy - uncontrolled     Subjective complaints   Patient is feeling fine, On RA satting well, ANOx3, has no medical complaints in general.  ROS negative for SOB, chest pain, lightheadedness, palpitations, abdominal pain/distention   reports only LE swelling     Present Today:   - Albright:  No [  ], Yes [ X  ] : Indication:                                                OBJECTIVE  PAST MEDICAL & SURGICAL HISTORY  Chronic systolic congestive heart failure    HTN (hypertension)    Dyslipidemia                                                ALLERGIES:  No Known Allergies                           HOME MEDICATIONS  Home Medications:  Eliquis 2.5 mg oral tablet: 1 tab(s) orally once a day at night (2022 21:53)  Eliquis 5 mg oral tablet: 1 tab(s) orally once a day in morning (2022 21:53)  LATANOPROST  0.005 % SOLN:  (12 Dec 2021 16:26)  Metoprolol Tartrate 25 mg oral tablet: 1 tab(s) orally once a day in morning (2022 21:54)  Metoprolol Tartrate 25 mg oral tablet: 12.5 milligram(s) orally once a day at night (2022 21:55)  olmesartan 20 mg oral tablet: 1 tab(s) orally once a day (2022 21:56)  rosuvastatin 5 mg oral tablet: 1 tab(s) orally once a day (12 Dec 2021 16:26)                           MEDICATIONS:  STANDING MEDICATIONS  apixaban 2.5 milliGRAM(s) Oral two times a day  aspirin  chewable 81 milliGRAM(s) Oral daily  atorvastatin 20 milliGRAM(s) Oral at bedtime  buMETAnide Injectable 2 milliGRAM(s) IV Push two times a day  chlorhexidine 4% Liquid 1 Application(s) Topical <User Schedule>  influenza  Vaccine (HIGH DOSE) 0.7 milliLiter(s) IntraMuscular once  magnesium sulfate  IVPB 2 Gram(s) IV Intermittent once  metolazone 5 milliGRAM(s) Oral daily  pantoprazole    Tablet 40 milliGRAM(s) Oral before breakfast  potassium chloride    Tablet ER 40 milliEquivalent(s) Oral once  tamsulosin 0.4 milliGRAM(s) Oral at bedtime    PRN MEDICATIONS  acetaminophen     Tablet .. 650 milliGRAM(s) Oral every 6 hours PRN                                            ------------------------------------------------------------  VITAL SIGNS: Last 24 Hours  T(C): 35.6 (2022 04:55), Max: 36.3 (2022 20:43)  T(F): 96 (2022 04:55), Max: 97.4 (2022 20:43)  HR: 140 (2022 07:41) (69 - 144)  BP: 105/60 (2022 07:41) (100/62 - 120/65)  BP(mean): 72 (2022 07:41) (72 - 72)  RR: 18 (2022 07:41) (16 - 18)  SpO2: 93% (2022 07:41) (93% - 97%)      22 @ 07:01  -  22 @ 07:00  --------------------------------------------------------  IN: 705 mL / OUT: 1600 mL / NET: -895 mL                                               LABS:                        10.8   6.02  )-----------( 136      ( 2022 04:30 )             33.3     29    139  |  102  |  42<H>  ----------------------------<  102<H>  3.2<L>   |  25  |  2.2<H>    Ca    9.3      2022 04:30  Phos  4.4       Mg     1.9         TPro  5.1<L>  /  Alb  3.2<L>  /  TBili  0.7  /  DBili  x   /  AST  12  /  ALT  28  /  AlkPhos  43  29      Urinalysis Basic - ( 2022 01:00 )    Color: Colorless / Appearance: Clear / S.009 / pH: x  Gluc: x / Ketone: Negative  / Bili: Negative / Urobili: <2 mg/dL   Blood: x / Protein: Negative / Nitrite: Negative   Leuk Esterase: Negative / RBC: 17 /HPF / WBC 1 /HPF   Sq Epi: x / Non Sq Epi: 0 /HPF / Bacteria: Negative        Troponin T, Serum: 0.07 ng/mL *HH* (22 @ 04:30)  Lactate, Blood: 0.9 mmol/L (22 @ 04:30)          CARDIAC MARKERS ( 2022 04:30 )  x     / 0.07 ng/mL / x     / x     / x      CARDIAC MARKERS ( 2022 04:30 )  x     / 0.07 ng/mL / 30 U/L / x     / 2.5 ng/mL  CARDIAC MARKERS ( 2022 13:01 )  x     / 0.06 ng/mL / x     / x     / x                                                  RADIOLOGY:       EXAM:  ECHO TTE WC COMP WO DOPP        PROCEDURE DATE:  2022      INTERPRETATION:   [place] [address]                Phone: [phone].   TRANSTHORACIC ECHOCARDIOGRAM REPORT        Patient Name:   SAÚL HODGSON Accession #: [id number]  Medical Rec #:  [id number]       Height:      68.0 in 172.7 cm  YOB: 1940     Weight:      235.0 lb 106.59 kg  Patient Age:    81 years  BSA:         2.19 m²  Patient Gender: M              BP:          133/73 mmHg      Date of Exam:        2022 2:06:30 PM  Referring Physician: [id number] MURPHY  Sonographer:         Erika Miranda  Reading Physician:   Vadim Barrett.    Procedure:     2D Echo/Doppler/Color Doppler Complete.  Indications:   I48.91 - Unspecified Atrial Fibrillation  Diagnosis:     I48.91 - Unspecified Atrial Fibrillation  Study Details: Technically suboptimal study.        Summary:   1. LV Ejection Fraction by Mcdonough's Method with a biplane EF of 27 %.   2. Severely decreased global left ventricular systolic function.   3. LV apex is not well visualized. If clinically indicated, consider   repat exam with Lumason echo enhancing agent.   4. Paradoxical LV septum motion consistent with post op state. Cannot   rule constriction.   5. The left ventricular diastolic function could not be assessed in this   study.   6. Moderately enlarged right ventricle.   7. Severely reduced RV systolic function.   8. Bioprosthetic valve in the Aortic position (Unknown make and size).   Peak transvalvular velocity 0.84 m/s. Peak/mean pressure gradient 14/9   mmHg. LESIA VTI 1.26 cm2. Acceleration time 66 msec. Findings are   suggestive of normal function bioprosthetic. Repeater exam with better HR   control is recommended to better evaluate valve hemodynamics.   9. Mild thickening and calcification of the anterior and posterior   mitral valve leaflets.  10. Moderate mitral annular calcification.  11. Mild mitral valve regurgitation.  12. Moderate-severe tricuspid regurgitation.  13. Estimated pulmonary artery systolic pressure is 51.8 mmHg assuming a   right atrial pressure of 10 mmHg, which is consistent with moderate   pulmonary hypertension.  14. Left atrial enlargement.  15. Right atrial enlargement.  16. LV systolic function evaluation and hemodynamic assessment is   adversely affected by tachycardia and irregular rhythm.      PHYSICAL EXAM:  GENERAL: NAD, lying in bed comfortably  NECK: Supple, No JVD  CHEST/LUNG: Clear to auscultation bilaterally; diffuse bilateral crackles + wheezing   HEART: Irregular rate and rhythm; No murmurs, rubs, or gallops  ABDOMEN: BSx4; Soft, nontender, nondistended  EXTREMITIES:  2+ Peripheral Pulses,. ++LE edema bilateral   NERVOUS SYSTEM:  A&Ox3, no focal deficits   SKIN: No rashes or lesions                                         ASSESSMENT & PLAN    81 year old male patient with history of CAD s/p CABG, CHF, s/p AVR, HTN, CKD III, and recently diagnosed right LE DVT who was referred to the ED by Dr Murphy for new onset afib with RVR in setting of volume overload, found to be tachycardic in afib with RVR on presentation s/p cardizem drip, found to have elevated pro BNP and evidence of NICA on labs and congested lungs on imaging s/p IV diuretics, to be admitted to  for telemetry monitoring and further investigations and management of acute on chronic CHF exacerbation in setting of new onset afib with RVR. Currently still tachycardic  bpm.    #SOB with volume overload   #Possible Acute on Chronic CHF Exacerbation  s/p AVR   * No TTE in chart  * Cardiologist Dr Sebastian Mendoza  * Home med PO Lasix 40mg QD   * Pro BNP 8852   * PE leg swelling and diffuse crackles  * CXR  diffuse bilateral interstitial opacities  * S/P IV Lasix 20mg x1 and IV Bumex 1mg x1 doses in ED  - Cardiology Team on board on 3C  - Monitor accurate in/out  - Daily weight (standing)  - Monitor SaO2 and O2 requirements: Currently SAO2 98% on RA  - started on Bumex 2 IV BID   - Salt restricted diet and Fluid restriction to 1.2L per 24 hours  - Monitor telemetry   - Trend electrolytes and keep K>4 and Mg>2  - Check TTE --> EF 27% HFrEF --> continue same HF management   - Check Iron Studies --> TIBC 15% iron 34 ferritin 223 --> likely iron depleted --> started IV venofer for 5 days   - started metolazone 5 daily   - started tamsulosin daily   - Daily weight --> (do standing) --> 106kg today     New Onset Atrial Fibrillation with RVR  In Setting of Acute CHF Decompensation  * Referred from Dr Murphy's office for 's bpm and ECG showing afib with RVR  * ED Vitals 111/71mmHg and  bpm  * ED ECG afib with RVR  * S/P ELiquis 2.5mg x1 dose  * S/P Initiation of Cardizem drip at a rate of 15/hour with improvement in HR to 115 bpm  - Cardiology Team on board on 3C  - Monitor HR  - Monitor telemetry  - Check TSH --> 1.56 NL   - Keep K>4 and Mg>2  - For AC: resume home eliquis (ordered 2.5mg BID; takes 5mg in AM and 2.5mg in PM at home) originally prescribed for DVT  - For rate control: continue Cardizem drip at rate 15 for now --> off drip --> received one dose of digoxin --> consider continuing digoxin in the setting of Afib with HF - pt still uncontrolled f/u after cardioversion for need to keep digoxin   - Resume home dose of lopressor 25mg in AM and 12.5mg in PM (originally prescribed for HTN)      Acute Kidney Injury on CKD III  BUN:CR almost 20:1 but Less Likely Pre-Renal  * Baseline Cr 1.3  * ED BUN 46, Cr 2.4  * S/P IV diuretics as above  - Trend BUN and Cr daily  - Start IV diuretics as above for CHF decompensation  - Check urine studies  - Avoid nephrotoxic agents  - Hold Olmesartan 20mg QD for now  - IV iron for 5 days for HF --> consider adjustment for 10 days and f/u as per nephrology in the context of CKD       Elevated Troponin in Setting of History of CAD s/p CABG  Possibly in Setting of NICA  * Hx CAD s/p CABG x4 in   * Home Lopressor 25mg AM and 12.5mg PM, Aspirin 81mg QD, Rosuvastatin 5mg QD  * Cardiologist Dr Sebastian Mendoza  * No active chest pain  * Troponin 0.06 - no trending needed   * ECG noted with afib with RVR    - Cardiology Team on board on 3C  - Trend CE  - Check lipid profile  - Repeat ECG if patient complains of chest pain  - Resume Lopressor 25mg AM and 12.5mg PM, Aspirin 81mg QD, and Statin       Recent Right Peroneal, Popliteal, and Posterior Tibial DVT  * Recently found to have Acute Right Peroneal, Popliteal, and PT DVT during recent admission on   * VA duplex LE Venous from  noted  * Discharged on Eliquis -> Most recent home dose per wife is 5mg in AM and 2.5mg in PM (not 5mg BID) due to nose bleed  - Resume AC as ordered   - PT/OT       HTN  * Home meds Lopressor as above, Olmesartan 20mg QD  * ED /71 mmHg  - Monitor BP closely  - Resume Lopressor 25mg in AM and 12.5mg in PM  - Hold home Olmesartan 20mg QD in setting of NICA      Recent admission from - for COVID Pneumonia   * s/p IV Dexamethasone and IV RDV  * COVID  negative  - Resolved                                                                              ----------------------------------------------------  # DVT prophylaxis - Eliquis 2.5     # GI prophylaxis - Pantoprazole 40mg     # Diet - DASH     # Activity - OOBTC - IAT     # Code status - Full code     # Disposition                                                                              --------------------------------------------------------    # Handoff

## 2022-01-30 LAB
ALBUMIN SERPL ELPH-MCNC: 3.4 G/DL — LOW (ref 3.5–5.2)
ALBUMIN SERPL ELPH-MCNC: 3.9 G/DL — SIGNIFICANT CHANGE UP (ref 3.5–5.2)
ALP SERPL-CCNC: 50 U/L — SIGNIFICANT CHANGE UP (ref 30–115)
ALP SERPL-CCNC: 70 U/L — SIGNIFICANT CHANGE UP (ref 30–115)
ALT FLD-CCNC: 24 U/L — SIGNIFICANT CHANGE UP (ref 0–41)
ALT FLD-CCNC: 25 U/L — SIGNIFICANT CHANGE UP (ref 0–41)
ANION GAP SERPL CALC-SCNC: 15 MMOL/L — HIGH (ref 7–14)
ANION GAP SERPL CALC-SCNC: 16 MMOL/L — HIGH (ref 7–14)
AST SERPL-CCNC: 13 U/L — SIGNIFICANT CHANGE UP (ref 0–41)
AST SERPL-CCNC: 14 U/L — SIGNIFICANT CHANGE UP (ref 0–41)
BASOPHILS # BLD AUTO: 0.05 K/UL — SIGNIFICANT CHANGE UP (ref 0–0.2)
BASOPHILS NFR BLD AUTO: 0.7 % — SIGNIFICANT CHANGE UP (ref 0–1)
BILIRUB SERPL-MCNC: 0.7 MG/DL — SIGNIFICANT CHANGE UP (ref 0.2–1.2)
BILIRUB SERPL-MCNC: 0.7 MG/DL — SIGNIFICANT CHANGE UP (ref 0.2–1.2)
BUN SERPL-MCNC: 37 MG/DL — HIGH (ref 10–20)
BUN SERPL-MCNC: 39 MG/DL — HIGH (ref 10–20)
CALCIUM SERPL-MCNC: 10.1 MG/DL — SIGNIFICANT CHANGE UP (ref 8.5–10.1)
CALCIUM SERPL-MCNC: 9.6 MG/DL — SIGNIFICANT CHANGE UP (ref 8.5–10.1)
CHLORIDE SERPL-SCNC: 100 MMOL/L — SIGNIFICANT CHANGE UP (ref 98–110)
CHLORIDE SERPL-SCNC: 97 MMOL/L — LOW (ref 98–110)
CO2 SERPL-SCNC: 26 MMOL/L — SIGNIFICANT CHANGE UP (ref 17–32)
CO2 SERPL-SCNC: 27 MMOL/L — SIGNIFICANT CHANGE UP (ref 17–32)
CREAT SERPL-MCNC: 2.3 MG/DL — HIGH (ref 0.7–1.5)
CREAT SERPL-MCNC: 2.4 MG/DL — HIGH (ref 0.7–1.5)
DIGOXIN SERPL-MCNC: 0.9 NG/ML — SIGNIFICANT CHANGE UP (ref 0.8–2)
EOSINOPHIL # BLD AUTO: 0.07 K/UL — SIGNIFICANT CHANGE UP (ref 0–0.7)
EOSINOPHIL NFR BLD AUTO: 1 % — SIGNIFICANT CHANGE UP (ref 0–8)
GLUCOSE SERPL-MCNC: 129 MG/DL — HIGH (ref 70–99)
GLUCOSE SERPL-MCNC: 92 MG/DL — SIGNIFICANT CHANGE UP (ref 70–99)
HCT VFR BLD CALC: 34.6 % — LOW (ref 42–52)
HGB BLD-MCNC: 11.1 G/DL — LOW (ref 14–18)
IMM GRANULOCYTES NFR BLD AUTO: 0.3 % — SIGNIFICANT CHANGE UP (ref 0.1–0.3)
LYMPHOCYTES # BLD AUTO: 1.07 K/UL — LOW (ref 1.2–3.4)
LYMPHOCYTES # BLD AUTO: 15.6 % — LOW (ref 20.5–51.1)
MAGNESIUM SERPL-MCNC: 2 MG/DL — SIGNIFICANT CHANGE UP (ref 1.8–2.4)
MAGNESIUM SERPL-MCNC: 2.1 MG/DL — SIGNIFICANT CHANGE UP (ref 1.8–2.4)
MCHC RBC-ENTMCNC: 30.7 PG — SIGNIFICANT CHANGE UP (ref 27–31)
MCHC RBC-ENTMCNC: 32.1 G/DL — SIGNIFICANT CHANGE UP (ref 32–37)
MCV RBC AUTO: 95.8 FL — HIGH (ref 80–94)
MONOCYTES # BLD AUTO: 0.82 K/UL — HIGH (ref 0.1–0.6)
MONOCYTES NFR BLD AUTO: 11.9 % — HIGH (ref 1.7–9.3)
NEUTROPHILS # BLD AUTO: 4.84 K/UL — SIGNIFICANT CHANGE UP (ref 1.4–6.5)
NEUTROPHILS NFR BLD AUTO: 70.5 % — SIGNIFICANT CHANGE UP (ref 42.2–75.2)
NRBC # BLD: 0 /100 WBCS — SIGNIFICANT CHANGE UP (ref 0–0)
PLATELET # BLD AUTO: 138 K/UL — SIGNIFICANT CHANGE UP (ref 130–400)
POTASSIUM SERPL-MCNC: 3.5 MMOL/L — SIGNIFICANT CHANGE UP (ref 3.5–5)
POTASSIUM SERPL-MCNC: 3.7 MMOL/L — SIGNIFICANT CHANGE UP (ref 3.5–5)
POTASSIUM SERPL-SCNC: 3.5 MMOL/L — SIGNIFICANT CHANGE UP (ref 3.5–5)
POTASSIUM SERPL-SCNC: 3.7 MMOL/L — SIGNIFICANT CHANGE UP (ref 3.5–5)
PROT SERPL-MCNC: 5 G/DL — LOW (ref 6–8)
PROT SERPL-MCNC: 6 G/DL — SIGNIFICANT CHANGE UP (ref 6–8)
RBC # BLD: 3.61 M/UL — LOW (ref 4.7–6.1)
RBC # FLD: 17.2 % — HIGH (ref 11.5–14.5)
SODIUM SERPL-SCNC: 139 MMOL/L — SIGNIFICANT CHANGE UP (ref 135–146)
SODIUM SERPL-SCNC: 142 MMOL/L — SIGNIFICANT CHANGE UP (ref 135–146)
WBC # BLD: 6.87 K/UL — SIGNIFICANT CHANGE UP (ref 4.8–10.8)
WBC # FLD AUTO: 6.87 K/UL — SIGNIFICANT CHANGE UP (ref 4.8–10.8)

## 2022-01-30 PROCEDURE — 99223 1ST HOSP IP/OBS HIGH 75: CPT

## 2022-01-30 PROCEDURE — 93010 ELECTROCARDIOGRAM REPORT: CPT

## 2022-01-30 RX ORDER — POTASSIUM CHLORIDE 20 MEQ
40 PACKET (EA) ORAL ONCE
Refills: 0 | Status: DISCONTINUED | OUTPATIENT
Start: 2022-01-30 | End: 2022-01-30

## 2022-01-30 RX ORDER — AMIODARONE HYDROCHLORIDE 400 MG/1
0.5 TABLET ORAL
Qty: 900 | Refills: 0 | Status: DISCONTINUED | OUTPATIENT
Start: 2022-01-30 | End: 2022-01-31

## 2022-01-30 RX ORDER — POTASSIUM CHLORIDE 20 MEQ
40 PACKET (EA) ORAL ONCE
Refills: 0 | Status: COMPLETED | OUTPATIENT
Start: 2022-01-30 | End: 2022-01-30

## 2022-01-30 RX ORDER — AMIODARONE HYDROCHLORIDE 400 MG/1
1 TABLET ORAL
Qty: 900 | Refills: 0 | Status: DISCONTINUED | OUTPATIENT
Start: 2022-01-30 | End: 2022-01-31

## 2022-01-30 RX ORDER — AMIODARONE HYDROCHLORIDE 400 MG/1
150 TABLET ORAL ONCE
Refills: 0 | Status: COMPLETED | OUTPATIENT
Start: 2022-01-30 | End: 2022-01-30

## 2022-01-30 RX ORDER — POTASSIUM CHLORIDE 20 MEQ
20 PACKET (EA) ORAL ONCE
Refills: 0 | Status: COMPLETED | OUTPATIENT
Start: 2022-01-30 | End: 2022-01-30

## 2022-01-30 RX ADMIN — PANTOPRAZOLE SODIUM 40 MILLIGRAM(S): 20 TABLET, DELAYED RELEASE ORAL at 06:12

## 2022-01-30 RX ADMIN — AMIODARONE HYDROCHLORIDE 600 MILLIGRAM(S): 400 TABLET ORAL at 17:41

## 2022-01-30 RX ADMIN — TAMSULOSIN HYDROCHLORIDE 0.4 MILLIGRAM(S): 0.4 CAPSULE ORAL at 22:10

## 2022-01-30 RX ADMIN — BUMETANIDE 2 MILLIGRAM(S): 0.25 INJECTION INTRAMUSCULAR; INTRAVENOUS at 17:56

## 2022-01-30 RX ADMIN — BUMETANIDE 2 MILLIGRAM(S): 0.25 INJECTION INTRAMUSCULAR; INTRAVENOUS at 06:24

## 2022-01-30 RX ADMIN — Medication 125 MICROGRAM(S): at 06:13

## 2022-01-30 RX ADMIN — APIXABAN 2.5 MILLIGRAM(S): 2.5 TABLET, FILM COATED ORAL at 17:42

## 2022-01-30 RX ADMIN — Medication 81 MILLIGRAM(S): at 12:45

## 2022-01-30 RX ADMIN — Medication 40 MILLIEQUIVALENT(S): at 22:10

## 2022-01-30 RX ADMIN — APIXABAN 2.5 MILLIGRAM(S): 2.5 TABLET, FILM COATED ORAL at 06:13

## 2022-01-30 RX ADMIN — IRON SUCROSE 210 MILLIGRAM(S): 20 INJECTION, SOLUTION INTRAVENOUS at 12:45

## 2022-01-30 RX ADMIN — ATORVASTATIN CALCIUM 20 MILLIGRAM(S): 80 TABLET, FILM COATED ORAL at 22:10

## 2022-01-30 RX ADMIN — AMIODARONE HYDROCHLORIDE 33.3 MG/MIN: 400 TABLET ORAL at 18:10

## 2022-01-30 RX ADMIN — Medication 20 MILLIEQUIVALENT(S): at 13:14

## 2022-01-30 RX ADMIN — CHLORHEXIDINE GLUCONATE 1 APPLICATION(S): 213 SOLUTION TOPICAL at 06:25

## 2022-01-30 NOTE — CONSULT NOTE ADULT - ASSESSMENT
Cards: Dr. Mendoza    81 year old male patient with history of CAD s/p CABG, CHF, s/p AVR (T), HTN, CKD III, and COVID (admitted 12/12-12/20) c/b right LE DVT (Eliquis) who was referred to the ED by Dr Murphy for new onset AF with RVR in setting of volume overload. s/p cardizem drip, found to have elevated pro BNP and evidence of NICA on labs and congested lungs on imaging s/p IV diuretics.   TSH normal. EF 27%.     Impression:  New onset AF RVR - on Digoxin, Eliquis  Acute on chronic HF exac, EF 27% (prior ?)  Hx CAD s/p CABG, AVR (T), HTN  Hx CKD III  Hx COVID 12/2021 c/b LE DVT on Eliquis  COVID neg 1/27    Plan:  - Recommend amio bolus f/b gtt since patient has been on AC since December (LFT WNL)  - Once amio gtt complete, please start amio 200 mg Q 12 x 2 weeks f/b 200 mg Po daily  - DINO/DCCV, f/u cardiology  - Cont Digoxin  - Please obtain echo and any prior ischemic verdin from Dr. Mendoza  - If this EF is newly decreased, may need further ischemic verdin  - Poss lifevest on discharge  - Maintain k>4 and mag >2 - please replete potassium k=3.5  - Strict I & Os, daily weights      EPS [id number]     Cards: Dr. Mendoza    81 year old male patient with history of CAD s/p CABG, CHF, s/p AVR (T), HTN, CKD III, and COVID (admitted 12/12-12/20) c/b right LE DVT (Eliquis) who was referred to the ED by Dr Murphy for new onset AF with RVR in setting of volume overload. s/p cardizem drip, found to have elevated pro BNP and evidence of NICA on labs and congested lungs on imaging s/p IV diuretics.   TSH normal. EF 27%.     Impression:  New onset AF RVR - on Digoxin, Eliquis  Acute on chronic HF exac, EF 27% (prior ?)  Hx CAD s/p CABG, AVR (T), HTN  Hx CKD III  Hx COVID 12/2021 c/b LE DVT on Eliquis  COVID neg 1/27    Plan:  - Recommend amio bolus f/b gtt since patient has been on AC since December (LFT WNL)  - Once amio gtt complete, please start amio 200 mg Q 12 x 2 weeks f/b 200 mg Po daily  - DINO/DCCV, f/u cardiology  - Cont Digoxin  - Please obtain echo and any prior ischemic verdin from Dr. Mendoza  - If this EF is newly decreased, may need further ischemic verdin  - Poss lifevest on discharge  - Maintain k>4 and mag >2 - please replete potassium k=3.5  - Strict I & Os, daily weights  - Outpatient fu with Dr. Rm in 3-4 weeks      EPS [id number]

## 2022-01-30 NOTE — PROGRESS NOTE ADULT - SUBJECTIVE AND OBJECTIVE BOX
[facility] FOLLOW UP NOTE  --------------------------------------------------------------------------------  24 hour events/subjective: Patient examined. Appears comfortable.    PAST HISTORY  --------------------------------------------------------------------------------  No significant changes to PMH, PSH, FHx, SHx, unless otherwise noted    ALLERGIES & MEDICATIONS  --------------------------------------------------------------------------------  Allergies    No Known Allergies    Standing Inpatient Medications  apixaban 2.5 milliGRAM(s) Oral two times a day  aspirin  chewable 81 milliGRAM(s) Oral daily  atorvastatin 20 milliGRAM(s) Oral at bedtime  buMETAnide Injectable 2 milliGRAM(s) IV Push two times a day  chlorhexidine 4% Liquid 1 Application(s) Topical <User Schedule>  digoxin     Tablet 125 MICROGram(s) Oral daily  influenza  Vaccine (HIGH DOSE) 0.7 milliLiter(s) IntraMuscular once  iron sucrose IVPB 100 milliGRAM(s) IV Intermittent every 24 hours  metolazone 5 milliGRAM(s) Oral daily  pantoprazole    Tablet 40 milliGRAM(s) Oral before breakfast  tamsulosin 0.4 milliGRAM(s) Oral at bedtime    PRN Inpatient Medications  acetaminophen     Tablet .. 650 milliGRAM(s) Oral every 6 hours PRN    VITALS/PHYSICAL EXAM  --------------------------------------------------------------------------------  T(C): 36 (01-30-22 @ 06:04), Max: 36 (01-30-22 @ 06:04)  HR: 139 (01-30-22 @ 06:04) (84 - 139)  BP: 139/69 (01-30-22 @ 06:04) (119/69 - 139/69)  RR: 18 (01-30-22 @ 06:04) (18 - 18)  SpO2: 94% (01-30-22 @ 06:04) (93% - 94%)    01-29-22 @ 07:01  -  01-30-22 @ 07:00  --------------------------------------------------------  IN: 0 mL / OUT: 3975 mL / NET: -3975 mL    01-30-22 @ 07:01  -  01-30-22 @ 13:39  --------------------------------------------------------  IN: 0 mL / OUT: 1100 mL / NET: -1100 mL    Physical Exam:  	Gen: NAD  	Pulm: CTA B/L  	CV: RRR, S1S2  	Abd: +BS, soft, nontender/nondistended  	: No suprapubic tenderness  	LE: Warm,  edema    LABS/STUDIES  --------------------------------------------------------------------------------              11.1   6.87  >-----------<  138      [01-30-22 @ 06:00]              34.6     142  |  100  |  39  ----------------------------<  92      [01-30-22 @ 06:00]  3.5   |  26  |  2.3        Ca     9.6     [01-30-22 @ 06:00]      Mg     2.1     [01-30-22 @ 06:00]    TPro  5.0  /  Alb  3.4  /  TBili  0.7  /  DBili  x   /  AST  13  /  ALT  24  /  AlkPhos  50  [01-30-22 @ 06:00]    Troponin 0.07      [01-29-22 @ 04:30]    Creatinine Trend:  SCr 2.3 [01-30 @ 06:00]  SCr 2.4 [01-29 @ 16:00]  SCr 2.2 [01-29 @ 04:30]  SCr 2.3 [01-28 @ 20:00]  SCr 2.2 [01-28 @ 16:00]    Urinalysis - [01-28-22 @ 01:00]      Color Colorless / Appearance Clear / SG 1.009 / pH 6.0      Gluc Negative / Ketone Negative  / Bili Negative / Urobili <2 mg/dL       Blood Moderate / Protein Negative / Leuk Est Negative / Nitrite Negative      RBC 17 / WBC 1 / Hyaline 2 / Gran  / Sq Epi  / Non Sq Epi 0 / Bacteria Negative    Urine Creatinine 23      [01-28-22 @ 01:00]  Urine Protein <5      [01-28-22 @ 01:00]  Urine Sodium 103.0      [01-28-22 @ 01:00]  Urine Urea Nitrogen 210      [01-28-22 @ 01:00]  Urine Chloride 107      [01-28-22 @ 01:00]    Iron 34, TIBC 229, %sat 15      [01-28-22 @ 16:00]  Ferritin 223      [01-28-22 @ 16:00]  PTH -- (Ca 9.6)      [01-28-22 @ 04:30]   40  TSH 1.56      [01-28-22 @ 04:30]  Lipid: chol 140, , HDL 37, LDL --      [01-28-22 @ 04:30]

## 2022-01-30 NOTE — PROGRESS NOTE ADULT - ASSESSMENT
Acute kidney injury / Cardiorenal syndrome   chronic kidney disease stage 3  prior normal renal sono (12/21)  no proteinuria  acute on chronic systolic CHF  Afib with RVR / s/p AVR (procine) / CAD / CABG  DVT / PE on eliquis  recent severe COVID PNA  HTN     plan:    cont bumex 2mg iv q12h - likely change to po tomorrow  metolazone 5mg po qd  monitor UOP / keep negative balance as BP allows  trend renal function, lytes with iv diuresis

## 2022-01-30 NOTE — PROGRESS NOTE ADULT - SUBJECTIVE AND OBJECTIVE BOX
Vascular Cardiology  Progress note     [email or web address]     CC:  shortness of breath    INTERVAL HISTORY:    Reports great improvement in LE swelling. Feels well. Denies palpitations, lightheadedness/dizziness, chest pain, SOB.     Allergies    No Known Allergies    Intolerances    	    MEDICATIONS:  apixaban 2.5 milliGRAM(s) Oral two times a day  aspirin  chewable 81 milliGRAM(s) Oral daily  buMETAnide Injectable 2 milliGRAM(s) IV Push two times a day  digoxin     Tablet 125 MICROGram(s) Oral daily  metolazone 5 milliGRAM(s) Oral daily  tamsulosin 0.4 milliGRAM(s) Oral at bedtime        acetaminophen     Tablet .. 650 milliGRAM(s) Oral every 6 hours PRN    pantoprazole    Tablet 40 milliGRAM(s) Oral before breakfast    atorvastatin 20 milliGRAM(s) Oral at bedtime    chlorhexidine 4% Liquid 1 Application(s) Topical <User Schedule>  influenza  Vaccine (HIGH DOSE) 0.7 milliLiter(s) IntraMuscular once  iron sucrose IVPB 100 milliGRAM(s) IV Intermittent every 24 hours      PAST MEDICAL & SURGICAL HISTORY:  Chronic systolic congestive heart failure    HTN (hypertension)    Dyslipidemia        FAMILY HISTORY:      SOCIAL HISTORY:  unchanged    REVIEW OF SYSTEMS:  CONSTITUTIONAL: No fever, weight loss, or fatigue  EYES: No eye pain, visual disturbances, or discharge  ENMT:  No difficulty hearing, tinnitus, vertigo; No sinus or throat pain  NECK: No pain or stiffness  RESPIRATORY: No cough, wheezing, chills or hemoptysis; No Shortness of Breath  CARDIOVASCULAR: No chest pain, palpitations, passing out, dizziness, or leg swelling  GASTROINTESTINAL: No abdominal or epigastric pain. No nausea, vomiting, or hematemesis; No diarrhea or constipation. No melena or hematochezia.  GENITOURINARY: No dysuria, frequency, hematuria, or incontinence  NEUROLOGICAL: No headaches, memory loss, loss of strength, numbness, or tremors  SKIN: No itching, burning, rashes, or lesions   LYMPH Nodes: No enlarged glands  ENDOCRINE: No heat or cold intolerance; No hair loss  MUSCULOSKELETAL: No joint pain or swelling; No muscle, back, or extremity pain  PSYCHIATRIC: No depression, anxiety, mood swings, or difficulty sleeping  HEME/LYMPH: No easy bruising, or bleeding gums  ALLERY AND IMMUNOLOGIC: No hives or eczema	    [ x] All others negative	  [ ] Unable to obtain    PHYSICAL EXAM:  T(C): 35.7 (01-30-22 @ 13:50), Max: 36 (01-30-22 @ 06:04)  HR: 144 (01-30-22 @ 13:50) (84 - 144)  BP: 139/66 (01-30-22 @ 13:50) (119/69 - 139/69)  RR: 16 (01-30-22 @ 13:50) (16 - 18)  SpO2: 94% (01-30-22 @ 06:04) (93% - 94%)  Wt(kg): --  I&O's Summary    29 Jan 2022 07:01  -  30 Jan 2022 07:00  --------------------------------------------------------  IN: 0 mL / OUT: 3975 mL / NET: -3975 mL    30 Jan 2022 07:01  -  30 Jan 2022 16:23  --------------------------------------------------------  IN: 0 mL / OUT: 2000 mL / NET: -2000 mL        Appearance: Normal, speaking in complete sentences	  HEENT:   Normal oral mucosa, PERRL, EOMI	  Carotid:  Right: No bruit    Left:  No bruit  Lymphatic: No lymphadenopathy  Cardiovascular: tachy, irregular, Normal S1 S2, No JVD, No murmurs, 1+ LE edema R>L, but with wrinkles as well  Respiratory: diminished in bases	  Psychiatry: A & O x 3, Mood & affect appropriate  Gastrointestinal:  Soft, Non-tender, + BS	  Skin: No rashes, No ecchymoses, No cyanosis	  Neurologic: Non-focal  Extremities: Normal range of motion, No clubbing, cyanosis.    LABS:	 	    CBC Full  -  ( 30 Jan 2022 06:00 )  WBC Count : 6.87 K/uL  Hemoglobin : 11.1 g/dL  Hematocrit : 34.6 %  Platelet Count - Automated : 138 K/uL  Mean Cell Volume : 95.8 fL  Mean Cell Hemoglobin : 30.7 pg  Mean Cell Hemoglobin Concentration : 32.1 g/dL  Auto Neutrophil # : 4.84 K/uL  Auto Lymphocyte # : 1.07 K/uL  Auto Monocyte # : 0.82 K/uL  Auto Eosinophil # : 0.07 K/uL  Auto Basophil # : 0.05 K/uL  Auto Neutrophil % : 70.5 %  Auto Lymphocyte % : 15.6 %  Auto Monocyte % : 11.9 %  Auto Eosinophil % : 1.0 %  Auto Basophil % : 0.7 %    01-30    142  |  100  |  39<H>  ----------------------------<  92  3.5   |  26  |  2.3<H>  01-29    145  |  104  |  41<H>  ----------------------------<  124<H>  4.0   |  27  |  2.4<H>    Ca    9.6      30 Jan 2022 06:00  Ca    9.8      29 Jan 2022 16:00  Mg     2.1     01-30  Mg     2.3     01-29    TPro  5.0<L>  /  Alb  3.4<L>  /  TBili  0.7  /  DBili  x   /  AST  13  /  ALT  24  /  AlkPhos  50  01-30  TPro  5.1<L>  /  Alb  3.2<L>  /  TBili  0.7  /  DBili  x   /  AST  12  /  ALT  28  /  AlkPhos  43  01-29

## 2022-01-30 NOTE — CONSULT NOTE ADULT - SUBJECTIVE AND OBJECTIVE BOX
NEPHROLOGY CONSULTATION NOTE    82 yo male with PMH of CKD, recent COVID PNA hospitalization complicated by NICA and DVT, CAD, CHF, s/p AVR '11, with progressive SOB and leg swelling.  Was scheduled for telemed visit with me this afternoon, but sent in by Dr. Murphy (PMD) for rapid afib with chf exacerbation.    PAST MEDICAL & SURGICAL HISTORY:  Chronic systolic congestive heart failure    HTN (hypertension)    Dyslipidemia      Allergies:  No Known Allergies    Home Medications Reviewed    SOCIAL HISTORY:  Denies ETOH,Smoking, drugs    FAMILY HISTORY:  no renal disease        REVIEW OF SYSTEMS:  CONSTITUTIONAL: No weakness, fevers or chills  EYES/ENT: No visual changes;  No vertigo or throat pain   NECK: No pain or stiffness  RESPIRATORY: No cough, wheezing, hemoptysis; + shortness of breath  CARDIOVASCULAR: No chest pain or palpitations.  GASTROINTESTINAL: No abdominal or epigastric pain. No nausea, vomiting, or hematemesis; No diarrhea or constipation. No melena or hematochezia.  GENITOURINARY: No dysuria, frequency, foamy urine, urinary urgency, incontinence or hematuria  NEUROLOGICAL: No numbness or weakness  SKIN: No itching, burning, rashes, or lesions   VASCULAR: + bilateral lower extremity edema.   All other review of systems is negative unless indicated above.    PHYSICAL EXAM:  Constitutional: NAD  HEENT: anicteric sclera, oropharynx clear, MMM  Neck: + JVD  Respiratory: decreased BS b/l with bibasilar crackles  Cardiovascular: S1, S2, irreg, tachy  Gastrointestinal: BS+, soft, NT/ND  Extremities: No cyanosis or clubbing. 2-3+ peripheral edema  Neurological: A/O x 3, no focal deficits  Psychiatric: Normal mood, normal affect  : No CVA tenderness. No covarrubias.   Skin: No rashes    Hospital Medications:   MEDICATIONS  (STANDING):  aspirin  chewable 81 milliGRAM(s) Oral daily  atorvastatin 20 milliGRAM(s) Oral at bedtime  chlorhexidine 4% Liquid 1 Application(s) Topical <User Schedule>  diltiazem Infusion 15 mG/Hr (15 mL/Hr) IV Continuous <Continuous>  metoprolol tartrate 25 milliGRAM(s) Oral <User Schedule>  metoprolol tartrate 12.5 milliGRAM(s) Oral <User Schedule>  pantoprazole    Tablet 40 milliGRAM(s) Oral before breakfast        VITALS:  T(F): 96 (01-27-22 @ 12:34), Max: 96 (01-27-22 @ 12:34)  HR: 115 (01-27-22 @ 20:58)  BP: 120/84 (01-27-22 @ 20:58)  RR: 16 (01-27-22 @ 20:58)  SpO2: 100% (01-27-22 @ 20:58)  Wt(kg): --        LABS:  01-27    141  |  105  |  46<H>  ----------------------------<  120<H>  3.9   |  18  |  2.4<H>    Ca    9.6      27 Jan 2022 13:01  Mg     2.1     01-27    TPro  5.7<L>  /  Alb  3.6  /  TBili  0.6  /  DBili      /  AST  28  /  ALT  47<H>  /  AlkPhos  45  01-27                          12.1   7.14  )-----------( 147      ( 27 Jan 2022 13:01 )             37.1       Urine Studies:        RADIOLOGY & ADDITIONAL STUDIES:      Hospital Medications:   MEDICATIONS  (STANDING):  aspirin  chewable 81 milliGRAM(s) Oral daily  atorvastatin 20 milliGRAM(s) Oral at bedtime  chlorhexidine 4% Liquid 1 Application(s) Topical <User Schedule>  diltiazem Infusion 15 mG/Hr (15 mL/Hr) IV Continuous <Continuous>  metoprolol tartrate 25 milliGRAM(s) Oral <User Schedule>  metoprolol tartrate 12.5 milliGRAM(s) Oral <User Schedule>  pantoprazole    Tablet 40 milliGRAM(s) Oral before breakfast        VITALS:  T(F): 96 (01-27-22 @ 12:34), Max: 96 (01-27-22 @ 12:34)  HR: 115 (01-27-22 @ 20:58)  BP: 120/84 (01-27-22 @ 20:58)  RR: 16 (01-27-22 @ 20:58)  SpO2: 100% (01-27-22 @ 20:58)  Wt(kg): --        LABS:  01-27    141  |  105  |  46<H>  ----------------------------<  120<H>  3.9   |  18  |  2.4<H>    Ca    9.6      27 Jan 2022 13:01  Mg     2.1     01-27    TPro  5.7<L>  /  Alb  3.6  /  TBili  0.6  /  DBili      /  AST  28  /  ALT  47<H>  /  AlkPhos  45  01-27                          12.1   7.14  )-----------( 147      ( 27 Jan 2022 13:01 )             37.1       Urine Studies:        RADIOLOGY & ADDITIONAL STUDIES:  
Patient is a 81y old  Male who presents with a chief complaint of Afib with RVR (30 Jan 2022 13:39)    HPI:  History of Present Illness    Mr. Silva is an 81 year old male patient known to have:  - Baseline AO3, comes from home, lives with wife  - CAD s/p CABG x4 in 2006. Cardiologist Dr Sebastian Mendoza  - CHF (no TTE in chart) and s/p AVR 2011. Cardiologist Dr Sebastian Mendoza. Home med PO Lasix 40mg QD per wife  - CKD III (Baseline Cr 1.3)  - Recent admission from 12/12-12/20 for COVID Pneumonia s/p IV Dexamethasone and IV RDV  - Recently found to have Acute Right Peroneal, Popliteal, and PT DVT during recent admission on 12/12. Discharged on Eliquis. Most recent home dose per wife is 5mg in AM and 2.5mg in PM (not 5mg BID) due to nose bleed  - HTN      He presented to the ED on 01/27 after being referred by Dr Murphy for new onset afib with RVR.  History goes back to 01/27 when the patient was following up with Dr Murphy.  During the visit, patient was found to be volume overloaded.  His HR was in 150bpm and an ECG performed revealed new onset Afib with RVR.  As a result, patient was referred by Dr Murphy to ED for evaluation.  On history taking, patient reports a 2 week history of worsening shortness of breath on minimal exertion (even walking few steps make him feel out of breath).   This has been associated with worsening bilateral leg swelling, orthopnea (sleeps in a seated position), and PNDs in the absence of any chest pain, palpitations, light headedness, nausea, or pre/syncope.      On review of systems, patient denies any recent fever, chills, night sweats, URTI symptoms (cough, rhinorrhea, sore throat), urinary symptoms (urinary frequency, urgency, intermittence, dysuria, foul smelling urine, cloudy urine), change in bowel movements (diarrhea or constipation), abdominal pain, headache, or vomiting.   No sick contacts.  No recent travel or exposure to recent travelers.      Upon presentation to the ED, the patient was tachycardic in afib with RVR as below  Vital Signs in ED   - /71 mmHg  -  bpm -> confirmed afib with RVR on ECG -> s/p cardizem drip at 15/hour -> most recent  during my evaluation  - RR 18 bpm  - T 35.6  - SaO2 97% on RA        - Patient was given IV Lasix 20mg x1 and IV Bumex 1mg x1 doses for volume overload in ED  - He was also given Eliquis 2.5mg x1 and started on IV Cardizem drip at 15/hour for afib with RVR  - He will be admitted to  for further monitoring, investigations, and management of acute on chronic CHF exacerbation in setting of new onset afib with RVR   (27 Jan 2022 21:26)      EP consulted for AF RVR management    REVIEW OF SYSTEMS    [x] A ten-point review of systems was otherwise negative except as noted.  [ ] Due to altered mental status/intubation, subjective information were not able to be obtained from the patient. History was obtained, to the extent possible, from review of the chart and collateral sources of information.      PAST MEDICAL & SURGICAL HISTORY:  Chronic systolic congestive heart failure    HTN (hypertension)    Dyslipidemia        Home Medications:  Eliquis 2.5 mg oral tablet: 1 tab(s) orally once a day at night (27 Jan 2022 21:53)  Eliquis 5 mg oral tablet: 1 tab(s) orally once a day in morning (27 Jan 2022 21:53)  LATANOPROST  0.005 % SOLN:  (12 Dec 2021 16:26)  Metoprolol Tartrate 25 mg oral tablet: 1 tab(s) orally once a day in morning (27 Jan 2022 21:54)  Metoprolol Tartrate 25 mg oral tablet: 12.5 milligram(s) orally once a day at night (27 Jan 2022 21:55)  olmesartan 20 mg oral tablet: 1 tab(s) orally once a day (27 Jan 2022 21:56)  rosuvastatin 5 mg oral tablet: 1 tab(s) orally once a day (12 Dec 2021 16:26)      Allergies:  No Known Allergies        MEDICATIONS  (STANDING):  apixaban 2.5 milliGRAM(s) Oral two times a day  aspirin  chewable 81 milliGRAM(s) Oral daily  atorvastatin 20 milliGRAM(s) Oral at bedtime  buMETAnide Injectable 2 milliGRAM(s) IV Push two times a day  chlorhexidine 4% Liquid 1 Application(s) Topical <User Schedule>  digoxin     Tablet 125 MICROGram(s) Oral daily  influenza  Vaccine (HIGH DOSE) 0.7 milliLiter(s) IntraMuscular once  iron sucrose IVPB 100 milliGRAM(s) IV Intermittent every 24 hours  metolazone 5 milliGRAM(s) Oral daily  pantoprazole    Tablet 40 milliGRAM(s) Oral before breakfast  tamsulosin 0.4 milliGRAM(s) Oral at bedtime    MEDICATIONS  (PRN):  acetaminophen     Tablet .. 650 milliGRAM(s) Oral every 6 hours PRN Moderate Pain (4 - 6)      Vital Signs Last 24 Hrs  T(C): 35.7 (30 Jan 2022 13:50), Max: 36 (30 Jan 2022 06:04)  T(F): 96.3 (30 Jan 2022 13:50), Max: 96.8 (30 Jan 2022 06:04)  HR: 144 (30 Jan 2022 13:50) (84 - 144)  BP: 139/66 (30 Jan 2022 13:50) (119/69 - 139/69)  BP(mean): 16 (30 Jan 2022 13:50) (16 - 16)  RR: 16 (30 Jan 2022 13:50) (16 - 18)  SpO2: 94% (30 Jan 2022 06:04) (93% - 94%)    PHYSICAL EXAM:    GENERAL: In no apparent distress, well nourished, and hydrated.  HEART: Irregalar  PULMONARY: B/L rales  ABDOMEN: Soft, Nontender, Nondistended; Bowel sounds present  EXTREMITIES: + LE edema  2+ Peripheral Pulses, No clubbing, cyanosis,  NEUROLOGICAL: AO x4, FERNANDEZ, speech clear    INTERPRETATION OF TELEMETRY: -150s    ECG: < from: 12 Lead ECG (01.28.22 @ 08:03) >  Ventricular Rate 121 BPM    Atrial Rate 122 BPM    QRS Duration 162 ms    Q-T Interval 340 ms    QTC Calculation(Bazett) 482 ms    R Axis -49 degrees    T Axis 131 degrees    Diagnosis Line Atrial fibrillation with rapid ventricular response with premature ventricular  or aberrantly conducted complexes  Left axis deviation  Left bundle branch block  Abnormal ECG    < end of copied text >      I&O's Detail    29 Jan 2022 07:01  -  30 Jan 2022 07:00  --------------------------------------------------------  IN:  Total IN: 0 mL    OUT:    Indwelling Catheter - Urethral (mL): 3975 mL  Total OUT: 3975 mL    Total NET: -3975 mL      30 Jan 2022 07:01  -  30 Jan 2022 15:19  --------------------------------------------------------  IN:  Total IN: 0 mL    OUT:    Indwelling Catheter - Urethral (mL): 2000 mL  Total OUT: 2000 mL    Total NET: -2000 mL          LABS:                        11.1   6.87  )-----------( 138      ( 30 Jan 2022 06:00 )             34.6     01-30    142  |  100  |  39<H>  ----------------------------<  92  3.5   |  26  |  2.3<H>    Ca    9.6      30 Jan 2022 06:00  Mg     2.1     01-30    TPro  5.0<L>  /  Alb  3.4<L>  /  TBili  0.7  /  DBili  x   /  AST  13  /  ALT  24  /  AlkPhos  50  01-30    CARDIAC MARKERS ( 29 Jan 2022 04:30 )  x     / 0.07 ng/mL / x     / x     / x            BNP      I&O's Detail    29 Jan 2022 07:01  -  30 Jan 2022 07:00  --------------------------------------------------------  IN:  Total IN: 0 mL    OUT:    Indwelling Catheter - Urethral (mL): 3975 mL  Total OUT: 3975 mL    Total NET: -3975 mL      30 Jan 2022 07:01  -  30 Jan 2022 15:19  --------------------------------------------------------  IN:  Total IN: 0 mL    OUT:    Indwelling Catheter - Urethral (mL): 2000 mL  Total OUT: 2000 mL    Total NET: -2000 mL      RADIOLOGY:  < from: TTE Echo Complete w/ Contrast w/o Doppler (01.28.22 @ 14:06) >  Summary:   1. LV Ejection Fraction by Mcdonough's Method with a biplane EF of 27 %.   2. Severely decreased global left ventricular systolic function.   3. LV apex is not well visualized. If clinically indicated, consider   repat exam with Lumason echo enhancing agent.   4. Paradoxical LV septum motion consistent with post op state. Cannot   rule constriction.   5. The left ventricular diastolic function could not be assessed in this   study.   6. Moderately enlarged right ventricle.   7. Severely reduced RV systolic function.   8. Bioprosthetic valve in the Aortic position (Unknown make and size).   Peak transvalvular velocity 0.84 m/s. Peak/mean pressure gradient 14/9   mmHg. LESIA VTI 1.26 cm2. Acceleration time 66 msec. Findings are   suggestive of normal function bioprosthetic. Repeater exam with better HR   control is recommended to better evaluate valve hemodynamics.   9. Mild thickening and calcification of the anterior and posterior   mitral valve leaflets.  10. Moderate mitral annular calcification.  11. Mild mitral valve regurgitation.  12. Moderate-severe tricuspid regurgitation.  13. Estimated pulmonary artery systolic pressure is 51.8 mmHg assuming a   right atrial pressure of 10 mmHg, which is consistent with moderate   pulmonary hypertension.  14. Left atrial enlargement.  15. Right atrial enlargement.  16. LV systolic function evaluation and hemodynamic assessment is   adversely affected by tachycardia and irregular rhythm.    PHYSICIAN INTERPRETATION:  Left Ventricle: The left ventricular internal cavity size is moderately   increased. Global LV systolic function was severely decreased. The left   ventricular diastolic function could not be assessed in this study.  LV apex is not well visualized. If clinically indicated, consider repat   exam with Lumason echo enhancing agent.  Right Ventricle: The right ventricular size is moderately enlarged. RV   systolic function is severely reduced.  Left Atrium: Left atrial enlargement.  Right Atrium: Right atrial enlargement.  Pericardium: There is noevidence of pericardial effusion.  Mitral Valve: Mild thickening and calcification of the anterior and   posterior mitral valve leaflets. There is moderate mitral annular   calcification. Mild mitral valve regurgitation is seen.  Tricuspid Valve: Moderate-severe tricuspid regurgitation is visualized.   Estimated pulmonary artery systolic pressure is 51.8 mmHg assuming a   right atrial pressure of 10 mmHg, which is consistent with moderate   pulmonary hypertension.  Aortic Valve: Bioprosthetic valve in the Aortic position (Unknown make   and size). Peak transvalvular velocity 0.84 m/s. Peak/mean pressure   gradient 14/9 mmHg. LESIA VTI 1.26 cm2. Acceleration time 66 msec. Findings   are suggestive of normal function bioprosthetic. Repeater exam with   better HR control is recommended to better evaluate valve hemodynamics.  Pulmonic Valve: Trace pulmonic valve regurgitation.  Venous: The inferior vena cava was dilated, with respiratory size   variation greater than 50%.    < end of copied text >

## 2022-01-30 NOTE — PROGRESS NOTE ADULT - ASSESSMENT
81M with CAD s/p CABG, bioprosth AVR, CKD (b/l 1.3), recent COVID infection with DVT/PE presented with SOB, edema and AFib with RVR.       Diuresing with Bumex and metolazone. He remains volume up today. Standing weight 106.6 kg yesterday -> 106 kg today.     Cr 2.2 ->2.4 in 24 hours    Plan  - S/p digoxin 250 mcg IV q8h x 3, continue digoxin 125 mcg daily  - Begin amio bolus f/b gtt as per EP recs, after gtt then amio 200 mg PO BID x2 weeks, followed by 200 mg PO daily  - DINO/DCCV when more euvolemic  - Continue Bumex 2 mg IV BID and metolazone 5 mg daily  - May have to discontinue metolazone if Cr continues to rise  - Replete for goal K > 4 and Mg > 2   - Strict I/Os and daily standing weights  - F/u digoxin level (not drawn this morning for some reason)  - Avoid CCB or Bb given the patient's severe BiV failure, will prioritize diuresis for rate control  - On apixaban 2.5 mg BID for Afib and history of clots  - Continue ASA 81 mg daily and atorvastatin 20 mg nightly .  81M with CAD s/p CABG, bioprosth AVR, CKD (b/l 1.3), recent COVID infection with DVT/PE presented with SOB, edema and AFib with RVR.       Diuresing with Bumex and metolazone. He remains volume up today. Standing weight 106.6 kg yesterday -> 106 kg today.     Cr 2.2 ->2.4 in 24 hours    Plan  - S/p digoxin 250 mcg IV q8h x3, continue digoxin 125 mcg daily  - Begin amio bolus f/b gtt as per EP recs, after gtt then amio 200 mg PO BID x2 weeks, followed by 200 mg PO daily  - DINO/DCCV when more euvolemic  - Continue Bumex 2 mg IV BID and metolazone 5 mg daily  - May have to discontinue metolazone if Cr continues to rise  - Replete for goal K > 4 and Mg > 2   - Strict I/Os and daily standing weights  - F/u digoxin level (not drawn this morning for some reason)  - Avoid CCB or Bb given the patient's severe BiV failure, will prioritize diuresis for rate control  - On apixaban 2.5 mg BID for Afib and history of clots  - Continue ASA 81 mg daily and atorvastatin 20 mg nightly .

## 2022-01-30 NOTE — CONSULT NOTE ADULT - ATTENDING COMMENTS
complex patient  AF with RVR  recommend rate control with Amio and digoxin  recommend DINO/DCCV  outpatient f/u with me

## 2022-01-31 LAB
ALBUMIN SERPL ELPH-MCNC: 3.9 G/DL — SIGNIFICANT CHANGE UP (ref 3.5–5.2)
ALP SERPL-CCNC: 51 U/L — SIGNIFICANT CHANGE UP (ref 30–115)
ALT FLD-CCNC: 20 U/L — SIGNIFICANT CHANGE UP (ref 0–41)
ANION GAP SERPL CALC-SCNC: 14 MMOL/L — SIGNIFICANT CHANGE UP (ref 7–14)
ANION GAP SERPL CALC-SCNC: 15 MMOL/L — HIGH (ref 7–14)
APTT BLD: 38.3 SEC — SIGNIFICANT CHANGE UP (ref 27–39.2)
AST SERPL-CCNC: 12 U/L — SIGNIFICANT CHANGE UP (ref 0–41)
BASOPHILS # BLD AUTO: 0.04 K/UL — SIGNIFICANT CHANGE UP (ref 0–0.2)
BASOPHILS NFR BLD AUTO: 0.5 % — SIGNIFICANT CHANGE UP (ref 0–1)
BILIRUB SERPL-MCNC: 0.7 MG/DL — SIGNIFICANT CHANGE UP (ref 0.2–1.2)
BUN SERPL-MCNC: 34 MG/DL — HIGH (ref 10–20)
BUN SERPL-MCNC: 34 MG/DL — HIGH (ref 10–20)
CALCIUM SERPL-MCNC: 9.7 MG/DL — SIGNIFICANT CHANGE UP (ref 8.5–10.1)
CALCIUM SERPL-MCNC: 9.7 MG/DL — SIGNIFICANT CHANGE UP (ref 8.5–10.1)
CHLORIDE SERPL-SCNC: 93 MMOL/L — LOW (ref 98–110)
CHLORIDE SERPL-SCNC: 98 MMOL/L — SIGNIFICANT CHANGE UP (ref 98–110)
CO2 SERPL-SCNC: 31 MMOL/L — SIGNIFICANT CHANGE UP (ref 17–32)
CO2 SERPL-SCNC: 32 MMOL/L — SIGNIFICANT CHANGE UP (ref 17–32)
CREAT SERPL-MCNC: 2.4 MG/DL — HIGH (ref 0.7–1.5)
CREAT SERPL-MCNC: 2.4 MG/DL — HIGH (ref 0.7–1.5)
EOSINOPHIL # BLD AUTO: 0.06 K/UL — SIGNIFICANT CHANGE UP (ref 0–0.7)
EOSINOPHIL NFR BLD AUTO: 0.8 % — SIGNIFICANT CHANGE UP (ref 0–8)
GLUCOSE SERPL-MCNC: 124 MG/DL — HIGH (ref 70–99)
GLUCOSE SERPL-MCNC: 97 MG/DL — SIGNIFICANT CHANGE UP (ref 70–99)
HCT VFR BLD CALC: 36.5 % — LOW (ref 42–52)
HGB BLD-MCNC: 11.8 G/DL — LOW (ref 14–18)
IMM GRANULOCYTES NFR BLD AUTO: 0.4 % — HIGH (ref 0.1–0.3)
INR BLD: 1.62 RATIO — HIGH (ref 0.65–1.3)
LYMPHOCYTES # BLD AUTO: 0.87 K/UL — LOW (ref 1.2–3.4)
LYMPHOCYTES # BLD AUTO: 11.4 % — LOW (ref 20.5–51.1)
MAGNESIUM SERPL-MCNC: 1.9 MG/DL — SIGNIFICANT CHANGE UP (ref 1.8–2.4)
MAGNESIUM SERPL-MCNC: 2.2 MG/DL — SIGNIFICANT CHANGE UP (ref 1.8–2.4)
MCHC RBC-ENTMCNC: 30.3 PG — SIGNIFICANT CHANGE UP (ref 27–31)
MCHC RBC-ENTMCNC: 32.3 G/DL — SIGNIFICANT CHANGE UP (ref 32–37)
MCV RBC AUTO: 93.8 FL — SIGNIFICANT CHANGE UP (ref 80–94)
MONOCYTES # BLD AUTO: 0.93 K/UL — HIGH (ref 0.1–0.6)
MONOCYTES NFR BLD AUTO: 12.2 % — HIGH (ref 1.7–9.3)
MPO AB + PR3 PNL SER: SIGNIFICANT CHANGE UP
NEUTROPHILS # BLD AUTO: 5.71 K/UL — SIGNIFICANT CHANGE UP (ref 1.4–6.5)
NEUTROPHILS NFR BLD AUTO: 74.7 % — SIGNIFICANT CHANGE UP (ref 42.2–75.2)
NRBC # BLD: 0 /100 WBCS — SIGNIFICANT CHANGE UP (ref 0–0)
PLATELET # BLD AUTO: 146 K/UL — SIGNIFICANT CHANGE UP (ref 130–400)
POTASSIUM SERPL-MCNC: 3.4 MMOL/L — LOW (ref 3.5–5)
POTASSIUM SERPL-MCNC: 3.4 MMOL/L — LOW (ref 3.5–5)
POTASSIUM SERPL-SCNC: 3.4 MMOL/L — LOW (ref 3.5–5)
POTASSIUM SERPL-SCNC: 3.4 MMOL/L — LOW (ref 3.5–5)
PROT SERPL-MCNC: 5.5 G/DL — LOW (ref 6–8)
PROTHROM AB SERPL-ACNC: 18.5 SEC — HIGH (ref 9.95–12.87)
RBC # BLD: 3.89 M/UL — LOW (ref 4.7–6.1)
RBC # FLD: 17.2 % — HIGH (ref 11.5–14.5)
SARS-COV-2 RNA SPEC QL NAA+PROBE: SIGNIFICANT CHANGE UP
SODIUM SERPL-SCNC: 139 MMOL/L — SIGNIFICANT CHANGE UP (ref 135–146)
SODIUM SERPL-SCNC: 144 MMOL/L — SIGNIFICANT CHANGE UP (ref 135–146)
WBC # BLD: 7.64 K/UL — SIGNIFICANT CHANGE UP (ref 4.8–10.8)
WBC # FLD AUTO: 7.64 K/UL — SIGNIFICANT CHANGE UP (ref 4.8–10.8)

## 2022-01-31 PROCEDURE — 99233 SBSQ HOSP IP/OBS HIGH 50: CPT

## 2022-01-31 RX ORDER — AMIODARONE HYDROCHLORIDE 400 MG/1
200 TABLET ORAL ONCE
Refills: 0 | Status: COMPLETED | OUTPATIENT
Start: 2022-01-31 | End: 2022-01-31

## 2022-01-31 RX ORDER — IRON SUCROSE 20 MG/ML
200 INJECTION, SOLUTION INTRAVENOUS
Refills: 0 | Status: DISCONTINUED | OUTPATIENT
Start: 2022-01-31 | End: 2022-01-31

## 2022-01-31 RX ORDER — AMIODARONE HYDROCHLORIDE 400 MG/1
200 TABLET ORAL
Refills: 0 | Status: DISCONTINUED | OUTPATIENT
Start: 2022-02-01 | End: 2022-02-04

## 2022-01-31 RX ORDER — BUMETANIDE 0.25 MG/ML
2 INJECTION INTRAMUSCULAR; INTRAVENOUS DAILY
Refills: 0 | Status: DISCONTINUED | OUTPATIENT
Start: 2022-02-01 | End: 2022-02-01

## 2022-01-31 RX ORDER — POTASSIUM CHLORIDE 20 MEQ
40 PACKET (EA) ORAL ONCE
Refills: 0 | Status: COMPLETED | OUTPATIENT
Start: 2022-01-31 | End: 2022-01-31

## 2022-01-31 RX ORDER — IRON SUCROSE 20 MG/ML
200 INJECTION, SOLUTION INTRAVENOUS EVERY 24 HOURS
Refills: 0 | Status: COMPLETED | OUTPATIENT
Start: 2022-02-01 | End: 2022-02-03

## 2022-01-31 RX ORDER — MAGNESIUM SULFATE 500 MG/ML
2 VIAL (ML) INJECTION ONCE
Refills: 0 | Status: COMPLETED | OUTPATIENT
Start: 2022-01-31 | End: 2022-01-31

## 2022-01-31 RX ORDER — SENNA PLUS 8.6 MG/1
2 TABLET ORAL AT BEDTIME
Refills: 0 | Status: DISCONTINUED | OUTPATIENT
Start: 2022-01-31 | End: 2022-02-04

## 2022-01-31 RX ORDER — BUMETANIDE 0.25 MG/ML
2 INJECTION INTRAMUSCULAR; INTRAVENOUS DAILY
Refills: 0 | Status: DISCONTINUED | OUTPATIENT
Start: 2022-01-31 | End: 2022-01-31

## 2022-01-31 RX ORDER — RIVAROXABAN 15 MG-20MG
15 KIT ORAL DAILY
Refills: 0 | Status: DISCONTINUED | OUTPATIENT
Start: 2022-01-31 | End: 2022-02-01

## 2022-01-31 RX ORDER — POTASSIUM CHLORIDE 20 MEQ
40 PACKET (EA) ORAL EVERY 4 HOURS
Refills: 0 | Status: COMPLETED | OUTPATIENT
Start: 2022-01-31 | End: 2022-02-01

## 2022-01-31 RX ADMIN — RIVAROXABAN 15 MILLIGRAM(S): KIT at 12:55

## 2022-01-31 RX ADMIN — Medication 125 MICROGRAM(S): at 05:44

## 2022-01-31 RX ADMIN — IRON SUCROSE 110 MILLIGRAM(S): 20 INJECTION, SOLUTION INTRAVENOUS at 14:02

## 2022-01-31 RX ADMIN — AMIODARONE HYDROCHLORIDE 16.7 MG/MIN: 400 TABLET ORAL at 00:16

## 2022-01-31 RX ADMIN — PANTOPRAZOLE SODIUM 40 MILLIGRAM(S): 20 TABLET, DELAYED RELEASE ORAL at 05:44

## 2022-01-31 RX ADMIN — Medication 25 GRAM(S): at 11:11

## 2022-01-31 RX ADMIN — TAMSULOSIN HYDROCHLORIDE 0.4 MILLIGRAM(S): 0.4 CAPSULE ORAL at 22:27

## 2022-01-31 RX ADMIN — BUMETANIDE 2 MILLIGRAM(S): 0.25 INJECTION INTRAMUSCULAR; INTRAVENOUS at 05:44

## 2022-01-31 RX ADMIN — SENNA PLUS 2 TABLET(S): 8.6 TABLET ORAL at 23:01

## 2022-01-31 RX ADMIN — ATORVASTATIN CALCIUM 20 MILLIGRAM(S): 80 TABLET, FILM COATED ORAL at 22:27

## 2022-01-31 RX ADMIN — CHLORHEXIDINE GLUCONATE 1 APPLICATION(S): 213 SOLUTION TOPICAL at 05:45

## 2022-01-31 RX ADMIN — APIXABAN 2.5 MILLIGRAM(S): 2.5 TABLET, FILM COATED ORAL at 05:44

## 2022-01-31 RX ADMIN — Medication 40 MILLIEQUIVALENT(S): at 23:01

## 2022-01-31 RX ADMIN — Medication 81 MILLIGRAM(S): at 11:11

## 2022-01-31 RX ADMIN — AMIODARONE HYDROCHLORIDE 200 MILLIGRAM(S): 400 TABLET ORAL at 19:19

## 2022-01-31 NOTE — PROGRESS NOTE ADULT - SUBJECTIVE AND OBJECTIVE BOX
NEPHROLOGY FOLLOW UP NOTE    pt seen and examined  diuresing well  BP's fair  on amio gtt  less dyspneic  edema improving    PAST MEDICAL & SURGICAL HISTORY:  Chronic systolic congestive heart failure    HTN (hypertension)    Dyslipidemia      Allergies:  No Known Allergies    Home Medications Reviewed    SOCIAL HISTORY:  Denies ETOH,Smoking, drugs    FAMILY HISTORY:  no renal disease        REVIEW OF SYSTEMS:  CONSTITUTIONAL: No weakness, fevers or chills  EYES/ENT: No visual changes;  No vertigo or throat pain   NECK: No pain or stiffness  RESPIRATORY: No cough, wheezing, hemoptysis; + shortness of breath  CARDIOVASCULAR: No chest pain or palpitations.  GASTROINTESTINAL: No abdominal or epigastric pain. No nausea, vomiting, or hematemesis; No diarrhea or constipation. No melena or hematochezia.  GENITOURINARY: No dysuria, frequency, foamy urine, urinary urgency, incontinence or hematuria  NEUROLOGICAL: No numbness or weakness  SKIN: No itching, burning, rashes, or lesions   VASCULAR: + bilateral lower extremity edema.   All other review of systems is negative unless indicated above.    PHYSICAL EXAM:  Constitutional: NAD  HEENT: anicteric sclera, oropharynx clear, MMM  Neck: + JVD  Respiratory: decreased BS b/l with bibasilar crackles  Cardiovascular: S1, S2, irreg   Gastrointestinal: BS+, soft, NT/ND  Extremities: No cyanosis or clubbing. 2+ peripheral edema  Neurological: A/O x 3, no focal deficits  Psychiatric: Normal mood, normal affect  : No CVA tenderness.     Skin: No rashes    Hospital Medications:   MEDICATIONS  (STANDING):  aMIOdarone Infusion 1 mG/Min (33.3 mL/Hr) IV Continuous <Continuous>  aMIOdarone Infusion 0.5 mG/Min (16.7 mL/Hr) IV Continuous <Continuous>  aspirin  chewable 81 milliGRAM(s) Oral daily  atorvastatin 20 milliGRAM(s) Oral at bedtime  chlorhexidine 4% Liquid 1 Application(s) Topical <User Schedule>  digoxin     Tablet 125 MICROGram(s) Oral daily  iron sucrose IVPB 200 milliGRAM(s) IV Intermittent every 7 days  pantoprazole    Tablet 40 milliGRAM(s) Oral before breakfast  rivaroxaban 15 milliGRAM(s) Oral daily  tamsulosin 0.4 milliGRAM(s) Oral at bedtime        VITALS:  T(F): 95.7 (22 @ 12:43), Max: 96.9 (22 @ 03:40)  HR: 79 (22 @ 12:43)  BP: 133/64 (22 @ 12:43)  RR: 16 (22 @ 12:43)  SpO2: 98% (22 @ 05:13)  Wt(kg): --     07:01  -   @ 07:00  --------------------------------------------------------  IN: 0 mL / OUT: 3975 mL / NET: -3975 mL     @ 07:  -   @ 07:00  --------------------------------------------------------  IN: 266.8 mL / OUT: 4100 mL / NET: -3833.2 mL     @ 07:01  -   @ 15:06  --------------------------------------------------------  IN: 0 mL / OUT: 1200 mL / NET: -1200 mL          LABS:      144  |  98  |  34<H>  ----------------------------<  124<H>  3.4<L>   |  32  |  2.4<H>    Ca    9.7      2022 04:30  Mg     1.9         TPro  5.5<L>  /  Alb  3.9  /  TBili  0.7  /  DBili      /  AST  12  /  ALT  20  /  AlkPhos  51                            11.8   7.64  )-----------( 146      ( 2022 04:30 )             36.5       Urine Studies:  Urinalysis Basic - ( 2022 01:00 )    Color: Colorless / Appearance: Clear / S.009 / pH:   Gluc:  / Ketone: Negative  / Bili: Negative / Urobili: <2 mg/dL   Blood:  / Protein: Negative / Nitrite: Negative   Leuk Esterase: Negative / RBC: 17 /HPF / WBC 1 /HPF   Sq Epi:  / Non Sq Epi: 0 /HPF / Bacteria: Negative      Sodium, Random Urine: 103.0 mmoL/L ( @ 01:00)  Chloride, Random Urine: 107 ( @ 01:00)  Creatinine, Random Urine: 23 mg/dL ( @ 01:00)  Protein/Creatinine Ratio Calculation: <0.2 Ratio ( @ 01:00)  Creatinine, Random Urine: 25 mg/dL ( @ 01:00)  Protein/Creatinine Ratio Calculation: <0.2 Ratio ( @ 01:00)      RADIOLOGY & ADDITIONAL STUDIES:

## 2022-01-31 NOTE — PROGRESS NOTE ADULT - ASSESSMENT
Acute kidney injury / Cardiorenal syndrome   chronic kidney disease stage 3  prior normal renal sono (12/21)  no proteinuria  acute on chronic systolic CHF  Afib with RVR / s/p AVR (procine) / CAD / CABG  DVT / PE on eliquis  recent severe COVID PNA  HTN         plan:    bumex lowered and metolazone held, would cont with iv diuresis  allow for permissive azotemia to achieve needed diuresis  replete K+   dc covarrubias, trial of void, [place] catheter  off ARB - consider entresto if renal function stable after iv diuresis  trend renal function, lytes   f/u cardiology and EP  d/w wife at bedside

## 2022-01-31 NOTE — PROGRESS NOTE ADULT - ATTENDING COMMENTS
Mr. Silva is an 81yoM with CAD s/p CABG, bioprosthetic AVR (2011), HTN, CKD (baseline Cr 1.3), and recent hospitalization in 12/2021 for COVID-19 infection c/b RLE DVT and PE for which patient required 6L nc and has since been weaned off who presented with dyspnea, orthopnea, PND, and edema, found to be in Afib with RVR, diagnosed with BiV heart failure.     Patient has been diuresing well with metolazone 5 mg PO daily and Bumex 2 mg IV BID, with BMP reflective of contraction alkalosis. Will hold PM diuretics today and plan for DINO and DCCV tomorrow.     Plan:  - S/p digoxin IV loading with 250 mcg x 3, on digoxin 125 mcg daily  - S/p amiodarone IV loading  - Start amiodarone 200 mg BID  - Discontinue Eliquis as patient is unlikely to be therapeutic on 2.5 mg BID  - Start Xarelto 15 mg (renally dosed) with dinner  - Discontinue metolazone  - Hold PM diuretics, will give Bumex 2 mg QD-BID tomorrow pending lab work  - COVID swab today  - NPO at midnight  - DINO and DCCV tomorrow  - Will initiate GDMT for HFrEF before discharge  - Strict I/Os and daily standing weights  - Replete for goal K > 4 and Mg > 2   - Avoid CCB or Bb given the patient's severe BiV failure, will prioritize diuresis for rate control  - Continue ASA 81 mg daily and atorvastatin 20 mg nightly  - A1c 5.8, LDL 90, TSH 1.56  - Ferritin 140 and iron sat 15%, ordered for a 5-day course of IV iron sucrose (received 100 mg on Day 1 and 2, now ordered for 200 mg) Mr. Silva is an 81yoM with CAD s/p CABG, bioprosthetic AVR (2011), HTN, CKD (baseline Cr 1.3), and recent hospitalization in 12/2021 for COVID-19 infection c/b RLE DVT and PE for which patient required 6L nc and has since been weaned off who presented with dyspnea, orthopnea, PND, and edema, found to be in Afib with RVR, diagnosed with BiV heart failure.     Patient has been diuresing well with metolazone 5 mg PO daily and Bumex 2 mg IV BID, with BMP reflective of contraction alkalosis. Will hold PM diuretics today and plan for DINO and DCCV tomorrow.     Plan:  - S/p digoxin IV loading with 250 mcg x 3, on digoxin 125 mcg daily  - S/p amiodarone IV loading  - Start amiodarone 200 mg BID  - Discontinue Eliquis as patient is unlikely to be therapeutic on 2.5 mg BID  - Start Xarelto 15 mg (renally dosed) with dinner  - Discontinue metolazone  - Hold PM diuretics, will give Bumex 2 mg QD-BID tomorrow pending lab work  - Albright removed after discussion on morning rounds, condom cath placed  - COVID swab today  - NPO at midnight  - DINO and DCCV tomorrow  - Will initiate GDMT for HFrEF before discharge  - Strict I/Os and daily standing weights  - Replete for goal K > 4 and Mg > 2   - Avoid CCB or Bb given the patient's severe BiV failure, will prioritize diuresis for rate control  - Continue ASA 81 mg daily and atorvastatin 20 mg nightly  - A1c 5.8, LDL 90, TSH 1.56  - Ferritin 140 and iron sat 15%, ordered for a 5-day course of IV iron sucrose (received 100 mg on Day 1 and 2, now ordered for 200 mg)

## 2022-01-31 NOTE — PROGRESS NOTE ADULT - SUBJECTIVE AND OBJECTIVE BOX
LENGTH OF HOSPITAL STAY: 01-27-22 (4d)  CHIEF COMPLAINT:   Patient is a 81y old  Male who presents with a chief complaint of Afib with RVR (30 Jan 2022 16:23)      Progress:     Interval hx: No events overnight. Remains AF w/ RVR. Patient appears comfortable. Denies CP, SOB, new complaints today.    HISTORY OF PRESENTING ILLNESS:    HPI:  History of Present Illness    Mr. Silva is an 81 year old male patient known to have:  - Baseline AO3, comes from home, lives with wife  - CAD s/p CABG x4 in 2006. Cardiologist Dr Sebastian Mendoza  - CHF (no TTE in chart) and s/p AVR 2011. Cardiologist Dr Sebastian Mendoza. Home med PO Lasix 40mg QD per wife  - CKD III (Baseline Cr 1.3)  - Recent admission from 12/12-12/20 for COVID Pneumonia s/p IV Dexamethasone and IV RDV  - Recently found to have Acute Right Peroneal, Popliteal, and PT DVT during recent admission on 12/12. Discharged on Eliquis. Most recent home dose per wife is 5mg in AM and 2.5mg in PM (not 5mg BID) due to nose bleed  - HTN      He presented to the ED on 01/27 after being referred by Dr Murphy for new onset afib with RVR.  History goes back to 01/27 when the patient was following up with Dr Murphy.  During the visit, patient was found to be volume overloaded.  His HR was in 150bpm and an ECG performed revealed new onset Afib with RVR.  As a result, patient was referred by Dr Murphy to ED for evaluation.  On history taking, patient reports a 2 week history of worsening shortness of breath on minimal exertion (even walking few steps make him feel out of breath).   This has been associated with worsening bilateral leg swelling, orthopnea (sleeps in a seated position), and PNDs in the absence of any chest pain, palpitations, light headedness, nausea, or pre/syncope.      On review of systems, patient denies any recent fever, chills, night sweats, URTI symptoms (cough, rhinorrhea, sore throat), urinary symptoms (urinary frequency, urgency, intermittence, dysuria, foul smelling urine, cloudy urine), change in bowel movements (diarrhea or constipation), abdominal pain, headache, or vomiting.   No sick contacts.  No recent travel or exposure to recent travelers.      Upon presentation to the ED, the patient was tachycardic in afib with RVR as below  Vital Signs in ED   - /71 mmHg  -  bpm -> confirmed afib with RVR on ECG -> s/p cardizem drip at 15/hour -> most recent  during my evaluation  - RR 18 bpm  - T 35.6  - SaO2 97% on RA      Investigations   Laboratory Workup  - CBC:                        12.1   7.14  )-----------( 147      ( 27 Jan 2022 13:01 )             37.1     - Chemistry:  01-27    141  |  105  |  46<H>  ----------------------------<  120<H>  3.9   |  18  |  2.4<H>    Ca    9.6      27 Jan 2022 13:01  Mg     2.1     01-27    TPro  5.7<L>  /  Alb  3.6  /  TBili  0.6  /  DBili  x   /  AST  28  /  ALT  47<H>  /  AlkPhos  45  01-27      - Cardiac Markers:  CARDIAC MARKERS ( 27 Jan 2022 13:01 )  x     / 0.06 ng/mL / x     / x     / x          Microbiological Workup  * COVID negative      Radiological Workup  * CXR 01/27 bilateral opacities      - Patient was given IV Lasix 20mg x1 and IV Bumex 1mg x1 doses for volume overload in ED  - He was also given Eliquis 2.5mg x1 and started on IV Cardizem drip at 15/hour for afib with RVR  - He will be admitted to 3C for further monitoring, investigations, and management of acute on chronic CHF exacerbation in setting of new onset afib with RVR   (27 Jan 2022 21:26)      PHYSICAL EXAM:   GENERAL: No acute distress  NEURO: Alert & Oriented X3. Speech clear and fluent.   HEAD: Atraumatic, normocephalic.   EYES: EOMI. Sclera non-icteric.  ENT: Moist mucous membranes.    NECK: No JVD.  No cervical lymphadenopathy.   LUNGS: Clear to auscultation bilaterally. No wheezes or rhonchi. No accessory muscle use.  HEART: RRR. S1/S2 present.   ABDOMEN: Bowel sounds present. Soft. Nontender. Nondistended. No guarding or rigidity    EXTREMITIES: No edema, cyanosis, clubbing. 2+ peripheral pulses bilaterally.  SKIN: Warm and dry.    OBJECTIVE DATA:    T(C): 35.9 (01-31-22 @ 10:52), Max: 36.1 (01-31-22 @ 03:40)  T(F): 96.7 (01-31-22 @ 10:52), Max: 96.9 (01-31-22 @ 03:40)  HR: 67 (01-31-22 @ 10:52) (67 - 150)  BP: 155/67 (01-31-22 @ 10:52) (93/63 - 155/67)  ABP: --  ABP(mean): --  RR: 16 (01-31-22 @ 10:52) (16 - 18)  SpO2: 98% (01-31-22 @ 05:13) (95% - 98%)                            11.8   7.64  )-----------( 146      ( 31 Jan 2022 04:30 )             36.5       01-31    144  |  98  |  34<H>  ----------------------------<  124<H>  3.4<L>   |  32  |  2.4<H>    Ca    9.7      31 Jan 2022 04:30  Mg     1.9     01-31    TPro  5.5<L>  /  Alb  3.9  /  TBili  0.7  /  DBili  x   /  AST  12  /  ALT  20  /  AlkPhos  51  01-31                        ASSESSMENT/PLAN:  81M with CAD s/p CABG, bioprosth AVR (2011), CKD (b/l 1.3), recent COVID infection with LE DVT presented with exertional SOB and edema x2 weeks and AFib with RVR sent by Dr. Lane.     #Acute HFrEF ( EF 27% on TTE) - new vs old no prior ECHO for comparison  #Hx CAD s/p CABG x4 (2006), bioprosth TAVR ( 2011), HTN  >near euvolemic  >cardiologist is Dr. Sebastian Mendoza  >on home PO lasix 40 mg QD per wife  > S/p digoxin 250 mcg IV q8h x3  -decrease diuresis - Bumex 2 mg IV QD and DC metolazone  ---- BMP and Mg BID (goal >4, >2)  ----- Strict I/Os and daily standing weights  -c/w digoxin 125 mcg daily (dig level 0.9)  -c/w amio gtt per EP recs then amio 200 mg PO BID x2 weeks, followed by 200 mg PO daily  -plan for DINO/DCCV tomorrow  / NPO after MN  - Not currently on medical management for HF ; will prioritize diuresis for rate control and reassess to start BB/CCB  - C/w ASA 81 mg daily and atorvastatin 20 mg nightly    #New onset AF w/ RVR   -rate uncontrolled on Digoxin, Amio gtt  -Xarelto for AC - switched from Eliquis   -EP following / plan for TTE and DCCV tomorrow / NPO after MN, Covid swab    #Acute on chronic CKD III - stable  >baseline 1.3  >trending upward/stable at 2.4  -c/w diuresis  -nephro following - FU recs    Hx COVID 12/2021 c/b LE DVT on Eliquis  > + Acute Right Peroneal, Popliteal, and PT DVT during recent admission on 12/12. On Eliquis 2.5 mg BID  > COVID neg 1/27  -switch Eliquis 2.5 bid to Xarelto 15 mg daily    #DVT ppx - Xarelto  #Diet - DASH/ NPO after midnight  #Activity - AAT  #Code/Family - DNR/DNI; reverse for DINO/cardioversion     #Disposition - acute, likely home

## 2022-02-01 ENCOUNTER — TRANSCRIPTION ENCOUNTER (OUTPATIENT)
Age: 82
End: 2022-02-01

## 2022-02-01 LAB
ANA TITR SER: NEGATIVE — SIGNIFICANT CHANGE UP
APTT BLD: 44.1 SEC — HIGH (ref 27–39.2)
AUTO DIFF PNL BLD: ABNORMAL
BASOPHILS # BLD AUTO: 0.04 K/UL — SIGNIFICANT CHANGE UP (ref 0–0.2)
BASOPHILS NFR BLD AUTO: 0.5 % — SIGNIFICANT CHANGE UP (ref 0–1)
C-ANCA SER-ACNC: NEGATIVE — SIGNIFICANT CHANGE UP
EOSINOPHIL # BLD AUTO: 0.08 K/UL — SIGNIFICANT CHANGE UP (ref 0–0.7)
EOSINOPHIL NFR BLD AUTO: 1.1 % — SIGNIFICANT CHANGE UP (ref 0–8)
HCT VFR BLD CALC: 39.3 % — LOW (ref 42–52)
HGB BLD-MCNC: 12.8 G/DL — LOW (ref 14–18)
IMM GRANULOCYTES NFR BLD AUTO: 0.4 % — HIGH (ref 0.1–0.3)
INR BLD: 2.19 RATIO — HIGH (ref 0.65–1.3)
KAPPA LC SER QL IFE: 3.2 MG/DL — HIGH (ref 0.33–1.94)
KAPPA/LAMBDA FREE LIGHT CHAIN RATIO, SERUM: 1.04 RATIO — SIGNIFICANT CHANGE UP (ref 0.26–1.65)
LAMBDA LC SER QL IFE: 3.08 MG/DL — HIGH (ref 0.57–2.63)
LYMPHOCYTES # BLD AUTO: 1.11 K/UL — LOW (ref 1.2–3.4)
LYMPHOCYTES # BLD AUTO: 14.7 % — LOW (ref 20.5–51.1)
MCHC RBC-ENTMCNC: 30.6 PG — SIGNIFICANT CHANGE UP (ref 27–31)
MCHC RBC-ENTMCNC: 32.6 G/DL — SIGNIFICANT CHANGE UP (ref 32–37)
MCV RBC AUTO: 94 FL — SIGNIFICANT CHANGE UP (ref 80–94)
MONOCYTES # BLD AUTO: 1.13 K/UL — HIGH (ref 0.1–0.6)
MONOCYTES NFR BLD AUTO: 14.9 % — HIGH (ref 1.7–9.3)
NEUTROPHILS # BLD AUTO: 5.17 K/UL — SIGNIFICANT CHANGE UP (ref 1.4–6.5)
NEUTROPHILS NFR BLD AUTO: 68.4 % — SIGNIFICANT CHANGE UP (ref 42.2–75.2)
NRBC # BLD: 0 /100 WBCS — SIGNIFICANT CHANGE UP (ref 0–0)
P-ANCA SER-ACNC: NEGATIVE — SIGNIFICANT CHANGE UP
PLATELET # BLD AUTO: 161 K/UL — SIGNIFICANT CHANGE UP (ref 130–400)
PROTHROM AB SERPL-ACNC: 25 SEC — HIGH (ref 9.95–12.87)
RBC # BLD: 4.18 M/UL — LOW (ref 4.7–6.1)
RBC # FLD: 16.8 % — HIGH (ref 11.5–14.5)
WBC # BLD: 7.56 K/UL — SIGNIFICANT CHANGE UP (ref 4.8–10.8)
WBC # FLD AUTO: 7.56 K/UL — SIGNIFICANT CHANGE UP (ref 4.8–10.8)

## 2022-02-01 PROCEDURE — 93010 ELECTROCARDIOGRAM REPORT: CPT

## 2022-02-01 PROCEDURE — 93325 DOPPLER ECHO COLOR FLOW MAPG: CPT | Mod: 26

## 2022-02-01 PROCEDURE — 93010 ELECTROCARDIOGRAM REPORT: CPT | Mod: 77

## 2022-02-01 PROCEDURE — 99232 SBSQ HOSP IP/OBS MODERATE 35: CPT | Mod: 25

## 2022-02-01 PROCEDURE — 92960 CARDIOVERSION ELECTRIC EXT: CPT

## 2022-02-01 PROCEDURE — 93321 DOPPLER ECHO F-UP/LMTD STD: CPT | Mod: 26

## 2022-02-01 PROCEDURE — 93312 ECHO TRANSESOPHAGEAL: CPT | Mod: 26,XU

## 2022-02-01 RX ORDER — AMIODARONE HYDROCHLORIDE 400 MG/1
150 TABLET ORAL ONCE
Refills: 0 | Status: COMPLETED | OUTPATIENT
Start: 2022-02-01 | End: 2022-02-01

## 2022-02-01 RX ORDER — BUMETANIDE 0.25 MG/ML
2 INJECTION INTRAMUSCULAR; INTRAVENOUS ONCE
Refills: 0 | Status: DISCONTINUED | OUTPATIENT
Start: 2022-02-01 | End: 2022-02-01

## 2022-02-01 RX ORDER — POTASSIUM CHLORIDE 20 MEQ
40 PACKET (EA) ORAL EVERY 4 HOURS
Refills: 0 | Status: COMPLETED | OUTPATIENT
Start: 2022-02-01 | End: 2022-02-01

## 2022-02-01 RX ORDER — POTASSIUM CHLORIDE 20 MEQ
40 PACKET (EA) ORAL ONCE
Refills: 0 | Status: DISCONTINUED | OUTPATIENT
Start: 2022-02-01 | End: 2022-02-01

## 2022-02-01 RX ORDER — RIVAROXABAN 15 MG-20MG
15 KIT ORAL
Refills: 0 | Status: DISCONTINUED | OUTPATIENT
Start: 2022-02-02 | End: 2022-02-04

## 2022-02-01 RX ADMIN — RIVAROXABAN 15 MILLIGRAM(S): KIT at 11:34

## 2022-02-01 RX ADMIN — SENNA PLUS 2 TABLET(S): 8.6 TABLET ORAL at 21:51

## 2022-02-01 RX ADMIN — Medication 125 MICROGRAM(S): at 05:43

## 2022-02-01 RX ADMIN — Medication 40 MILLIEQUIVALENT(S): at 11:34

## 2022-02-01 RX ADMIN — AMIODARONE HYDROCHLORIDE 200 MILLIGRAM(S): 400 TABLET ORAL at 17:15

## 2022-02-01 RX ADMIN — AMIODARONE HYDROCHLORIDE 600 MILLIGRAM(S): 400 TABLET ORAL at 14:19

## 2022-02-01 RX ADMIN — Medication 40 MILLIEQUIVALENT(S): at 18:04

## 2022-02-01 RX ADMIN — ATORVASTATIN CALCIUM 20 MILLIGRAM(S): 80 TABLET, FILM COATED ORAL at 21:51

## 2022-02-01 RX ADMIN — AMIODARONE HYDROCHLORIDE 200 MILLIGRAM(S): 400 TABLET ORAL at 05:43

## 2022-02-01 RX ADMIN — PANTOPRAZOLE SODIUM 40 MILLIGRAM(S): 20 TABLET, DELAYED RELEASE ORAL at 05:45

## 2022-02-01 RX ADMIN — Medication 40 MILLIEQUIVALENT(S): at 03:04

## 2022-02-01 RX ADMIN — BUMETANIDE 2 MILLIGRAM(S): 0.25 INJECTION INTRAMUSCULAR; INTRAVENOUS at 05:45

## 2022-02-01 RX ADMIN — Medication 81 MILLIGRAM(S): at 11:33

## 2022-02-01 RX ADMIN — IRON SUCROSE 110 MILLIGRAM(S): 20 INJECTION, SOLUTION INTRAVENOUS at 13:16

## 2022-02-01 RX ADMIN — TAMSULOSIN HYDROCHLORIDE 0.4 MILLIGRAM(S): 0.4 CAPSULE ORAL at 21:51

## 2022-02-01 NOTE — DISCHARGE NOTE PROVIDER - NSDCCPTREATMENT_GEN_ALL_CORE_FT
PRINCIPAL PROCEDURE  Procedure: Transesophageal echocardiogram (DINO)  Findings and Treatment: You had an ultrasound of your heart with a probe that went down your esophagus. These are the results:   1. Left ventricular ejection fraction, by visual estimation, is 25 to 30%.   2. Severely decreased global left ventricular systolic function.   3. Moderately enlarged right ventricle.   4. Severely reduced RV systolic function.   5. Bioprosthetic valve in the Aortic position. Normal leaflet motion noted. trace AI noted. Hemodynamic assessment was not performed on this study.   6. Mild thickening and calcification of the anterior and posterior mitral valve leaflets.   7. Trace mitral valve regurgitation.   8. Moderate tricuspid regurgitation.   9. Spontaneous echo contrast ("smoke") is seen in the left atrial cavity and the left atrial appendage, which is dense and the contrast is   continuous.  10. Endocardial visualization was enhanced with intravenous echo contrast.  11. No left atrial appendage thrombus.  12. Left atrial enlargement.  13. Right atrial enlargement.  14. Successfully cardioverted to NSR with PACs and PVCs with 200 J x 1   attempt.

## 2022-02-01 NOTE — DISCHARGE NOTE PROVIDER - ATTENDING DISCHARGE PHYSICAL EXAMINATION:
Patient with clear lungs and no edema. With the patient and his wife (on the telephone), I reviewed his entire hospitalization and plan for outpatient care. Patient as given a list of his medications in large print with instructions on the timing of each pill.

## 2022-02-01 NOTE — PROGRESS NOTE ADULT - ASSESSMENT
Acute kidney injury / Cardiorenal syndrome   chronic kidney disease stage 3  hypokalemia  contraction alkalosis  acute on chronic systolic CHF  Afib with RVR / s/p AVR (procine) / CAD / CABG / s/p DCC  DVT / PE on eliquis  recent severe COVID PNA  HTN         plan:    diuretics on hold  replete K+  agree to resume PO bumex or torsemide in next few days with PRN metolazone  ARB on hold  CHF / CKD education  d/w wife again at bedside

## 2022-02-01 NOTE — PROGRESS NOTE ADULT - ATTENDING COMMENTS
Mr. Silva is an 81yoM with CAD s/p CABG, bioprosthetic AVR (2011), HTN, CKD (baseline Cr 1.3), and recent hospitalization in 12/2021 for COVID-19 infection c/b RLE DVT and PE for which patient required 6L nc and has since been weaned off who presented with dyspnea, orthopnea, PND, and edema, found to be in Afib with RVR, diagnosed with BiV heart failure.     Patient has been diuresing well with metolazone 5 mg PO daily and Bumex 2 mg IV BID, with BMP reflective of contraction alkalosis. Patient underwent DINO and DCCV today 2/01, with restoration of NSR. ECG demonstrated sinus rhythm with frequent PACs. Patient bolused amiodarone 150 mg IV x 1 and will continue to receive amiodarone 200 mg PO BID and digoxin 125 mcg.     According to the patient and his wife, his JANEY has completely resolved. Patient breathing well on room air, able to lie flat, and lungs are clear. Overall, appears euvolemic. Will hold PM IV diuretics and transition to PO diuretics tomorrow.     Plan:  - S/p digoxin IV loading with 250 mcg x 3  - S/p amiodarone IV loading for 24 hours  - S/p DINO and DCCV on 2/01/22, receiving an additional amiodarone 150 mg IV x 1   - Continue amiodarone 200 mg BID, digoxin 125 mcg, and Xarelto 15 mg (renally dosed) with dinner  - Check digoxin level tomorrow with AM labs  - Hold PM diuretics, will transition to PO diuretics tomorrow  - Check BNP tomorrow with AM labs  - Passed trial of void  - Will initiate GDMT for HFrEF before discharge  - ECG tomorrow AM  - Strict I/Os and daily standing weights  - Replete for goal K > 4 and Mg > 2   - Continue ASA 81 mg daily and atorvastatin 20 mg nightly  - A1c 5.8, LDL 90, TSH 1.56  - Ferritin 140 and iron sat 15%, ordered for a 5-day course of IV iron sucrose (received 100 mg on Day 1 and 2, now ordered for 200 mg).

## 2022-02-01 NOTE — PROGRESS NOTE ADULT - PROVIDER SPECIALTY LIST ADULT
Device: Port  Central Line Site: Right  and Chest  Central Line Site Appearance: clear  Implanted port accessed using a 20 gauge non-coring needle primed with 0.9% sodium chloride. Good blood return obtained.  Blood obtained and sent to lab.  Site Care: Aseptic site care per protocol, Occlusive Transparent Dressing and Injection caps changed/applied  Central line flushed with: 20 ml 0.9 normal saline  Central line removed: no  Garza Needle: Garza needle left in place       Internal Medicine Cardiology

## 2022-02-01 NOTE — PROGRESS NOTE ADULT - SUBJECTIVE AND OBJECTIVE BOX
LENGTH OF HOSPITAL STAY: 01-27-22 (4d)  CHIEF COMPLAINT:   Patient is a 81y old  Male who presents with a chief complaint of Afib with RVR (30 Jan 2022 16:23)      Progress:     Interval hx: Remains AF w/ RVR on monitor to go for DINO/DCCV today. Passed TOV. Lytes repleted. Patient resting comfortably in bed. Denies any new complaints, denies CP and SOB.     HISTORY OF PRESENTING ILLNESS:    HPI:  History of Present Illness    Mr. Silva is an 81 year old male patient known to have:  - Baseline AO3, comes from home, lives with wife  - CAD s/p CABG x4 in 2006. Cardiologist Dr Sebastian Mendoza  - CHF (no TTE in chart) and s/p AVR 2011. Cardiologist Dr Sebastian Mendoza. Home med PO Lasix 40mg QD per wife  - CKD III (Baseline Cr 1.3)  - Recent admission from 12/12-12/20 for COVID Pneumonia s/p IV Dexamethasone and IV RDV  - Recently found to have Acute Right Peroneal, Popliteal, and PT DVT during recent admission on 12/12. Discharged on Eliquis. Most recent home dose per wife is 5mg in AM and 2.5mg in PM (not 5mg BID) due to nose bleed  - HTN      He presented to the ED on 01/27 after being referred by Dr Murphy for new onset afib with RVR.  History goes back to 01/27 when the patient was following up with Dr Murphy.  During the visit, patient was found to be volume overloaded.  His HR was in 150bpm and an ECG performed revealed new onset Afib with RVR.  As a result, patient was referred by Dr Murphy to ED for evaluation.  On history taking, patient reports a 2 week history of worsening shortness of breath on minimal exertion (even walking few steps make him feel out of breath).   This has been associated with worsening bilateral leg swelling, orthopnea (sleeps in a seated position), and PNDs in the absence of any chest pain, palpitations, light headedness, nausea, or pre/syncope.      On review of systems, patient denies any recent fever, chills, night sweats, URTI symptoms (cough, rhinorrhea, sore throat), urinary symptoms (urinary frequency, urgency, intermittence, dysuria, foul smelling urine, cloudy urine), change in bowel movements (diarrhea or constipation), abdominal pain, headache, or vomiting.   No sick contacts.  No recent travel or exposure to recent travelers.      Upon presentation to the ED, the patient was tachycardic in afib with RVR as below  Vital Signs in ED   - /71 mmHg  -  bpm -> confirmed afib with RVR on ECG -> s/p cardizem drip at 15/hour -> most recent  during my evaluation  - RR 18 bpm  - T 35.6  - SaO2 97% on RA      Investigations   Laboratory Workup  - CBC:                        12.1   7.14  )-----------( 147      ( 27 Jan 2022 13:01 )             37.1     - Chemistry:  01-27    141  |  105  |  46<H>  ----------------------------<  120<H>  3.9   |  18  |  2.4<H>    Ca    9.6      27 Jan 2022 13:01  Mg     2.1     01-27    TPro  5.7<L>  /  Alb  3.6  /  TBili  0.6  /  DBili  x   /  AST  28  /  ALT  47<H>  /  AlkPhos  45  01-27      - Cardiac Markers:  CARDIAC MARKERS ( 27 Jan 2022 13:01 )  x     / 0.06 ng/mL / x     / x     / x          Microbiological Workup  * COVID negative      Radiological Workup  * CXR 01/27 bilateral opacities      - Patient was given IV Lasix 20mg x1 and IV Bumex 1mg x1 doses for volume overload in ED  - He was also given Eliquis 2.5mg x1 and started on IV Cardizem drip at 15/hour for afib with RVR  - He will be admitted to 3C for further monitoring, investigations, and management of acute on chronic CHF exacerbation in setting of new onset afib with RVR   (27 Jan 2022 21:26)      PHYSICAL EXAM:   GENERAL: No acute distress  NEURO: Alert & Oriented X3. Speech clear and fluent.   HEAD: Atraumatic, normocephalic.   EYES: EOMI. Sclera non-icteric.  ENT: Moist mucous membranes.    NECK: No JVD.  No cervical lymphadenopathy.   LUNGS: Clear to auscultation bilaterally. No wheezes or rhonchi. No accessory muscle use.  HEART: Tachycardic irregularly irregular. S1/S2 present. No murmurs.  ABDOMEN: Bowel sounds present. Soft. Nontender. Nondistended. No guarding or rigidity    EXTREMITIES: No edema, cyanosis, clubbing. 2+ peripheral pulses bilaterally.  SKIN: Warm and dry.      OBJECTIVE DATA:    T(C): 36.7 (02-01-22 @ 04:56), Max: 36.8 (01-31-22 @ 20:00)  T(F): 98.1 (02-01-22 @ 04:56), Max: 98.2 (01-31-22 @ 20:00)  HR: 129 (02-01-22 @ 04:56) (79 - 149)  BP: 124/67 (02-01-22 @ 04:56) (113/73 - 139/84)  ABP: --  ABP(mean): --  RR: 18 (01-31-22 @ 20:00) (16 - 18)  SpO2: 97% (02-01-22 @ 09:04) (97% - 97%)                            12.8   7.56  )-----------( 161      ( 01 Feb 2022 04:30 )             39.3       02-01    141  |  96<L>  |  36<H>  ----------------------------<  96  3.8   |  33<H>  |  2.6<H>    Ca    9.8      01 Feb 2022 04:30  Mg     2.2     02-01    TPro  5.5<L>  /  Alb  3.9  /  TBili  0.7  /  DBili  x   /  AST  12  /  ALT  20  /  AlkPhos  51  01-31                            ASSESSMENT/PLAN:  81M with CAD s/p CABG, bioprosth AVR (2011), CKD (b/l 1.3), recent COVID infection with LE DVT presented with exertional SOB and edema x2 weeks and AFib with RVR sent by Dr. Lane.     #Acute HFrEF ( EF 27% on TTE) - new vs old no prior ECHO for comparison  #Hx CAD s/p CABG x4 (2006), bioprosth TAVR ( 2011), HTN  >near euvolemic  >cardiologist is Dr. Sebastian Mendoza  >on home PO lasix 40 mg QD per wife  > S/p digoxin 250 mcg IV q8h x3  -DINO/DCCV today -> EKG after/ resume diet  -c/w diuresis w/ Bumex 2 mg IV BID after DINO  ----- BMP and Mg BID - goal K >4 and Mg >2   ----- Strict I/Os and daily standing weights - passed TOV monitoring strict urine output; RN aware  -c/w digoxin 125 mcg daily (dig level 0.9 on 1/30)  -c/w amio 200 mg PO BID x2 weeks, followed by 200 mg PO daily per EP recs  -C/w ASA 81 mg daily and atorvastatin 20 mg nightly  -will prioritize diuresis for rate control and reassess to start BB/CCB for HF medical management  -daily AM CMP on Amio and Statin    #New onset AF w/ RVR   -DINO/DCCV today -> resume amio and dig after and c/w diuresis  -Xarelto for AC - switched from Eliquis     #Acute on chronic CKD III - stable/ worsening  >baseline 1.3  >trending upward 2.4 -> 2.6  -prioritize IV diuresis for now  -nephro following - FU recs    #Hx COVID 12/2021 c/b LE DVT on Eliquis  > + Acute Right Peroneal, Popliteal, and PT DVT during recent admission on 12/12. On Eliquis 2.5 mg BID  > COVID neg 1/27  -c/w Xarelto 15 mg daily    #DVT ppx - Xarelto  #Diet - DASH diet after DINO/DCCV  #Activity - AAT  #Code/Family - DNR/DNI    #Disposition - acute, likely home

## 2022-02-01 NOTE — DISCHARGE NOTE PROVIDER - NSDCCPCAREPLAN_GEN_ALL_CORE_FT
PRINCIPAL DISCHARGE DIAGNOSIS  Diagnosis: Atrial fibrillation with rapid ventricular response  Assessment and Plan of Treatment: Atrial fibrillation is a type of irregular heartbeat (arrhythmia) where the heart quivers continuously in a chaotic pattern that makes the heart unable to pump blood normally. This can increase the risk for stroke, heart failure, and other heart-related conditions. Atrial fibrillation can be caused by a variety of conditions and may be temporary, intermittent, or permanent. Symptoms include feeling that your heart is beating rapidly or irregularly, chest discomfort, shortness of breath, or dizziness/lightheadedness that may be worse with exertion. Treatment is varied but may involve medication or electrical shock (cardioversion).  SEEK IMMEDIATE MEDICAL CARE IF YOU HAVE ANY OF THE FOLLOWING SYMPTOMS: chest pain, shortness of breath, abdominal pain, sweating, vomiting, blood in vomit/bowel movements/urine, dizziness/lightheadedness, weakness or numbness to face/arm/leg, trouble speaking or understanding, facial droop.        SECONDARY DISCHARGE DIAGNOSES  Diagnosis: Acute systolic congestive heart failure  Assessment and Plan of Treatment: Congestive Heart Failure (CHF)  Congestive heart failure is a chronic condition in which the heart has trouble pumping blood. In some cases of heart failure, fluid may back up into your lungs or you may have swelling (edema) in your lower legs. There are many causes of heart failure including high blood pressure, coronary artery disease, abnormal heart valves, heart muscle disease, lung disease, diabetes, etc. Symptoms include shortness of breath with activity or when lying flat, cough, swelling of the legs, fatigue, or increased urination during the night.   Treatment is aimed at managing the symptoms of heart failure and may include lifestyle changes, medications, or surgical procedures. Take medicines only as directed by your health care provider and do not stop unless instructed to do so. Eat heart-healthy foods with low or no trans/saturated fats, cholesterol and salt. Weigh yourself every day for early recognition of fluid accumulation.  SEEK IMMEDIATE MEDICAL CARE IF YOU HAVE ANY OF THE FOLLOWING SYMPTOMS: shortness of breath, change in mental status, chest pain, lightheadedness/dizziness/fainting, or worsening of symptoms including not being able to conduct normal physical activity.       PRINCIPAL DISCHARGE DIAGNOSIS  Diagnosis: Atrial fibrillation with rapid ventricular response  Assessment and Plan of Treatment: Atrial fibrillation is a type of irregular heartbeat (arrhythmia) where the heart quivers continuously in a chaotic pattern that makes the heart unable to pump blood normally. This can increase the risk for stroke, heart failure, and other heart-related conditions. Atrial fibrillation can be caused by a variety of conditions and may be temporary, intermittent, or permanent. Symptoms include feeling that your heart is beating rapidly or irregularly, chest discomfort, shortness of breath, or dizziness/lightheadedness that may be worse with exertion. Treatment is varied but may involve medication or electrical shock (cardioversion).  SEEK IMMEDIATE MEDICAL CARE IF YOU HAVE ANY OF THE FOLLOWING SYMPTOMS: chest pain, shortness of breath, abdominal pain, sweating, vomiting, blood in vomit/bowel movements/urine, dizziness/lightheadedness, weakness or numbness to face/arm/leg, trouble speaking or understanding, facial droop.        SECONDARY DISCHARGE DIAGNOSES  Diagnosis: Acute systolic congestive heart failure  Assessment and Plan of Treatment: Congestive heart failure is a chronic condition in which the heart has trouble pumping blood. In some cases of heart failure, fluid may back up into your lungs or you may have swelling (edema) in your lower legs. There are many causes of heart failure including high blood pressure, coronary artery disease, abnormal heart valves, heart muscle disease, lung disease, diabetes, etc. Symptoms include shortness of breath with activity or when lying flat, cough, swelling of the legs, fatigue, or increased urination during the night.   Treatment is aimed at managing the symptoms of heart failure and may include lifestyle changes, medications, or surgical procedures. Take medicines only as directed by your health care provider and do not stop unless instructed to do so. Eat heart-healthy foods with low or no trans/saturated fats, cholesterol and salt. Weigh yourself every day for early recognition of fluid accumulation.  SEEK IMMEDIATE MEDICAL CARE IF YOU HAVE ANY OF THE FOLLOWING SYMPTOMS: shortness of breath, change in mental status, chest pain, lightheadedness/dizziness/fainting, or worsening of symptoms including not being able to conduct normal physical activity.  DINO:   1. Left ventricular ejection fraction, by visual estimation, is 25 to   30%.   2. Severely decreased global left ventricular systolic function.   3. Moderately enlarged right ventricle.   4. Severely reduced RV systolic function.   5. Bioprosthetic valve in the Aortic position. Normal leaflet motion   noted. trace AI noted. Hemodynamic assessment was not performed on this   study.   6. Mild thickening and calcification of the anterior and posterior   mitral valve leaflets.   7. Trace mitral valve regurgitation.   8. Moderate tricuspid regurgitation.   9. Spontaneous echo contrast ("smoke") is seen in the left atrial cavity   and the left atrial appendage, which is dense and the contrast is   continuous.  10. Endocardial visualization was enhanced with intravenous echo contrast.  11. No left atrial appendage thrombus.  12     PRINCIPAL DISCHARGE DIAGNOSIS  Diagnosis: Acute systolic congestive heart failure  Assessment and Plan of Treatment: Congestive heart failure is a chronic condition in which the heart has trouble pumping blood. In some cases of heart failure, fluid may back up into your lungs or you may have swelling (edema) in your lower legs. There are many causes of heart failure including high blood pressure, coronary artery disease, abnormal heart valves, heart muscle disease, lung disease, diabetes, etc. Symptoms include shortness of breath with activity or when lying flat, cough, swelling of the legs, fatigue, or increased urination during the night.   Treatment is aimed at managing the symptoms of heart failure and may include lifestyle changes, medications, or surgical procedures. Take medicines only as directed by your health care provider and do not stop unless instructed to do so. Eat heart-healthy foods with low or no trans/saturated fats, cholesterol and salt. Weigh yourself every day for early recognition of fluid accumulation.  SEEK IMMEDIATE MEDICAL CARE IF YOU HAVE ANY OF THE FOLLOWING SYMPTOMS: shortness of breath, change in mental status, chest pain, lightheadedness/dizziness/fainting, or worsening of symptoms including not being able to conduct normal physical activity.  DINO:   1. Left ventricular ejection fraction, by visual estimation, is 25 to   30%.   2. Severely decreased global left ventricular systolic function.   3. Moderately enlarged right ventricle.   4. Severely reduced RV systolic function.   5. Bioprosthetic valve in the Aortic position. Normal leaflet motion   noted. trace AI noted. Hemodynamic assessment was not performed on this   study.   6. Mild thickening and calcification of the anterior and posterior   mitral valve leaflets.   7. Trace mitral valve regurgitation.   8. Moderate tricuspid regurgitation.   9. Spontaneous echo contrast ("smoke") is seen in the left atrial cavity   and the left atrial appendage, which is dense and the contrast is   continuous.  10. Endocardial visualization was enhanced with intravenous echo contrast.  11. No left atrial appendage thrombus.  12      SECONDARY DISCHARGE DIAGNOSES  Diagnosis: Atrial fibrillation with rapid ventricular response  Assessment and Plan of Treatment: Atrial fibrillation is a type of irregular heartbeat (arrhythmia) where the heart quivers continuously in a chaotic pattern that makes the heart unable to pump blood normally. This can increase the risk for stroke, heart failure, and other heart-related conditions. Atrial fibrillation can be caused by a variety of conditions and may be temporary, intermittent, or permanent. Symptoms include feeling that your heart is beating rapidly or irregularly, chest discomfort, shortness of breath, or dizziness/lightheadedness that may be worse with exertion. Treatment is varied but may involve medication or electrical shock (cardioversion).  SEEK IMMEDIATE MEDICAL CARE IF YOU HAVE ANY OF THE FOLLOWING SYMPTOMS: chest pain, shortness of breath, abdominal pain, sweating, vomiting, blood in vomit/bowel movements/urine, dizziness/lightheadedness, weakness or numbness to face/arm/leg, trouble speaking or understanding, facial droop.       PRINCIPAL DISCHARGE DIAGNOSIS  Diagnosis: Acute systolic congestive heart failure  Assessment and Plan of Treatment: Please follow up with Dr. Mendoza and continue to take your medications as prescribed. Here is more information on heart failure:  Congestive heart failure is a chronic condition in which the heart has trouble pumping blood. In some cases of heart failure, fluid may back up into your lungs or you may have swelling (edema) in your lower legs. There are many causes of heart failure including high blood pressure, coronary artery disease, abnormal heart valves, heart muscle disease, lung disease, diabetes, etc. Symptoms include shortness of breath with activity or when lying flat, cough, swelling of the legs, fatigue, or increased urination during the night.   Treatment is aimed at managing the symptoms of heart failure and may include lifestyle changes, medications, or surgical procedures. Take medicines only as directed by your health care provider and do not stop unless instructed to do so. Eat heart-healthy foods with low or no trans/saturated fats, cholesterol and salt. Weigh yourself every day for early recognition of fluid accumulation.  SEEK IMMEDIATE MEDICAL CARE IF YOU HAVE ANY OF THE FOLLOWING SYMPTOMS: shortness of breath, change in mental status, chest pain, lightheadedness/dizziness/fainting, or worsening of symptoms including not being able to conduct normal physical activity.      SECONDARY DISCHARGE DIAGNOSES  Diagnosis: Atrial fibrillation with rapid ventricular response  Assessment and Plan of Treatment: Please continue to take your amiodarone to keep your heart rhythm normal and Xarelto (rivaroxaban) to prevent strokes. Please follow up with Dr. Mendoza within the next 2 weeks. Here is more information about atrial fibrillation:  Atrial fibrillation is a type of irregular heartbeat (arrhythmia) where the heart quivers continuously in a chaotic pattern that makes the heart unable to pump blood normally. This can increase the risk for stroke, heart failure, and other heart-related conditions. Atrial fibrillation can be caused by a variety of conditions and may be temporary, intermittent, or permanent. Symptoms include feeling that your heart is beating rapidly or irregularly, chest discomfort, shortness of breath, or dizziness/lightheadedness that may be worse with exertion. Treatment is varied but may involve medication or electrical shock (cardioversion).  SEEK IMMEDIATE MEDICAL CARE IF YOU HAVE ANY OF THE FOLLOWING SYMPTOMS: chest pain, shortness of breath, abdominal pain, sweating, vomiting, blood in vomit/bowel movements/urine, dizziness/lightheadedness, weakness or numbness to face/arm/leg, trouble speaking or understanding, facial droop.

## 2022-02-01 NOTE — PROGRESS NOTE ADULT - SUBJECTIVE AND OBJECTIVE BOX
NEPHROLOGY FOLLOW UP NOTE    pt seen and examined  s/p DINO / DCC  BP's fair  less edematous  voiding on own    PAST MEDICAL & SURGICAL HISTORY:  Chronic systolic congestive heart failure    HTN (hypertension)    Dyslipidemia      Allergies:  No Known Allergies    Home Medications Reviewed    SOCIAL HISTORY:  Denies ETOH,Smoking, drugs    FAMILY HISTORY:  no renal disease        REVIEW OF SYSTEMS:  CONSTITUTIONAL: No weakness, fevers or chills  EYES/ENT: No visual changes;  No vertigo or throat pain   NECK: No pain or stiffness  RESPIRATORY: No cough, wheezing, hemoptysis; + shortness of breath  CARDIOVASCULAR: No chest pain or palpitations.  GASTROINTESTINAL: No abdominal or epigastric pain. No nausea, vomiting, or hematemesis; No diarrhea or constipation. No melena or hematochezia.  GENITOURINARY: No dysuria, frequency, foamy urine, urinary urgency, incontinence or hematuria  NEUROLOGICAL: No numbness or weakness  SKIN: No itching, burning, rashes, or lesions   VASCULAR: + bilateral lower extremity edema.   All other review of systems is negative unless indicated above.    PHYSICAL EXAM:  Constitutional: NAD  HEENT: anicteric sclera, oropharynx clear, MMM  Neck: + JVD  Respiratory: decreased BS b/l with bibasilar crackles  Cardiovascular: S1, S2, irreg   Gastrointestinal: BS+, soft, NT/ND  Extremities: No cyanosis or clubbing. 2+ peripheral edema  Neurological: A/O x 3, no focal deficits  Psychiatric: Normal mood, normal affect  : No CVA tenderness.     Skin: No rashes    Hospital Medications:   MEDICATIONS  (STANDING):  aMIOdarone    Tablet 200 milliGRAM(s) Oral two times a day  aspirin  chewable 81 milliGRAM(s) Oral daily  atorvastatin 20 milliGRAM(s) Oral at bedtime  chlorhexidine 4% Liquid 1 Application(s) Topical <User Schedule>  digoxin     Tablet 125 MICROGram(s) Oral daily  iron sucrose IVPB 200 milliGRAM(s) IV Intermittent every 24 hours  pantoprazole    Tablet 40 milliGRAM(s) Oral before breakfast  potassium chloride    Tablet ER 40 milliEquivalent(s) Oral every 4 hours  rivaroxaban 15 milliGRAM(s) Oral daily  senna 2 Tablet(s) Oral at bedtime  tamsulosin 0.4 milliGRAM(s) Oral at bedtime        VITALS:  T(F): 96.1 (22 @ 13:04), Max: 98.2 (22 @ 20:00)  HR: 85 (22 @ 14:15)  BP: 126/82 (22 @ 14:15)  RR: 18 (22 @ 13:04)  SpO2: 97% (22 @ 09:04)  Wt(kg): --     @ 07:01  -   07:00  --------------------------------------------------------  IN: 266.8 mL / OUT: 4100 mL / NET: -3833.2 mL     07:01  -   @ 07:00  --------------------------------------------------------  IN: 740 mL / OUT: 2125 mL / NET: -1385 mL     @ 07:01  -   @ 14:44  --------------------------------------------------------  IN: 0 mL / OUT: 850 mL / NET: -850 mL      Height (cm): 172.7 ( @ 09:25)  Weight (kg): 102.058 ( 09:25)  BMI (kg/m2): 34.2 ( 09:25)  BSA (m2): 2.15 ( 09:25)    LABS:      141  |  96<L>  |  36<H>  ----------------------------<  96  3.8   |  33<H>  |  2.6<H>    Ca    9.8      2022 04:30  Mg     2.2         TPro  5.5<L>  /  Alb  3.9  /  TBili  0.7  /  DBili      /  AST  12  /  ALT  20  /  AlkPhos  51                            12.8   7.56  )-----------( 161      ( 2022 04:30 )             39.3       Urine Studies:  Urinalysis Basic - ( 2022 01:00 )    Color: Colorless / Appearance: Clear / S.009 / pH:   Gluc:  / Ketone: Negative  / Bili: Negative / Urobili: <2 mg/dL   Blood:  / Protein: Negative / Nitrite: Negative   Leuk Esterase: Negative / RBC: 17 /HPF / WBC 1 /HPF   Sq Epi:  / Non Sq Epi: 0 /HPF / Bacteria: Negative      Sodium, Random Urine: 103.0 mmoL/L ( @ 01:00)  Chloride, Random Urine: 107 ( @ 01:00)  Creatinine, Random Urine: 23 mg/dL ( @ 01:00)  Protein/Creatinine Ratio Calculation: <0.2 Ratio ( @ 01:00)  Creatinine, Random Urine: 25 mg/dL ( @ 01:00)  Protein/Creatinine Ratio Calculation: <0.2 Ratio ( @ 01:00)      RADIOLOGY & ADDITIONAL STUDIES:

## 2022-02-01 NOTE — DISCHARGE NOTE PROVIDER - PROVIDER TOKENS
PROVIDER:[TOKEN:[[id number]],FOLLOWUP:[1 week],ESTABLISHEDPATIENT:[T]],PROVIDER:[TOKEN:[[id number]],FOLLOWUP:[1-3 days],ESTABLISHEDPATIENT:[T]],PROVIDER:[TOKEN:[[id number]],FOLLOWUP:[1 month]]

## 2022-02-01 NOTE — CHART NOTE - NSCHARTNOTEFT_GEN_A_CORE
POST OPERATIVE PROCEDURAL DOCUMENTATION  PRE-OP DIAGNOSIS:  AF RVR    POST-OP DIAGNOSIS:  No BILLY thrombus, successful cardioversion to NSR    PROCEDURE: Transesophageal echocardiogram    Primary Physician: Dr. Barrett  Fellow: Dr. Cedillo    ANESTHESIA TYPE  [] General Anesthesia  [x] Conscious Sedation  [] Local/Regional    CONDITION  [] Critical  [] Serious  [] Fair  [x] Good    SPECIMENS REMOVED (IF APPLICABLE): N/A    IMPLANTS (IF APPLICABLE): None    ESTIMATED BLOOD LOSS: None    COMPLICATIONS: None    FINDINGS:  After risks and benefits of procedures were explained, informed consent was obtained and placed in chart. Refer to Anesthesia note for sedation details. The DINO probe was passed into the esophagus without difficulty. Transesophageal images were obtained. The DINO probe was removed without difficulty and examined. There was no evidence for bleeding. The patient tolerated the procedure well without any immediate DINO-related complications.      Preliminary Findings:  LA: Mild to moderate enlargement (+) spontaneous contrast  BILLY: No evidence of clot. (+) spontaneous contrast  LV: LVEF was low (EF 30-35%)  MV: Mild MR, no MS  AV: Bioprosthetic aortic valve with normal function, No AI or AS  TV: Moderate TR, no TS  PV: NWV  RA: Mild enlargement  RV: Mildly dilated with decreased systolic function  IAS: No evidence of PFO. No R -> L shunt by color  Descending aorta & arch: There was mild diffuse, non-mobile atheroma seen in the thoracic aorta    Patient successfully converted to sinus rhythm with synchronized  200J of direct current cardioversion.    DIAGNOSIS/IMPRESSION:  No BILLY thrombus, successful cardioversion to normal sinus rhythm    PLAN OF CARE:  - Return to inpatient bed  - Continue anticoagulation and anti-arrhythmic medications  - F/u primary cardiologist and EP POST OPERATIVE PROCEDURAL DOCUMENTATION  PRE-OP DIAGNOSIS:  AF RVR    POST-OP DIAGNOSIS:  No BILLY thrombus, successful cardioversion to NSR    PROCEDURE: Transesophageal echocardiogram    Primary Physician: Dr. Barrett  Fellow: Dr. Cedillo    ANESTHESIA TYPE  [] General Anesthesia  [x] Conscious Sedation  [] Local/Regional    CONDITION  [] Critical  [] Serious  [] Fair  [x] Good    SPECIMENS REMOVED (IF APPLICABLE): N/A    IMPLANTS (IF APPLICABLE): None    ESTIMATED BLOOD LOSS: None    COMPLICATIONS: None    FINDINGS:  After risks and benefits of procedures were explained, informed consent was obtained and placed in chart. Refer to Anesthesia note for sedation details. The DINO probe was passed into the esophagus without difficulty. Transesophageal images were obtained. The DINO probe was removed without difficulty and examined. There was no evidence for bleeding. The patient tolerated the procedure well without any immediate DINO-related complications.      Preliminary Findings:  < from: Transesophageal Echocardiogram (02.01.22 @ 09:38) >     1. Left ventricular ejection fraction, by visual estimation, is 25 to   30%.   2. Severely decreased global left ventricular systolic function.   3. Moderately enlarged right ventricle.   4. Severely reduced RV systolic function.   5. Bioprosthetic valve in the Aortic position. Normal leaflet motion   noted. trace AI noted. Hemodynamic assessment was not performed on this   study.   6. Mild thickening and calcification of the anterior and posterior   mitral valve leaflets.   7. Trace mitral valve regurgitation.   8. Moderate tricuspid regurgitation.   9. Spontaneous echo contrast ("smoke") is seen in the left atrial cavity   and the left atrial appendage, which is dense and the contrast is   continuous.  10. Endocardial visualization was enhanced with intravenous echo contrast.  11. No left atrial appendage thrombus.  12. Left atrial enlargement.  13. Right atrial enlargement.  14. Successfully cardioverted to NSR with PACs and PVCs with 200 J x 1   attempt.      < end of copied text >        Patient successfully converted to sinus rhythm with synchronized  200J of direct current cardioversion.    DIAGNOSIS/IMPRESSION:  No BILLY thrombus, successful cardioversion to normal sinus rhythm    PLAN OF CARE:  - Return to inpatient bed  - Continue anticoagulation and anti-arrhythmic medications  - F/u primary cardiologist and EP

## 2022-02-01 NOTE — DISCHARGE NOTE PROVIDER - CARE PROVIDERS DIRECT ADDRESSES
[email or web address]

## 2022-02-01 NOTE — DISCHARGE NOTE PROVIDER - NSDCMRMEDTOKEN_GEN_ALL_CORE_FT
aspirin 81 mg oral tablet, chewable: 1 tab(s) orally once a day  Eliquis 2.5 mg oral tablet: 1 tab(s) orally once a day at night  Eliquis 5 mg oral tablet: 1 tab(s) orally once a day in morning  LATANOPROST  0.005 % SOLN:   Metoprolol Tartrate 25 mg oral tablet: 1 tab(s) orally once a day in morning  Metoprolol Tartrate 25 mg oral tablet: 12.5 milligram(s) orally once a day at night  olmesartan 20 mg oral tablet: 1 tab(s) orally once a day  rosuvastatin 5 mg oral tablet: 1 tab(s) orally once a day   aspirin 81 mg oral tablet, chewable: 1 tab(s) orally once a day  LATANOPROST  0.005 % SOLN:   Metoprolol Tartrate 25 mg oral tablet: 1 tab(s) orally once a day in morning  Metoprolol Tartrate 25 mg oral tablet: 12.5 milligram(s) orally once a day at night  olmesartan 20 mg oral tablet: 1 tab(s) orally once a day  rivaroxaban 15 mg oral tablet: 1 tab(s) orally once a day (before a meal)  rosuvastatin 5 mg oral tablet: 1 tab(s) orally once a day   amiodarone 200 mg oral tablet: 1 tab(s) orally 2 times a day  aspirin 81 mg oral tablet, chewable: 1 tab(s) orally once a day  atorvastatin 20 mg oral tablet: 1 tab(s) orally once a day (at bedtime)  Bactrim  mg-160 mg oral tablet: 1 tab(s) orally once a day   hydrALAZINE 10 mg oral tablet: 1 tab(s) orally 3 times a day  isosorbide mononitrate 30 mg oral tablet, extended release: 1 tab(s) orally once a day  LATANOPROST  0.005 % SOLN:   Metoprolol Succinate ER 50 mg oral tablet, extended release: 1 tab(s) orally once a day   rivaroxaban 15 mg oral tablet: 1 tab(s) orally once a day (before a meal)  tamsulosin 0.4 mg oral capsule: 1 cap(s) orally once a day (at bedtime)

## 2022-02-01 NOTE — PROGRESS NOTE ADULT - TIME BILLING
Exam and counseling  Care coordination  Discussion with EP
Bedside evaluation  Discussion of plan of care with patient and wife  Update of patient's PCP
Bedside evaluation   Update re: plan of care  Discussion with EP and outpatient cardiologist  Care coordination with Cath Lab for DINO and DCCV tomorrow
Exam and counseling  Care coordination

## 2022-02-01 NOTE — DISCHARGE NOTE PROVIDER - HOSPITAL COURSE
THIS DISCHARGE NOTE IS A DRAFT AND INCOMPLETE, PLEASE DO NOT INFER ANYTHING FROM THIS NOTE UNTIL COMPLETE    Mr. Silva is an 81yoM with CAD s/p CABG, bioprosthetic AVR (2011), HTN, CKD (baseline Cr 1.3), and recent hospitalization in 12/2021 for COVID-19 infection c/b RLE DVT and PE for which patient required 6L nc and has since been weaned off who presented with dyspnea, orthopnea, PND, and edema, found to be in Afib with RVR, diagnosed with BiV heart failure.   Patient diuresed well with metolazone 5 mg PO daily and Bumex 2 mg IV BID. Patient underwent DINO and DCCV on 2/01, with restoration of NSR. ECG demonstrated sinus rhythm with frequent PACs. Patient bolused amiodarone 150 mg IV x 1 and received amiodarone 200 mg PO BID and digoxin 125 mcg. Leg edema has resolved, patient breathing well on room air, able to lie flat, lungs clear, appears euvolemic. Transitioned to PO diuretics.      Plan:  - S/p digoxin IV loading with 250 mcg x 3  - S/p amiodarone IV loading for 24 hours  - S/p DINO and DCCV on 2/01/22, receiving an additional amiodarone 150 mg IV x 1   - Continue amiodarone 200 mg BID, digoxin 125 mcg, and Xarelto 15 mg (renally dosed) with dinner  - Check digoxin level tomorrow with AM labs  - Hold PM diuretics, will transition to PO diuretics tomorrow  - Check BNP tomorrow with AM labs  - Passed trial of void  - Will initiate GDMT for HFrEF before discharge  - ECG tomorrow AM  - Strict I/Os and daily standing weights  - Replete for goal K > 4 and Mg > 2   - Continue ASA 81 mg daily and atorvastatin 20 mg nightly  - A1c 5.8, LDL 90, TSH 1.56  - Ferritin 140 and iron sat 15%, ordered for a 5-day course of IV iron sucrose (received 100 mg on Day 1 and 2, now ordered for 200 mg). PATIENT NOT BEING DISCHARGED YET. THIS NOTE IS A DRAFT AND INCOMPLETE, PLEASE DO NOT INFER ANYTHING FROM THIS NOTE    Mr. Silva is an 81yoM with CAD s/p CABG, bioprosthetic AVR (2011), HTN, CKD (baseline Cr 1.3), and recent hospitalization in 12/2021 for COVID-19 infection c/b RLE DVT and PE for which patient required 6L nc and has since been weaned off who presented with dyspnea, orthopnea, PND, and edema, found to be in Afib with RVR, diagnosed with BiV heart failure.   Patient diuresed well with metolazone 5 mg PO daily and Bumex 2 mg IV BID. Patient underwent DINO and DCCV on 2/01, with restoration of NSR. ECG demonstrated sinus rhythm with frequent PACs. Patient bolused amiodarone 150 mg IV x 1 and received amiodarone 200 mg PO BID and digoxin 125 mcg. Leg edema has resolved, patient breathing well on room air, able to lie flat, lungs clear, appears euvolemic. Transitioned to PO diuretics.      Plan:  - S/p digoxin IV loading with 250 mcg x 3  - S/p amiodarone IV loading for 24 hours  - S/p DINO and DCCV on 2/01/22, receiving an additional amiodarone 150 mg IV x 1   - Continue amiodarone 200 mg BID, digoxin 125 mcg, and Xarelto 15 mg (renally dosed) with dinner  - Check digoxin level tomorrow with AM labs  - Hold PM diuretics, will transition to PO diuretics tomorrow  - Check BNP tomorrow with AM labs  - Passed trial of void  - Will initiate GDMT for HFrEF before discharge  - ECG tomorrow AM  - Strict I/Os and daily standing weights  - Replete for goal K > 4 and Mg > 2   - Continue ASA 81 mg daily and atorvastatin 20 mg nightly  - A1c 5.8, LDL 90, TSH 1.56  - Ferritin 140 and iron sat 15%, ordered for a 5-day course of IV iron sucrose (received 100 mg on Day 1 and 2, now ordered for 200 mg). PATIENT NOT BEING DISCHARGED YET. THIS NOTE IS A DRAFT AND INCOMPLETE, PLEASE DO NOT INFER ANYTHING FROM THIS NOTE    Mr. Silva is an 81yoM with CAD s/p CABG, bioprosthetic AVR (2011), HTN, CKD (baseline Cr 1.3), and recent hospitalization in 12/2021 for COVID-19 infection c/b RLE DVT and PE for which patient required 6L nc and has since been weaned off who presented with dyspnea, orthopnea, PND, and edema, found to be in Afib with RVR, diagnosed with BiV heart failure.     Patient was diuresed with Bumex 2 mg IV QD-BID (also receive metolazone initially) with good UOP and improvement in his edema. His diuretics were gradually decreased, but despite this, patient appears to have develops contraction alkalosis with NICA. Holding diuretics and following Cr. Will obtain renal ultrasound for further work-up, and Nephrology following.     Patient underwent DINO and DCCV on 2/01, with restoration of NSR. ECG demonstrated sinus rhythm with frequent PACs. Patient on PO amiodarone to maintain sinus rhythm.     Lastly, ECG in sinus rhythm shows new Q waves inferiorly. Started on Plavix and underwent NST which showed small reversible defect in the anterior/anteroseptal LV wall.    Plan:  - Nuclear Lexiscan stress test pending  - S/p digoxin IV loading with 250 mcg x 3  - S/p amiodarone IV loading for 24 hours  - S/p DINO and DCCV on 2/01/22, receiving an additional amiodarone 150 mg IV x 1   - Continue amiodarone 200 mg BID and Xarelto 15 mg (renally dosed) with dinner  - Give Plavix 300 mg today and start 75 mg daily thereafter  - Continue ASA 81 mg daily and atorvastatin 20 mg nightly, will discontinue ASA on discharge to avoid triple therapy  - Continue Toprol 25 mg daily today and Imdur 30 mg daily   - Start hydralazine 10 mg TID  - Will give 250 cc over 5 hours  - Encouraged liberal PO intake of fluids today  - BNP 8852 --> 9766   - Daily ECGs  - Strict I/Os and daily standing weights  - Replete for goal K > 4 and Mg > 2   - A1c 5.8, LDL 90, TSH 1.56  - Ferritin 140 and iron sat 15%, ordered for a 5-day course of IV iron sucrose (received 100 mg on Day 1 and 2, now ordered for 200 mg) . Mr. Silva is an 81yoM with CAD s/p CABG, bioprosthetic AVR (2011), HTN, CKD (baseline Cr 1.3), and recent hospitalization in 12/2021 for COVID-19 infection c/b RLE DVT and PE for which patient required 6L nc and has since been weaned off who presented with dyspnea, orthopnea, PND, and edema, found to be in Afib with RVR, diagnosed with BiV heart failure.     Patient was diuresed with Bumex 2 mg IV QD-BID (also receive metolazone initially) with good UOP and improvement in his edema. His diuretics were gradually decreased, but despite this, patient appears to have develops contraction alkalosis with NICA. Diuretics were held and his creatinine improved. UA was + for UTI so patient will be d/c'd on Bactrim. Renal ultrasound was negative. Will continue to hold diuretics on discharge. Toprol was uptitrated to 50 mg daily.    Patient underwent DINO and DCCV on 2/1, with restoration of NSR. Post cardioversion ECG demonstrated sinus rhythm with frequent PACs. Patient on PO amiodarone to maintain sinus rhythm and Xarelto for anticoagulation.    Lastly, ECG in sinus rhythm showed new Q waves inferiorly. Underwent NST which showed small reversible defect in the anterior/anteroseptal LV wall, but on further review did not appear to be acute so patient will not need Plavix    Patient is safe and stable for discharge home. Instructed to f/u with Dr. Mendoza and with Dr. Gracia Mr. Silva is an 81yoM with CAD s/p CABG, bioprosthetic AVR (2011), HTN, CKD (baseline Cr 1.3), and recent hospitalization in 12/2021 for COVID-19 infection c/b RLE DVT and PE for which patient required 6L nc and has since been weaned off who presented with dyspnea, orthopnea, PND, and edema, found to be in Afib with RVR, diagnosed with BiV heart failure.     Patient was diuresed with Bumex 2 mg IV QD-BID (also receive metolazone initially) with good UOP and improvement in his edema. His diuretics were gradually decreased and held completely, but despite this, patient appears to have develops contraction alkalosis with NICA. Nephrology was consulted. UA was + for UTI so patient will be d/c'd on Bactrim. Renal ultrasound was negative. Will continue to hold diuretics on discharge. Toprol was uptitrated to 50 mg daily. Due to NICA, patient not started on ACE inhibitor/ARB/ARNI, instead started on hydralazine and Imdur.    Patient underwent DINO and DCCV on 2/1, with restoration of NSR. Post cardioversion ECG demonstrated sinus rhythm with frequent PACs. Patient on PO amiodarone to maintain sinus rhythm and Xarelto for anticoagulation.    Lastly, ECG in sinus rhythm showed new Q waves inferiorly. Underwent NST which showed small reversible defect in the anterior/anteroseptal LV wall, but on further review did not appear to be acute so patient will not need Plavix    Patient is safe and stable for discharge home. Instructed to f/u with Dr. Mendoza and with Dr. Gracia. Mr. Silva is an 81yoM with CAD s/p CABG, bioprosthetic AVR (2011), HTN, CKD (baseline Cr 1.3), and recent hospitalization in 12/2021 for COVID-19 infection c/b RLE DVT and PE for which patient required 6L nc and has since been weaned off who presented with dyspnea, orthopnea, PND, and edema, found to be in Afib with RVR, diagnosed with BiV heart failure.     Patient was diuresed with Bumex 2 mg IV QD-BID (also receive metolazone initially) with good UOP and improvement in his edema. His diuretics were gradually decreased and held completely, but despite this, patient appears to have develops contraction alkalosis with NICA. Nephrology was consulted. UA was + for UTI so patient will be d/c'd on Bactrim. Renal ultrasound was negative. Will continue to hold diuretics on discharge. Toprol was uptitrated to 50 mg daily. Due to NICA, patient not started on ACE inhibitor/ARB/ARNI, instead started on hydralazine and Imdur.    Patient underwent DINO and DCCV on 2/1, with restoration of NSR. Post cardioversion ECG demonstrated sinus rhythm with frequent PACs. Patient on PO amiodarone to maintain sinus rhythm and Xarelto for anticoagulation.    Lastly, ECG in sinus rhythm showed new Q waves inferiorly. Underwent NST which showed small reversible defect in the anterior/anteroseptal LV wall, but on further review did not appear to be acute so patient will not need Plavix    On day of discharge, patient feels improved overall with no dyspnea saturating well on RA. Standing weight: 210.3 lbs (95.4 kg)    Patient is safe and stable for discharge home. Instructed to f/u with Dr. Mendoza and with Dr. Gracia. Mr. Silva is an 81yoM with CAD s/p CABG, bioprosthetic AVR (2011), HTN, CKD (baseline Cr 1.3), and recent hospitalization in 12/2021 for COVID-19 infection c/b RLE DVT and PE for which patient required 6L nc and has since been weaned off who presented with dyspnea, orthopnea, PND, and edema, found to be in Afib with RVR, diagnosed with BiV heart failure.     Patient was diuresed with Bumex 2 mg IV QD-BID (also receive metolazone initially) with good UOP and improvement in his edema. His diuretics were gradually decreased and held completely, but despite this, patient appears to have develops contraction alkalosis with NICA. Nephrology was consulted. Renal ultrasound was negative. UA was + for UTI so patient given Rocephin 1g IV x1 here and will be d/c'd on Bactrim. Will continue to hold diuretics on discharge. Due to NICA, patient not started on ACE inhibitor/ARB/ARNI, instead started on hydralazine and Imdur. Toprol was uptitrated to 50 mg daily.    Patient underwent DINO and DCCV on 2/1, with restoration of NSR. Post cardioversion ECG demonstrated sinus rhythm with frequent PACs. Patient on PO amiodarone to maintain sinus rhythm and Xarelto for anticoagulation.    Lastly, ECG in sinus rhythm showed new Q waves inferiorly. Underwent NST which showed small reversible defect in the anterior/anteroseptal LV wall, but on further review did not appear to be acute so patient will not need Plavix    On day of discharge, patient feels improved overall with no dyspnea saturating well on RA. Standing weight: 210.3 lbs (95.4 kg)    Patient is safe and stable for discharge home. Instructed to f/u with Dr. Mendoza and with Dr. Gracia. Mr. Silva is an 81yoM with CAD s/p CABG, bioprosthetic AVR (2011), HTN, CKD (baseline Cr 1.3), and recent hospitalization in 12/2021 for COVID-19 infection c/b RLE DVT and PE for which patient required 6L nc and has since been weaned off who presented with dyspnea, orthopnea, PND, and edema, found to be in Afib with RVR, diagnosed with BiV heart failure.     Patient was diuresed with Bumex 2 mg IV QD-BID (also receive metolazone initially) with good UOP and improvement in his edema. His diuretics were gradually decreased and held completely, but despite this, patient appears to have develops contraction alkalosis with NICA. Nephrology was consulted. Renal ultrasound was negative. UA was + for UTI so patient given Rocephin 1g IV x1 here and will be d/c'd on Bactrim. Will continue to hold diuretics on discharge. Due to NICA, patient not started on ACE inhibitor/ARB/ARNI, instead started on hydralazine and Imdur. Toprol was uptitrated to 50 mg daily.    Patient underwent DINO and DCCV on 2/1, with restoration of NSR. Post cardioversion ECG demonstrated sinus rhythm with frequent PACs. Patient on PO amiodarone to maintain sinus rhythm and Xarelto for anticoagulation.    Lastly, ECG in sinus rhythm showed new Q waves inferiorly. Underwent NST which showed small reversible defect in the anterior/anteroseptal LV wall. Given the ischemia did not align with the Q waves and because LBBB can create anteroseptal defects on MPI, decision was made to discontinue Plavix.     On day of discharge, patient feels improved overall with no dyspnea saturating well on RA. Standing weight: 210.3 lbs (95.4 kg).    Patient is safe and stable for discharge home. Instructed to f/u with Dr. Mendoza and with Dr. Gracia.

## 2022-02-01 NOTE — PRE-ANESTHESIA EVALUATION ADULT - NSANTHPMHFT_GEN_ALL_CORE
- CAD s/p CABG x4 in 2006. Cardiologist Dr Sebastian Mendoza  - CHF (no TTE in chart) and s/p AVR 2011. Cardiologist Dr Sebastian Mendoza. Home med PO Lasix 40mg QD per wife  - CKD III (Baseline Cr 1.3)

## 2022-02-01 NOTE — DISCHARGE NOTE PROVIDER - NSDCFUSCHEDAPPT_GEN_ALL_CORE_FT
SAÚL HODGSON ; 02/28/2022 ; [facility] [address] ; 03/14/2022 ; [facility] [address] SAÚL Slade ; 03/24/2022 ; NPP Cardio [address]

## 2022-02-01 NOTE — DISCHARGE NOTE PROVIDER - CARE PROVIDER_API CALL
Sebastian Mendoza)  Cardiovascular Disease; Internal Medicine; Interventional Cardiology  [address] [place] [address]  Phone: [phone]  Fax: [phone]  Established Patient  Follow Up Time: 1 week    Brando Murphy)  Internal Medicine  [address] [place] [address]  Phone: [phone]  Fax: [phone]  Established Patient  Follow Up Time: 1-3 days    Sadiq Gracia)  Cardiac Electrophysiology; Cardiovascular Disease; [facility]  [address] [place] [address]  Phone: [phone]  Fax: [phone]  Follow Up Time: 1 month

## 2022-02-02 LAB
ALBUMIN SERPL ELPH-MCNC: 3.1 G/DL — LOW (ref 3.5–5.2)
ALP SERPL-CCNC: 51 U/L — SIGNIFICANT CHANGE UP (ref 30–115)
ALT FLD-CCNC: 14 U/L — SIGNIFICANT CHANGE UP (ref 0–41)
ANION GAP SERPL CALC-SCNC: 19 MMOL/L — HIGH (ref 7–14)
AST SERPL-CCNC: 12 U/L — SIGNIFICANT CHANGE UP (ref 0–41)
BASOPHILS # BLD AUTO: 0.03 K/UL — SIGNIFICANT CHANGE UP (ref 0–0.2)
BASOPHILS NFR BLD AUTO: 0.4 % — SIGNIFICANT CHANGE UP (ref 0–1)
BILIRUB SERPL-MCNC: 0.8 MG/DL — SIGNIFICANT CHANGE UP (ref 0.2–1.2)
BUN SERPL-MCNC: 41 MG/DL — HIGH (ref 10–20)
CALCIUM SERPL-MCNC: 8.9 MG/DL — SIGNIFICANT CHANGE UP (ref 8.5–10.1)
CHLORIDE SERPL-SCNC: 95 MMOL/L — LOW (ref 98–110)
CO2 SERPL-SCNC: 24 MMOL/L — SIGNIFICANT CHANGE UP (ref 17–32)
CREAT SERPL-MCNC: 3.1 MG/DL — HIGH (ref 0.7–1.5)
DIGOXIN SERPL-MCNC: 1.9 NG/ML — SIGNIFICANT CHANGE UP (ref 0.8–2)
EOSINOPHIL # BLD AUTO: 0.07 K/UL — SIGNIFICANT CHANGE UP (ref 0–0.7)
EOSINOPHIL NFR BLD AUTO: 1 % — SIGNIFICANT CHANGE UP (ref 0–8)
GLUCOSE SERPL-MCNC: 89 MG/DL — SIGNIFICANT CHANGE UP (ref 70–99)
HCT VFR BLD CALC: 36.2 % — LOW (ref 42–52)
HGB BLD-MCNC: 11.8 G/DL — LOW (ref 14–18)
IMM GRANULOCYTES NFR BLD AUTO: 0.6 % — HIGH (ref 0.1–0.3)
LYMPHOCYTES # BLD AUTO: 0.8 K/UL — LOW (ref 1.2–3.4)
LYMPHOCYTES # BLD AUTO: 11.2 % — LOW (ref 20.5–51.1)
MAGNESIUM SERPL-MCNC: 2.2 MG/DL — SIGNIFICANT CHANGE UP (ref 1.8–2.4)
MCHC RBC-ENTMCNC: 30.9 PG — SIGNIFICANT CHANGE UP (ref 27–31)
MCHC RBC-ENTMCNC: 32.6 G/DL — SIGNIFICANT CHANGE UP (ref 32–37)
MCV RBC AUTO: 94.8 FL — HIGH (ref 80–94)
MONOCYTES # BLD AUTO: 1.04 K/UL — HIGH (ref 0.1–0.6)
MONOCYTES NFR BLD AUTO: 14.5 % — HIGH (ref 1.7–9.3)
NEUTROPHILS # BLD AUTO: 5.17 K/UL — SIGNIFICANT CHANGE UP (ref 1.4–6.5)
NEUTROPHILS NFR BLD AUTO: 72.3 % — SIGNIFICANT CHANGE UP (ref 42.2–75.2)
NRBC # BLD: 0 /100 WBCS — SIGNIFICANT CHANGE UP (ref 0–0)
NT-PROBNP SERPL-SCNC: 9766 PG/ML — HIGH (ref 0–300)
PLATELET # BLD AUTO: 161 K/UL — SIGNIFICANT CHANGE UP (ref 130–400)
POTASSIUM SERPL-MCNC: 3.9 MMOL/L — SIGNIFICANT CHANGE UP (ref 3.5–5)
POTASSIUM SERPL-SCNC: 3.9 MMOL/L — SIGNIFICANT CHANGE UP (ref 3.5–5)
PROT SERPL-MCNC: 4.9 G/DL — LOW (ref 6–8)
RBC # BLD: 3.82 M/UL — LOW (ref 4.7–6.1)
RBC # FLD: 16.5 % — HIGH (ref 11.5–14.5)
SODIUM SERPL-SCNC: 138 MMOL/L — SIGNIFICANT CHANGE UP (ref 135–146)
WBC # BLD: 7.15 K/UL — SIGNIFICANT CHANGE UP (ref 4.8–10.8)
WBC # FLD AUTO: 7.15 K/UL — SIGNIFICANT CHANGE UP (ref 4.8–10.8)

## 2022-02-02 PROCEDURE — 99233 SBSQ HOSP IP/OBS HIGH 50: CPT

## 2022-02-02 PROCEDURE — 93010 ELECTROCARDIOGRAM REPORT: CPT | Mod: 76

## 2022-02-02 RX ORDER — POTASSIUM CHLORIDE 20 MEQ
20 PACKET (EA) ORAL ONCE
Refills: 0 | Status: COMPLETED | OUTPATIENT
Start: 2022-02-02 | End: 2022-02-02

## 2022-02-02 RX ORDER — SODIUM CHLORIDE 9 MG/ML
250 INJECTION INTRAMUSCULAR; INTRAVENOUS; SUBCUTANEOUS ONCE
Refills: 0 | Status: COMPLETED | OUTPATIENT
Start: 2022-02-02 | End: 2022-02-02

## 2022-02-02 RX ORDER — APIXABAN 2.5 MG/1
1 TABLET, FILM COATED ORAL
Qty: 0 | Refills: 0 | DISCHARGE

## 2022-02-02 RX ORDER — RIVAROXABAN 15 MG-20MG
1 KIT ORAL
Qty: 30 | Refills: 0
Start: 2022-02-02 | End: 2022-03-03

## 2022-02-02 RX ORDER — METOPROLOL TARTRATE 50 MG
25 TABLET ORAL ONCE
Refills: 0 | Status: COMPLETED | OUTPATIENT
Start: 2022-02-02 | End: 2022-02-02

## 2022-02-02 RX ORDER — REGADENOSON 0.08 MG/ML
0.4 INJECTION, SOLUTION INTRAVENOUS ONCE
Refills: 0 | Status: COMPLETED | OUTPATIENT
Start: 2022-02-02 | End: 2022-02-03

## 2022-02-02 RX ORDER — ISOSORBIDE MONONITRATE 60 MG/1
30 TABLET, EXTENDED RELEASE ORAL DAILY
Refills: 0 | Status: DISCONTINUED | OUTPATIENT
Start: 2022-02-03 | End: 2022-02-04

## 2022-02-02 RX ORDER — METOPROLOL TARTRATE 50 MG
25 TABLET ORAL DAILY
Refills: 0 | Status: DISCONTINUED | OUTPATIENT
Start: 2022-02-02 | End: 2022-02-04

## 2022-02-02 RX ORDER — REGADENOSON 0.08 MG/ML
0.4 INJECTION, SOLUTION INTRAVENOUS ONCE
Refills: 0 | Status: DISCONTINUED | OUTPATIENT
Start: 2022-02-02 | End: 2022-02-02

## 2022-02-02 RX ORDER — ISOSORBIDE MONONITRATE 60 MG/1
30 TABLET, EXTENDED RELEASE ORAL ONCE
Refills: 0 | Status: COMPLETED | OUTPATIENT
Start: 2022-02-02 | End: 2022-02-02

## 2022-02-02 RX ADMIN — Medication 81 MILLIGRAM(S): at 11:17

## 2022-02-02 RX ADMIN — AMIODARONE HYDROCHLORIDE 200 MILLIGRAM(S): 400 TABLET ORAL at 05:46

## 2022-02-02 RX ADMIN — ATORVASTATIN CALCIUM 20 MILLIGRAM(S): 80 TABLET, FILM COATED ORAL at 19:41

## 2022-02-02 RX ADMIN — Medication 20 MILLIEQUIVALENT(S): at 08:55

## 2022-02-02 RX ADMIN — TAMSULOSIN HYDROCHLORIDE 0.4 MILLIGRAM(S): 0.4 CAPSULE ORAL at 19:41

## 2022-02-02 RX ADMIN — Medication 25 MILLIGRAM(S): at 11:17

## 2022-02-02 RX ADMIN — RIVAROXABAN 15 MILLIGRAM(S): KIT at 17:15

## 2022-02-02 RX ADMIN — Medication 125 MICROGRAM(S): at 05:46

## 2022-02-02 RX ADMIN — ISOSORBIDE MONONITRATE 30 MILLIGRAM(S): 60 TABLET, EXTENDED RELEASE ORAL at 12:44

## 2022-02-02 RX ADMIN — AMIODARONE HYDROCHLORIDE 200 MILLIGRAM(S): 400 TABLET ORAL at 17:15

## 2022-02-02 RX ADMIN — PANTOPRAZOLE SODIUM 40 MILLIGRAM(S): 20 TABLET, DELAYED RELEASE ORAL at 05:46

## 2022-02-02 RX ADMIN — SODIUM CHLORIDE 50 MILLILITER(S): 9 INJECTION INTRAMUSCULAR; INTRAVENOUS; SUBCUTANEOUS at 12:44

## 2022-02-02 RX ADMIN — IRON SUCROSE 110 MILLIGRAM(S): 20 INJECTION, SOLUTION INTRAVENOUS at 14:17

## 2022-02-02 RX ADMIN — SENNA PLUS 2 TABLET(S): 8.6 TABLET ORAL at 19:41

## 2022-02-02 RX ADMIN — CHLORHEXIDINE GLUCONATE 1 APPLICATION(S): 213 SOLUTION TOPICAL at 05:46

## 2022-02-02 NOTE — PROGRESS NOTE ADULT - ATTENDING COMMENTS
Mr. Silva is an 81yoM with CAD s/p CABG, bioprosthetic AVR (2011), HTN, CKD (baseline Cr 1.3), and recent hospitalization in 12/2021 for COVID-19 infection c/b RLE DVT and PE for which patient required 6L nc and has since been weaned off who presented with dyspnea, orthopnea, PND, and edema, found to be in Afib with RVR, diagnosed with BiV heart failure.     Patient has been diuresing well with metolazone 5 mg PO daily and Bumex 2 mg IV BID, with BMP reflective of contraction alkalosis and therefore diuretic regimen was decreased. Patient underwent DINO and DCCV on 2/01, with restoration of NSR. ECG demonstrated sinus rhythm with frequent PACs. Patient bolused amiodarone 150 mg IV x 1 in addition to her PO amiodarone and PO digoxin.     According to the patient and his wife, his JANEY has completely resolved and so further diuretic were held. However, Cr uptrending today, suggestive of overdiuresis. Will give a small fluid bolus, and patient has been instructed to liberalize his PO intake, for a goal of net positive today.     Plan:  - S/p digoxin IV loading with 250 mcg x 3  - S/p amiodarone IV loading for 24 hours  - S/p DINO and DCCV on 2/01/22, receiving an additional amiodarone 150 mg IV x 1   - Continue amiodarone 200 mg BID and Xarelto 15 mg (renally dosed) with dinner  - Discontinue digoxin given ESHA  - Continue ASA 81 mg daily and atorvastatin 20 mg nightly  - Will start Toprol 25 mg daily today and uptitrate for GDMT of HFrEF  - Cannot start ACEi/ARB/Entresto in the setting of Esha  - Will give 250 cc over 5 hours  - Encouraged liberal PO intake of fluids today  - Will start Imdur 30 mg today for afterload reduction, and tomorrow consider hydralazine TID  - BNP 8852 --> 9766 (today)  - Daily ECGs  - Strict I/Os and daily standing weights  - Replete for goal K > 4 and Mg > 2   - A1c 5.8, LDL 90, TSH 1.56  - Ferritin 140 and iron sat 15%, ordered for a 5-day course of IV iron sucrose (received 100 mg on Day 1 and 2, now ordered for 200 mg) Mr. Silva is an 81yoM with CAD s/p CABG, bioprosthetic AVR (2011), HTN, CKD (baseline Cr 1.3), and recent hospitalization in 12/2021 for COVID-19 infection c/b RLE DVT and PE for which patient required 6L nc and has since been weaned off who presented with dyspnea, orthopnea, PND, and edema, found to be in Afib with RVR, diagnosed with BiV heart failure.     Patient has been diuresing well with metolazone 5 mg PO daily and Bumex 2 mg IV BID, with BMP reflective of contraction alkalosis and therefore diuretic regimen was decreased. Patient underwent DINO and DCCV on 2/01, with restoration of NSR. ECG demonstrated sinus rhythm with frequent PACs. Patient bolused amiodarone 150 mg IV x 1 in addition to her PO amiodarone and PO digoxin.     According to the patient and his wife, his JANEY has completely resolved and so further diuretic were held. However, Cr uptrending today, suggestive of overdiuresis. Will give a small fluid bolus, and patient has been instructed to liberalize his PO intake, for a goal of net positive today.     ECG today with sinus rhythm with extrasysoles and new Q waves in the inferior leads. Will obtain nuclear stress test to evaluate for new ischemia/infarct as a cause of the patient's new HFrEF.     Plan:  - Nuclear Lexiscan stress test ordered  - S/p digoxin IV loading with 250 mcg x 3  - S/p amiodarone IV loading for 24 hours  - S/p DINO and DCCV on 2/01/22, receiving an additional amiodarone 150 mg IV x 1   - Continue amiodarone 200 mg BID and Xarelto 15 mg (renally dosed) with dinner  - Discontinue digoxin given ESHA  - Continue ASA 81 mg daily and atorvastatin 20 mg nightly  - Will start Toprol 25 mg daily today and uptitrate for GDMT of HFrEF  - Cannot start ACEi/ARB/Entresto in the setting of Esha  - Will give 250 cc over 5 hours  - Encouraged liberal PO intake of fluids today  - Will start Imdur 30 mg today for afterload reduction, and tomorrow consider hydralazine TID  - BNP 8852 --> 9766 (today)  - Daily ECGs  - Strict I/Os and daily standing weights  - Replete for goal K > 4 and Mg > 2   - A1c 5.8, LDL 90, TSH 1.56  - Ferritin 140 and iron sat 15%, ordered for a 5-day course of IV iron sucrose (received 100 mg on Day 1 and 2, now ordered for 200 mg)

## 2022-02-02 NOTE — PROGRESS NOTE ADULT - ASSESSMENT
Acute kidney injury / Cardiorenal syndrome   chronic kidney disease stage 3  contraction alkalosis  acute on chronic systolic CHF  Afib with RVR / s/p AVR (procine) / CAD / CABG / s/p DCC  DVT / PE on eliquis  recent severe COVID PNA  HTN         plan:    cont holding diuretics  small IVF bolus given  cont to hold ARB  f/u labs  CHF / CKD education

## 2022-02-02 NOTE — PROGRESS NOTE ADULT - SUBJECTIVE AND OBJECTIVE BOX
LENGTH OF HOSPITAL STAY: 01-27-22 (5d)  CHIEF COMPLAINT:   Patient is a 81y old  Male who presents with a chief complaint of Afib with RVR (30 Jan 2022 16:23)      Progress:     Interval hx: No events. HR controlled, sinus with PACs. Patient somnolent on my assessment, however upon waking up he states he feels well and wants to go home. No CP or SOB.      HISTORY OF PRESENTING ILLNESS:    HPI:  History of Present Illness    Mr. Silva is an 81 year old male patient known to have:  - Baseline AO3, comes from home, lives with wife  - CAD s/p CABG x4 in 2006. Cardiologist Dr Sebastian Mendoza  - CHF (no TTE in chart) and s/p AVR 2011. Cardiologist Dr Sebastian Mendoza. Home med PO Lasix 40mg QD per wife  - CKD III (Baseline Cr 1.3)  - Recent admission from 12/12-12/20 for COVID Pneumonia s/p IV Dexamethasone and IV RDV  - Recently found to have Acute Right Peroneal, Popliteal, and PT DVT during recent admission on 12/12. Discharged on Eliquis. Most recent home dose per wife is 5mg in AM and 2.5mg in PM (not 5mg BID) due to nose bleed  - HTN      He presented to the ED on 01/27 after being referred by Dr Murphy for new onset afib with RVR.  History goes back to 01/27 when the patient was following up with Dr Murphy.  During the visit, patient was found to be volume overloaded.  His HR was in 150bpm and an ECG performed revealed new onset Afib with RVR.  As a result, patient was referred by Dr Murphy to ED for evaluation.  On history taking, patient reports a 2 week history of worsening shortness of breath on minimal exertion (even walking few steps make him feel out of breath).   This has been associated with worsening bilateral leg swelling, orthopnea (sleeps in a seated position), and PNDs in the absence of any chest pain, palpitations, light headedness, nausea, or pre/syncope.      On review of systems, patient denies any recent fever, chills, night sweats, URTI symptoms (cough, rhinorrhea, sore throat), urinary symptoms (urinary frequency, urgency, intermittence, dysuria, foul smelling urine, cloudy urine), change in bowel movements (diarrhea or constipation), abdominal pain, headache, or vomiting.   No sick contacts.  No recent travel or exposure to recent travelers.      Upon presentation to the ED, the patient was tachycardic in afib with RVR as below  Vital Signs in ED   - /71 mmHg  -  bpm -> confirmed afib with RVR on ECG -> s/p cardizem drip at 15/hour -> most recent  during my evaluation  - RR 18 bpm  - T 35.6  - SaO2 97% on RA      Investigations   Laboratory Workup  - CBC:                        12.1   7.14  )-----------( 147      ( 27 Jan 2022 13:01 )             37.1     - Chemistry:  01-27    141  |  105  |  46<H>  ----------------------------<  120<H>  3.9   |  18  |  2.4<H>    Ca    9.6      27 Jan 2022 13:01  Mg     2.1     01-27    TPro  5.7<L>  /  Alb  3.6  /  TBili  0.6  /  DBili  x   /  AST  28  /  ALT  47<H>  /  AlkPhos  45  01-27      - Cardiac Markers:  CARDIAC MARKERS ( 27 Jan 2022 13:01 )  x     / 0.06 ng/mL / x     / x     / x          Microbiological Workup  * COVID negative      Radiological Workup  * CXR 01/27 bilateral opacities      - Patient was given IV Lasix 20mg x1 and IV Bumex 1mg x1 doses for volume overload in ED  - He was also given Eliquis 2.5mg x1 and started on IV Cardizem drip at 15/hour for afib with RVR  - He will be admitted to 3C for further monitoring, investigations, and management of acute on chronic CHF exacerbation in setting of new onset afib with RVR   (27 Jan 2022 21:26)      PHYSICAL EXAM:   GENERAL: No acute distress  NEURO: Alert & Oriented X3. Speech clear and fluent.   HEAD: Atraumatic, normocephalic.   EYES: EOMI. Sclera non-icteric.  ENT: Moist mucous membranes.    NECK: No JVD.  No cervical lymphadenopathy.   LUNGS: Clear to auscultation bilaterally. No wheezes or rhonchi. No accessory muscle use.  HEART: Tachycardic irregularly irregular. S1/S2 present. No murmurs.  ABDOMEN: Bowel sounds present. Soft. Nontender. Nondistended. No guarding or rigidity    EXTREMITIES: No edema, cyanosis, clubbing. 2+ peripheral pulses bilaterally.  SKIN: Warm and dry.      OBJECTIVE DATA:    T(C): 36.7 (02-02-22 @ 05:09), Max: 36.7 (02-02-22 @ 05:09)  T(F): 98.1 (02-02-22 @ 05:09), Max: 98.1 (02-02-22 @ 05:09)  HR: 79 (02-02-22 @ 05:09) (77 - 85)  BP: 136/63 (02-02-22 @ 05:09) (124/83 - 137/68)  ABP: --  ABP(mean): --  RR: 18 (02-02-22 @ 05:09) (18 - 18)  SpO2: --                            11.8   7.15  )-----------( 161      ( 02 Feb 2022 06:07 )             36.2       02-02    138  |  95<L>  |  41<H>  ----------------------------<  89  3.9   |  24  |  3.1<H>    Ca    8.9      02 Feb 2022 06:07  Mg     2.2     02-02    TPro  4.9<L>  /  Alb  3.1<L>  /  TBili  0.8  /  DBili  x   /  AST  12  /  ALT  14  /  AlkPhos  51  02-02                          ASSESSMENT/PLAN:  81M with CAD s/p CABG, bioprosth AVR (2011), CKD (b/l 1.3), recent COVID infection with LE DVT presented with exertional SOB and edema x2 weeks and AFib with RVR sent by Dr. Lane.     #Acute HFrEF ( EF 27% on TTE) - new vs old no prior ECHO for comparison  #Hx CAD s/p CABG x4 (2006), bioprosth TAVR ( 2011), HTN  >near euvolemic  >cardiologist is Dr. Sebastian Mendoza  >on home PO lasix 40 mg QD per wife  > S/p digoxin 250 mcg IV q8h x3  -DINO/DCCV today -> EKG after/ resume diet  -c/w diuresis w/ Bumex 2 mg IV BID after DINO  ----- BMP and Mg daily - goal K >4 and Mg >2   ----- Strict I/Os and daily standing weights  -c/w digoxin 125 mcg daily (dig level 0.9 on 1/30)  -c/w amio 200 mg PO BID x2 weeks, followed by 200 mg PO daily per EP recs  -C/w ASA 81 mg daily and atorvastatin 20 mg nightly  -will prioritize diuresis for rate control and reassess to start BB/CCB for HF medical management  -daily AM CMP on Amio and Statin    #New onset AF w/ RVR   -rate controlled w/ amio and dig -> fu dig level  -Xarelto for AC - switched from Eliquis     #Acute on chronic CKD III - worsening  >baseline 1.3  >trending upward 2.4 -> 2.6-> 3.1  -monitor off IV diuresis  -nephro following - FU recs    #Hx COVID 12/2021 c/b LE DVT on Eliquis  > + Acute Right Peroneal, Popliteal, and PT DVT during recent admission on 12/12. On Eliquis 2.5 mg BID  > COVID neg 1/27  -c/w Xarelto 15 mg daily    #DVT ppx - Xarelto  #Diet - DASH diet after DINO/DCCV  #Activity - AAT  #Code/Family - DNR/DNI    #Disposition - home pending improvement in kidney function LENGTH OF HOSPITAL STAY: 01-27-22 (5d)  CHIEF COMPLAINT:   Patient is a 81y old  Male who presents with a chief complaint of Afib with RVR (30 Jan 2022 16:23)      Progress:     Interval hx: No events. HR controlled, sinus with PACs. Patient somnolent on my assessment, however upon waking up he states he feels well and wants to go home. No CP or SOB.      HISTORY OF PRESENTING ILLNESS:    HPI:  History of Present Illness    Mr. Silva is an 81 year old male patient known to have:  - Baseline AO3, comes from home, lives with wife  - CAD s/p CABG x4 in 2006. Cardiologist Dr Sebastian Mendoza  - CHF (no TTE in chart) and s/p AVR 2011. Cardiologist Dr Sebastian Mendoza. Home med PO Lasix 40mg QD per wife  - CKD III (Baseline Cr 1.3)  - Recent admission from 12/12-12/20 for COVID Pneumonia s/p IV Dexamethasone and IV RDV  - Recently found to have Acute Right Peroneal, Popliteal, and PT DVT during recent admission on 12/12. Discharged on Eliquis. Most recent home dose per wife is 5mg in AM and 2.5mg in PM (not 5mg BID) due to nose bleed  - HTN      He presented to the ED on 01/27 after being referred by Dr Murphy for new onset afib with RVR.  History goes back to 01/27 when the patient was following up with Dr Murphy.  During the visit, patient was found to be volume overloaded.  His HR was in 150bpm and an ECG performed revealed new onset Afib with RVR.  As a result, patient was referred by Dr Murphy to ED for evaluation.  On history taking, patient reports a 2 week history of worsening shortness of breath on minimal exertion (even walking few steps make him feel out of breath).   This has been associated with worsening bilateral leg swelling, orthopnea (sleeps in a seated position), and PNDs in the absence of any chest pain, palpitations, light headedness, nausea, or pre/syncope.      On review of systems, patient denies any recent fever, chills, night sweats, URTI symptoms (cough, rhinorrhea, sore throat), urinary symptoms (urinary frequency, urgency, intermittence, dysuria, foul smelling urine, cloudy urine), change in bowel movements (diarrhea or constipation), abdominal pain, headache, or vomiting.   No sick contacts.  No recent travel or exposure to recent travelers.      Upon presentation to the ED, the patient was tachycardic in afib with RVR as below  Vital Signs in ED   - /71 mmHg  -  bpm -> confirmed afib with RVR on ECG -> s/p cardizem drip at 15/hour -> most recent  during my evaluation  - RR 18 bpm  - T 35.6  - SaO2 97% on RA      Investigations   Laboratory Workup  - CBC:                        12.1   7.14  )-----------( 147      ( 27 Jan 2022 13:01 )             37.1     - Chemistry:  01-27    141  |  105  |  46<H>  ----------------------------<  120<H>  3.9   |  18  |  2.4<H>    Ca    9.6      27 Jan 2022 13:01  Mg     2.1     01-27    TPro  5.7<L>  /  Alb  3.6  /  TBili  0.6  /  DBili  x   /  AST  28  /  ALT  47<H>  /  AlkPhos  45  01-27      - Cardiac Markers:  CARDIAC MARKERS ( 27 Jan 2022 13:01 )  x     / 0.06 ng/mL / x     / x     / x          Microbiological Workup  * COVID negative      Radiological Workup  * CXR 01/27 bilateral opacities      - Patient was given IV Lasix 20mg x1 and IV Bumex 1mg x1 doses for volume overload in ED  - He was also given Eliquis 2.5mg x1 and started on IV Cardizem drip at 15/hour for afib with RVR  - He will be admitted to 3C for further monitoring, investigations, and management of acute on chronic CHF exacerbation in setting of new onset afib with RVR   (27 Jan 2022 21:26)      PHYSICAL EXAM:   GENERAL: No acute distress  NEURO: Alert & Oriented X3. Speech clear and fluent.   HEAD: Atraumatic, normocephalic.   EYES: EOMI. Sclera non-icteric.  ENT: Moist mucous membranes.    NECK: No JVD.  No cervical lymphadenopathy.   LUNGS: Clear to auscultation bilaterally. No wheezes or rhonchi. No accessory muscle use.  HEART: Tachycardic irregularly irregular. S1/S2 present. No murmurs.  ABDOMEN: Bowel sounds present. Soft. Nontender. Nondistended. No guarding or rigidity    EXTREMITIES: No edema, cyanosis, clubbing. 2+ peripheral pulses bilaterally.  SKIN: Warm and dry.      OBJECTIVE DATA:    T(C): 36.7 (02-02-22 @ 05:09), Max: 36.7 (02-02-22 @ 05:09)  T(F): 98.1 (02-02-22 @ 05:09), Max: 98.1 (02-02-22 @ 05:09)  HR: 79 (02-02-22 @ 05:09) (77 - 85)  BP: 136/63 (02-02-22 @ 05:09) (124/83 - 137/68)  ABP: --  ABP(mean): --  RR: 18 (02-02-22 @ 05:09) (18 - 18)  SpO2: --                            11.8   7.15  )-----------( 161      ( 02 Feb 2022 06:07 )             36.2       02-02    138  |  95<L>  |  41<H>  ----------------------------<  89  3.9   |  24  |  3.1<H>    Ca    8.9      02 Feb 2022 06:07  Mg     2.2     02-02    TPro  4.9<L>  /  Alb  3.1<L>  /  TBili  0.8  /  DBili  x   /  AST  12  /  ALT  14  /  AlkPhos  51  02-02                          ASSESSMENT/PLAN:  81M with CAD s/p CABG, bioprosth AVR (2011), CKD (b/l 1.3), recent COVID infection with LE DVT presented with exertional SOB and edema x2 weeks and AFib with RVR sent by Dr. Lane.     #Acute HFrEF ( EF 27% on TTE) - new vs old no prior ECHO for comparison  #Hx CAD s/p CABG x4 (2006), bioprosth TAVR ( 2011), HTN  >cardiologist is Dr. Sebastian Mendoza  >on home PO lasix 40 mg QD per wife  > S/p digoxin 250 mcg IV q8h x3  -DINO/DCCV today -> EKG after/ resume diet  -hold diursis diuresis w/ Bumex 2 mg IV BID after DINO  ----- BMP and Mg daily - goal K >4 and Mg >2   ----- Strict I/Os and daily standing weights    -C/w ASA 81 mg daily and atorvastatin 20 mg nightly  -will prioritize diuresis for rate control and reassess to start BB/CCB for HF medical management  -daily AM CMP on Amio and Statin    #Acute on chronic CKD III - worsening  >baseline 1.3  >trending upward 2.4 -> 2.6-> 3.1  -monitor off IV diuresis  -nephro following - FU recs    #New onset AF w/ RVR   -rate controlled : c/w amio 200 mg PO BID x2 weeks followed by 200 mg PO daily (EP), no   -c/w Xarelto 15 mg  for AC - sent to pharmacy for pricing    #Hx COVID 12/2021 c/b LE DVT on Eliquis  > + Acute Right Peroneal, Popliteal, and PT DVT during recent admission on 12/12. On Eliquis 2.5 mg BID  > COVID neg 1/27  -c/w Xarelto 15 mg daily    #DVT ppx - Xarelto  #Diet - DASH diet after DINO/DCCV  #Activity - AAT  #Code/Family - DNR/DNI    #Disposition - home pending improvement in kidney function LENGTH OF HOSPITAL STAY: 01-27-22 (5d)  CHIEF COMPLAINT:   Patient is a 81y old  Male who presents with a chief complaint of Afib with RVR (30 Jan 2022 16:23)      Progress:     Interval hx: No events. HR controlled, sinus with PACs. Patient somnolent on my assessment, however upon waking up he states he feels well and wants to go home. No CP or SOB.      HISTORY OF PRESENTING ILLNESS:    HPI:  History of Present Illness    Mr. Silva is an 81 year old male patient known to have:  - Baseline AO3, comes from home, lives with wife  - CAD s/p CABG x4 in 2006. Cardiologist Dr Sebastian Mendoza  - CHF (no TTE in chart) and s/p AVR 2011. Cardiologist Dr Sebastian Mendoza. Home med PO Lasix 40mg QD per wife  - CKD III (Baseline Cr 1.3)  - Recent admission from 12/12-12/20 for COVID Pneumonia s/p IV Dexamethasone and IV RDV  - Recently found to have Acute Right Peroneal, Popliteal, and PT DVT during recent admission on 12/12. Discharged on Eliquis. Most recent home dose per wife is 5mg in AM and 2.5mg in PM (not 5mg BID) due to nose bleed  - HTN      He presented to the ED on 01/27 after being referred by Dr Murphy for new onset afib with RVR.  History goes back to 01/27 when the patient was following up with Dr Murphy.  During the visit, patient was found to be volume overloaded.  His HR was in 150bpm and an ECG performed revealed new onset Afib with RVR.  As a result, patient was referred by Dr Murphy to ED for evaluation.  On history taking, patient reports a 2 week history of worsening shortness of breath on minimal exertion (even walking few steps make him feel out of breath).   This has been associated with worsening bilateral leg swelling, orthopnea (sleeps in a seated position), and PNDs in the absence of any chest pain, palpitations, light headedness, nausea, or pre/syncope.      On review of systems, patient denies any recent fever, chills, night sweats, URTI symptoms (cough, rhinorrhea, sore throat), urinary symptoms (urinary frequency, urgency, intermittence, dysuria, foul smelling urine, cloudy urine), change in bowel movements (diarrhea or constipation), abdominal pain, headache, or vomiting.   No sick contacts.  No recent travel or exposure to recent travelers.      Upon presentation to the ED, the patient was tachycardic in afib with RVR as below  Vital Signs in ED   - /71 mmHg  -  bpm -> confirmed afib with RVR on ECG -> s/p cardizem drip at 15/hour -> most recent  during my evaluation  - RR 18 bpm  - T 35.6  - SaO2 97% on RA      Investigations   Laboratory Workup  - CBC:                        12.1   7.14  )-----------( 147      ( 27 Jan 2022 13:01 )             37.1     - Chemistry:  01-27    141  |  105  |  46<H>  ----------------------------<  120<H>  3.9   |  18  |  2.4<H>    Ca    9.6      27 Jan 2022 13:01  Mg     2.1     01-27    TPro  5.7<L>  /  Alb  3.6  /  TBili  0.6  /  DBili  x   /  AST  28  /  ALT  47<H>  /  AlkPhos  45  01-27      - Cardiac Markers:  CARDIAC MARKERS ( 27 Jan 2022 13:01 )  x     / 0.06 ng/mL / x     / x     / x          Microbiological Workup  * COVID negative      Radiological Workup  * CXR 01/27 bilateral opacities      - Patient was given IV Lasix 20mg x1 and IV Bumex 1mg x1 doses for volume overload in ED  - He was also given Eliquis 2.5mg x1 and started on IV Cardizem drip at 15/hour for afib with RVR  - He will be admitted to 3C for further monitoring, investigations, and management of acute on chronic CHF exacerbation in setting of new onset afib with RVR   (27 Jan 2022 21:26)      PHYSICAL EXAM:   GENERAL: No acute distress  NEURO: Alert & Oriented X3. Speech clear and fluent.   HEAD: Atraumatic, normocephalic.   EYES: EOMI. Sclera non-icteric.  ENT: Moist mucous membranes.    NECK: No JVD.  No cervical lymphadenopathy.   LUNGS: Clear to auscultation bilaterally. No wheezes or rhonchi. No accessory muscle use.  HEART: Tachycardic irregularly irregular. S1/S2 present. No murmurs.  ABDOMEN: Bowel sounds present. Soft. Nontender. Nondistended. No guarding or rigidity    EXTREMITIES: No edema, cyanosis, clubbing. 2+ peripheral pulses bilaterally.  SKIN: Warm and dry.      OBJECTIVE DATA:    T(C): 36.7 (02-02-22 @ 05:09), Max: 36.7 (02-02-22 @ 05:09)  T(F): 98.1 (02-02-22 @ 05:09), Max: 98.1 (02-02-22 @ 05:09)  HR: 79 (02-02-22 @ 05:09) (77 - 85)  BP: 136/63 (02-02-22 @ 05:09) (124/83 - 137/68)  ABP: --  ABP(mean): --  RR: 18 (02-02-22 @ 05:09) (18 - 18)  SpO2: --                            11.8   7.15  )-----------( 161      ( 02 Feb 2022 06:07 )             36.2       02-02    138  |  95<L>  |  41<H>  ----------------------------<  89  3.9   |  24  |  3.1<H>    Ca    8.9      02 Feb 2022 06:07  Mg     2.2     02-02    TPro  4.9<L>  /  Alb  3.1<L>  /  TBili  0.8  /  DBili  x   /  AST  12  /  ALT  14  /  AlkPhos  51  02-02                    ASSESSMENT/PLAN:  81M with CAD s/p CABG, bioprosth AVR (2011), CKD (b/l 1.3), recent COVID infection with LE DVT presented with exertional SOB and edema x2 weeks and AFib with RVR sent by Dr. Lane.     #Acute HFrEF ( EF 27% on TTE) - new vs old no prior ECHO for comparison  #Hx CAD s/p CABG x4 (2006), bioprosth TAVR ( 2011), HTN  >cardiologist is Dr. Sebastian Mendoza  >on home PO lasix 40 mg QD per wife  -Strict I/Os and daily standing weights - net neutral today , weight stable at 98.6 kg  -hold diuresis today given worsening renal likely overdiuresed - euvolemic on exam  -CMP/ Mg daily, target K >4 and Mg > 2 - repleted K  -C/w ASA 81 mg daily and atorvastatin 20 mg nightly  -started BB - Toprol XL 25 PO    #Acute on chronic CKD III - worsening  >baseline 1.3 : trending upward 2.4 -> 2.6-> 3.1  -monitor off diuretics, liberate PO fluid intake today  -nephro following - FU recs    #New onset AF w/ RVR   -c/w amio 200 mg PO BID x2 weeks followed by 200 mg PO daily (EP)  -d/c digoxin  -c/w Xarelto 15 mg  for AC - sent to pharmacy for pricing  -daily EKG     #Hx COVID 12/2021 c/b LE DVT on Eliquis  > + Acute Right Peroneal, Popliteal, and PT DVT during recent admission on 12/12. On Eliquis 2.5 mg BID  > COVID neg 1/27  -c/w Xarelto 15 mg daily - sent to pharmacy for pricing    #DVT ppx - Xarelto  #Diet - DASH diet - encourage PO fluid intake today  #Activity - AAT  #Code/Family - DNR/DNI    #Disposition - home pending improved renal

## 2022-02-02 NOTE — PROGRESS NOTE ADULT - SUBJECTIVE AND OBJECTIVE BOX
NEPHROLOGY FOLLOW UP NOTE    pt seen and examined  + void  cr up  no new complaints    PAST MEDICAL & SURGICAL HISTORY:  Chronic systolic congestive heart failure    HTN (hypertension)    Dyslipidemia      Allergies:  No Known Allergies    Home Medications Reviewed    SOCIAL HISTORY:  Denies ETOH,Smoking, drugs    FAMILY HISTORY:  no renal disease        REVIEW OF SYSTEMS:  CONSTITUTIONAL: No weakness, fevers or chills  EYES/ENT: No visual changes;  No vertigo or throat pain   NECK: No pain or stiffness  RESPIRATORY: No cough, wheezing, hemoptysis; + shortness of breath  CARDIOVASCULAR: No chest pain or palpitations.  GASTROINTESTINAL: No abdominal or epigastric pain. No nausea, vomiting, or hematemesis; No diarrhea or constipation. No melena or hematochezia.  GENITOURINARY: No dysuria, frequency, foamy urine, urinary urgency, incontinence or hematuria  NEUROLOGICAL: No numbness or weakness  SKIN: No itching, burning, rashes, or lesions   VASCULAR: + bilateral lower extremity edema.   All other review of systems is negative unless indicated above.    PHYSICAL EXAM:  Constitutional: NAD  HEENT: anicteric sclera, oropharynx clear, MMM  Neck: + JVD  Respiratory: decreased BS b/l with bibasilar crackles  Cardiovascular: S1, S2, irreg   Gastrointestinal: BS+, soft, NT/ND  Extremities: No cyanosis or clubbing. 2+ peripheral edema  Neurological: A/O x 3, no focal deficits  Psychiatric: Normal mood, normal affect  : No CVA tenderness.     Skin: No rashes    Hospital Medications:   MEDICATIONS  (STANDING):  aMIOdarone    Tablet 200 milliGRAM(s) Oral two times a day  aspirin  chewable 81 milliGRAM(s) Oral daily  atorvastatin 20 milliGRAM(s) Oral at bedtime  chlorhexidine 4% Liquid 1 Application(s) Topical <User Schedule>  iron sucrose IVPB 200 milliGRAM(s) IV Intermittent every 24 hours  metoprolol succinate ER 25 milliGRAM(s) Oral daily  pantoprazole    Tablet 40 milliGRAM(s) Oral before breakfast  regadenoson Injectable 0.4 milliGRAM(s) IV Push once  rivaroxaban 15 milliGRAM(s) Oral with dinner  senna 2 Tablet(s) Oral at bedtime  tamsulosin 0.4 milliGRAM(s) Oral at bedtime        VITALS:  T(F): 95.5 (22 @ 13:07), Max: 98.1 (22 @ 05:09)  HR: 74 (22 @ 13:07)  BP: 121/64 (22 @ 13:07)  RR: 18 (22 @ 13:07)  SpO2: --  Wt(kg): --     @ 07:01  -   @ 07:00  --------------------------------------------------------  IN: 740 mL / OUT: 2125 mL / NET: -1385 mL     @ 07:01  -   @ 07:00  --------------------------------------------------------  IN: 1158 mL / OUT: 2300 mL / NET: -1142 mL     @ 07:01  -   @ 16:43  --------------------------------------------------------  IN: 720 mL / OUT: 250 mL / NET: 470 mL          LABS:      138  |  95<L>  |  41<H>  ----------------------------<  89  3.9   |  24  |  3.1<H>    Ca    8.9      2022 06:07  Mg     2.2         TPro  4.9<L>  /  Alb  3.1<L>  /  TBili  0.8  /  DBili      /  AST  12  /  ALT  14  /  AlkPhos  51  02                          11.8   7.15  )-----------( 161      ( 2022 06:07 )             36.2       Urine Studies:  Urinalysis Basic - ( 2022 01:00 )    Color: Colorless / Appearance: Clear / S.009 / pH:   Gluc:  / Ketone: Negative  / Bili: Negative / Urobili: <2 mg/dL   Blood:  / Protein: Negative / Nitrite: Negative   Leuk Esterase: Negative / RBC: 17 /HPF / WBC 1 /HPF   Sq Epi:  / Non Sq Epi: 0 /HPF / Bacteria: Negative      Sodium, Random Urine: 103.0 mmoL/L ( @ 01:00)  Chloride, Random Urine: 107 ( @ 01:00)  Creatinine, Random Urine: 23 mg/dL ( @ 01:00)  Protein/Creatinine Ratio Calculation: <0.2 Ratio ( @ 01:00)  Creatinine, Random Urine: 25 mg/dL ( @ 01:00)  Protein/Creatinine Ratio Calculation: <0.2 Ratio ( @ 01:00)      RADIOLOGY & ADDITIONAL STUDIES:

## 2022-02-03 ENCOUNTER — APPOINTMENT (OUTPATIENT)
Dept: CARDIOLOGY | Facility: CLINIC | Age: 82
End: 2022-02-03

## 2022-02-03 LAB
ALBUMIN SERPL ELPH-MCNC: 3.4 G/DL — LOW (ref 3.5–5.2)
ALP SERPL-CCNC: 54 U/L — SIGNIFICANT CHANGE UP (ref 30–115)
ALT FLD-CCNC: 13 U/L — SIGNIFICANT CHANGE UP (ref 0–41)
ANION GAP SERPL CALC-SCNC: 13 MMOL/L — SIGNIFICANT CHANGE UP (ref 7–14)
ANION GAP SERPL CALC-SCNC: 14 MMOL/L — SIGNIFICANT CHANGE UP (ref 7–14)
AST SERPL-CCNC: 12 U/L — SIGNIFICANT CHANGE UP (ref 0–41)
BASOPHILS # BLD AUTO: 0.04 K/UL — SIGNIFICANT CHANGE UP (ref 0–0.2)
BASOPHILS NFR BLD AUTO: 0.5 % — SIGNIFICANT CHANGE UP (ref 0–1)
BILIRUB SERPL-MCNC: 0.8 MG/DL — SIGNIFICANT CHANGE UP (ref 0.2–1.2)
BUN SERPL-MCNC: 40 MG/DL — HIGH (ref 10–20)
BUN SERPL-MCNC: 42 MG/DL — HIGH (ref 10–20)
CALCIUM SERPL-MCNC: 9 MG/DL — SIGNIFICANT CHANGE UP (ref 8.5–10.1)
CALCIUM SERPL-MCNC: 9.3 MG/DL — SIGNIFICANT CHANGE UP (ref 8.5–10.1)
CHLORIDE SERPL-SCNC: 98 MMOL/L — SIGNIFICANT CHANGE UP (ref 98–110)
CHLORIDE SERPL-SCNC: 99 MMOL/L — SIGNIFICANT CHANGE UP (ref 98–110)
CO2 SERPL-SCNC: 27 MMOL/L — SIGNIFICANT CHANGE UP (ref 17–32)
CO2 SERPL-SCNC: 29 MMOL/L — SIGNIFICANT CHANGE UP (ref 17–32)
CREAT SERPL-MCNC: 3.3 MG/DL — HIGH (ref 0.7–1.5)
CREAT SERPL-MCNC: 3.4 MG/DL — HIGH (ref 0.7–1.5)
EOSINOPHIL # BLD AUTO: 0.13 K/UL — SIGNIFICANT CHANGE UP (ref 0–0.7)
EOSINOPHIL NFR BLD AUTO: 1.7 % — SIGNIFICANT CHANGE UP (ref 0–8)
GLUCOSE SERPL-MCNC: 109 MG/DL — HIGH (ref 70–99)
GLUCOSE SERPL-MCNC: 99 MG/DL — SIGNIFICANT CHANGE UP (ref 70–99)
HCT VFR BLD CALC: 36.4 % — LOW (ref 42–52)
HGB BLD-MCNC: 11.9 G/DL — LOW (ref 14–18)
IMM GRANULOCYTES NFR BLD AUTO: 0.4 % — HIGH (ref 0.1–0.3)
LYMPHOCYTES # BLD AUTO: 1.05 K/UL — LOW (ref 1.2–3.4)
LYMPHOCYTES # BLD AUTO: 13.9 % — LOW (ref 20.5–51.1)
MAGNESIUM SERPL-MCNC: 2.2 MG/DL — SIGNIFICANT CHANGE UP (ref 1.8–2.4)
MCHC RBC-ENTMCNC: 30.7 PG — SIGNIFICANT CHANGE UP (ref 27–31)
MCHC RBC-ENTMCNC: 32.7 G/DL — SIGNIFICANT CHANGE UP (ref 32–37)
MCV RBC AUTO: 94.1 FL — HIGH (ref 80–94)
MONOCYTES # BLD AUTO: 1.07 K/UL — HIGH (ref 0.1–0.6)
MONOCYTES NFR BLD AUTO: 14.2 % — HIGH (ref 1.7–9.3)
NEUTROPHILS # BLD AUTO: 5.22 K/UL — SIGNIFICANT CHANGE UP (ref 1.4–6.5)
NEUTROPHILS NFR BLD AUTO: 69.3 % — SIGNIFICANT CHANGE UP (ref 42.2–75.2)
NRBC # BLD: 0 /100 WBCS — SIGNIFICANT CHANGE UP (ref 0–0)
OSMOLALITY UR: 358 MOS/KG — SIGNIFICANT CHANGE UP (ref 50–1200)
PLATELET # BLD AUTO: 184 K/UL — SIGNIFICANT CHANGE UP (ref 130–400)
POTASSIUM SERPL-MCNC: 3.4 MMOL/L — LOW (ref 3.5–5)
POTASSIUM SERPL-MCNC: 3.6 MMOL/L — SIGNIFICANT CHANGE UP (ref 3.5–5)
POTASSIUM SERPL-SCNC: 3.4 MMOL/L — LOW (ref 3.5–5)
POTASSIUM SERPL-SCNC: 3.6 MMOL/L — SIGNIFICANT CHANGE UP (ref 3.5–5)
PROT SERPL-MCNC: 5.5 G/DL — LOW (ref 6–8)
RBC # BLD: 3.87 M/UL — LOW (ref 4.7–6.1)
RBC # FLD: 15.9 % — HIGH (ref 11.5–14.5)
SODIUM SERPL-SCNC: 139 MMOL/L — SIGNIFICANT CHANGE UP (ref 135–146)
SODIUM SERPL-SCNC: 141 MMOL/L — SIGNIFICANT CHANGE UP (ref 135–146)
SODIUM UR-SCNC: 97 MMOL/L — SIGNIFICANT CHANGE UP
TROPONIN T SERPL-MCNC: 0.07 NG/ML — CRITICAL HIGH
TROPONIN T SERPL-MCNC: 0.08 NG/ML — CRITICAL HIGH
WBC # BLD: 7.54 K/UL — SIGNIFICANT CHANGE UP (ref 4.8–10.8)
WBC # FLD AUTO: 7.54 K/UL — SIGNIFICANT CHANGE UP (ref 4.8–10.8)

## 2022-02-03 PROCEDURE — 93018 CV STRESS TEST I&R ONLY: CPT

## 2022-02-03 PROCEDURE — 78452 HT MUSCLE IMAGE SPECT MULT: CPT | Mod: 26

## 2022-02-03 PROCEDURE — 93016 CV STRESS TEST SUPVJ ONLY: CPT

## 2022-02-03 PROCEDURE — 93010 ELECTROCARDIOGRAM REPORT: CPT

## 2022-02-03 PROCEDURE — 99232 SBSQ HOSP IP/OBS MODERATE 35: CPT

## 2022-02-03 RX ORDER — HYDRALAZINE HCL 50 MG
10 TABLET ORAL THREE TIMES A DAY
Refills: 0 | Status: DISCONTINUED | OUTPATIENT
Start: 2022-02-03 | End: 2022-02-04

## 2022-02-03 RX ORDER — CLOPIDOGREL BISULFATE 75 MG/1
300 TABLET, FILM COATED ORAL ONCE
Refills: 0 | Status: COMPLETED | OUTPATIENT
Start: 2022-02-03 | End: 2022-02-03

## 2022-02-03 RX ORDER — SODIUM CHLORIDE 9 MG/ML
1000 INJECTION INTRAMUSCULAR; INTRAVENOUS; SUBCUTANEOUS
Refills: 0 | Status: DISCONTINUED | OUTPATIENT
Start: 2022-02-03 | End: 2022-02-04

## 2022-02-03 RX ORDER — CLOPIDOGREL BISULFATE 75 MG/1
75 TABLET, FILM COATED ORAL DAILY
Refills: 0 | Status: DISCONTINUED | OUTPATIENT
Start: 2022-02-04 | End: 2022-02-04

## 2022-02-03 RX ORDER — POTASSIUM CHLORIDE 20 MEQ
40 PACKET (EA) ORAL ONCE
Refills: 0 | Status: DISCONTINUED | OUTPATIENT
Start: 2022-02-03 | End: 2022-02-03

## 2022-02-03 RX ORDER — POTASSIUM CHLORIDE 20 MEQ
40 PACKET (EA) ORAL ONCE
Refills: 0 | Status: COMPLETED | OUTPATIENT
Start: 2022-02-03 | End: 2022-02-03

## 2022-02-03 RX ADMIN — CHLORHEXIDINE GLUCONATE 1 APPLICATION(S): 213 SOLUTION TOPICAL at 05:26

## 2022-02-03 RX ADMIN — RIVAROXABAN 15 MILLIGRAM(S): KIT at 18:27

## 2022-02-03 RX ADMIN — Medication 10 MILLIGRAM(S): at 21:36

## 2022-02-03 RX ADMIN — Medication 25 MILLIGRAM(S): at 05:26

## 2022-02-03 RX ADMIN — SENNA PLUS 2 TABLET(S): 8.6 TABLET ORAL at 21:36

## 2022-02-03 RX ADMIN — SODIUM CHLORIDE 250 MILLILITER(S): 9 INJECTION INTRAMUSCULAR; INTRAVENOUS; SUBCUTANEOUS at 11:50

## 2022-02-03 RX ADMIN — IRON SUCROSE 110 MILLIGRAM(S): 20 INJECTION, SOLUTION INTRAVENOUS at 13:09

## 2022-02-03 RX ADMIN — REGADENOSON 0.4 MILLIGRAM(S): 0.08 INJECTION, SOLUTION INTRAVENOUS at 17:04

## 2022-02-03 RX ADMIN — ISOSORBIDE MONONITRATE 30 MILLIGRAM(S): 60 TABLET, EXTENDED RELEASE ORAL at 13:09

## 2022-02-03 RX ADMIN — CLOPIDOGREL BISULFATE 300 MILLIGRAM(S): 75 TABLET, FILM COATED ORAL at 10:49

## 2022-02-03 RX ADMIN — Medication 81 MILLIGRAM(S): at 13:09

## 2022-02-03 RX ADMIN — AMIODARONE HYDROCHLORIDE 200 MILLIGRAM(S): 400 TABLET ORAL at 05:26

## 2022-02-03 RX ADMIN — Medication 10 MILLIGRAM(S): at 13:10

## 2022-02-03 RX ADMIN — AMIODARONE HYDROCHLORIDE 200 MILLIGRAM(S): 400 TABLET ORAL at 18:27

## 2022-02-03 RX ADMIN — PANTOPRAZOLE SODIUM 40 MILLIGRAM(S): 20 TABLET, DELAYED RELEASE ORAL at 05:26

## 2022-02-03 RX ADMIN — TAMSULOSIN HYDROCHLORIDE 0.4 MILLIGRAM(S): 0.4 CAPSULE ORAL at 21:36

## 2022-02-03 RX ADMIN — Medication 40 MILLIEQUIVALENT(S): at 10:50

## 2022-02-03 RX ADMIN — ATORVASTATIN CALCIUM 20 MILLIGRAM(S): 80 TABLET, FILM COATED ORAL at 21:38

## 2022-02-03 NOTE — PROGRESS NOTE ADULT - SUBJECTIVE AND OBJECTIVE BOX
LENGTH OF HOSPITAL STAY: 01-27-22 (6d)  CHIEF COMPLAINT:   Patient is a 81y old  Male who presents with a chief complaint of Afib with RVR (30 Jan 2022 16:23)      Progress:     Interval hx: 24 hour events noted. New Q waves noted in inferior leads. Patient appears comfortable in bed today, denies any chest pain, shortness of breath, palpitations.    HISTORY OF PRESENTING ILLNESS:    HPI:  History of Present Illness    Mr. Silva is an 81 year old male patient known to have:  - Baseline AO3, comes from home, lives with wife  - CAD s/p CABG x4 in 2006. Cardiologist Dr Sebastian Mendoza  - CHF (no TTE in chart) and s/p AVR 2011. Cardiologist Dr Sebastian Mendoza. Home med PO Lasix 40mg QD per wife  - CKD III (Baseline Cr 1.3)  - Recent admission from 12/12-12/20 for COVID Pneumonia s/p IV Dexamethasone and IV RDV  - Recently found to have Acute Right Peroneal, Popliteal, and PT DVT during recent admission on 12/12. Discharged on Eliquis. Most recent home dose per wife is 5mg in AM and 2.5mg in PM (not 5mg BID) due to nose bleed  - HTN  He presented to the ED on 01/27 after being referred by Dr Murphy for new onset afib with RVR.  History goes back to 01/27 when the patient was following up with Dr Murphy.  During the visit, patient was found to be volume overloaded.  His HR was in 150bpm and an ECG performed revealed new onset Afib with RVR.  As a result, patient was referred by Dr Murphy to ED for evaluation.  On history taking, patient reports a 2 week history of worsening shortness of breath on minimal exertion (even walking few steps make him feel out of breath).   This has been associated with worsening bilateral leg swelling, orthopnea (sleeps in a seated position), and PNDs in the absence of any chest pain, palpitations, light headedness, nausea, or pre/syncope.  Upon presentation to the ED, the patient was tachycardic in afib with RVR as below  Vital Signs in ED   - /71 mmHg  -  bpm -> confirmed afib with RVR on ECG -> s/p cardizem drip at 15/hour -> most recent  during my evaluation  - RR 18 bpm  - T 35.6  - SaO2 97% on RA        PHYSICAL EXAM:   GENERAL: No acute distress, well appearing pleasant elderly male   NEURO: Alert & Oriented X3. Speech clear and fluent.   HEAD: Atraumatic, normocephalic.   EYES: EOMI. Sclera non-icteric.  ENT: Moist mucous membranes.    NECK: No JVD.  No cervical lymphadenopathy.   LUNGS: Clear to auscultation bilaterally. No wheezes or rhonchi. No accessory muscle use.  HEART: Tachycardic irregularly irregular. S1/S2 present. No murmurs.  ABDOMEN: Bowel sounds present. Soft. Nontender. Nondistended. No guarding or rigidity    EXTREMITIES: No edema, cyanosis, clubbing. 2+ peripheral pulses bilaterally.  SKIN: Warm and dry.    OBJECTIVE DATA:    T(C): 36.4 (02-03-22 @ 06:02), Max: 36.4 (02-03-22 @ 06:02)  T(F): 97.6 (02-03-22 @ 06:02), Max: 97.6 (02-03-22 @ 06:02)  HR: 68 (02-03-22 @ 06:02) (68 - 73)  BP: 139/66 (02-03-22 @ 06:02) (127/58 - 139/66)  ABP: --  ABP(mean): --  RR: 18 (02-03-22 @ 06:02) (18 - 18)  SpO2: 98% (02-02-22 @ 20:18) (98% - 98%)                            11.9   7.54  )-----------( 184      ( 03 Feb 2022 06:55 )             36.4       02-03    141  |  98  |  42<H>  ----------------------------<  109<H>  3.4<L>   |  29  |  3.4<H>    Ca    9.3      03 Feb 2022 06:55  Mg     2.2     02-03    TPro  5.5<L>  /  Alb  3.4<L>  /  TBili  0.8  /  DBili  x   /  AST  12  /  ALT  13  /  AlkPhos  54  02-03                        ASSESSMENT/PLAN:  81M with CAD s/p CABG, bioprosth AVR (2011), CKD (b/l 1.3), recent COVID infection with LE DVT presented with exertional SOB and edema x2 weeks and AFib with RVR sent by Dr. Lane.   ECHO revealed EF of 27% actively diuresed now over diuresed with increasing creatinine.   Noted to have new Q waves in inferior leads (III, aVF).       #Acute HFrEF ( EF 27% on TTE) - new vs old no prior ECHO for comparison - now likely overdiuresed  >cardiologist is Dr. Sebastian Mendoza  >on home PO lasix 40 mg QD per wife  >s/p Lasix     -hold diuresis today, 250 cc NS over 5 hours and encourage PO intake  -c/w Toprol XL 25 PO (started here)  -holding ACEi/ARB in light of NICA  -strict I&Os/ daily standing weight  -C/w ASA 81 mg daily and atorvastatin 20 mg nightly    #New Q waves in inferior leads, concern for acute ischemic event  #Hx CAD s/p CABG x4 (2006), bioprosthetic TAVR ( 2011)  #HTN  >findings noted on EKG 2/2 and seen again on 2/3  >cardiologist is Dr. Sebastian Mendoza    -Lexiscan Stress Test today  -Plavix 300 mg x1 then daily 75mg  -Imdur and Hydralazine 10 mg TID  -c/w statin  -daily EKG    #NICA on CKD III - worsening  >baseline 1.3 : trending upward 2.4 -> 2.6-> 3.1->3.4    -hold diuresis today, 250 cc NS over 5 hours and encourage PO intake  -nephro following    #New onset AF w/ RVR - now NSR  #Hx COVID 12/2021 c/b LE DVT on Eliquis  >rate regular w/ PACs, heart rate controlled  > + Acute Right Peroneal, Popliteal, and PT DVT during recent admission on 12/12. On Eliquis 2.5 mg BID  > COVID neg 1/27    -c/w amio 200 mg PO BID x2 weeks (followed by 200 mg PO daily per EP)  -c/w Xarelto 15 mg for AC (already sent to pharmacy for pricing)  -daily EKG           #DVT ppx - Xarelto  #Diet - DASH diet - encourage PO fluid intake today  #Activity - AAT  #Code/Family - DNR/DNI    #Disposition - home pending improved renal  LENGTH OF HOSPITAL STAY: 01-27-22 (6d)  CHIEF COMPLAINT:   Patient is a 81y old  Male who presents with a chief complaint of Afib with RVR (30 Jan 2022 16:23)      Progress:     Interval hx: 24 hour events noted. New Q waves noted in inferior leads. Patient appears comfortable in bed today, denies any chest pain, shortness of breath, palpitations.    HISTORY OF PRESENTING ILLNESS:    HPI:  History of Present Illness    Mr. Silva is an 81 year old male patient known to have:  - Baseline AO3, comes from home, lives with wife  - CAD s/p CABG x4 in 2006. Cardiologist Dr Sebastian Mendoza  - CHF (no TTE in chart) and s/p AVR 2011. Cardiologist Dr Sebastian Mendoza. Home med PO Lasix 40mg QD per wife  - CKD III (Baseline Cr 1.3)  - Recent admission from 12/12-12/20 for COVID Pneumonia s/p IV Dexamethasone and IV RDV  - Recently found to have Acute Right Peroneal, Popliteal, and PT DVT during recent admission on 12/12. Discharged on Eliquis. Most recent home dose per wife is 5mg in AM and 2.5mg in PM (not 5mg BID) due to nose bleed  - HTN  He presented to the ED on 01/27 after being referred by Dr Murphy for new onset afib with RVR.  History goes back to 01/27 when the patient was following up with Dr Murphy.  During the visit, patient was found to be volume overloaded.  His HR was in 150bpm and an ECG performed revealed new onset Afib with RVR.  As a result, patient was referred by Dr Murphy to ED for evaluation.  On history taking, patient reports a 2 week history of worsening shortness of breath on minimal exertion (even walking few steps make him feel out of breath).   This has been associated with worsening bilateral leg swelling, orthopnea (sleeps in a seated position), and PNDs in the absence of any chest pain, palpitations, light headedness, nausea, or pre/syncope.  Upon presentation to the ED, the patient was tachycardic in afib with RVR as below  Vital Signs in ED   - /71 mmHg  -  bpm -> confirmed afib with RVR on ECG -> s/p cardizem drip at 15/hour -> most recent  during my evaluation  - RR 18 bpm  - T 35.6  - SaO2 97% on RA        PHYSICAL EXAM:   GENERAL: No acute distress, well appearing pleasant elderly male   NEURO: Alert & Oriented X3. Speech clear and fluent.   HEAD: Atraumatic, normocephalic.   EYES: EOMI. Sclera non-icteric.  ENT: Moist mucous membranes.    NECK: No JVD.  No cervical lymphadenopathy.   LUNGS: Clear to auscultation bilaterally. No wheezes or rhonchi. No accessory muscle use.  HEART: Tachycardic irregularly irregular. S1/S2 present. No murmurs.  ABDOMEN: Bowel sounds present. Soft. Nontender. Nondistended. No guarding or rigidity    EXTREMITIES: No edema, cyanosis, clubbing. 2+ peripheral pulses bilaterally.  SKIN: Warm and dry.    OBJECTIVE DATA:    T(C): 36.4 (02-03-22 @ 06:02), Max: 36.4 (02-03-22 @ 06:02)  T(F): 97.6 (02-03-22 @ 06:02), Max: 97.6 (02-03-22 @ 06:02)  HR: 68 (02-03-22 @ 06:02) (68 - 73)  BP: 139/66 (02-03-22 @ 06:02) (127/58 - 139/66)  ABP: --  ABP(mean): --  RR: 18 (02-03-22 @ 06:02) (18 - 18)  SpO2: 98% (02-02-22 @ 20:18) (98% - 98%)                            11.9   7.54  )-----------( 184      ( 03 Feb 2022 06:55 )             36.4       02-03    141  |  98  |  42<H>  ----------------------------<  109<H>  3.4<L>   |  29  |  3.4<H>    Ca    9.3      03 Feb 2022 06:55  Mg     2.2     02-03    TPro  5.5<L>  /  Alb  3.4<L>  /  TBili  0.8  /  DBili  x   /  AST  12  /  ALT  13  /  AlkPhos  54  02-03                        ASSESSMENT/PLAN:  81M with CAD s/p CABG, bioprosth AVR (2011), CKD (b/l 1.3), recent COVID infection with LE DVT presented with exertional SOB and edema x2 weeks and AFib with RVR sent by Dr. Lane.   ECHO revealed EF of 27% actively diuresed now over diuresed with increasing creatinine.   Noted to have new Q waves in inferior leads (III, aVF).       #Acute HFrEF ( EF 27% on TTE) - new vs old no prior ECHO for comparison - now likely overdiuresed  >cardiologist is Dr. Sebastian Mendoza  >on home PO lasix 40 mg QD per wife  >s/p Lasix     -hold diuresis today, 250 cc NS over 5 hours and encourage PO intake  -c/w Toprol XL 25 PO (started here)  -holding ACEi/ARB in light of NICA  -strict I&Os/ daily standing weight  -C/w ASA 81 mg daily and atorvastatin 20 mg nightly    #New Q waves in inferior leads, concern for acute ischemic event  #Hx CAD s/p CABG x4 (2006), bioprosthetic TAVR ( 2011)  #HTN  >findings noted on EKG 2/2 and seen again on 2/3  >cardiologist is Dr. Sebastian Mendoza    -Lexiscan Stress Test today  -Plavix 300 mg x1 then daily 75mg  -Imdur and Hydralazine 10 mg TID  -c/w ASA, statin  -daily EKG    #NICA on CKD III - worsening  >baseline 1.3 : trending upward 2.4 -> 2.6-> 3.1->3.4    -hold diuresis today, 250 cc NS over 5 hours and encourage PO intake  -nephro following  -RBUS    #New onset AF w/ RVR - now NSR  #Hx COVID 12/2021 c/b LE DVT on Eliquis  >rate regular w/ PACs, heart rate controlled  > + Acute Right Peroneal, Popliteal, and PT DVT during recent admission on 12/12. On Eliquis 2.5 mg BID  > COVID neg 1/27    -c/w amio 200 mg PO BID x2 weeks (followed by 200 mg PO daily per EP)  -c/w Xarelto 15 mg for AC (already sent to pharmacy for pricing)  -daily EKG           #DVT ppx - Xarelto  #Diet - DASH diet - encourage PO fluid intake today  #Activity - AAT  #Code/Family - DNR/DNI    Dispo - home  Pending - NICA, stress test LENGTH OF HOSPITAL STAY: 01-27-22 (6d)  CHIEF COMPLAINT:   Patient is a 81y old  Male who presents with a chief complaint of Afib with RVR (30 Jan 2022 16:23)      Progress:     Interval hx: 24 hour events noted. New Q waves noted in inferior leads. Patient appears comfortable in bed today, denies any chest pain, shortness of breath, palpitations.    HISTORY OF PRESENTING ILLNESS:    HPI:  History of Present Illness    Mr. Silva is an 81 year old male patient known to have:  - Baseline AO3, comes from home, lives with wife  - CAD s/p CABG x4 in 2006. Cardiologist Dr Sebastian Mendoza  - CHF (no TTE in chart) and s/p AVR 2011. Cardiologist Dr Sebastian Mendoza. Home med PO Lasix 40mg QD per wife  - CKD III (Baseline Cr 1.3)  - Recent admission from 12/12-12/20 for COVID Pneumonia s/p IV Dexamethasone and IV RDV  - Recently found to have Acute Right Peroneal, Popliteal, and PT DVT during recent admission on 12/12. Discharged on Eliquis. Most recent home dose per wife is 5mg in AM and 2.5mg in PM (not 5mg BID) due to nose bleed  - HTN  He presented to the ED on 01/27 after being referred by Dr Murphy for new onset afib with RVR.  History goes back to 01/27 when the patient was following up with Dr Murphy.  During the visit, patient was found to be volume overloaded.  His HR was in 150bpm and an ECG performed revealed new onset Afib with RVR.  As a result, patient was referred by Dr Murphy to ED for evaluation.  On history taking, patient reports a 2 week history of worsening shortness of breath on minimal exertion (even walking few steps make him feel out of breath).   This has been associated with worsening bilateral leg swelling, orthopnea (sleeps in a seated position), and PNDs in the absence of any chest pain, palpitations, light headedness, nausea, or pre/syncope.  Upon presentation to the ED, the patient was tachycardic in afib with RVR as below  Vital Signs in ED   - /71 mmHg  -  bpm -> confirmed afib with RVR on ECG -> s/p cardizem drip at 15/hour -> most recent  during my evaluation  - RR 18 bpm  - T 35.6  - SaO2 97% on RA        PHYSICAL EXAM:   GENERAL: No acute distress, well appearing pleasant elderly male   NEURO: Alert & Oriented X3. Speech clear and fluent.   HEAD: Atraumatic, normocephalic.   EYES: EOMI. Sclera non-icteric.  ENT: Moist mucous membranes.    NECK: No JVD.  No cervical lymphadenopathy.   LUNGS: Clear to auscultation bilaterally. No wheezes or rhonchi. No accessory muscle use.  HEART: Tachycardic irregularly irregular. S1/S2 present. No murmurs.  ABDOMEN: Bowel sounds present. Soft. Nontender. Nondistended. No guarding or rigidity    EXTREMITIES: No edema, cyanosis, clubbing. 2+ peripheral pulses bilaterally.  SKIN: Warm and dry.    OBJECTIVE DATA:    T(C): 36.4 (02-03-22 @ 06:02), Max: 36.4 (02-03-22 @ 06:02)  T(F): 97.6 (02-03-22 @ 06:02), Max: 97.6 (02-03-22 @ 06:02)  HR: 68 (02-03-22 @ 06:02) (68 - 73)  BP: 139/66 (02-03-22 @ 06:02) (127/58 - 139/66)  ABP: --  ABP(mean): --  RR: 18 (02-03-22 @ 06:02) (18 - 18)  SpO2: 98% (02-02-22 @ 20:18) (98% - 98%)                            11.9   7.54  )-----------( 184      ( 03 Feb 2022 06:55 )             36.4       02-03    141  |  98  |  42<H>  ----------------------------<  109<H>  3.4<L>   |  29  |  3.4<H>    Ca    9.3      03 Feb 2022 06:55  Mg     2.2     02-03    TPro  5.5<L>  /  Alb  3.4<L>  /  TBili  0.8  /  DBili  x   /  AST  12  /  ALT  13  /  AlkPhos  54  02-03                        ASSESSMENT/PLAN:  81M with CAD s/p CABG, bioprosth AVR (2011), CKD (b/l 1.3), recent COVID infection with LE DVT presented with exertional SOB and edema x2 weeks and AFib with RVR sent by Dr. Lane.   ECHO revealed EF of 27% actively diuresed now over diuresed with increasing creatinine.   Noted to have new Q waves in inferior leads (III, aVF).       #Acute HFrEF ( EF 27% on TTE) - new vs old no prior ECHO for comparison - now likely overdiuresed  >cardiologist is Dr. Sebastian Mendoza  >on home PO lasix 40 mg QD per wife  >s/p Lasix     -hold diuresis today, 250 cc NS over 5 hours and encourage PO intake  -c/w Toprol XL 25 PO (started here)  -holding ACEi/ARB in light of NICA  -strict I&Os/ daily standing weight  -C/w ASA 81 mg daily and atorvastatin 20 mg nightly    #New Q waves in inferior leads, concern for acute ischemic event  #Hx CAD s/p CABG x4 (2006), bioprosthetic TAVR ( 2011)  #HTN  >findings noted on EKG 2/2 and seen again on 2/3  >cardiologist is Dr. Sebastian Mendoza    -Lexiscan Stress Test today  -Plavix 300 mg x1 then daily 75mg  -Imdur and Hydralazine 10 mg TID  -c/w ASA, statin  -daily EKG    #NICA on CKD III - worsening  >baseline 1.3 : trending upward 2.4 -> 2.6-> 3.1->3.4    -hold diuresis today, 250 cc NS over 5 hours and encourage PO intake  -nephro following  -RBUS    #New onset AF w/ RVR - now NSR  #Hx COVID 12/2021 c/b LE DVT on Eliquis  >rate regular w/ PACs, heart rate controlled  > + Acute Right Peroneal, Popliteal, and PT DVT during recent admission on 12/12. On Eliquis 2.5 mg BID  > COVID neg 1/27    -c/w amio 200 mg PO BID x2 weeks (followed by 200 mg PO daily per EP)  -c/w Xarelto 15 mg for AC (already sent to pharmacy for pricing)  -daily EKG           #DVT ppx - Xarelto  #Diet - DASH diet - encourage PO fluid intake today  #Activity - AAT  #Code/Family - DNR/DNI    Dispo - home  Pending - [facility], stress test, RBUS

## 2022-02-03 NOTE — PROGRESS NOTE ADULT - SUBJECTIVE AND OBJECTIVE BOX
NEPHROLOGY FOLLOW UP NOTE    pt seen and examined  renal function worse  bp's fair  no sob  no edema  voiding on own  anxious today, wants to go home    PAST MEDICAL & SURGICAL HISTORY:  Chronic systolic congestive heart failure    HTN (hypertension)    Dyslipidemia      Allergies:  No Known Allergies    Home Medications Reviewed    SOCIAL HISTORY:  Denies ETOH,Smoking, drugs    FAMILY HISTORY:  no renal disease        REVIEW OF SYSTEMS:  CONSTITUTIONAL: No weakness, fevers or chills  EYES/ENT: No visual changes;  No vertigo or throat pain   NECK: No pain or stiffness  RESPIRATORY: No cough, wheezing, hemoptysis; + shortness of breath  CARDIOVASCULAR: No chest pain or palpitations.  GASTROINTESTINAL: No abdominal or epigastric pain. No nausea, vomiting, or hematemesis; No diarrhea or constipation. No melena or hematochezia.  GENITOURINARY: No dysuria, frequency, foamy urine, urinary urgency, incontinence or hematuria  NEUROLOGICAL: No numbness or weakness  SKIN: No itching, burning, rashes, or lesions   VASCULAR: + bilateral lower extremity edema.   All other review of systems is negative unless indicated above.    PHYSICAL EXAM:  Constitutional: NAD  HEENT: anicteric sclera, oropharynx clear, MMM  Neck: + JVD  Respiratory: decreased BS b/l with bibasilar crackles  Cardiovascular: S1, S2, irreg   Gastrointestinal: BS+, soft, NT/ND  Extremities: No cyanosis or clubbing. no peripheral edema  Neurological: A/O x 3, no focal deficits  Psychiatric: Normal mood, normal affect  : No CVA tenderness.     Skin: No rashes    Hospital Medications:   MEDICATIONS  (STANDING):  aMIOdarone    Tablet 200 milliGRAM(s) Oral two times a day  aspirin  chewable 81 milliGRAM(s) Oral daily  atorvastatin 20 milliGRAM(s) Oral at bedtime  chlorhexidine 4% Liquid 1 Application(s) Topical <User Schedule>  hydrALAZINE 10 milliGRAM(s) Oral three times a day  isosorbide   mononitrate ER Tablet (IMDUR) 30 milliGRAM(s) Oral daily  metoprolol succinate ER 25 milliGRAM(s) Oral daily  pantoprazole    Tablet 40 milliGRAM(s) Oral before breakfast  regadenoson Injectable 0.4 milliGRAM(s) IV Push once  rivaroxaban 15 milliGRAM(s) Oral with dinner  senna 2 Tablet(s) Oral at bedtime  sodium chloride 0.9%. 1000 milliLiter(s) (250 mL/Hr) IV Continuous <Continuous>  tamsulosin 0.4 milliGRAM(s) Oral at bedtime        VITALS:  T(F): 97.4 (22 @ 14:12), Max: 97.6 (22 @ 06:02)  HR: 66 (22 @ 13:40)  BP: 119/59 (22 @ 13:40)  RR: 18 (22 @ 06:02)  SpO2: 98% (22 @ 20:18)  Wt(kg): --     @ 07:  -   @ 07:00  --------------------------------------------------------  IN: 1158 mL / OUT: 2300 mL / NET: -1142 mL     @ 07:01  -   @ 07:00  --------------------------------------------------------  IN: 1200 mL / OUT: 650 mL / NET: 550 mL     @ 07:01  -   @ 16:48  --------------------------------------------------------  IN: 0 mL / OUT: 200 mL / NET: -200 mL          LABS:      141  |  98  |  42<H>  ----------------------------<  109<H>  3.4<L>   |  29  |  3.4<H>    Ca    9.3      2022 06:55  Mg     2.2         TPro  5.5<L>  /  Alb  3.4<L>  /  TBili  0.8  /  DBili      /  AST  12  /  ALT  13  /  AlkPhos  54                            11.9   7.54  )-----------( 184      ( 2022 06:55 )             36.4       Urine Studies:  Urinalysis Basic - ( 2022 01:00 )    Color: Colorless / Appearance: Clear / S.009 / pH:   Gluc:  / Ketone: Negative  / Bili: Negative / Urobili: <2 mg/dL   Blood:  / Protein: Negative / Nitrite: Negative   Leuk Esterase: Negative / RBC: 17 /HPF / WBC 1 /HPF   Sq Epi:  / Non Sq Epi: 0 /HPF / Bacteria: Negative      Osmolality, Random Urine: 358 mos/kg ( @ 14:19)  Sodium, Random Urine: 97.0 mmoL/L ( @ 14:19)  Sodium, Random Urine: 103.0 mmoL/L ( @ 01:00)  Chloride, Random Urine: 107 ( @ 01:00)  Creatinine, Random Urine: 23 mg/dL ( @ 01:00)  Protein/Creatinine Ratio Calculation: <0.2 Ratio ( @ 01:00)  Creatinine, Random Urine: 25 mg/dL ( @ 01:00)  Protein/Creatinine Ratio Calculation: <0.2 Ratio ( @ 01:00)      RADIOLOGY & ADDITIONAL STUDIES:

## 2022-02-03 NOTE — PROGRESS NOTE ADULT - ASSESSMENT
Acute kidney injury / Cardiorenal syndrome   worsening renal function from overzealous diuresis  chronic kidney disease stage 3  contraction alkalosis  hypokalemia  acute on chronic systolic CHF  Afib with RVR / s/p AVR (procine) / CAD / CABG / s/p DCC  DVT / PE on eliquis  recent severe COVID PNA  HTN   anxiety        plan:    cont holding diuretics  agree with further IVF today  obtain renal sono  send UA, Lorie+, Uosm, calculate FeNa+  cont to hold ARB  f/u labs  d/w pt and wife in detail.  Both very anxious and frustrated over hospitalization and want to go home.  Hopefully Cr can stabilize in next 24-48 hours for safe dc home  d/w Dr. Ramon

## 2022-02-03 NOTE — PROGRESS NOTE ADULT - ATTENDING COMMENTS
Mr. Silva is an 81yoM with CAD s/p CABG, bioprosthetic AVR (2011), HTN, CKD (baseline Cr 1.3), and recent hospitalization in 12/2021 for COVID-19 infection c/b RLE DVT and PE for which patient required 6L nc and has since been weaned off who presented with dyspnea, orthopnea, PND, and edema, found to be in Afib with RVR, diagnosed with BiV heart failure.     Patient was diuresed with Bumex 2 mg IV QD-BID (also receive metolazone initially) with good UOP and improvement in his edema. His diuretics were gradually decreased, but despite this, patient appears to have develops contraction alkalosis with NICA. Holding diuretics and following Cr. Will obtain renal ultrasound for further work-up, and Nephrology following.     Patient underwent DINO and DCCV on 2/01, with restoration of NSR. ECG demonstrated sinus rhythm with frequent PACs. Patient on PO amiodarone to maintain sinus rhythm.     Lastly, ECG in sinus rhythm shows new Q waves inferiorly. Started on Plavix and underwent stress test today to evaluate if ischemia is a possible cause of his newly depressed LVEF.     Plan:  - Nuclear Lexiscan stress test pending  - S/p digoxin IV loading with 250 mcg x 3  - S/p amiodarone IV loading for 24 hours  - S/p DINO and DCCV on 2/01/22, receiving an additional amiodarone 150 mg IV x 1   - Continue amiodarone 200 mg BID and Xarelto 15 mg (renally dosed) with dinner  - Give Plavix 300 mg today and start 75 mg daily thereafter  - Continue ASA 81 mg daily and atorvastatin 20 mg nightly, will discontinue ASA on discharge to avoid triple therapy  - Continue Toprol 25 mg daily today and Imdur 30 mg daily   - Start hydralazine 10 mg TID  - Will give 250 cc over 5 hours  - Encouraged liberal PO intake of fluids today  - BNP 8852 --> 9766   - Daily ECGs  - Strict I/Os and daily standing weights  - Replete for goal K > 4 and Mg > 2   - A1c 5.8, LDL 90, TSH 1.56  - Ferritin 140 and iron sat 15%, ordered for a 5-day course of IV iron sucrose (received 100 mg on Day 1 and 2, now ordered for 200 mg)

## 2022-02-04 ENCOUNTER — TRANSCRIPTION ENCOUNTER (OUTPATIENT)
Age: 82
End: 2022-02-04

## 2022-02-04 VITALS
RESPIRATION RATE: 18 BRPM | DIASTOLIC BLOOD PRESSURE: 71 MMHG | TEMPERATURE: 96 F | SYSTOLIC BLOOD PRESSURE: 127 MMHG | HEART RATE: 75 BPM

## 2022-02-04 LAB
% ALBUMIN: 61.1 % — SIGNIFICANT CHANGE UP
% ALPHA 1: 5.5 % — SIGNIFICANT CHANGE UP
% ALPHA 2: 12.8 % — SIGNIFICANT CHANGE UP
% BETA: 10.8 % — SIGNIFICANT CHANGE UP
% GAMMA: 9.8 % — SIGNIFICANT CHANGE UP
% M SPIKE: SIGNIFICANT CHANGE UP
ALBUMIN SERPL ELPH-MCNC: 4 G/DL — SIGNIFICANT CHANGE UP (ref 3.6–5.5)
ALBUMIN/GLOB SERPL ELPH: 1.5 RATIO — SIGNIFICANT CHANGE UP
ALPHA1 GLOB SERPL ELPH-MCNC: 0.4 G/DL — SIGNIFICANT CHANGE UP (ref 0.1–0.4)
ALPHA2 GLOB SERPL ELPH-MCNC: 0.8 G/DL — SIGNIFICANT CHANGE UP (ref 0.5–1)
ANION GAP SERPL CALC-SCNC: 22 MMOL/L — HIGH (ref 7–14)
APPEARANCE UR: ABNORMAL
B-GLOBULIN SERPL ELPH-MCNC: 0.7 G/DL — SIGNIFICANT CHANGE UP (ref 0.5–1)
BACTERIA # UR AUTO: ABNORMAL
BASOPHILS # BLD AUTO: 0.06 K/UL — SIGNIFICANT CHANGE UP (ref 0–0.2)
BASOPHILS NFR BLD AUTO: 0.8 % — SIGNIFICANT CHANGE UP (ref 0–1)
BILIRUB UR-MCNC: NEGATIVE — SIGNIFICANT CHANGE UP
BUN SERPL-MCNC: 38 MG/DL — HIGH (ref 10–20)
CALCIUM SERPL-MCNC: 9.5 MG/DL — SIGNIFICANT CHANGE UP (ref 8.5–10.1)
CHLORIDE SERPL-SCNC: 100 MMOL/L — SIGNIFICANT CHANGE UP (ref 98–110)
CO2 SERPL-SCNC: 20 MMOL/L — SIGNIFICANT CHANGE UP (ref 17–32)
COLOR SPEC: YELLOW — SIGNIFICANT CHANGE UP
CREAT ?TM UR-MCNC: 78 MG/DL — SIGNIFICANT CHANGE UP
CREAT SERPL-MCNC: 3.2 MG/DL — HIGH (ref 0.7–1.5)
DIFF PNL FLD: ABNORMAL
EOSINOPHIL # BLD AUTO: 0.16 K/UL — SIGNIFICANT CHANGE UP (ref 0–0.7)
EOSINOPHIL NFR BLD AUTO: 2.1 % — SIGNIFICANT CHANGE UP (ref 0–8)
EPI CELLS # UR: 0 /HPF — SIGNIFICANT CHANGE UP (ref 0–5)
GAMMA GLOBULIN: 0.6 G/DL — SIGNIFICANT CHANGE UP (ref 0.6–1.6)
GLUCOSE SERPL-MCNC: 91 MG/DL — SIGNIFICANT CHANGE UP (ref 70–99)
GLUCOSE UR QL: NEGATIVE — SIGNIFICANT CHANGE UP
HCT VFR BLD CALC: 38.5 % — LOW (ref 42–52)
HGB BLD-MCNC: 12.4 G/DL — LOW (ref 14–18)
HYALINE CASTS # UR AUTO: 0 /LPF — SIGNIFICANT CHANGE UP (ref 0–7)
IMM GRANULOCYTES NFR BLD AUTO: 0.5 % — HIGH (ref 0.1–0.3)
INTERPRETATION SERPL IFE-IMP: SIGNIFICANT CHANGE UP
KETONES UR-MCNC: NEGATIVE — SIGNIFICANT CHANGE UP
LEUKOCYTE ESTERASE UR-ACNC: ABNORMAL
LYMPHOCYTES # BLD AUTO: 1.02 K/UL — LOW (ref 1.2–3.4)
LYMPHOCYTES # BLD AUTO: 13.6 % — LOW (ref 20.5–51.1)
M-SPIKE: SIGNIFICANT CHANGE UP (ref 0–0)
MAGNESIUM SERPL-MCNC: 2.2 MG/DL — SIGNIFICANT CHANGE UP (ref 1.8–2.4)
MCHC RBC-ENTMCNC: 30.5 PG — SIGNIFICANT CHANGE UP (ref 27–31)
MCHC RBC-ENTMCNC: 32.2 G/DL — SIGNIFICANT CHANGE UP (ref 32–37)
MCV RBC AUTO: 94.6 FL — HIGH (ref 80–94)
MONOCYTES # BLD AUTO: 1.11 K/UL — HIGH (ref 0.1–0.6)
MONOCYTES NFR BLD AUTO: 14.8 % — HIGH (ref 1.7–9.3)
NEUTROPHILS # BLD AUTO: 5.11 K/UL — SIGNIFICANT CHANGE UP (ref 1.4–6.5)
NEUTROPHILS NFR BLD AUTO: 68.2 % — SIGNIFICANT CHANGE UP (ref 42.2–75.2)
NITRITE UR-MCNC: NEGATIVE — SIGNIFICANT CHANGE UP
NRBC # BLD: 0 /100 WBCS — SIGNIFICANT CHANGE UP (ref 0–0)
OSMOLALITY UR: 360 MOS/KG — SIGNIFICANT CHANGE UP (ref 50–1200)
PH UR: 8 — SIGNIFICANT CHANGE UP (ref 5–8)
PLATELET # BLD AUTO: 211 K/UL — SIGNIFICANT CHANGE UP (ref 130–400)
POTASSIUM SERPL-MCNC: 3.8 MMOL/L — SIGNIFICANT CHANGE UP (ref 3.5–5)
POTASSIUM SERPL-SCNC: 3.8 MMOL/L — SIGNIFICANT CHANGE UP (ref 3.5–5)
PROT ?TM UR-MCNC: 61 MG/DLG/24H — SIGNIFICANT CHANGE UP
PROT PATTERN SERPL ELPH-IMP: SIGNIFICANT CHANGE UP
PROT SERPL-MCNC: 6.6 G/DL — SIGNIFICANT CHANGE UP (ref 6–8.3)
PROT SERPL-MCNC: 6.6 G/DL — SIGNIFICANT CHANGE UP (ref 6–8.3)
PROT UR-MCNC: ABNORMAL
PROT/CREAT UR-RTO: 0.8 RATIO — HIGH (ref 0–0.2)
RBC # BLD: 4.07 M/UL — LOW (ref 4.7–6.1)
RBC # FLD: 15.7 % — HIGH (ref 11.5–14.5)
RBC CASTS # UR COMP ASSIST: 344 /HPF — HIGH (ref 0–4)
SODIUM SERPL-SCNC: 142 MMOL/L — SIGNIFICANT CHANGE UP (ref 135–146)
SODIUM UR-SCNC: 87 MMOL/L — SIGNIFICANT CHANGE UP
SP GR SPEC: 1.01 — SIGNIFICANT CHANGE UP (ref 1.01–1.03)
UROBILINOGEN FLD QL: SIGNIFICANT CHANGE UP
WBC # BLD: 7.5 K/UL — SIGNIFICANT CHANGE UP (ref 4.8–10.8)
WBC # FLD AUTO: 7.5 K/UL — SIGNIFICANT CHANGE UP (ref 4.8–10.8)
WBC UR QL: 22 /HPF — HIGH (ref 0–5)

## 2022-02-04 PROCEDURE — 76770 US EXAM ABDO BACK WALL COMP: CPT | Mod: 26

## 2022-02-04 PROCEDURE — 99239 HOSP IP/OBS DSCHRG MGMT >30: CPT

## 2022-02-04 RX ORDER — ATORVASTATIN CALCIUM 80 MG/1
1 TABLET, FILM COATED ORAL
Qty: 30 | Refills: 0
Start: 2022-02-04 | End: 2022-03-05

## 2022-02-04 RX ORDER — ASPIRIN/CALCIUM CARB/MAGNESIUM 324 MG
1 TABLET ORAL
Qty: 30 | Refills: 0
Start: 2022-02-04 | End: 2022-03-05

## 2022-02-04 RX ORDER — TAMSULOSIN HYDROCHLORIDE 0.4 MG/1
1 CAPSULE ORAL
Qty: 30 | Refills: 0
Start: 2022-02-04 | End: 2022-03-05

## 2022-02-04 RX ORDER — CEFTRIAXONE 500 MG/1
1000 INJECTION, POWDER, FOR SOLUTION INTRAMUSCULAR; INTRAVENOUS EVERY 24 HOURS
Refills: 0 | Status: DISCONTINUED | OUTPATIENT
Start: 2022-02-05 | End: 2022-02-04

## 2022-02-04 RX ORDER — METOPROLOL TARTRATE 50 MG
12.5 TABLET ORAL
Qty: 0 | Refills: 0 | DISCHARGE

## 2022-02-04 RX ORDER — METOPROLOL TARTRATE 50 MG
1 TABLET ORAL
Qty: 30 | Refills: 0
Start: 2022-02-04 | End: 2022-03-05

## 2022-02-04 RX ORDER — METOPROLOL TARTRATE 50 MG
25 TABLET ORAL ONCE
Refills: 0 | Status: COMPLETED | OUTPATIENT
Start: 2022-02-04 | End: 2022-02-04

## 2022-02-04 RX ORDER — METOPROLOL TARTRATE 50 MG
50 TABLET ORAL DAILY
Refills: 0 | Status: DISCONTINUED | OUTPATIENT
Start: 2022-02-05 | End: 2022-02-04

## 2022-02-04 RX ORDER — CEFTRIAXONE 500 MG/1
INJECTION, POWDER, FOR SOLUTION INTRAMUSCULAR; INTRAVENOUS
Refills: 0 | Status: DISCONTINUED | OUTPATIENT
Start: 2022-02-04 | End: 2022-02-04

## 2022-02-04 RX ORDER — ROSUVASTATIN CALCIUM 5 MG/1
1 TABLET ORAL
Qty: 0 | Refills: 2 | DISCHARGE

## 2022-02-04 RX ORDER — RIVAROXABAN 15 MG-20MG
1 KIT ORAL
Qty: 30 | Refills: 0
Start: 2022-02-04 | End: 2022-03-05

## 2022-02-04 RX ORDER — METOPROLOL TARTRATE 50 MG
1 TABLET ORAL
Qty: 0 | Refills: 0 | DISCHARGE

## 2022-02-04 RX ORDER — AMIODARONE HYDROCHLORIDE 400 MG/1
1 TABLET ORAL
Qty: 60 | Refills: 0
Start: 2022-02-04 | End: 2022-03-05

## 2022-02-04 RX ORDER — OLMESARTAN MEDOXOMIL 5 MG/1
1 TABLET, FILM COATED ORAL
Qty: 0 | Refills: 0 | DISCHARGE

## 2022-02-04 RX ORDER — AZTREONAM 2 G
1 VIAL (EA) INJECTION
Qty: 6 | Refills: 0
Start: 2022-02-04 | End: 2022-02-09

## 2022-02-04 RX ORDER — HYDRALAZINE HCL 50 MG
1 TABLET ORAL
Qty: 90 | Refills: 0
Start: 2022-02-04 | End: 2022-03-05

## 2022-02-04 RX ORDER — CEFTRIAXONE 500 MG/1
1000 INJECTION, POWDER, FOR SOLUTION INTRAMUSCULAR; INTRAVENOUS ONCE
Refills: 0 | Status: COMPLETED | OUTPATIENT
Start: 2022-02-04 | End: 2022-02-04

## 2022-02-04 RX ORDER — ISOSORBIDE MONONITRATE 60 MG/1
1 TABLET, EXTENDED RELEASE ORAL
Qty: 30 | Refills: 0
Start: 2022-02-04 | End: 2022-03-05

## 2022-02-04 RX ADMIN — ISOSORBIDE MONONITRATE 30 MILLIGRAM(S): 60 TABLET, EXTENDED RELEASE ORAL at 11:19

## 2022-02-04 RX ADMIN — AMIODARONE HYDROCHLORIDE 200 MILLIGRAM(S): 400 TABLET ORAL at 05:29

## 2022-02-04 RX ADMIN — Medication 10 MILLIGRAM(S): at 14:16

## 2022-02-04 RX ADMIN — CEFTRIAXONE 1000 MILLIGRAM(S): 500 INJECTION, POWDER, FOR SOLUTION INTRAMUSCULAR; INTRAVENOUS at 09:52

## 2022-02-04 RX ADMIN — Medication 81 MILLIGRAM(S): at 11:16

## 2022-02-04 RX ADMIN — Medication 25 MILLIGRAM(S): at 05:29

## 2022-02-04 RX ADMIN — Medication 10 MILLIGRAM(S): at 05:29

## 2022-02-04 RX ADMIN — Medication 25 MILLIGRAM(S): at 09:52

## 2022-02-04 RX ADMIN — PANTOPRAZOLE SODIUM 40 MILLIGRAM(S): 20 TABLET, DELAYED RELEASE ORAL at 05:29

## 2022-02-04 RX ADMIN — CHLORHEXIDINE GLUCONATE 1 APPLICATION(S): 213 SOLUTION TOPICAL at 05:28

## 2022-02-04 NOTE — PROGRESS NOTE ADULT - REASON FOR ADMISSION
Afib with RVR

## 2022-02-04 NOTE — DISCHARGE NOTE NURSING/CASE MANAGEMENT/SOCIAL WORK - NSDCPEFALRISK_GEN_ALL_CORE
For information on Fall & Injury Prevention, visit: [email or web address] OR  [email or web address] OR  https://www.cdc.gov/steadi/patient.html

## 2022-02-04 NOTE — PROGRESS NOTE ADULT - SUBJECTIVE AND OBJECTIVE BOX
NEPHROLOGY FOLLOW UP NOTE    pt seen and examined  no new complaints  voiding well off diuretics  ambulating      PAST MEDICAL & SURGICAL HISTORY:  Chronic systolic congestive heart failure    HTN (hypertension)    Dyslipidemia      Allergies:  No Known Allergies    Home Medications Reviewed    SOCIAL HISTORY:  Denies ETOH,Smoking, drugs    FAMILY HISTORY:  no renal disease        REVIEW OF SYSTEMS:  CONSTITUTIONAL: No weakness, fevers or chills  EYES/ENT: No visual changes;  No vertigo or throat pain   NECK: No pain or stiffness  RESPIRATORY: No cough, wheezing, hemoptysis; + shortness of breath  CARDIOVASCULAR: No chest pain or palpitations.  GASTROINTESTINAL: No abdominal or epigastric pain. No nausea, vomiting, or hematemesis; No diarrhea or constipation. No melena or hematochezia.  GENITOURINARY: No dysuria, frequency, foamy urine, urinary urgency, incontinence or hematuria  NEUROLOGICAL: No numbness or weakness  SKIN: No itching, burning, rashes, or lesions   VASCULAR: + bilateral lower extremity edema.   All other review of systems is negative unless indicated above.    PHYSICAL EXAM:  Constitutional: NAD  HEENT: anicteric sclera, oropharynx clear, MMM  Neck: + JVD  Respiratory: decreased BS b/l    Cardiovascular: S1, S2, irreg   Gastrointestinal: BS+, soft, NT/ND  Extremities: No cyanosis or clubbing. no peripheral edema  Neurological: A/O x 3, no focal deficits  Psychiatric: Normal mood, normal affect  : No CVA tenderness.     Skin: No rashes    Hospital Medications:   MEDICATIONS  (STANDING):  aMIOdarone    Tablet 200 milliGRAM(s) Oral two times a day  aspirin  chewable 81 milliGRAM(s) Oral daily  atorvastatin 20 milliGRAM(s) Oral at bedtime  cefTRIAXone   IVPB      chlorhexidine 4% Liquid 1 Application(s) Topical <User Schedule>  hydrALAZINE 10 milliGRAM(s) Oral three times a day  isosorbide   mononitrate ER Tablet (IMDUR) 30 milliGRAM(s) Oral daily  pantoprazole    Tablet 40 milliGRAM(s) Oral before breakfast  rivaroxaban 15 milliGRAM(s) Oral with dinner  senna 2 Tablet(s) Oral at bedtime  tamsulosin 0.4 milliGRAM(s) Oral at bedtime        VITALS:  T(F): 96 (22 @ 13:07), Max: 97.5 (22 @ 05:00)  HR: 75 (22 @ 13:07)  BP: 127/71 (22 @ 13:07)  RR: 18 (22 @ 13:07)  SpO2: 93% (22 @ 19:40)  Wt(kg): --     07:01  -   @ 07:00  --------------------------------------------------------  IN: 1200 mL / OUT: 650 mL / NET: 550 mL     @ 07:01  -   @ 07:00  --------------------------------------------------------  IN: 320 mL / OUT: 200 mL / NET: 120 mL     @ 07:01  -   @ 14:19  --------------------------------------------------------  IN: 169 mL / OUT: 0 mL / NET: 169 mL          LABS:      142  |  100  |  38<H>  ----------------------------<  91  3.8   |  20  |  3.2<H>    Ca    9.5      2022 12:40  Mg     2.2         TPro  5.5<L>  /  Alb  3.4<L>  /  TBili  0.8  /  DBili      /  AST  12  /  ALT  13  /  AlkPhos  54  02-03                          12.4   7.50  )-----------( 211      ( 2022 12:40 )             38.5       Urine Studies:  Urinalysis Basic - ( 2022 06:00 )    Color: Yellow / Appearance: Slightly Turbid / S.013 / pH:   Gluc:  / Ketone: Negative  / Bili: Negative / Urobili: <2 mg/dL   Blood:  / Protein: 100 mg/dL / Nitrite: Negative   Leuk Esterase: Small / RBC: 344 /HPF / WBC 22 /HPF   Sq Epi:  / Non Sq Epi: 0 /HPF / Bacteria: Moderate      Creatinine, Random Urine: 78 mg/dL ( @ 06:00)  Protein/Creatinine Ratio Calculation: 0.8 Ratio ( @ 06:00)  Sodium, Random Urine: 87.0 mmoL/L ( @ 06:00)  Osmolality, Random Urine: 360 mos/kg ( @ 06:00)  Osmolality, Random Urine: 358 mos/kg ( @ 14:19)  Sodium, Random Urine: 97.0 mmoL/L ( @ 14:19)      RADIOLOGY & ADDITIONAL STUDIES:

## 2022-02-04 NOTE — PROGRESS NOTE ADULT - ASSESSMENT
Acute kidney injury / Cardiorenal syndrome   - cr improving   - renal sono - no hydro   - abnl UA possibly due to recent covarrubias, no fever / elevated WBC / urinary complaints  chronic kidney disease stage 3  acute on chronic systolic CHF  Afib with RVR / s/p AVR (procine) / CAD / CABG / s/p DCC  DVT / PE on eliquis  recent severe COVID PNA  HTN   anxiety        plan:    stable for dc home from renal standpoint if no further cardiac w/u  BMP check next week  consider bumex 2mg po qd  no ace-i / arb / entresto  outpt f/u my office  d/w Dr. Murphy

## 2022-02-04 NOTE — CHART NOTE - NSCHARTNOTEFT_GEN_A_CORE
<<<RESIDENT DISCHARGE NOTE>>>     SAÚL HODGSON  MRN-[id number]    VITAL SIGNS:  T(F): 96 (02-04-22 @ 13:07), Max: 97.5 (02-04-22 @ 05:00)  HR: 75 (02-04-22 @ 13:07)  BP: 127/71 (02-04-22 @ 13:07)  SpO2: 93% (02-03-22 @ 19:40)      PHYSICAL EXAMINATION:  General: Resting comfortably in no acute painful distress  Head & Neck: Normocephalic, atraumatic  Pulmonary: Clear to auscultation bilaterally, no wheezes, rales, or rhonchi  Cardiovascular: S1S2, regular rate and rhythm  Gastrointestinal/Abdomen & Pelvis: Soft, nontender, nondistended  Neurologic/Motor: AAOx4, FERNANDEZ    TEST RESULTS:                        12.4   7.50  )-----------( 211      ( 04 Feb 2022 12:40 )             38.5       02-04    142  |  100  |  38<H>  ----------------------------<  91  3.8   |  20  |  3.2<H>    Ca    9.5      04 Feb 2022 12:40  Mg     2.2     02-04    TPro  5.5<L>  /  Alb  3.4<L>  /  TBili  0.8  /  DBili  x   /  AST  12  /  ALT  13  /  AlkPhos  54  02-03      FINAL DISCHARGE INTERVIEW:  Resident(s) Present: (Name: Dr Brown), RN Present: (Name:  ___________)    DISCHARGE MEDICATION RECONCILIATION  reviewed with Attending (Name: Dr Ramon)    DISPOSITION:   [ x ] Home,  [  ] Home with Visiting Nursing Services,   [  ]  SNF/ NH,    [  ] Acute Rehab (4A),   [  ] Other (Specify:_________) <<<RESIDENT DISCHARGE NOTE>>>     SAÚL HODGSON  MRN-[id number]    VITAL SIGNS:  T(F): 96 (02-04-22 @ 13:07), Max: 97.5 (02-04-22 @ 05:00)  HR: 75 (02-04-22 @ 13:07)  BP: 127/71 (02-04-22 @ 13:07)  SpO2: 93% (02-03-22 @ 19:40)      PHYSICAL EXAMINATION:  General: Resting comfortably in no acute painful distress  Head & Neck: Normocephalic, atraumatic  Pulmonary: Clear to auscultation bilaterally, no wheezes, rales, or rhonchi  Cardiovascular: S1S2, regular rate and rhythm  Gastrointestinal/Abdomen & Pelvis: Soft, nontender, nondistended  Neurologic/Motor: AAOx4, FERNANDEZ    TEST RESULTS:                        12.4   7.50  )-----------( 211      ( 04 Feb 2022 12:40 )             38.5       02-04    142  |  100  |  38<H>  ----------------------------<  91  3.8   |  20  |  3.2<H>    Ca    9.5      04 Feb 2022 12:40  Mg     2.2     02-04    TPro  5.5<L>  /  Alb  3.4<L>  /  TBili  0.8  /  DBili  x   /  AST  12  /  ALT  13  /  AlkPhos  54  02-03      FINAL DISCHARGE INTERVIEW:  Resident(s) Present: (Name: Dr Brown), RN Present: (Name:  ___________)    Patient seen and evaluated at bedside. He is safe and stable for d/c home. Meds sent to rx. Reviewed meds with patient extensively at bedside    DISCHARGE MEDICATION RECONCILIATION  reviewed with Attending (Name: Dr Ramon)    DISPOSITION:   [ x ] Home,  [  ] Home with Visiting Nursing Services,   [  ]  SNF/ NH,    [  ] Acute Rehab (4A),   [  ] Other (Specify:_________)

## 2022-02-04 NOTE — DISCHARGE NOTE NURSING/CASE MANAGEMENT/SOCIAL WORK - PATIENT PORTAL LINK FT
You can access the FollowMyHealth Patient Portal offered by [facility] by registering at the following website: [email or web address]. By joining [facility]’s FollowMyHealth portal, you will also be able to view your health information using other applications (apps) compatible with our system.

## 2022-02-04 NOTE — DISCHARGE NOTE NURSING/CASE MANAGEMENT/SOCIAL WORK - NSDCVIVACCINE_GEN_ALL_CORE_FT
Tdap; 22-Jan-2019 21:12; Nathaniel Rodriguez (JACQUELINE); [employer]; [id number] (Exp. Date: 02-Nov-2020); IntraMuscular; Deltoid Right.; 0.5 milliLiter(s); VIS (VIS Published: 09-May-2013, VIS Presented: 22-Jan-2019);

## 2022-02-09 DIAGNOSIS — Z86.16 PERSONAL HISTORY OF COVID-19: ICD-10-CM

## 2022-02-09 DIAGNOSIS — Z87.891 PERSONAL HISTORY OF NICOTINE DEPENDENCE: ICD-10-CM

## 2022-02-09 DIAGNOSIS — I13.0 HYPERTENSIVE HEART AND CHRONIC KIDNEY DISEASE WITH HEART FAILURE AND STAGE 1 THROUGH STAGE 4 CHRONIC KIDNEY DISEASE, OR UNSPECIFIED CHRONIC KIDNEY DISEASE: ICD-10-CM

## 2022-02-09 DIAGNOSIS — N18.30 CHRONIC KIDNEY DISEASE, STAGE 3 UNSPECIFIED: ICD-10-CM

## 2022-02-09 DIAGNOSIS — Z79.01 LONG TERM (CURRENT) USE OF ANTICOAGULANTS: ICD-10-CM

## 2022-02-09 DIAGNOSIS — I50.23 ACUTE ON CHRONIC SYSTOLIC (CONGESTIVE) HEART FAILURE: ICD-10-CM

## 2022-02-09 DIAGNOSIS — Z86.711 PERSONAL HISTORY OF PULMONARY EMBOLISM: ICD-10-CM

## 2022-02-09 DIAGNOSIS — N39.0 URINARY TRACT INFECTION, SITE NOT SPECIFIED: ICD-10-CM

## 2022-02-09 DIAGNOSIS — Z79.82 LONG TERM (CURRENT) USE OF ASPIRIN: ICD-10-CM

## 2022-02-09 DIAGNOSIS — E87.6 HYPOKALEMIA: ICD-10-CM

## 2022-02-09 DIAGNOSIS — F41.9 ANXIETY DISORDER, UNSPECIFIED: ICD-10-CM

## 2022-02-09 DIAGNOSIS — Z95.3 PRESENCE OF XENOGENIC HEART VALVE: ICD-10-CM

## 2022-02-09 DIAGNOSIS — Z95.0 PRESENCE OF CARDIAC PACEMAKER: ICD-10-CM

## 2022-02-09 DIAGNOSIS — E87.3 ALKALOSIS: ICD-10-CM

## 2022-02-09 DIAGNOSIS — I25.10 ATHEROSCLEROTIC HEART DISEASE OF NATIVE CORONARY ARTERY WITHOUT ANGINA PECTORIS: ICD-10-CM

## 2022-02-09 DIAGNOSIS — Z86.718 PERSONAL HISTORY OF OTHER VENOUS THROMBOSIS AND EMBOLISM: ICD-10-CM

## 2022-02-09 DIAGNOSIS — Z79.52 LONG TERM (CURRENT) USE OF SYSTEMIC STEROIDS: ICD-10-CM

## 2022-02-09 DIAGNOSIS — N17.9 ACUTE KIDNEY FAILURE, UNSPECIFIED: ICD-10-CM

## 2022-02-09 DIAGNOSIS — Z66 DO NOT RESUSCITATE: ICD-10-CM

## 2022-02-09 DIAGNOSIS — I48.91 UNSPECIFIED ATRIAL FIBRILLATION: ICD-10-CM

## 2022-02-28 ENCOUNTER — APPOINTMENT (OUTPATIENT)
Age: 82
End: 2022-02-28

## 2022-03-02 ENCOUNTER — INPATIENT (INPATIENT)
Facility: HOSPITAL | Age: 82
LOS: 2 days | Discharge: LEFT BEFORE DISCHARGE | End: 2022-03-05
Attending: HOSPITALIST | Admitting: HOSPITALIST
Payer: MEDICARE

## 2022-03-02 VITALS
RESPIRATION RATE: 18 BRPM | SYSTOLIC BLOOD PRESSURE: 113 MMHG | TEMPERATURE: 98 F | HEART RATE: 85 BPM | HEIGHT: 68 IN | WEIGHT: 214.07 LBS | OXYGEN SATURATION: 99 % | DIASTOLIC BLOOD PRESSURE: 53 MMHG

## 2022-03-02 LAB
ABO RH CONFIRMATION: SIGNIFICANT CHANGE UP
ALBUMIN SERPL ELPH-MCNC: 3.7 G/DL — SIGNIFICANT CHANGE UP (ref 3.5–5.2)
ALP SERPL-CCNC: 63 U/L — SIGNIFICANT CHANGE UP (ref 30–115)
ALT FLD-CCNC: 9 U/L — SIGNIFICANT CHANGE UP (ref 0–41)
ANION GAP SERPL CALC-SCNC: 13 MMOL/L — SIGNIFICANT CHANGE UP (ref 7–14)
ANISOCYTOSIS BLD QL: SLIGHT — SIGNIFICANT CHANGE UP
APTT BLD: 52.9 SEC — HIGH (ref 27–39.2)
AST SERPL-CCNC: 12 U/L — SIGNIFICANT CHANGE UP (ref 0–41)
BASOPHILS # BLD AUTO: 0 K/UL — SIGNIFICANT CHANGE UP (ref 0–0.2)
BASOPHILS NFR BLD AUTO: 0 % — SIGNIFICANT CHANGE UP (ref 0–1)
BILIRUB SERPL-MCNC: 0.3 MG/DL — SIGNIFICANT CHANGE UP (ref 0.2–1.2)
BLD GP AB SCN SERPL QL: SIGNIFICANT CHANGE UP
BUN SERPL-MCNC: 27 MG/DL — HIGH (ref 10–20)
CALCIUM SERPL-MCNC: 8.9 MG/DL — SIGNIFICANT CHANGE UP (ref 8.5–10.1)
CHLORIDE SERPL-SCNC: 102 MMOL/L — SIGNIFICANT CHANGE UP (ref 98–110)
CO2 SERPL-SCNC: 25 MMOL/L — SIGNIFICANT CHANGE UP (ref 17–32)
CREAT SERPL-MCNC: 2.1 MG/DL — HIGH (ref 0.7–1.5)
EGFR: 31 ML/MIN/1.73M2 — LOW
EOSINOPHIL # BLD AUTO: 0.06 K/UL — SIGNIFICANT CHANGE UP (ref 0–0.7)
EOSINOPHIL NFR BLD AUTO: 0.9 % — SIGNIFICANT CHANGE UP (ref 0–8)
GIANT PLATELETS BLD QL SMEAR: PRESENT — SIGNIFICANT CHANGE UP
GLUCOSE SERPL-MCNC: 95 MG/DL — SIGNIFICANT CHANGE UP (ref 70–99)
HCT VFR BLD CALC: 21.8 % — LOW (ref 42–52)
HGB BLD-MCNC: 6.6 G/DL — CRITICAL LOW (ref 14–18)
INR BLD: 2.5 RATIO — HIGH (ref 0.65–1.3)
LYMPHOCYTES # BLD AUTO: 1.16 K/UL — LOW (ref 1.2–3.4)
LYMPHOCYTES # BLD AUTO: 16.8 % — LOW (ref 20.5–51.1)
MACROCYTES BLD QL: SLIGHT — SIGNIFICANT CHANGE UP
MANUAL SMEAR VERIFICATION: SIGNIFICANT CHANGE UP
MCHC RBC-ENTMCNC: 30.3 G/DL — LOW (ref 32–37)
MCHC RBC-ENTMCNC: 31.1 PG — HIGH (ref 27–31)
MCV RBC AUTO: 102.8 FL — HIGH (ref 80–94)
MONOCYTES # BLD AUTO: 0.43 K/UL — SIGNIFICANT CHANGE UP (ref 0.1–0.6)
MONOCYTES NFR BLD AUTO: 6.2 % — SIGNIFICANT CHANGE UP (ref 1.7–9.3)
NEUTROPHILS # BLD AUTO: 5.21 K/UL — SIGNIFICANT CHANGE UP (ref 1.4–6.5)
NEUTROPHILS NFR BLD AUTO: 75.2 % — SIGNIFICANT CHANGE UP (ref 42.2–75.2)
NRBC # BLD: 3 /100 — HIGH (ref 0–0)
NRBC # BLD: SIGNIFICANT CHANGE UP /100 WBCS (ref 0–0)
PLAT MORPH BLD: ABNORMAL
PLATELET # BLD AUTO: 171 K/UL — SIGNIFICANT CHANGE UP (ref 130–400)
POLYCHROMASIA BLD QL SMEAR: SLIGHT — SIGNIFICANT CHANGE UP
POTASSIUM SERPL-MCNC: 4.1 MMOL/L — SIGNIFICANT CHANGE UP (ref 3.5–5)
POTASSIUM SERPL-SCNC: 4.1 MMOL/L — SIGNIFICANT CHANGE UP (ref 3.5–5)
PROT SERPL-MCNC: 5.7 G/DL — LOW (ref 6–8)
PROTHROM AB SERPL-ACNC: 28.5 SEC — HIGH (ref 9.95–12.87)
RBC # BLD: 2.12 M/UL — LOW (ref 4.7–6.1)
RBC # FLD: 17.2 % — HIGH (ref 11.5–14.5)
RBC BLD AUTO: ABNORMAL
SARS-COV-2 RNA SPEC QL NAA+PROBE: SIGNIFICANT CHANGE UP
SODIUM SERPL-SCNC: 140 MMOL/L — SIGNIFICANT CHANGE UP (ref 135–146)
VARIANT LYMPHS # BLD: 0.9 % — SIGNIFICANT CHANGE UP (ref 0–5)
WBC # BLD: 6.93 K/UL — SIGNIFICANT CHANGE UP (ref 4.8–10.8)
WBC # FLD AUTO: 6.93 K/UL — SIGNIFICANT CHANGE UP (ref 4.8–10.8)

## 2022-03-02 PROCEDURE — 99497 ADVNCD CARE PLAN 30 MIN: CPT | Mod: 25

## 2022-03-02 PROCEDURE — 99223 1ST HOSP IP/OBS HIGH 75: CPT

## 2022-03-02 PROCEDURE — 99285 EMERGENCY DEPT VISIT HI MDM: CPT

## 2022-03-02 PROCEDURE — 93010 ELECTROCARDIOGRAM REPORT: CPT

## 2022-03-02 PROCEDURE — 71045 X-RAY EXAM CHEST 1 VIEW: CPT | Mod: 26

## 2022-03-02 NOTE — ED ADULT NURSE NOTE - NSIMPLEMENTINTERV_GEN_ALL_ED
Implemented All Fall with Harm Risk Interventions:  [place] to call system. Call bell, personal items and telephone within reach. Instruct patient to call for assistance. Room bathroom lighting operational. Non-slip footwear when patient is off stretcher. Physically safe environment: no spills, clutter or unnecessary equipment. Stretcher in lowest position, wheels locked, appropriate side rails in place. Provide visual cue, wrist band, yellow gown, etc. Monitor gait and stability. Monitor for mental status changes and reorient to person, place, and time. Review medications for side effects contributing to fall risk. Reinforce activity limits and safety measures with patient and family. Provide visual clues: red socks.

## 2022-03-02 NOTE — ED PROVIDER NOTE - OBJECTIVE STATEMENT
81 year old male with pmhx of cad s/p cabg, ckd, HTN, COVID pneumonia in Dec, PE, Afib on xarelto sent in by pmd for hb of 5.8. pts baseline hb is 9. No bleeding or dark stools. no chest pain, sob, dizziness, syncope, abd pain or nausea, vomiting, diarrhea.

## 2022-03-02 NOTE — ED ADULT TRIAGE NOTE - CHIEF COMPLAINT QUOTE
He had blood work done this morning, his doctor just called and said his hemoglobin is low, that he needs a transfusion - wife  Patient is on Xarelto, denies dark stools, had mild bloody sputum n the morning, is on Xarelto. Patient denies SOB, denies chest pain, reports weakness

## 2022-03-02 NOTE — ED PROVIDER NOTE - CLINICAL SUMMARY MEDICAL DECISION MAKING FREE TEXT BOX
new-onset anemia, on xarelto, denies any known GIB - per PCP request will transfuse & admit for inpatient gi/renal consultation

## 2022-03-02 NOTE — ED PROVIDER NOTE - PROGRESS NOTE DETAILS
spoke with pts pmd dr jaylon polanco who recommends patient to be admitted for GI, renal, and cardio evaluation. patients renal is dr guerrero, cardio is dr tyson

## 2022-03-02 NOTE — ED PROVIDER NOTE - ATTENDING CONTRIBUTION TO CARE
81M pmh chf, AF on xarelto, cad/cabg, s/p avr, ckd, htn, dvt/pe s/p covid p/w anemia. Per labs chkd by PCP Dr. Lane recently Hg 5, referred to ED for blood transfusion, admission & GI/Renal consult. Pt denies ha, dizziness, cp/sob, nvd, abd pain, hematemesis, brbpr, melena, edema. PCP Domingo / Cardio [place] / Renal [place]ar[place].    PE:  nad  skin warm, dry  ncat  neck supple  rrr nl s1s2 no mrg  ctab no wrr  abd soft ntnd no palpable masses no rgr  back non-tender no cvat  ext no cce dpi  neuro aaox3 grossly nf exam

## 2022-03-02 NOTE — ED ADULT NURSE NOTE - OBJECTIVE STATEMENT
pt is 82 y/o male sent to ED by PMD. pt states his PMD called him tonight after receiving blood work earlier today. pt states that he was told by PMD that his hemoglobin is 5 and he needs a blood transfusion. pt denies any symptoms at this time.

## 2022-03-03 LAB
ALBUMIN SERPL ELPH-MCNC: 3.4 G/DL — LOW (ref 3.5–5.2)
ALP SERPL-CCNC: 59 U/L — SIGNIFICANT CHANGE UP (ref 30–115)
ALT FLD-CCNC: 9 U/L — SIGNIFICANT CHANGE UP (ref 0–41)
ANION GAP SERPL CALC-SCNC: 12 MMOL/L — SIGNIFICANT CHANGE UP (ref 7–14)
ANION GAP SERPL CALC-SCNC: 13 MMOL/L — SIGNIFICANT CHANGE UP (ref 7–14)
APTT BLD: 43.5 SEC — HIGH (ref 27–39.2)
AST SERPL-CCNC: 12 U/L — SIGNIFICANT CHANGE UP (ref 0–41)
BASOPHILS # BLD AUTO: 0.06 K/UL — SIGNIFICANT CHANGE UP (ref 0–0.2)
BASOPHILS NFR BLD AUTO: 1 % — SIGNIFICANT CHANGE UP (ref 0–1)
BILIRUB SERPL-MCNC: 0.3 MG/DL — SIGNIFICANT CHANGE UP (ref 0.2–1.2)
BUN SERPL-MCNC: 26 MG/DL — HIGH (ref 10–20)
BUN SERPL-MCNC: 26 MG/DL — HIGH (ref 10–20)
CALCIUM SERPL-MCNC: 8.6 MG/DL — SIGNIFICANT CHANGE UP (ref 8.5–10.1)
CALCIUM SERPL-MCNC: 8.6 MG/DL — SIGNIFICANT CHANGE UP (ref 8.5–10.1)
CHLORIDE SERPL-SCNC: 101 MMOL/L — SIGNIFICANT CHANGE UP (ref 98–110)
CHLORIDE SERPL-SCNC: 105 MMOL/L — SIGNIFICANT CHANGE UP (ref 98–110)
CO2 SERPL-SCNC: 23 MMOL/L — SIGNIFICANT CHANGE UP (ref 17–32)
CO2 SERPL-SCNC: 24 MMOL/L — SIGNIFICANT CHANGE UP (ref 17–32)
CREAT SERPL-MCNC: 1.9 MG/DL — HIGH (ref 0.7–1.5)
CREAT SERPL-MCNC: 1.9 MG/DL — HIGH (ref 0.7–1.5)
EGFR: 35 ML/MIN/1.73M2 — LOW
EGFR: 35 ML/MIN/1.73M2 — LOW
EOSINOPHIL # BLD AUTO: 0.1 K/UL — SIGNIFICANT CHANGE UP (ref 0–0.7)
EOSINOPHIL NFR BLD AUTO: 1.7 % — SIGNIFICANT CHANGE UP (ref 0–8)
GLUCOSE BLDC GLUCOMTR-MCNC: 139 MG/DL — HIGH (ref 70–99)
GLUCOSE BLDC GLUCOMTR-MCNC: 96 MG/DL — SIGNIFICANT CHANGE UP (ref 70–99)
GLUCOSE SERPL-MCNC: 106 MG/DL — HIGH (ref 70–99)
GLUCOSE SERPL-MCNC: 106 MG/DL — HIGH (ref 70–99)
HCT VFR BLD CALC: 24.9 % — LOW (ref 42–52)
HCT VFR BLD CALC: 25.4 % — LOW (ref 42–52)
HGB BLD-MCNC: 7.9 G/DL — LOW (ref 14–18)
HGB BLD-MCNC: 8.2 G/DL — LOW (ref 14–18)
IMM GRANULOCYTES NFR BLD AUTO: 0.3 % — SIGNIFICANT CHANGE UP (ref 0.1–0.3)
INR BLD: 1.45 RATIO — HIGH (ref 0.65–1.3)
LYMPHOCYTES # BLD AUTO: 1.2 K/UL — SIGNIFICANT CHANGE UP (ref 1.2–3.4)
LYMPHOCYTES # BLD AUTO: 20.4 % — LOW (ref 20.5–51.1)
MAGNESIUM SERPL-MCNC: 2.1 MG/DL — SIGNIFICANT CHANGE UP (ref 1.8–2.4)
MCHC RBC-ENTMCNC: 31 PG — SIGNIFICANT CHANGE UP (ref 27–31)
MCHC RBC-ENTMCNC: 31.5 PG — HIGH (ref 27–31)
MCHC RBC-ENTMCNC: 31.7 G/DL — LOW (ref 32–37)
MCHC RBC-ENTMCNC: 32.3 G/DL — SIGNIFICANT CHANGE UP (ref 32–37)
MCV RBC AUTO: 97.6 FL — HIGH (ref 80–94)
MCV RBC AUTO: 97.7 FL — HIGH (ref 80–94)
MONOCYTES # BLD AUTO: 0.66 K/UL — HIGH (ref 0.1–0.6)
MONOCYTES NFR BLD AUTO: 11.2 % — HIGH (ref 1.7–9.3)
NEUTROPHILS # BLD AUTO: 3.84 K/UL — SIGNIFICANT CHANGE UP (ref 1.4–6.5)
NEUTROPHILS NFR BLD AUTO: 65.4 % — SIGNIFICANT CHANGE UP (ref 42.2–75.2)
NRBC # BLD: 0 /100 WBCS — SIGNIFICANT CHANGE UP (ref 0–0)
NRBC # BLD: 0 /100 WBCS — SIGNIFICANT CHANGE UP (ref 0–0)
PLATELET # BLD AUTO: 152 K/UL — SIGNIFICANT CHANGE UP (ref 130–400)
PLATELET # BLD AUTO: 153 K/UL — SIGNIFICANT CHANGE UP (ref 130–400)
POTASSIUM SERPL-MCNC: 4 MMOL/L — SIGNIFICANT CHANGE UP (ref 3.5–5)
POTASSIUM SERPL-MCNC: 4.1 MMOL/L — SIGNIFICANT CHANGE UP (ref 3.5–5)
POTASSIUM SERPL-SCNC: 4 MMOL/L — SIGNIFICANT CHANGE UP (ref 3.5–5)
POTASSIUM SERPL-SCNC: 4.1 MMOL/L — SIGNIFICANT CHANGE UP (ref 3.5–5)
PROT SERPL-MCNC: 5.4 G/DL — LOW (ref 6–8)
PROTHROM AB SERPL-ACNC: 16.6 SEC — HIGH (ref 9.95–12.87)
RBC # BLD: 2.55 M/UL — LOW (ref 4.7–6.1)
RBC # BLD: 2.6 M/UL — LOW (ref 4.7–6.1)
RBC # FLD: 17.2 % — HIGH (ref 11.5–14.5)
RBC # FLD: 17.3 % — HIGH (ref 11.5–14.5)
SODIUM SERPL-SCNC: 138 MMOL/L — SIGNIFICANT CHANGE UP (ref 135–146)
SODIUM SERPL-SCNC: 140 MMOL/L — SIGNIFICANT CHANGE UP (ref 135–146)
WBC # BLD: 5.88 K/UL — SIGNIFICANT CHANGE UP (ref 4.8–10.8)
WBC # BLD: 6.02 K/UL — SIGNIFICANT CHANGE UP (ref 4.8–10.8)
WBC # FLD AUTO: 5.88 K/UL — SIGNIFICANT CHANGE UP (ref 4.8–10.8)
WBC # FLD AUTO: 6.02 K/UL — SIGNIFICANT CHANGE UP (ref 4.8–10.8)

## 2022-03-03 PROCEDURE — 93970 EXTREMITY STUDY: CPT | Mod: 26

## 2022-03-03 PROCEDURE — 99222 1ST HOSP IP/OBS MODERATE 55: CPT

## 2022-03-03 PROCEDURE — 99233 SBSQ HOSP IP/OBS HIGH 50: CPT

## 2022-03-03 PROCEDURE — 71250 CT THORAX DX C-: CPT | Mod: 26

## 2022-03-03 RX ORDER — HYDRALAZINE HCL 50 MG
10 TABLET ORAL THREE TIMES A DAY
Refills: 0 | Status: DISCONTINUED | OUTPATIENT
Start: 2022-03-03 | End: 2022-03-05

## 2022-03-03 RX ORDER — ACETAMINOPHEN 500 MG
650 TABLET ORAL EVERY 6 HOURS
Refills: 0 | Status: DISCONTINUED | OUTPATIENT
Start: 2022-03-03 | End: 2022-03-05

## 2022-03-03 RX ORDER — AMIODARONE HYDROCHLORIDE 400 MG/1
200 TABLET ORAL
Refills: 0 | Status: DISCONTINUED | OUTPATIENT
Start: 2022-03-03 | End: 2022-03-05

## 2022-03-03 RX ORDER — ONDANSETRON 8 MG/1
4 TABLET, FILM COATED ORAL EVERY 8 HOURS
Refills: 0 | Status: DISCONTINUED | OUTPATIENT
Start: 2022-03-03 | End: 2022-03-05

## 2022-03-03 RX ORDER — TAMSULOSIN HYDROCHLORIDE 0.4 MG/1
0.4 CAPSULE ORAL AT BEDTIME
Refills: 0 | Status: DISCONTINUED | OUTPATIENT
Start: 2022-03-03 | End: 2022-03-05

## 2022-03-03 RX ORDER — ATORVASTATIN CALCIUM 80 MG/1
20 TABLET, FILM COATED ORAL AT BEDTIME
Refills: 0 | Status: DISCONTINUED | OUTPATIENT
Start: 2022-03-03 | End: 2022-03-05

## 2022-03-03 RX ORDER — PANTOPRAZOLE SODIUM 20 MG/1
40 TABLET, DELAYED RELEASE ORAL
Refills: 0 | Status: DISCONTINUED | OUTPATIENT
Start: 2022-03-03 | End: 2022-03-05

## 2022-03-03 RX ORDER — IRON SUCROSE 20 MG/ML
100 INJECTION, SOLUTION INTRAVENOUS EVERY 24 HOURS
Refills: 0 | Status: DISCONTINUED | OUTPATIENT
Start: 2022-03-03 | End: 2022-03-05

## 2022-03-03 RX ORDER — SENNA PLUS 8.6 MG/1
2 TABLET ORAL AT BEDTIME
Refills: 0 | Status: DISCONTINUED | OUTPATIENT
Start: 2022-03-03 | End: 2022-03-05

## 2022-03-03 RX ORDER — LANOLIN ALCOHOL/MO/W.PET/CERES
3 CREAM (GRAM) TOPICAL AT BEDTIME
Refills: 0 | Status: DISCONTINUED | OUTPATIENT
Start: 2022-03-03 | End: 2022-03-05

## 2022-03-03 RX ORDER — ISOSORBIDE MONONITRATE 60 MG/1
30 TABLET, EXTENDED RELEASE ORAL DAILY
Refills: 0 | Status: DISCONTINUED | OUTPATIENT
Start: 2022-03-03 | End: 2022-03-05

## 2022-03-03 RX ORDER — METOPROLOL TARTRATE 50 MG
50 TABLET ORAL DAILY
Refills: 0 | Status: DISCONTINUED | OUTPATIENT
Start: 2022-03-03 | End: 2022-03-05

## 2022-03-03 RX ORDER — FUROSEMIDE 40 MG
40 TABLET ORAL DAILY
Refills: 0 | Status: DISCONTINUED | OUTPATIENT
Start: 2022-03-03 | End: 2022-03-05

## 2022-03-03 RX ORDER — POLYETHYLENE GLYCOL 3350 17 G/17G
17 POWDER, FOR SOLUTION ORAL DAILY
Refills: 0 | Status: DISCONTINUED | OUTPATIENT
Start: 2022-03-03 | End: 2022-03-05

## 2022-03-03 RX ADMIN — Medication 10 MILLIGRAM(S): at 06:41

## 2022-03-03 RX ADMIN — AMIODARONE HYDROCHLORIDE 200 MILLIGRAM(S): 400 TABLET ORAL at 17:12

## 2022-03-03 RX ADMIN — Medication 10 MILLIGRAM(S): at 23:10

## 2022-03-03 RX ADMIN — AMIODARONE HYDROCHLORIDE 200 MILLIGRAM(S): 400 TABLET ORAL at 06:40

## 2022-03-03 RX ADMIN — ATORVASTATIN CALCIUM 20 MILLIGRAM(S): 80 TABLET, FILM COATED ORAL at 23:11

## 2022-03-03 RX ADMIN — ISOSORBIDE MONONITRATE 30 MILLIGRAM(S): 60 TABLET, EXTENDED RELEASE ORAL at 11:36

## 2022-03-03 RX ADMIN — IRON SUCROSE 210 MILLIGRAM(S): 20 INJECTION, SOLUTION INTRAVENOUS at 18:09

## 2022-03-03 RX ADMIN — Medication 10 MILLIGRAM(S): at 14:14

## 2022-03-03 RX ADMIN — Medication 50 MILLIGRAM(S): at 06:40

## 2022-03-03 RX ADMIN — PANTOPRAZOLE SODIUM 40 MILLIGRAM(S): 20 TABLET, DELAYED RELEASE ORAL at 17:13

## 2022-03-03 NOTE — H&P ADULT - NSHPLABSRESULTS_GEN_ALL_CORE
6.6    6.93  )-----------( 171      ( 02 Mar 2022 22:25 )             21.8       03-02    140  |  102  |  27<H>  ----------------------------<  95  4.1   |  25  |  2.1<H>    Ca    8.9      02 Mar 2022 22:25    TPro  5.7<L>  /  Alb  3.7  /  TBili  0.3  /  DBili  x   /  AST  12  /  ALT  9   /  AlkPhos  63  03-02                  PT/INR - ( 02 Mar 2022 22:25 )   PT: 28.50 sec;   INR: 2.50 ratio         PTT - ( 02 Mar 2022 22:25 )  PTT:52.9 sec    Lactate Trend            CAPILLARY BLOOD GLUCOSE

## 2022-03-03 NOTE — H&P ADULT - CONVERSATION DETAILS
I spoke to the pt regarding his previous wishes to be DNR/DNI in December 2021.     The pt informed me that his wishes have not changed, he is not interested in life support or mechanical ventilation.     The pt wishes to be DNR/DNI with a trial of IVFs and IV ABXs.

## 2022-03-03 NOTE — PROGRESS NOTE ADULT - ASSESSMENT
81 year old male with PMHx of CAD s/p CABG x4 in 2006 (Cardiologist Dr Sebastian Mendoza), HFrEF s/p AVR 2011, CKD III, COVID complicated by DVT, HTN, recently diagnosed with AF on Xarelto. Presents to the ED after routine blood work obtained o/p. Pt was found to have Hb of 5.8. Rpt Hb in the ED is 6.6. s/p 2u pRBC. Xarelto to be held per cardiologist.    In the ED, pt remains hemodynamically stable. Labs show Hb 6.6 , Cr 2.1     #Acute on chronic anemia ( macrocytic )  Pt endorses hemoptysis for the past week and fatigue  Denies melena, bloody BM, coffee ground emesis  RYAN performed - negative   Baseline Hgb around 11  s/p 2u pRBC - f/u rpt CBC to check for appropriate response  Trend H/H   Keep active type and screen   f/u iron profile, vit B12, folate   holding anticoagulation   f/u GI consult for endoscopy/colonoscopy   obtain cardio consult - will likely need clearance for any planned procedure     Pulmonary :   - Non-lifethreatening Hemoptysis / mild (low hb unrelated to hemoptysis/ CXR clear )  - Acute blood loss/ anemia    - HO CAD SP CABG, Bioprosthetic AVR, HFrEF (EF 27%)     PLAN:  CT CHEST NC  check LE duplex  GI eval  Pulmonary toilet  Incentive spirometry  HOB 45  Serial CBC  Will follow    f/u CT chest     #HFrEF    Not in exacerbation   s/p AVR 2011  cont Home med PO Lasix 40mg QD   Monitor accurate in/out  Daily weight   Monitor SaO2 and O2 requirements: Currently SAO2 98% on RA  Trend electrolytes and keep K>4 and Mg>2      #HO AFIB on Xarelto  # HO DVT   Rate controlled  Hold Xarelto for now   Keep K>4 and Mg>2  cont home meds     #CKD III  Trend BUN and Cr daily  Avoid nephrotoxic agents  cont Olmesartan 20mg QD for now      #CAD s/p CABG x4 in 2006  come Home Lopressor 25mg AM and 12.5mg PM, Aspirin 81mg QD, Rosuvastatin 5mg QD  No active chest pain      #Right Peroneal, Popliteal, and Posterior Tibial DVT  Holding Xarelto for now       #HTN  Resume home meds       #s/p COVID Pneumonia in Dec   Resolved      Others  - DVT Prophylaxis: SCDs   - GI Prophylaxis: Pantoprazole 40mg PO QD  - Diet: DASH/ TLC  - Code Status: DNR DNI      81 year old male with PMHx of CAD s/p CABG x4 in 2006 (Cardiologist Dr Sebastian Mendoza), HFrEF s/p AVR 2011, CKD III, COVID complicated by DVT, HTN, recently diagnosed with AF on Xarelto. Presents to the ED after routine blood work obtained o/p. Pt was found to have Hb of 5.8. Rpt Hb in the ED is 6.6. s/p 2u pRBC. Xarelto to be held per cardiologist.    In the ED, pt remains hemodynamically stable. Labs show Hb 6.6 , Cr 2.1     #Acute on chronic anemia ( macrocytic )  - Non-lifethreatening Hemoptysis / mild (low hb unrelated to hemoptysis/ CXR clear )  - Denies melena, bloody BM, coffee ground emesis  - RYAN performed - negative   - Baseline Hgb around 11  - s/p 2u pRBC - f/u rpt CBC to check for appropriate response  - Trend H/H   - Keep active type and screen   - f/u iron profile, vit B12, folate   - holding anticoagulation   -f/u FOBT   - venofer 100mg iv q24h x 5 days  -f/u B12, folate and hemolysis w/u (ldh, haptoglobin)    - GI :   - pt will need colonoscopy +/- EGD however pt is refusing to undergo endoscopic workup at this time.   - if HB keeps dropping / jignesh blood recall GI  ; Xarelto will need to be held for 4 days for procedure  - bowel regimen - senna x2 tabs and miralax  - correct INR to <1.5 f/u INR  - target Hb  >8  - If unstable  stat CTA and call IR    - obtain cardio consult - will likely need clearance for any planned procedure     Pulmonary :   - f/u CT CHEST NC  - Pulmonary toilet, Incentive spirometry      #HO CAD SP CABG, Bioprosthetic AVR, HFrEF (EF 27%)   - Not in exacerbation   - s/p AVR 2011  -restarted on lasix 40 mg po once daily  - Monitor accurate in/out  - Daily weight   - Monitor SaO2 and O2 requirements: Currently SAO2 98% on RA  - Trend electrolytes and keep K>4 and Mg>2      #HO AFIB on Xarelto  # HO DVT   - Rate controlled  - Hold Xarelto for now   - Keep K>4 and Mg>2  - cont home meds     #CKD III  - Trend BUN and Cr daily  - Avoid nephrotoxic agents  - cont Olmesartan 20mg QD for now  - f/u  UA and U protein / creatinine    #CAD s/p CABG x4 in 2006  - cont. Home Lopressor 25mg AM and 12.5mg PM, Aspirin 81mg QD, Rosuvastatin 5mg QD  - No active chest pain      #Right Peroneal, Popliteal, and Posterior Tibial DVT  - Holding Xarelto for now   -f/u LE duplex     #HTN  -c/w home meds     #s/p COVID Pneumonia in Dec   Resolved    Others  - DVT Prophylaxis: SCDs , xarelto on hold due to anemia  - GI Prophylaxis: Pantoprazole 40mg PO QD  - Diet: DASH/ TLC  - Code Status: DNR DNI

## 2022-03-03 NOTE — H&P ADULT - HISTORY OF PRESENT ILLNESS
81 year old male with PMHx of CAD s/p CABG x4 in 2006 (Cardiologist Dr Sebastian Mendoza), HFrEF s/p AVR 2011, CKD III, COVID complicated by DVT, HTN, recently diagnosed with AF on Xarelto. Presents to the ED after routine bloodwork obtained o/p. Pt was found to have Hb of 5.8. Rpt Hb in the ED is 6.6. s/p 2upRBC. Xarelto held per cardiologist.    In the ED, pt remains hemodynamically stable. Labs show Hb 6.6 , Cr 2.1  81 year old male with PMHx of CAD s/p CABG x4 in 2006 (Cardiologist Dr Sebastian Mendoza), HFrEF s/p AVR 2011, CKD III, COVID complicated by DVT, HTN, recently diagnosed with AF on Xarelto. Presents to the ED after routine bloodwork obtained o/p. Pt was found to have Hb of 5.8. Rpt Hb in the ED is 6.6. s/p 2u pRBC. Xarelto held per cardiologist.    In the ED, pt remains hemodynamically stable. Labs show Hb 6.6 , Cr 2.1  81 year old male with PMHx of CAD s/p CABG x4 in 2006 (Cardiologist Dr Sebastian Mendoza), HFrEF s/p AVR 2011, CKD III, COVID complicated by DVT, HTN, recently diagnosed with AF on Xarelto. Presents to the ED after routine blood work obtained o/p. Pt was found to have Hb of 5.8. Per wife, pt has been producing blood streaked sputum x week. Pt also endorses fatigue. Denies fever, chills, chest pain, sob, palpitations, syncope, abdominal pain, N/V/D/C, melena.     Rpt Hb in the ED is 6.6. s/p 2u pRBC. Xarelto held per cardiologist.    In the ED, pt remains hemodynamically stable. Labs show Hb 6.6 , Cr 2.1

## 2022-03-03 NOTE — CONSULT NOTE ADULT - ASSESSMENT
CKD stage 3-4  recently improved NICA / CRS  anemia, macrocytic  hematochezia  hemoptysis   chronic systolic CHF  Afib with RVR / s/p AVR (procine) / CAD / CABG / s/p DCC  DVT / PE on eliquis  hx of COVID PNA  HTN   anxiety        plan:    f/u H/H  PRN PRBC transfusion  w/u per GI   can add venofer 100mg iv q24h x 5 days or until discharge (low iron stores last admission)  can also check B12, folate and hemolysis w/u (ldh, haptoglobin)  lasix 40mg po qd resumed, may need even twice daily dosing  cont hydralazine, lopressor, imdur, flomax  off ACE-I / ARB / entresto  will resend UA and U protein / creatinine

## 2022-03-03 NOTE — H&P ADULT - ATTENDING COMMENTS
80 YO M with a PMH of CAD s/p CABG, HFrEF s/p AVR, HTN, CKD3, hx of COVID (Dec 2021) complicated by DVT, and recently diagnosed with chronic AFib (Xarelto) who was asked to present to the hospital by his PCP for eval of low Hgb on out-pt labs. The pt states that he is having hemoptysis in the AM (~50cc or bright red blood) that resolves over the course of the day. Denies any black/bloody stools, hematemesis, CP, SOB, rashes, hematuria, or fevers/chills. ROS is negative except as above.     Of note, while speaking with the pt, he coughs up a dime amount of streaked sputum w/ bright red blood.     In the ED, Hgb is 6.6. T&S obtained. Pt ordered for 2 units of PRBCs.     FMHx: Reviewed, not relevant    Physical exam shows pt in NAD. VSS, afebrile, not hypoxic on RA. A&Ox3. Neuro exam without deficits, motor/sensory intact, no dysarthria, no facial asymmetry. Muscle strength/sensation intact. CTA B/L with no W/C/R. RRR, no M/G/R. ABD is soft and non-tender, normoactive BSs. LEs with 1+ pitting edema B/L. No rashes. Labs and radiology as above.     Macrocytic anemia, rule out pulmonary hemorrhage. Pt is hemodynamically stable currently. CTA-chest. Pulm consult. Active T&S. Two large bore peripheral IVs. Transfuse for Hgb < 7. Send anemia work-up. FU official Chest X-Ray read.   -Stop Xarelto    Hx of CAD s/p CABG, HFrEF s/p AVR, HTN, CKD3, hx of COVID (Dec 2021) complicated by DVT, and recently diagnosed with chronic AFib (Xarelto). Restart home meds, except as stated above. DVT PPX. Inform PCP of pt's admission to hospital. My note supersedes the residents note.     Spoke with family: Wife, at bedside    Date seen by Attending: 3/2/22

## 2022-03-03 NOTE — CONSULT NOTE ADULT - ASSESSMENT
IMPRESSION:    Non-lifethreatening Hemoptysis   Acute on Chronic Macrocytic Anemia  HO DVT  HO AFIB on Xarelto  HO CAD SP CABG, Bioprosthetic AVR, HFrEF (EF 27%)     PLAN:    CT CHEST NC  check sputum culture, AFB  check LE duplex  Pulmonary toilet  Incentive spirometry  HOB 45  Serial CBC  Will follow IMPRESSION:    Non-lifethreatening Hemoptysis / mild (low hb unrelated to hemoptysis/ CXR clear )  Acute blood loss/ anemia  HO DVT  HO AFIB on Xarelto  HO CAD SP CABG, Bioprosthetic AVR, HFrEF (EF 27%)     PLAN:    CT CHEST NC  check LE duplex  GI eval  Pulmonary toilet  Incentive spirometry  HOB 45  Serial CBC  Will follow

## 2022-03-03 NOTE — PROGRESS NOTE ADULT - SUBJECTIVE AND OBJECTIVE BOX
SAÚL HODGSON 81y Male  MRN#: [id number]   CODE STATUS: full  Hospital Day: 1d    SUBJECTIVE  Hospital Course  81 year old male    PMHx of CAD s/p CABG x4 in 2006 (Cardiologist Dr Sebastian Mendoza), HFrEF s/p AVR 2011, CKD III, COVID complicated by DVT, HTN, recently diagnosed with AF on Xarelto.    Presents to the ED after routine blood work obtained o/p. Pt was found to have Hb of 5.8. Per wife, pt has been producing blood streaked sputum x week. Pt also endorses fatigue. Denies fever, chills, chest pain, sob, palpitations, syncope, abdominal pain, N/V/D/C, melena.     Rpt Hb in the ED is 6.6. s/p 2u pRBC. Xarelto held per cardiologist.  In the ED, pt remains hemodynamically stable. Labs show Hb 6.6 , Cr 2.1       PHYSICAL EXAM:  General:   HEENT:  LUNGS:  HEART:  ABDOMEN:  EXT:  NEURO:  SKIN:                                            ----------------------------------------------------------  OBJECTIVE  PAST MEDICAL & SURGICAL HISTORY  Chronic systolic congestive heart failure    HTN (hypertension)    Dyslipidemia                                              -----------------------------------------------------------  ALLERGIES:  No Known Allergies                                            ------------------------------------------------------------    HOME MEDICATIONS  Home Medications:  LATANOPROST  0.005 % SOLN:  (12 Dec 2021 16:26)                           MEDICATIONS:  STANDING MEDICATIONS  aMIOdarone    Tablet 200 milliGRAM(s) Oral two times a day  atorvastatin 20 milliGRAM(s) Oral at bedtime  furosemide    Tablet 40 milliGRAM(s) Oral daily  hydrALAZINE 10 milliGRAM(s) Oral three times a day  isosorbide   mononitrate ER Tablet (IMDUR) 30 milliGRAM(s) Oral daily  metoprolol succinate ER 50 milliGRAM(s) Oral daily  pantoprazole    Tablet 40 milliGRAM(s) Oral two times a day  tamsulosin 0.4 milliGRAM(s) Oral at bedtime    PRN MEDICATIONS  acetaminophen     Tablet .. 650 milliGRAM(s) Oral every 6 hours PRN  aluminum hydroxide/magnesium hydroxide/simethicone Suspension 30 milliLiter(s) Oral every 4 hours PRN  melatonin 3 milliGRAM(s) Oral at bedtime PRN  ondansetron Injectable 4 milliGRAM(s) IV Push every 8 hours PRN                                            ------------------------------------------------------------  VITAL SIGNS: Last 24 Hours  T(C): 36 (03 Mar 2022 12:15), Max: 36.4 (02 Mar 2022 21:37)  T(F): 96.8 (03 Mar 2022 12:15), Max: 97.6 (02 Mar 2022 21:37)  HR: 73 (03 Mar 2022 14:12) (60 - 85)  BP: 131/69 (03 Mar 2022 14:12) (113/53 - 145/67)  BP(mean): 95 (03 Mar 2022 14:12) (83 - 95)  RR: 18 (03 Mar 2022 12:15) (18 - 18)  SpO2: 96% (03 Mar 2022 11:37) (96% - 99%)                                             --------------------------------------------------------------  LABS:                        8.2    5.88  )-----------( 152      ( 03 Mar 2022 11:00 )             25.4     03-03    140  |  105  |  26<H>  ----------------------------<  106<H>  4.1   |  23  |  1.9<H>    Ca    8.6      03 Mar 2022 11:00  Mg     2.1     03-03    TPro  5.4<L>  /  Alb  3.4<L>  /  TBili  0.3  /  DBili  x   /  AST  12  /  ALT  9   /  AlkPhos  59  03-03    PT/INR - ( 03 Mar 2022 11:00 )   PT: 16.60 sec;   INR: 1.45 ratio         PTT - ( 03 Mar 2022 11:00 )  PTT:43.5 sec                                                                                                    SAÚL HODGSON 81y Male  MRN#: [id number]   CODE STATUS: full  Hospital Day: 1d    SUBJECTIVE  Hospital Course  81 year old male    PMHx of CAD s/p CABG x4 in 2006 (Cardiologist Dr Sebastian Mendoza), HFrEF s/p AVR 2011, CKD III, COVID complicated by DVT, HTN, recently diagnosed with AF on Xarelto.    Presents to the ED after routine blood work obtained o/p. Pt was found to have Hb of 5.8. Per wife, pt has been producing blood streaked sputum x week. Pt also endorses fatigue. Denies fever, chills, chest pain, sob, palpitations, syncope, abdominal pain, N/V/D/C, melena.     Rpt Hb in the ED is 6.6. s/p 2u pRBC. Xarelto held per cardiologist.  In the ED, pt remains hemodynamically stable. Labs show Hb 6.6 , Cr 2.1       PHYSICAL EXAM:  CONSTITUTIONAL:pale  ENT:  Airway patent, No thrush  EYES: Clear bilaterally, pupils equal, round and reactive to light.  CARDIAC: RUSTY 2/6  RESPIRATORY: DEC BS BOTH BASES  GASTROINTESTINAL: Abdomen soft, non-tender, No guarding,  Positive BS  MUSCULOSKELETAL:  Range of motion is not limited,No clubbing, cyanosis  NEUROLOGICAL: Alert and oriented , No motor deficits.                                            ----------------------------------------------------------  OBJECTIVE  PAST MEDICAL & SURGICAL HISTORY  Chronic systolic congestive heart failure  HTN (hypertension)  Dyslipidemia                                            -----------------------------------------------------------  ALLERGIES:  No Known Allergies                                            ------------------------------------------------------------    HOME MEDICATIONS  Home Medications:  LATANOPROST  0.005 % SOLN:  (12 Dec 2021 16:26)                           MEDICATIONS:  STANDING MEDICATIONS  aMIOdarone    Tablet 200 milliGRAM(s) Oral two times a day  atorvastatin 20 milliGRAM(s) Oral at bedtime  furosemide    Tablet 40 milliGRAM(s) Oral daily  hydrALAZINE 10 milliGRAM(s) Oral three times a day  isosorbide   mononitrate ER Tablet (IMDUR) 30 milliGRAM(s) Oral daily  metoprolol succinate ER 50 milliGRAM(s) Oral daily  pantoprazole    Tablet 40 milliGRAM(s) Oral two times a day  tamsulosin 0.4 milliGRAM(s) Oral at bedtime    PRN MEDICATIONS  acetaminophen     Tablet .. 650 milliGRAM(s) Oral every 6 hours PRN  aluminum hydroxide/magnesium hydroxide/simethicone Suspension 30 milliLiter(s) Oral every 4 hours PRN  melatonin 3 milliGRAM(s) Oral at bedtime PRN  ondansetron Injectable 4 milliGRAM(s) IV Push every 8 hours PRN                                            ------------------------------------------------------------  VITAL SIGNS: Last 24 Hours  T(C): 36 (03 Mar 2022 12:15), Max: 36.4 (02 Mar 2022 21:37)  T(F): 96.8 (03 Mar 2022 12:15), Max: 97.6 (02 Mar 2022 21:37)  HR: 73 (03 Mar 2022 14:12) (60 - 85)  BP: 131/69 (03 Mar 2022 14:12) (113/53 - 145/67)  BP(mean): 95 (03 Mar 2022 14:12) (83 - 95)  RR: 18 (03 Mar 2022 12:15) (18 - 18)  SpO2: 96% (03 Mar 2022 11:37) (96% - 99%)                                             --------------------------------------------------------------  LABS:                        8.2    5.88  )-----------( 152      ( 03 Mar 2022 11:00 )             25.4     03-03    140  |  105  |  26<H>  ----------------------------<  106<H>  4.1   |  23  |  1.9<H>    Ca    8.6      03 Mar 2022 11:00  Mg     2.1     03-03    TPro  5.4<L>  /  Alb  3.4<L>  /  TBili  0.3  /  DBili  x   /  AST  12  /  ALT  9   /  AlkPhos  59  03-03    PT/INR - ( 03 Mar 2022 11:00 )   PT: 16.60 sec;   INR: 1.45 ratio         PTT - ( 03 Mar 2022 11:00 )  PTT:43.5 sec

## 2022-03-03 NOTE — PATIENT PROFILE ADULT - FALL HARM RISK - RISK INTERVENTIONS

## 2022-03-03 NOTE — CONSULT NOTE ADULT - SUBJECTIVE AND OBJECTIVE BOX
Gastroenterology Consultation:    Patient is a 81y old  Male who presents with a chief complaint of       Admitted on: 03-02-22      HPI:  81 year old male with PMHx of CAD s/p CABG x4 in 2006 (Cardiologist Dr Sebastian Mendoza), HFrEF s/p AVR 2011, CKD III, COVID complicated by DVT, HTN, recently diagnosed with AF on Xarelto. Presents to the ED after routine blood work obtained o/p. Pt was found to have Hb of 5.8. Per wife, pt has been producing blood streaked sputum x week. Pt also endorses fatigue. Denies fever, chills, chest pain, sob, palpitations, syncope, abdominal pain, N/V/D/C, melena.     Rpt Hb in the ED is 6.6. s/p 2u pRBC. Xarelto held per cardiologist.    In the ED, pt remains hemodynamically stable. Labs show Hb 6.6 , Cr 2.1  (03 Mar 2022 00:03)        Prior EGD: 10 years ago per [facility]    Prior Colonoscopy: 10 years ago per patient wnl      PAST MEDICAL & SURGICAL HISTORY:  Chronic systolic congestive heart failure    HTN (hypertension)    Dyslipidemia          FAMILY HISTORY:      Social History:  Tobacco: denies   Alcohol: denies   Drugs: denies    Home Medications:  LATANOPROST  0.005 % SOLN:  (12 Dec 2021 16:26)        MEDICATIONS  (STANDING):  aMIOdarone    Tablet 200 milliGRAM(s) Oral two times a day  atorvastatin 20 milliGRAM(s) Oral at bedtime  hydrALAZINE 10 milliGRAM(s) Oral three times a day  isosorbide   mononitrate ER Tablet (IMDUR) 30 milliGRAM(s) Oral daily  metoprolol succinate ER 50 milliGRAM(s) Oral daily  pantoprazole    Tablet 40 milliGRAM(s) Oral two times a day  tamsulosin 0.4 milliGRAM(s) Oral at bedtime    MEDICATIONS  (PRN):  acetaminophen     Tablet .. 650 milliGRAM(s) Oral every 6 hours PRN Temp greater or equal to 38C (100.4F), Mild Pain (1 - 3)  aluminum hydroxide/magnesium hydroxide/simethicone Suspension 30 milliLiter(s) Oral every 4 hours PRN Dyspepsia  melatonin 3 milliGRAM(s) Oral at bedtime PRN Insomnia  ondansetron Injectable 4 milliGRAM(s) IV Push every 8 hours PRN Nausea and/or Vomiting      Allergies  No Known Allergies      Review of Systems:   Constitutional:  No Fever, No Chills  ENT/Mouth:  No Hearing Changes,  No Difficulty Swallowing  Eyes:  No Eye Pain, No Vision Changes  Cardiovascular:  No Chest Pain, No Palpitations  Respiratory:  No Cough, No Dyspnea  Gastrointestinal:  As described in HPI  Musculoskeletal:  No Joint Swelling, No Back Pain  Skin:  No Skin Lesions, No Jaundice  Neuro:  No Syncope, No Dizziness  Heme/Lymph:  No Bruising, No Bleeding.          Physical Examination:  T(C): 36.1 (03-03-22 @ 07:26), Max: 36.4 (03-02-22 @ 21:37)  HR: 60 (03-03-22 @ 07:26) (60 - 85)  BP: 135/63 (03-03-22 @ 07:26) (113/53 - 145/67)  RR: 18 (03-03-22 @ 07:26) (18 - 18)  SpO2: 98% (03-03-22 @ 07:26) (97% - 99%)  Height (cm): 172.7 (03-03-22 @ 01:00)  Weight (kg): 97.1 (03-02-22 @ 21:37)        GENERAL: AAOx3, no acute distress.  HEAD:  Atraumatic, Normocephalic  EYES: conjunctiva and sclera clear  NECK: Supple, no JVD or thyromegaly  CHEST/LUNG: Clear to auscultation bilaterally; No wheeze, rhonchi, or rales  HEART: Regular rate and rhythm; normal S1, S2, No murmurs.  ABDOMEN: Soft, nontender, nondistended; Bowel sounds present  NEUROLOGY: No asterixis or tremor.   SKIN: Intact, no jaundice        Data:                        6.6    6.93  )-----------( 171      ( 02 Mar 2022 22:25 )             21.8     Hgb Trend:  6.6  03-02-22 @ 22:25      03-02    140  |  102  |  27<H>  ----------------------------<  95  4.1   |  25  |  2.1<H>    Ca    8.9      02 Mar 2022 22:25    TPro  5.7<L>  /  Alb  3.7  /  TBili  0.3  /  DBili  x   /  AST  12  /  ALT  9   /  AlkPhos  63  03-02    Liver panel trend:  TBili 0.3   /   AST 12   /   ALT 9   /   AlkP 63   /   Tptn 5.7   /   Alb 3.7    /   DBili --      03-02      PT/INR - ( 02 Mar 2022 22:25 )   PT: 28.50 sec;   INR: 2.50 ratio         PTT - ( 02 Mar 2022 22:25 )  PTT:52.9 sec        Radiology:

## 2022-03-03 NOTE — H&P ADULT - ASSESSMENT
81 year old male with PMHx of CAD s/p CABG x4 in 2006 (Cardiologist Dr Sebastian Mendoza), HFrEF s/p AVR 2011, CKD III, COVID complicated by DVT, HTN, recently diagnosed with AF on Xarelto. Presents to the ED after routine blood work obtained o/p. Pt was found to have Hb of 5.8. Rpt Hb in the ED is 6.6. s/p 2upRBC. Xarelto held per cardiologist.    In the ED, pt remains hemodynamically stable. Labs show Hb 6.6 , Cr 2.1     #Acute on chronic anemia ( macrocytic )  Denies melena, bloody BM, coffee ground emesis  RYAN performed - negative   Baseline Hgb around 11  s/p 2u pRBC - f/u rpt CBC to check for appropriate response  Trend H/H   Keep active type and screen   f/u iron profile, vit B12, folate   start PPI drip  holding anticoagulation   f/u GI consult for endoscopy/colonoscopy   f/u cardio consult - will likely need clearance for any planned procedure       #HFrEF  Not in exacerbation   s/p AVR 2011  cont Home med PO Lasix 40mg QD   Monitor accurate in/out  Daily weight   -Monitor SaO2 and O2 requirements: Currently SAO2 98% on RA  - Trend electrolytes and keep K>4 and Mg>2      #Atrial Fibrillation   Rate controlled  cont Xarelto H  Keep K>4 and Mg>2  cont home meds     #CKD III  Trend BUN and Cr daily  Avoid nephrotoxic agents  cont Olmesartan 20mg QD for now      #CAD s/p CABG x4 in 2006  come Home Lopressor 25mg AM and 12.5mg PM, Aspirin 81mg QD, Rosuvastatin 5mg QD  No active chest pain      #Right Peroneal, Popliteal, and Posterior Tibial DVT  - Holding Xarelto for now       #HTN  - Resume home meds       #s/p COVID Pneumonia in Dec   Resolved      Others  - DVT Prophylaxis: SCDs   - GI Prophylaxis: Pantoprazole 40mg PO QD  - Diet: DASH/ TLC  - Code Status: DNR DNI    81 year old male with PMHx of CAD s/p CABG x4 in 2006 (Cardiologist Dr Sebastian Mendoza), HFrEF s/p AVR 2011, CKD III, COVID complicated by DVT, HTN, recently diagnosed with AF on Xarelto. Presents to the ED after routine blood work obtained o/p. Pt was found to have Hb of 5.8. Rpt Hb in the ED is 6.6. s/p 2u pRBC. Xarelto to be held per cardiologist.    In the ED, pt remains hemodynamically stable. Labs show Hb 6.6 , Cr 2.1     #Acute on chronic anemia ( macrocytic )  -Pt endorses hemoptysis for the past week and fatigue  Denies melena, bloody BM, coffee ground emesis  RYAN performed - negative   Baseline Hgb around 11  s/p 2u pRBC - f/u rpt CBC to check for appropriate response  Trend H/H   Keep active type and screen   f/u iron profile, vit B12, folate   start PPI drip  holding anticoagulation   f/u GI consult for endoscopy/colonoscopy   f/u cardio consult - will likely need clearance for any planned procedure   f/u pulm consult for pulmonary hemorrhage   f/u CT chest       #HFrEF  Not in exacerbation   s/p AVR 2011  cont Home med PO Lasix 40mg QD   Monitor accurate in/out  Daily weight   Monitor SaO2 and O2 requirements: Currently SAO2 98% on RA  Trend electrolytes and keep K>4 and Mg>2      #Atrial Fibrillation   Rate controlled  Hold Xarelto for now   Keep K>4 and Mg>2  cont home meds     #CKD III  Trend BUN and Cr daily  Avoid nephrotoxic agents  cont Olmesartan 20mg QD for now      #CAD s/p CABG x4 in 2006  come Home Lopressor 25mg AM and 12.5mg PM, Aspirin 81mg QD, Rosuvastatin 5mg QD  No active chest pain      #Right Peroneal, Popliteal, and Posterior Tibial DVT  Holding Xarelto for now       #HTN  Resume home meds       #s/p COVID Pneumonia in Dec   Resolved      Others  - DVT Prophylaxis: SCDs   - GI Prophylaxis: Pantoprazole 40mg PO QD  - Diet: DASH/ TLC  - Code Status: DNR DNI    81 year old male with PMHx of CAD s/p CABG x4 in 2006 (Cardiologist Dr Sebastian Mendoza), HFrEF s/p AVR 2011, CKD III, COVID complicated by DVT, HTN, recently diagnosed with AF on Xarelto. Presents to the ED after routine blood work obtained o/p. Pt was found to have Hb of 5.8. Rpt Hb in the ED is 6.6. s/p 2u pRBC. Xarelto to be held per cardiologist.    In the ED, pt remains hemodynamically stable. Labs show Hb 6.6 , Cr 2.1     #Acute on chronic anemia ( macrocytic )  -Pt endorses hemoptysis for the past week and fatigue  Denies melena, bloody BM, coffee ground emesis  RYAN performed - negative   Baseline Hgb around 11  s/p 2u pRBC - f/u rpt CBC to check for appropriate response  Trend H/H   Keep active type and screen   f/u iron profile, vit B12, folate   holding anticoagulation   f/u GI consult for endoscopy/colonoscopy   obtain cardio consult - will likely need clearance for any planned procedure   f/u pulm consult for pulmonary hemorrhage   f/u CT chest       #HFrEF  Not in exacerbation   s/p AVR 2011  cont Home med PO Lasix 40mg QD   Monitor accurate in/out  Daily weight   Monitor SaO2 and O2 requirements: Currently SAO2 98% on RA  Trend electrolytes and keep K>4 and Mg>2      #Atrial Fibrillation   Rate controlled  Hold Xarelto for now   Keep K>4 and Mg>2  cont home meds     #CKD III  Trend BUN and Cr daily  Avoid nephrotoxic agents  cont Olmesartan 20mg QD for now      #CAD s/p CABG x4 in 2006  come Home Lopressor 25mg AM and 12.5mg PM, Aspirin 81mg QD, Rosuvastatin 5mg QD  No active chest pain      #Right Peroneal, Popliteal, and Posterior Tibial DVT  Holding Xarelto for now       #HTN  Resume home meds       #s/p COVID Pneumonia in Dec   Resolved      Others  - DVT Prophylaxis: SCDs   - GI Prophylaxis: Pantoprazole 40mg PO QD  - Diet: DASH/ TLC  - Code Status: DNR DNI

## 2022-03-03 NOTE — H&P ADULT - TREATMENT GUIDELINES
CA was notified by  Ratna MCLAIN RN that pt is med clear.     CA called intake at [facility] to check if they will accept pt being on Lovenox. Intake from [facility] to check with their psychiatrist and call us back.    DNR Order/Antibiotic trial/IV fluid trial

## 2022-03-03 NOTE — CONSULT NOTE ADULT - SUBJECTIVE AND OBJECTIVE BOX
Patient is a 81y old  Male who presents with a chief complaint of hemoptysis    HPI: 80 yo M with PMHx of CAD s/p CABG x4 in 2006 (Cardiologist Dr Sebastian Mendoza), HFrEF s/p AVR 2011, CKD III, COVID-19 complicated by DVT, HTN, recently diagnosed with AF on Xarelto. Presents to the ED after routine blood work obtained as outpatient. Patient was found to have Hb of 5.8. Per wife, pt has been producing blood streaked sputum for few weeks. Patient has noted rectal bleeding from known hemorrhoids. Pt also endorses fatigue. Denies fever, chills, chest pain, sob, palpitations, syncope, abdominal pain, N/V/D/C, melena.       PAST MEDICAL & SURGICAL HISTORY:  Chronic systolic congestive heart failure    HTN (hypertension)    Dyslipidemia        SOCIAL HX:   Smoking        exsmoker quit in 1970s                 ETOH          denies                  Other denies    FAMILY HISTORY:  .  No cardiovascular or pulmonary family history     REVIEW OF SYSTEMS:    All ROS are negative except per HPI       Allergies    No Known Allergies    Intolerances          PHYSICAL EXAM  Vital Signs Last 24 Hrs  T(C): 36.1 (03 Mar 2022 07:25), Max: 36.4 (02 Mar 2022 21:37)  T(F): 96.9 (03 Mar 2022 07:25), Max: 97.6 (02 Mar 2022 21:37)  HR: 60 (03 Mar 2022 07:25) (60 - 85)  BP: 135/63 (03 Mar 2022 07:25) (113/53 - 145/67)  BP(mean): --  RR: 18 (03 Mar 2022 07:25) (18 - 18)  SpO2: 98% (03 Mar 2022 07:25) (97% - 99%)    CONSTITUTIONAL:  NAD    ENT:   Airway patent,   No thrush    EYES:   Clear bilaterally,   pupils equal,   round and reactive to light.    CARDIAC:   Normal rate,   regular rhythm.    no edema      RESPIRATORY:   No wheezing   Normal chest expansion  Not tachypneic,  No use of accessory muscles    GASTROINTESTINAL:  Abdomen soft, non-tender,   No guarding,   Positive BS    MUSCULOSKELETAL:   Range of motion is not limited,  No clubbing, cyanosis    NEUROLOGICAL:   Alert and oriented   No motor deficits.    SKIN:   Skin normal color for race,   No evidence of rash.      HEME LYMPH:   No cervical  lymphadenopathy.  no inguinal lymphadenopathy          LABS:                          6.6    6.93  )-----------( 171      ( 02 Mar 2022 22:25 )             21.8                                               03-02    140  |  102  |  27<H>  ----------------------------<  95  4.1   |  25  |  2.1<H>    Ca    8.9      02 Mar 2022 22:25    TPro  5.7<L>  /  Alb  3.7  /  TBili  0.3  /  DBili  x   /  AST  12  /  ALT  9   /  AlkPhos  63  03-02      PT/INR - ( 02 Mar 2022 22:25 )   PT: 28.50 sec;   INR: 2.50 ratio         PTT - ( 02 Mar 2022 22:25 )  PTT:52.9 sec                                                                                     LIVER FUNCTIONS - ( 02 Mar 2022 22:25 )  Alb: 3.7 g/dL / Pro: 5.7 g/dL / ALK PHOS: 63 U/L / ALT: 9 U/L / AST: 12 U/L / GGT: x                                                                                                MEDICATIONS  (STANDING):  aMIOdarone    Tablet 200 milliGRAM(s) Oral two times a day  atorvastatin 20 milliGRAM(s) Oral at bedtime  hydrALAZINE 10 milliGRAM(s) Oral three times a day  isosorbide   mononitrate ER Tablet (IMDUR) 30 milliGRAM(s) Oral daily  metoprolol succinate ER 50 milliGRAM(s) Oral daily  pantoprazole    Tablet 40 milliGRAM(s) Oral two times a day  tamsulosin 0.4 milliGRAM(s) Oral at bedtime    MEDICATIONS  (PRN):  acetaminophen     Tablet .. 650 milliGRAM(s) Oral every 6 hours PRN Temp greater or equal to 38C (100.4F), Mild Pain (1 - 3)  aluminum hydroxide/magnesium hydroxide/simethicone Suspension 30 milliLiter(s) Oral every 4 hours PRN Dyspepsia  melatonin 3 milliGRAM(s) Oral at bedtime PRN Insomnia  ondansetron Injectable 4 milliGRAM(s) IV Push every 8 hours PRN Nausea and/or Vomiting      X-Rays reviewed: faint bilateral opacities  Patient is a 81y old  Male who presents with a chief complaint of hemoptysis    80 yo M with PMHx of CAD s/p CABG x4 in 2006 (Cardiologist Dr Sebastian Mendoza), HFrEF s/p AVR 2011, CKD III, COVID-19 complicated by DVT, HTN, recently diagnosed with AF on Xarelto. Presents to the ED after routine blood work obtained as outpatient. Patient was found to have Hb of 5.8. Per wife, pt has been producing blood streaked sputum for few weeks. Patient has noted rectal bleeding from known hemorrhoids. Pt also endorses fatigue. Denies fever, chills, chest pain, sob, palpitations, syncope, abdominal pain, N/V/D/C, melena.   called to evaluate, he states BRBPR few weeks ago, cough streaks of blood last 1 week      PAST MEDICAL & SURGICAL HISTORY:  Chronic systolic congestive heart failure    HTN (hypertension)    Dyslipidemia        SOCIAL HX:   Smoking        ex smoker quit in 1970s                 ETOH          denies                  Other denies    FAMILY HISTORY:  .  No cardiovascular or pulmonary family history     REVIEW OF SYSTEMS:    All ROS are negative except per HPI       Allergies    No Known Allergies    Intolerances          PHYSICAL EXAM  Vital Signs Last 24 Hrs  T(C): 36.1 (03 Mar 2022 07:25), Max: 36.4 (02 Mar 2022 21:37)  T(F): 96.9 (03 Mar 2022 07:25), Max: 97.6 (02 Mar 2022 21:37)  HR: 60 (03 Mar 2022 07:25) (60 - 85)  BP: 135/63 (03 Mar 2022 07:25) (113/53 - 145/67)  RR: 18 (03 Mar 2022 07:25) (18 - 18)  SpO2: 98% (03 Mar 2022 07:25) (97% - 99%)    CONSTITUTIONAL:  pale    ENT:   Airway patent,   No thrush    EYES:   Clear bilaterally,   pupils equal,   round and reactive to light.    CARDIAC:   RUSTY 2/6    RESPIRATORY:   DEC BS BOTH BASES    GASTROINTESTINAL:  Abdomen soft, non-tender,   No guarding,   Positive BS    MUSCULOSKELETAL:   Range of motion is not limited,  No clubbing, cyanosis    NEUROLOGICAL:   Alert and oriented   No motor deficits.            LABS:                          6.6    6.93  )-----------( 171      ( 02 Mar 2022 22:25 )             21.8                                               03-02    140  |  102  |  27<H>  ----------------------------<  95  4.1   |  25  |  2.1<H>    Ca    8.9      02 Mar 2022 22:25    TPro  5.7<L>  /  Alb  3.7  /  TBili  0.3  /  DBili  x   /  AST  12  /  ALT  9   /  AlkPhos  63  03-02      PT/INR - ( 02 Mar 2022 22:25 )   PT: 28.50 sec;   INR: 2.50 ratio         PTT - ( 02 Mar 2022 22:25 )  PTT:52.9 sec                                                                                     LIVER FUNCTIONS - ( 02 Mar 2022 22:25 )  Alb: 3.7 g/dL / Pro: 5.7 g/dL / ALK PHOS: 63 U/L / ALT: 9 U/L / AST: 12 U/L / GGT: x                                                                                                MEDICATIONS  (STANDING):  aMIOdarone    Tablet 200 milliGRAM(s) Oral two times a day  atorvastatin 20 milliGRAM(s) Oral at bedtime  hydrALAZINE 10 milliGRAM(s) Oral three times a day  isosorbide   mononitrate ER Tablet (IMDUR) 30 milliGRAM(s) Oral daily  metoprolol succinate ER 50 milliGRAM(s) Oral daily  pantoprazole    Tablet 40 milliGRAM(s) Oral two times a day  tamsulosin 0.4 milliGRAM(s) Oral at bedtime    MEDICATIONS  (PRN):  acetaminophen     Tablet .. 650 milliGRAM(s) Oral every 6 hours PRN Temp greater or equal to 38C (100.4F), Mild Pain (1 - 3)  aluminum hydroxide/magnesium hydroxide/simethicone Suspension 30 milliLiter(s) Oral every 4 hours PRN Dyspepsia  melatonin 3 milliGRAM(s) Oral at bedtime PRN Insomnia  ondansetron Injectable 4 milliGRAM(s) IV Push every 8 hours PRN Nausea and/or Vomiting      X-Rays reviewed: faint bilateral opacities

## 2022-03-03 NOTE — CONSULT NOTE ADULT - ATTENDING COMMENTS
Patient with anemia and history of rectal bleeding. States he does not want to have colonoscopy at this time as rectal bleeding has stopped.
events noted, non life threatening hemoptysis, dec hb unlikely related/ CXR clear/ RA, need to look for GI etiology, Chest ct, transfuse, serial CBC

## 2022-03-03 NOTE — CONSULT NOTE ADULT - SUBJECTIVE AND OBJECTIVE BOX
NEPHROLOGY CONSULTATION NOTE    81 year old male with PMHx of CAD s/p CABG x4 in 2006 (Cardiologist Dr Sebastian Mendoza), HFrEF s/p AVR 2011, CKD III, COVID complicated by DVT, HTN, recently diagnosed with AF on Xarelto. Presents to the ED after routine blood work obtained o/p. Pt was found to have Hb of 5.8. Per wife, pt has been producing blood streaked sputum x week. Pt also endorses fatigue. Denies fever, chills, chest pain, sob, palpitations, syncope, abdominal pain, N/V/D/C, melena.     Rpt Hb in the ED is 6.6. s/p 2u pRBC. Xarelto held per cardiologist.    In the ED, pt remains hemodynamically stable. Labs show Hb 6.6 , Cr 2.1       PAST MEDICAL & SURGICAL HISTORY:  Chronic systolic congestive heart failure    HTN (hypertension)    Dyslipidemia      Allergies:  No Known Allergies    Home Medications Reviewed    SOCIAL HISTORY:  Denies ETOH,Smoking, drugs    FAMILY HISTORY:  no renal disease        REVIEW OF SYSTEMS:  CONSTITUTIONAL: No weakness, fevers or chills  EYES/ENT: No visual changes;  No vertigo or throat pain   NECK: No pain or stiffness  RESPIRATORY: No cough, wheezing, hemoptysis; mild shortness of breath  CARDIOVASCULAR: No chest pain or palpitations.  GASTROINTESTINAL: No abdominal or epigastric pain. No nausea, vomiting, or hematemesis; No diarrhea or constipation. No melena + hematochezia.  GENITOURINARY: No dysuria, frequency, foamy urine, urinary urgency, incontinence or hematuria  NEUROLOGICAL: No numbness or weakness  SKIN: No itching, burning, rashes, or lesions   VASCULAR: + bilateral lower extremity edema.   All other review of systems is negative unless indicated above.    PHYSICAL EXAM:  Constitutional: NAD  HEENT: anicteric sclera, oropharynx clear, MMM  Neck: + JVD  Respiratory: decreased BS b/l    Cardiovascular: S1, S2, irreg   Gastrointestinal: BS+, soft, NT/ND  Extremities: No cyanosis or clubbing. 2+ peripheral edema  Neurological: A/O x 3, no focal deficits  Psychiatric: Normal mood, normal affect  : No CVA tenderness. No covarrubias.   Skin: No rashes    Hospital Medications:   MEDICATIONS  (STANDING):  aMIOdarone    Tablet 200 milliGRAM(s) Oral two times a day  atorvastatin 20 milliGRAM(s) Oral at bedtime  furosemide    Tablet 40 milliGRAM(s) Oral daily  hydrALAZINE 10 milliGRAM(s) Oral three times a day  isosorbide   mononitrate ER Tablet (IMDUR) 30 milliGRAM(s) Oral daily  metoprolol succinate ER 50 milliGRAM(s) Oral daily  pantoprazole    Tablet 40 milliGRAM(s) Oral two times a day  tamsulosin 0.4 milliGRAM(s) Oral at bedtime        VITALS:  T(F): 96.8 (03-03-22 @ 12:15), Max: 97.6 (03-02-22 @ 21:37)  HR: 73 (03-03-22 @ 14:12)  BP: 131/69 (03-03-22 @ 14:12)  RR: 18 (03-03-22 @ 12:15)  SpO2: 96% (03-03-22 @ 11:37)  Wt(kg): --    Height (cm): 172.7 (03-03 @ 01:00)  Weight (kg): 97.1 (03-02 @ 21:37)  BMI (kg/m2): 32.6 (03-03 @ 01:00)  BSA (m2): 2.1 (03-03 @ 01:00)    LABS:  03-03    140  |  105  |  26<H>  ----------------------------<  106<H>  4.1   |  23  |  1.9<H>    Ca    8.6      03 Mar 2022 11:00  Mg     2.1     03-03    TPro  5.4<L>  /  Alb  3.4<L>  /  TBili  0.3  /  DBili      /  AST  12  /  ALT  9   /  AlkPhos  59  03-03                          8.2    5.88  )-----------( 152      ( 03 Mar 2022 11:00 )             25.4       Urine Studies:        RADIOLOGY & ADDITIONAL STUDIES:

## 2022-03-03 NOTE — CONSULT NOTE ADULT - ASSESSMENT
81 year old male with PMHx of CAD s/p CABG x4 in 2006 on ASA (Cardiologist Dr Sebastian Mendoza), HFrEF s/p AVR 2011, CKD III, COVID complicated by DVT, HTN, recently diagnosed with AF on Xarelto (LD 3/2). Presents to the ED after routine blood work obtained o/p. Pt was found to have Hb of 5.8. Rpt Hb in the ED is 6.6. s/p 2u pRBC. Xarelto to be held per cardiologist.    #Acute on chronic macrocytic anemia  #Hemoptysis  #Intermittent painful small volume hematochezia possibly d/t Hemorrhoids - r/o colon ca  - hemodynamically stable  - Baseline hemoglobin 11  - Hemoglobin on admission: 5.8  - No active bleeding  - Coags: INR 2.5  - Last use of Antithrombotic or no Antithrombotic: Xarelto 3/2    Rec:  - advance diet  - Maintain active Type and screen  - Trend CBC qdaily  - pt will need colonoscopy +/- EGD however pt is refusing to undergo endoscopic workup at this time. Pt states if there is recurrent jignesh bleeding per rectum or mouth or recurrent drop in hemoglobin he will prefer to have endoscopic workup done  - please recall GI if pt is agreeable for endoscopic intervention or recurrent bleed per rectum. Xarelto will need to be held for 4 days for procedure  - bowel regimen - senna x2 tabs and miralax  - Please correct INR to <1.5  - Please target Hb  >8  - If any unstable bleed please get stat CTA and call IR

## 2022-03-04 LAB
ALBUMIN SERPL ELPH-MCNC: 3 G/DL — LOW (ref 3.5–5.2)
ALBUMIN SERPL ELPH-MCNC: 3.3 G/DL — LOW (ref 3.5–5.2)
ALP SERPL-CCNC: 52 U/L — SIGNIFICANT CHANGE UP (ref 30–115)
ALP SERPL-CCNC: 58 U/L — SIGNIFICANT CHANGE UP (ref 30–115)
ALT FLD-CCNC: 8 U/L — SIGNIFICANT CHANGE UP (ref 0–41)
ALT FLD-CCNC: 9 U/L — SIGNIFICANT CHANGE UP (ref 0–41)
ANION GAP SERPL CALC-SCNC: 10 MMOL/L — SIGNIFICANT CHANGE UP (ref 7–14)
ANION GAP SERPL CALC-SCNC: 8 MMOL/L — SIGNIFICANT CHANGE UP (ref 7–14)
APPEARANCE UR: CLEAR — SIGNIFICANT CHANGE UP
APTT BLD: 36.9 SEC — SIGNIFICANT CHANGE UP (ref 27–39.2)
AST SERPL-CCNC: 11 U/L — SIGNIFICANT CHANGE UP (ref 0–41)
AST SERPL-CCNC: 11 U/L — SIGNIFICANT CHANGE UP (ref 0–41)
BILIRUB SERPL-MCNC: 0.3 MG/DL — SIGNIFICANT CHANGE UP (ref 0.2–1.2)
BILIRUB SERPL-MCNC: 0.3 MG/DL — SIGNIFICANT CHANGE UP (ref 0.2–1.2)
BILIRUB UR-MCNC: NEGATIVE — SIGNIFICANT CHANGE UP
BLD GP AB SCN SERPL QL: SIGNIFICANT CHANGE UP
BUN SERPL-MCNC: 22 MG/DL — HIGH (ref 10–20)
BUN SERPL-MCNC: 23 MG/DL — HIGH (ref 10–20)
CALCIUM SERPL-MCNC: 8.5 MG/DL — SIGNIFICANT CHANGE UP (ref 8.5–10.1)
CALCIUM SERPL-MCNC: 8.6 MG/DL — SIGNIFICANT CHANGE UP (ref 8.5–10.1)
CHLORIDE SERPL-SCNC: 104 MMOL/L — SIGNIFICANT CHANGE UP (ref 98–110)
CHLORIDE SERPL-SCNC: 106 MMOL/L — SIGNIFICANT CHANGE UP (ref 98–110)
CO2 SERPL-SCNC: 24 MMOL/L — SIGNIFICANT CHANGE UP (ref 17–32)
CO2 SERPL-SCNC: 25 MMOL/L — SIGNIFICANT CHANGE UP (ref 17–32)
COLOR SPEC: COLORLESS — SIGNIFICANT CHANGE UP
CREAT ?TM UR-MCNC: 12 MG/DL — SIGNIFICANT CHANGE UP
CREAT SERPL-MCNC: 1.9 MG/DL — HIGH (ref 0.7–1.5)
CREAT SERPL-MCNC: 1.9 MG/DL — HIGH (ref 0.7–1.5)
DIFF PNL FLD: NEGATIVE — SIGNIFICANT CHANGE UP
EGFR: 35 ML/MIN/1.73M2 — LOW
EGFR: 35 ML/MIN/1.73M2 — LOW
FERRITIN SERPL-MCNC: 264 NG/ML — SIGNIFICANT CHANGE UP (ref 30–400)
FOLATE SERPL-MCNC: 7.5 NG/ML — SIGNIFICANT CHANGE UP
FOLATE SERPL-MCNC: 8.3 NG/ML — SIGNIFICANT CHANGE UP
GLUCOSE SERPL-MCNC: 74 MG/DL — SIGNIFICANT CHANGE UP (ref 70–99)
GLUCOSE SERPL-MCNC: 79 MG/DL — SIGNIFICANT CHANGE UP (ref 70–99)
GLUCOSE UR QL: NEGATIVE — SIGNIFICANT CHANGE UP
HAPTOGLOB SERPL-MCNC: 87 MG/DL — SIGNIFICANT CHANGE UP (ref 34–200)
HCT VFR BLD CALC: 22.9 % — LOW (ref 42–52)
HCT VFR BLD CALC: 29.6 % — LOW (ref 42–52)
HGB BLD-MCNC: 7.3 G/DL — LOW (ref 14–18)
HGB BLD-MCNC: 9.4 G/DL — LOW (ref 14–18)
INR BLD: 1.14 RATIO — SIGNIFICANT CHANGE UP (ref 0.65–1.3)
KAPPA LC SER QL IFE: 4.01 MG/DL — HIGH (ref 0.33–1.94)
KAPPA/LAMBDA FREE LIGHT CHAIN RATIO, SERUM: 1.48 RATIO — SIGNIFICANT CHANGE UP (ref 0.26–1.65)
KETONES UR-MCNC: NEGATIVE — SIGNIFICANT CHANGE UP
LAMBDA LC SER QL IFE: 2.71 MG/DL — HIGH (ref 0.57–2.63)
LDH SERPL L TO P-CCNC: 172 U/L — SIGNIFICANT CHANGE UP (ref 50–242)
LEUKOCYTE ESTERASE UR-ACNC: NEGATIVE — SIGNIFICANT CHANGE UP
MAGNESIUM SERPL-MCNC: 2.2 MG/DL — SIGNIFICANT CHANGE UP (ref 1.8–2.4)
MCHC RBC-ENTMCNC: 31.2 PG — HIGH (ref 27–31)
MCHC RBC-ENTMCNC: 31.3 PG — HIGH (ref 27–31)
MCHC RBC-ENTMCNC: 31.8 G/DL — LOW (ref 32–37)
MCHC RBC-ENTMCNC: 31.9 G/DL — LOW (ref 32–37)
MCV RBC AUTO: 98.3 FL — HIGH (ref 80–94)
MCV RBC AUTO: 98.3 FL — HIGH (ref 80–94)
NITRITE UR-MCNC: NEGATIVE — SIGNIFICANT CHANGE UP
NRBC # BLD: 0 /100 WBCS — SIGNIFICANT CHANGE UP (ref 0–0)
NRBC # BLD: 0 /100 WBCS — SIGNIFICANT CHANGE UP (ref 0–0)
OB PNL STL: POSITIVE
PH UR: 7.5 — SIGNIFICANT CHANGE UP (ref 5–8)
PLATELET # BLD AUTO: 138 K/UL — SIGNIFICANT CHANGE UP (ref 130–400)
PLATELET # BLD AUTO: 156 K/UL — SIGNIFICANT CHANGE UP (ref 130–400)
POTASSIUM SERPL-MCNC: 3.9 MMOL/L — SIGNIFICANT CHANGE UP (ref 3.5–5)
POTASSIUM SERPL-MCNC: 3.9 MMOL/L — SIGNIFICANT CHANGE UP (ref 3.5–5)
POTASSIUM SERPL-SCNC: 3.9 MMOL/L — SIGNIFICANT CHANGE UP (ref 3.5–5)
POTASSIUM SERPL-SCNC: 3.9 MMOL/L — SIGNIFICANT CHANGE UP (ref 3.5–5)
PROT ?TM UR-MCNC: <5 MG/DLG/24H — SIGNIFICANT CHANGE UP
PROT SERPL-MCNC: 4.7 G/DL — LOW (ref 6–8)
PROT SERPL-MCNC: 5.2 G/DL — LOW (ref 6–8)
PROT UR-MCNC: NEGATIVE — SIGNIFICANT CHANGE UP
PROT/CREAT UR-RTO: <0.4 RATIO — HIGH (ref 0–0.2)
PROTHROM AB SERPL-ACNC: 13.1 SEC — HIGH (ref 9.95–12.87)
RBC # BLD: 2.33 M/UL — LOW (ref 4.7–6.1)
RBC # BLD: 3.01 M/UL — LOW (ref 4.7–6.1)
RBC # FLD: 17.3 % — HIGH (ref 11.5–14.5)
RBC # FLD: 17.4 % — HIGH (ref 11.5–14.5)
SODIUM SERPL-SCNC: 138 MMOL/L — SIGNIFICANT CHANGE UP (ref 135–146)
SODIUM SERPL-SCNC: 139 MMOL/L — SIGNIFICANT CHANGE UP (ref 135–146)
SP GR SPEC: 1.01 — LOW (ref 1.01–1.03)
UROBILINOGEN FLD QL: SIGNIFICANT CHANGE UP
VIT B12 SERPL-MCNC: 430 PG/ML — SIGNIFICANT CHANGE UP (ref 232–1245)
VIT B12 SERPL-MCNC: 449 PG/ML — SIGNIFICANT CHANGE UP (ref 232–1245)
WBC # BLD: 5.17 K/UL — SIGNIFICANT CHANGE UP (ref 4.8–10.8)
WBC # BLD: 7.47 K/UL — SIGNIFICANT CHANGE UP (ref 4.8–10.8)
WBC # FLD AUTO: 5.17 K/UL — SIGNIFICANT CHANGE UP (ref 4.8–10.8)
WBC # FLD AUTO: 7.47 K/UL — SIGNIFICANT CHANGE UP (ref 4.8–10.8)

## 2022-03-04 PROCEDURE — 99232 SBSQ HOSP IP/OBS MODERATE 35: CPT

## 2022-03-04 PROCEDURE — 99233 SBSQ HOSP IP/OBS HIGH 50: CPT

## 2022-03-04 RX ORDER — FUROSEMIDE 40 MG
20 TABLET ORAL ONCE
Refills: 0 | Status: COMPLETED | OUTPATIENT
Start: 2022-03-04 | End: 2022-03-04

## 2022-03-04 RX ADMIN — SENNA PLUS 2 TABLET(S): 8.6 TABLET ORAL at 21:51

## 2022-03-04 RX ADMIN — ISOSORBIDE MONONITRATE 30 MILLIGRAM(S): 60 TABLET, EXTENDED RELEASE ORAL at 11:36

## 2022-03-04 RX ADMIN — AMIODARONE HYDROCHLORIDE 200 MILLIGRAM(S): 400 TABLET ORAL at 06:19

## 2022-03-04 RX ADMIN — IRON SUCROSE 210 MILLIGRAM(S): 20 INJECTION, SOLUTION INTRAVENOUS at 17:04

## 2022-03-04 RX ADMIN — Medication 10 MILLIGRAM(S): at 21:52

## 2022-03-04 RX ADMIN — AMIODARONE HYDROCHLORIDE 200 MILLIGRAM(S): 400 TABLET ORAL at 17:05

## 2022-03-04 RX ADMIN — POLYETHYLENE GLYCOL 3350 17 GRAM(S): 17 POWDER, FOR SOLUTION ORAL at 11:36

## 2022-03-04 RX ADMIN — Medication 50 MILLIGRAM(S): at 06:19

## 2022-03-04 RX ADMIN — Medication 40 MILLIGRAM(S): at 06:20

## 2022-03-04 RX ADMIN — ATORVASTATIN CALCIUM 20 MILLIGRAM(S): 80 TABLET, FILM COATED ORAL at 21:51

## 2022-03-04 RX ADMIN — Medication 10 MILLIGRAM(S): at 06:19

## 2022-03-04 RX ADMIN — Medication 20 MILLIGRAM(S): at 15:28

## 2022-03-04 RX ADMIN — TAMSULOSIN HYDROCHLORIDE 0.4 MILLIGRAM(S): 0.4 CAPSULE ORAL at 21:52

## 2022-03-04 RX ADMIN — Medication 10 MILLIGRAM(S): at 13:18

## 2022-03-04 RX ADMIN — PANTOPRAZOLE SODIUM 40 MILLIGRAM(S): 20 TABLET, DELAYED RELEASE ORAL at 17:05

## 2022-03-04 NOTE — PROGRESS NOTE ADULT - SUBJECTIVE AND OBJECTIVE BOX
NEPHROLOGY FOLLOW UP NOTE    pt seen and examined  no active bleeding  wants to go home  denies SOB  chronic pedal swelling      PAST MEDICAL & SURGICAL HISTORY:  Chronic systolic congestive heart failure    HTN (hypertension)    Dyslipidemia      Allergies:  No Known Allergies    Home Medications Reviewed    SOCIAL HISTORY:  Denies ETOH,Smoking, drugs    FAMILY HISTORY:  no renal disease        REVIEW OF SYSTEMS:  CONSTITUTIONAL: No weakness, fevers or chills  EYES/ENT: No visual changes;  No vertigo or throat pain   NECK: No pain or stiffness  RESPIRATORY: No cough, wheezing, hemoptysis; mild shortness of breath  CARDIOVASCULAR: No chest pain or palpitations.  GASTROINTESTINAL: No abdominal or epigastric pain. No nausea, vomiting, or hematemesis; No diarrhea or constipation. No melena + hematochezia.  GENITOURINARY: No dysuria, frequency, foamy urine, urinary urgency, incontinence or hematuria  NEUROLOGICAL: No numbness or weakness  SKIN: No itching, burning, rashes, or lesions   VASCULAR: + bilateral lower extremity edema.   All other review of systems is negative unless indicated above.    advance care planning and directives reviewed    PHYSICAL EXAM:  Constitutional: NAD  HEENT: anicteric sclera, oropharynx clear, MMM  Neck: + JVD  Respiratory: decreased BS b/l    Cardiovascular: S1, S2, irreg   Gastrointestinal: BS+, soft, NT/ND  Extremities: No cyanosis or clubbing. 2+ peripheral edema  Neurological: A/O x 3, no focal deficits  Psychiatric: Normal mood, normal affect  : No CVA tenderness. No covarrubias.   Skin: No rashes    Hospital Medications:   MEDICATIONS  (STANDING):  aMIOdarone    Tablet 200 milliGRAM(s) Oral two times a day  atorvastatin 20 milliGRAM(s) Oral at bedtime  furosemide    Tablet 40 milliGRAM(s) Oral daily  furosemide   Injectable 20 milliGRAM(s) IV Push once  hydrALAZINE 10 milliGRAM(s) Oral three times a day  iron sucrose IVPB 100 milliGRAM(s) IV Intermittent every 24 hours  isosorbide   mononitrate ER Tablet (IMDUR) 30 milliGRAM(s) Oral daily  metoprolol succinate ER 50 milliGRAM(s) Oral daily  pantoprazole    Tablet 40 milliGRAM(s) Oral two times a day  polyethylene glycol 3350 17 Gram(s) Oral daily  senna 2 Tablet(s) Oral at bedtime  tamsulosin 0.4 milliGRAM(s) Oral at bedtime        VITALS:  T(F): 97.9 (03-04-22 @ 12:30), Max: 98.1 (03-04-22 @ 12:00)  HR: 98 (03-04-22 @ 12:30)  BP: 162/72 (03-04-22 @ 12:30)  RR: 17 (03-04-22 @ 12:30)  SpO2: 99% (03-04-22 @ 12:30)  Wt(kg): --        LABS:  03-04    139  |  106  |  22<H>  ----------------------------<  74  3.9   |  25  |  1.9<H>    Ca    8.5      04 Mar 2022 06:40  Mg     2.2     03-04    TPro  4.7<L>  /  Alb  3.0<L>  /  TBili  0.3  /  DBili      /  AST  11  /  ALT  8   /  AlkPhos  52  03-04                          7.3    5.17  )-----------( 138      ( 04 Mar 2022 06:40 )             22.9       Urine Studies:        RADIOLOGY & ADDITIONAL STUDIES:

## 2022-03-04 NOTE — PROGRESS NOTE ADULT - SUBJECTIVE AND OBJECTIVE BOX
SAÚL HODGSON 81y Male  MRN#: [id number]   CODE STATUS: full  Hospital Day: 2d    SUBJECTIVE  Hospital Course  81 year old male    PMHx of CAD s/p CABG x4 in 2006 (Cardiologist Dr Sebastian Mendoza), HFrEF s/p AVR 2011, CKD III, COVID complicated by DVT, HTN, recently diagnosed with AF on Xarelto.    Presents to the ED after routine blood work obtained o/p. Pt was found to have Hb of 5.8. Per wife, pt has been producing blood streaked sputum x week. Pt also endorses fatigue. Denies fever, chills, chest pain, sob, palpitations, syncope, abdominal pain, N/V/D/C, melena.     Rpt Hb in the ED is 6.6. s/p 2u pRBC. Xarelto held per cardiologist.  In the ED, pt remains hemodynamically stable. Labs show Hb 6.6 , Cr 2.1       PHYSICAL EXAM:  CONSTITUTIONAL:pale  ENT:  Airway patent, No thrush  EYES: Clear bilaterally, pupils equal, round and reactive to light.  CARDIAC: RUSTY 2/6  RESPIRATORY: DEC BS BOTH BASES  GASTROINTESTINAL: Abdomen soft, non-tender, No guarding,  Positive BS  MUSCULOSKELETAL:  Range of motion is not limited,No clubbing, cyanosis  NEUROLOGICAL: Alert and oriented , No motor deficits.                                            ----------------------------------------------------------  OBJECTIVE  PAST MEDICAL & SURGICAL HISTORY  Chronic systolic congestive heart failure  HTN (hypertension)  Dyslipidemia                                            -----------------------------------------------------------  ALLERGIES:  No Known Allergies                                            ------------------------------------------------------------    HOME MEDICATIONS  Home Medications:  LATANOPROST  0.005 % SOLN:  (12 Dec 2021 16:26)                           MEDICATIONS:  STANDING MEDICATIONS  aMIOdarone    Tablet 200 milliGRAM(s) Oral two times a day  atorvastatin 20 milliGRAM(s) Oral at bedtime  furosemide    Tablet 40 milliGRAM(s) Oral daily  hydrALAZINE 10 milliGRAM(s) Oral three times a day  isosorbide   mononitrate ER Tablet (IMDUR) 30 milliGRAM(s) Oral daily  metoprolol succinate ER 50 milliGRAM(s) Oral daily  pantoprazole    Tablet 40 milliGRAM(s) Oral two times a day  tamsulosin 0.4 milliGRAM(s) Oral at bedtime    PRN MEDICATIONS  acetaminophen     Tablet .. 650 milliGRAM(s) Oral every 6 hours PRN  aluminum hydroxide/magnesium hydroxide/simethicone Suspension 30 milliLiter(s) Oral every 4 hours PRN  melatonin 3 milliGRAM(s) Oral at bedtime PRN  ondansetron Injectable 4 milliGRAM(s) IV Push every 8 hours PRN                                            ------------------------------------------------------------  VITAL SIGNS: Last 24 Hours  T(C): 36 (03 Mar 2022 12:15), Max: 36.4 (02 Mar 2022 21:37)  T(F): 96.8 (03 Mar 2022 12:15), Max: 97.6 (02 Mar 2022 21:37)  HR: 73 (03 Mar 2022 14:12) (60 - 85)  BP: 131/69 (03 Mar 2022 14:12) (113/53 - 145/67)  BP(mean): 95 (03 Mar 2022 14:12) (83 - 95)  RR: 18 (03 Mar 2022 12:15) (18 - 18)  SpO2: 96% (03 Mar 2022 11:37) (96% - 99%)                                             --------------------------------------------------------------  LABS:                        8.2    5.88  )-----------( 152      ( 03 Mar 2022 11:00 )             25.4     03-03    140  |  105  |  26<H>  ----------------------------<  106<H>  4.1   |  23  |  1.9<H>    Ca    8.6      03 Mar 2022 11:00  Mg     2.1     03-03    TPro  5.4<L>  /  Alb  3.4<L>  /  TBili  0.3  /  DBili  x   /  AST  12  /  ALT  9   /  AlkPhos  59  03-03    PT/INR - ( 03 Mar 2022 11:00 )   PT: 16.60 sec;   INR: 1.45 ratio         PTT - ( 03 Mar 2022 11:00 )  PTT:43.5 sec

## 2022-03-04 NOTE — PROGRESS NOTE ADULT - ASSESSMENT
81 year old male with PMHx of CAD s/p CABG x4 in 2006 (Cardiologist Dr Sebastian Mendoza), HFrEF s/p AVR 2011, CKD III, COVID complicated by DVT, HTN, recently diagnosed with AF on Xarelto. Presents to the ED after routine blood work obtained o/p. Pt was found to have Hb of 5.8. Rpt Hb in the ED is 6.6. s/p 2u pRBC. Xarelto to be held per cardiologist.    In the ED, pt remains hemodynamically stable. Labs show Hb 6.6 , Cr 2.1     #Acute on chronic anemia ( macrocytic )  - Non-lifethreatening Hemoptysis / mild (low hb unrelated to hemoptysis/ CXR clear )  - Denies melena, bloody BM, coffee ground emesis  - RYAN performed - negative   - Baseline Hgb around 11  - s/p 2u pRBC with Hb 6.6 >>> 8.2 >>> 7.9 >> 7.3     S/p another unit today.   - Trend H/H ; next Hb at 8pm   - transfuse to Hb  >8 (IV lasix with transfusion)  - Keep active type and screen   - f/u iron profile, vit B12, folate   - Holding anticoagulation   - f/u FOBT   - Venofer 100mg iv q24h x 5 days  - f/u B12, folate and hemolysis w/u (ldh, haptoglobin)     As per GI   - pt will need colonoscopy +/- EGD however pt is refusing to undergo endoscopic workup at this time.  - if HB keeps dropping / jignesh blood recall GI  ; Xarelto will need to be held for 4 days for procedure  - bowel regimen - senna x2 tabs and miralax  - correct INR to <1.5 f/u INR  - If unstable stat CTA and call IR  - if agreeable obtain cardio consult - will likely need clearance for any planned procedure     - CT chest non con : Nonspecific  bilateral peripheral predominant ground-glass opacities, left greater than  right, with questionable peripheral reticulation and bronchiolectasis. No  obvious consolidation  - pulmonary not concerned for alveolar hemorrhage   - Pulmonary toilet, Incentive spirometry    #HO CAD SP CABG, Bioprosthetic AVR, Chronic HFrEF (EF 27%)   - Not in exacerbation   - s/p AVR 2011  -restarted on lasix 40 mg po once daily  - Monitor accurate in/out  - Daily weight   - Monitor SaO2 and O2 requirements: Currently SAO2 98% on RA  - Trend electrolytes and keep K>4 and Mg>2    #HO AFIB on Xarelto >>Eliquis  # HO DVT   - Rate controlled  - Hold Xarelto for now   - Keep K>4 and Mg>2  - cont home meds     #CKD III  - Trend BUN and Cr daily  - Avoid nephrotoxic agents  - cont Olmesartan 20mg QD for now  - UA : moderate hematuria   - U protein / creatinine :0.8    #CAD s/p CABG x4 in 2006  - cont. Home Lopressor 25mg AM and 12.5mg PM, Aspirin 81mg QD, Rosuvastatin 5mg QD  - No active chest pain    #Right Peroneal, Popliteal, and Posterior Tibial DVT  - Holding Xarelto for now   - LE duplex : no DVT 03/03    #HTN  -c/w home meds     #s/p COVID Pneumonia in Dec   Resolved    Others  - DVT Prophylaxis: SCDs , xarelto on hold due to anemia  - GI Prophylaxis: Pantoprazole 40mg PO QD  - Diet: DASH/ TLC  - Code Status: DNR DNI     #Progress Note Handoff  Pending (specify):  Stable hb/ ? Colonoscope  Disposition: Home_

## 2022-03-04 NOTE — PROGRESS NOTE ADULT - SUBJECTIVE AND OBJECTIVE BOX
Patient is a 81y old  Male who presents with a chief complaint of anemia (03 Mar 2022 10:51)        SUBJECTIVE: no acute events overnight      REVIEW OF SYSTEMS:    All Pertinent ROS are negative except per HPI       PHYSICAL EXAM  Vital Signs Last 24 Hrs  T(C): 36 (04 Mar 2022 05:00), Max: 36.6 (03 Mar 2022 20:21)  T(F): 96.8 (04 Mar 2022 05:00), Max: 97.9 (03 Mar 2022 20:21)  HR: 68 (04 Mar 2022 05:00) (62 - 73)  BP: 121/60 (04 Mar 2022 05:00) (119/57 - 169/75)  BP(mean): 95 (03 Mar 2022 14:12) (83 - 95)  RR: 18 (04 Mar 2022 05:00) (18 - 18)  SpO2: 94% (04 Mar 2022 05:00) (94% - 96%)    CONSTITUTIONAL:  NAD    ENT:   Airway patent,   No thrush    CARDIAC:   Normal rate,   regular rhythm.    no edema      RESPIRATORY:   NO Wheezing  Nnormal chest expansion  Nnot tachypneic,  No use of accessory muscles    GASTROINTESTINAL:  Abdomen soft,   non-tender,   no guarding,   + BS    MUSCULOSKELETAL:   range of motion is not limited,  no clubbing, cyanosis    NEUROLOGICAL:   Alert and oriented   no motor or deficits.    SKIN:   Skin normal color for race,   warm, dry   No evidence of rash.        LABS:                          7.9    6.02  )-----------( 153      ( 03 Mar 2022 23:00 )             24.9                                               03-03    138  |  104  |  23<H>  ----------------------------<  79  3.9   |  24  |  1.9<H>    Ca    8.6      03 Mar 2022 23:00  Mg     2.1     03-03    TPro  5.2<L>  /  Alb  3.3<L>  /  TBili  0.3  /  DBili  x   /  AST  11  /  ALT  9   /  AlkPhos  58  03-03      PT/INR - ( 03 Mar 2022 11:00 )   PT: 16.60 sec;   INR: 1.45 ratio         PTT - ( 03 Mar 2022 11:00 )  PTT:43.5 sec                                                                                     LIVER FUNCTIONS - ( 03 Mar 2022 23:00 )  Alb: 3.3 g/dL / Pro: 5.2 g/dL / ALK PHOS: 58 U/L / ALT: 9 U/L / AST: 11 U/L / GGT: x                                                                                                MEDICATIONS  (STANDING):  aMIOdarone    Tablet 200 milliGRAM(s) Oral two times a day  atorvastatin 20 milliGRAM(s) Oral at bedtime  furosemide    Tablet 40 milliGRAM(s) Oral daily  hydrALAZINE 10 milliGRAM(s) Oral three times a day  iron sucrose IVPB 100 milliGRAM(s) IV Intermittent every 24 hours  isosorbide   mononitrate ER Tablet (IMDUR) 30 milliGRAM(s) Oral daily  metoprolol succinate ER 50 milliGRAM(s) Oral daily  pantoprazole    Tablet 40 milliGRAM(s) Oral two times a day  polyethylene glycol 3350 17 Gram(s) Oral daily  senna 2 Tablet(s) Oral at bedtime  tamsulosin 0.4 milliGRAM(s) Oral at bedtime    MEDICATIONS  (PRN):  acetaminophen     Tablet .. 650 milliGRAM(s) Oral every 6 hours PRN Temp greater or equal to 38C (100.4F), Mild Pain (1 - 3)  aluminum hydroxide/magnesium hydroxide/simethicone Suspension 30 milliLiter(s) Oral every 4 hours PRN Dyspepsia  melatonin 3 milliGRAM(s) Oral at bedtime PRN Insomnia  ondansetron Injectable 4 milliGRAM(s) IV Push every 8 hours PRN Nausea and/or Vomiting      X-Rays reviewed     Patient is a 81y old  Male who presents with a chief complaint of anemia (03 Mar 2022 10:51)        SUBJECTIVE: no acute events overnight        PHYSICAL EXAM  Vital Signs Last 24 Hrs  T(C): 36 (04 Mar 2022 05:00), Max: 36.6 (03 Mar 2022 20:21)  T(F): 96.8 (04 Mar 2022 05:00), Max: 97.9 (03 Mar 2022 20:21)  HR: 68 (04 Mar 2022 05:00) (62 - 73)  BP: 121/60 (04 Mar 2022 05:00) (119/57 - 169/75)  BP(mean): 95 (03 Mar 2022 14:12) (83 - 95)  RR: 18 (04 Mar 2022 05:00) (18 - 18)  SpO2: 94% (04 Mar 2022 05:00) (94% - 96%)    CONSTITUTIONAL:  NAD    ENT:   Airway patent,   No thrush    CARDIAC:   Normal rate,   regular rhythm.    no edema      RESPIRATORY:   NO Wheezing  Nnormal chest expansion  Nnot tachypneic,  No use of accessory muscles    GASTROINTESTINAL:  Abdomen soft,   non-tender,   no guarding,   + BS    MUSCULOSKELETAL:   range of motion is not limited,  no clubbing, cyanosis    NEUROLOGICAL:   Alert and oriented   no motor or deficits.          LABS:                          7.9    6.02  )-----------( 153      ( 03 Mar 2022 23:00 )             24.9                                               03-03    138  |  104  |  23<H>  ----------------------------<  79  3.9   |  24  |  1.9<H>    Ca    8.6      03 Mar 2022 23:00  Mg     2.1     03-03    TPro  5.2<L>  /  Alb  3.3<L>  /  TBili  0.3  /  DBili  x   /  AST  11  /  ALT  9   /  AlkPhos  58  03-03      PT/INR - ( 03 Mar 2022 11:00 )   PT: 16.60 sec;   INR: 1.45 ratio         PTT - ( 03 Mar 2022 11:00 )  PTT:43.5 sec                                                                                     LIVER FUNCTIONS - ( 03 Mar 2022 23:00 )  Alb: 3.3 g/dL / Pro: 5.2 g/dL / ALK PHOS: 58 U/L / ALT: 9 U/L / AST: 11 U/L / GGT: x                                                                                                MEDICATIONS  (STANDING):  aMIOdarone    Tablet 200 milliGRAM(s) Oral two times a day  atorvastatin 20 milliGRAM(s) Oral at bedtime  furosemide    Tablet 40 milliGRAM(s) Oral daily  hydrALAZINE 10 milliGRAM(s) Oral three times a day  iron sucrose IVPB 100 milliGRAM(s) IV Intermittent every 24 hours  isosorbide   mononitrate ER Tablet (IMDUR) 30 milliGRAM(s) Oral daily  metoprolol succinate ER 50 milliGRAM(s) Oral daily  pantoprazole    Tablet 40 milliGRAM(s) Oral two times a day  polyethylene glycol 3350 17 Gram(s) Oral daily  senna 2 Tablet(s) Oral at bedtime  tamsulosin 0.4 milliGRAM(s) Oral at bedtime    MEDICATIONS  (PRN):  acetaminophen     Tablet .. 650 milliGRAM(s) Oral every 6 hours PRN Temp greater or equal to 38C (100.4F), Mild Pain (1 - 3)  aluminum hydroxide/magnesium hydroxide/simethicone Suspension 30 milliLiter(s) Oral every 4 hours PRN Dyspepsia  melatonin 3 milliGRAM(s) Oral at bedtime PRN Insomnia  ondansetron Injectable 4 milliGRAM(s) IV Push every 8 hours PRN Nausea and/or Vomiting      X-Rays reviewed

## 2022-03-04 NOTE — PROGRESS NOTE ADULT - ASSESSMENT
81 year old male with PMHx of CAD s/p CABG x4 in 2006 (Cardiologist Dr Sebastian Mendoza), HFrEF s/p AVR 2011, CKD III, COVID complicated by DVT, HTN, recently diagnosed with AF on Xarelto. Presents to the ED after routine blood work obtained o/p. Pt was found to have Hb of 5.8. Rpt Hb in the ED is 6.6. s/p 2u pRBC. Xarelto to be held per cardiologist.    In the ED, pt remains hemodynamically stable. Labs show Hb 6.6 , Cr 2.1     #Acute on chronic anemia ( macrocytic )  - Non-lifethreatening Hemoptysis / mild (low hb unrelated to hemoptysis/ CXR clear )  - Denies melena, bloody BM, coffee ground emesis  - RYAN performed - negative   - Baseline Hgb around 11  - s/p 2u pRBC - f/u rpt CBC to check for appropriate response  - Trend H/H   - Keep active type and screen   - f/u iron profile, vit B12, folate   - Holding anticoagulation   - f/u FOBT   - Venofer 100mg iv q24h x 5 days  - f/u B12, folate and hemolysis w/u (ldh, haptoglobin)     - GI :   - pt will need colonoscopy +/- EGD however pt is refusing to undergo endoscopic workup at this time.   - if HB keeps dropping / jignesh blood recall GI  ; Xarelto will need to be held for 4 days for procedure  - bowel regimen - senna x2 tabs and miralax  - correct INR to <1.5 f/u INR  - target Hb  >8  - If unstable  stat CTA and call IR    - obtain cardio consult - will likely need clearance for any planned procedure     Pulmonary :   - f/u CT CHEST NC  - Pulmonary toilet, Incentive spirometry    #HO CAD SP CABG, Bioprosthetic AVR, HFrEF (EF 27%)   - Not in exacerbation   - s/p AVR 2011  -restarted on lasix 40 mg po once daily  - Monitor accurate in/out  - Daily weight   - Monitor SaO2 and O2 requirements: Currently SAO2 98% on RA  - Trend electrolytes and keep K>4 and Mg>2    #HO AFIB on Xarelto  # HO DVT   - Rate controlled  - Hold Xarelto for now   - Keep K>4 and Mg>2  - cont home meds     #CKD III  - Trend BUN and Cr daily  - Avoid nephrotoxic agents  - cont Olmesartan 20mg QD for now  - f/u  UA and U protein / creatinine    #CAD s/p CABG x4 in 2006  - cont. Home Lopressor 25mg AM and 12.5mg PM, Aspirin 81mg QD, Rosuvastatin 5mg QD  - No active chest pain      #Right Peroneal, Popliteal, and Posterior Tibial DVT  - Holding Xarelto for now   -f/u LE duplex     #HTN  -c/w home meds     #s/p COVID Pneumonia in Dec   Resolved    Others  - DVT Prophylaxis: SCDs , xarelto on hold due to anemia  - GI Prophylaxis: Pantoprazole 40mg PO QD  - Diet: DASH/ TLC  - Code Status: DNR DNI                            81 year old male with PMHx of CAD s/p CABG x4 in 2006 (Cardiologist Dr Sebastian Mendoza), HFrEF s/p AVR 2011, CKD III, COVID complicated by DVT, HTN, recently diagnosed with AF on Xarelto. Presents to the ED after routine blood work obtained o/p. Pt was found to have Hb of 5.8. Rpt Hb in the ED is 6.6. s/p 2u pRBC. Xarelto to be held per cardiologist.    In the ED, pt remains hemodynamically stable. Labs show Hb 6.6 , Cr 2.1     #Acute on chronic anemia ( macrocytic )  - Non-lifethreatening Hemoptysis / mild (low hb unrelated to hemoptysis/ CXR clear )  - Denies melena, bloody BM, coffee ground emesis  - RYAN performed - negative   - Baseline Hgb around 11  - s/p 2u pRBC - f/u rpt CBC to check for appropriate response  - Trend H/H ; next Hb at 8pm   - transfuse to Hb  >8 (IV lasix with transfusion)  - Keep active type and screen   - f/u iron profile, vit B12, folate   - Holding anticoagulation   - f/u FOBT   - Venofer 100mg iv q24h x 5 days  - f/u B12, folate and hemolysis w/u (ldh, haptoglobin)     - GI :   - pt will need colonoscopy +/- EGD however pt is refusing to undergo endoscopic workup at this time. (spoke to Pt 3/4, 4:30pm still refusing workup, will reconsider if persistent anemia)  - if HB keeps dropping / jignesh blood recall GI  ; Xarelto will need to be held for 4 days for procedure  - bowel regimen - senna x2 tabs and miralax  - correct INR to <1.5 f/u INR  - If unstable stat CTA and call IR  - if agreeable obtain cardio consult - will likely need clearance for any planned procedure     Pulmonary :   - CT chest non con : Nonspecific  bilateral peripheral predominant ground-glass opacities, left greater than  right, with questionable peripheral reticulation and bronchiolectasis. No  obvious consolidation  - pulmonary not concerned for alveolar hemorrhage   - Pulmonary toilet, Incentive spirometry    #HO CAD SP CABG, Bioprosthetic AVR, HFrEF (EF 27%)   - Not in exacerbation   - s/p AVR 2011  -restarted on lasix 40 mg po once daily  - Monitor accurate in/out  - Daily weight   - Monitor SaO2 and O2 requirements: Currently SAO2 98% on RA  - Trend electrolytes and keep K>4 and Mg>2    #HO AFIB on Xarelto  # HO DVT   - Rate controlled  - Hold Xarelto for now   - Keep K>4 and Mg>2  - cont home meds     #CKD III  - Trend BUN and Cr daily  - Avoid nephrotoxic agents  - cont Olmesartan 20mg QD for now  - UA : moderate hematuria   - U protein / creatinine :0.8    #CAD s/p CABG x4 in 2006  - cont. Home Lopressor 25mg AM and 12.5mg PM, Aspirin 81mg QD, Rosuvastatin 5mg QD  - No active chest pain      #Right Peroneal, Popliteal, and Posterior Tibial DVT  - Holding Xarelto for now   - LE duplex : no DVT     #HTN  -c/w home meds     #s/p COVID Pneumonia in Dec   Resolved    Others  - DVT Prophylaxis: SCDs , xarelto on hold due to anemia  - GI Prophylaxis: Pantoprazole 40mg PO QD  - Diet: DASH/ TLC  - Code Status: DNR DNI

## 2022-03-04 NOTE — PROGRESS NOTE ADULT - SUBJECTIVE AND OBJECTIVE BOX
REMA, SAÚL  81y  Male      Patient is a 81y old  Male who presents with a chief complaint of anemia.      INTERVAL HPI/OVERNIGHT EVENTS:      ******************************* REVIEW OF SYSTEMS:**********************************************    All other review of systems negative    *********************** VITALS ******************************************    T(F): 97.4 (03-04-22 @ 15:20)  HR: 67 (03-04-22 @ 15:20) (65 - 98)  BP: 147/68 (03-04-22 @ 15:20) (121/60 - 178/77)  RR: 18 (03-04-22 @ 15:20) (17 - 18)  SpO2: 99% (03-04-22 @ 15:20) (94% - 99%)            ******************************** PHYSICAL EXAM:**************************************************  GENERAL: NAD    PSYCH: no agitation, baseline mentation  HEENT:     NERVOUS SYSTEM:  Alert & Oriented X3,   PULMONARY: JACK, CTA    CARDIOVASCULAR: S1S2 RRR    GI: Soft, NT, ND; BS present.    EXTREMITIES:  2+ Peripheral Pulses, No clubbing, cyanosis, or edema    LYMPH: No lymphadenopathy noted    SKIN: No rashes or lesions      **************************** LABS *******************************************************                          7.3    5.17  )-----------( 138      ( 04 Mar 2022 06:40 )             22.9     03-04    139  |  106  |  22<H>  ----------------------------<  74  3.9   |  25  |  1.9<H>    Ca    8.5      04 Mar 2022 06:40  Mg     2.2     03-04    TPro  4.7<L>  /  Alb  3.0<L>  /  TBili  0.3  /  DBili  x   /  AST  11  /  ALT  8   /  AlkPhos  52  03-04        PT/INR - ( 04 Mar 2022 06:40 )   PT: 13.10 sec;   INR: 1.14 ratio         PTT - ( 04 Mar 2022 06:40 )  PTT:36.9 sec  Lactate Trend        CAPILLARY BLOOD GLUCOSE      POCT Blood Glucose.: 96 mg/dL (03 Mar 2022 21:42)          **************************Active Medications *******************************************  No Known Allergies      acetaminophen     Tablet .. 650 milliGRAM(s) Oral every 6 hours PRN  aluminum hydroxide/magnesium hydroxide/simethicone Suspension 30 milliLiter(s) Oral every 4 hours PRN  aMIOdarone    Tablet 200 milliGRAM(s) Oral two times a day  atorvastatin 20 milliGRAM(s) Oral at bedtime  furosemide    Tablet 40 milliGRAM(s) Oral daily  hydrALAZINE 10 milliGRAM(s) Oral three times a day  iron sucrose IVPB 100 milliGRAM(s) IV Intermittent every 24 hours  isosorbide   mononitrate ER Tablet (IMDUR) 30 milliGRAM(s) Oral daily  melatonin 3 milliGRAM(s) Oral at bedtime PRN  metoprolol succinate ER 50 milliGRAM(s) Oral daily  ondansetron Injectable 4 milliGRAM(s) IV Push every 8 hours PRN  pantoprazole    Tablet 40 milliGRAM(s) Oral two times a day  polyethylene glycol 3350 17 Gram(s) Oral daily  senna 2 Tablet(s) Oral at bedtime  tamsulosin 0.4 milliGRAM(s) Oral at bedtime      ***************************************************  RADIOLOGY & ADDITIONAL TESTS:    Imaging Personally Reviewed:  [ ] YES  [ ] NO    HEALTH ISSUES - PROBLEM Dx:

## 2022-03-04 NOTE — PROGRESS NOTE ADULT - ASSESSMENT
CKD stage 3-4  recently improved NICA / CRS  anemia, macrocytic  hematochezia  hemoptysis   chronic systolic CHF  chronic pedal edema  Afib / s/p AVR (procine) / CAD / CABG / s/p DCC  DVT / PE on eliquis  hx of COVID PNA  HTN   anxiety        plan:    PRBC transfusion  IV lasix with transfusion  cont venofer 100mg iv q24h x 5 days or until discharge (low iron stores last admission)  f/u B12, folate and hemolysis w/u (ldh, haptoglobin)  cont lasix 40mg po qd    cont hydralazine, lopressor, imdur, flomax  off ACE-I / ARB / entresto  full code  d/w Dr. Murphy

## 2022-03-04 NOTE — PROGRESS NOTE ADULT - ASSESSMENT
IMPRESSION:    Non-lifethreatening Hemoptysis / mild (low hb unrelated to hemoptysis/ CXR clear )  Acute blood loss/ anemia  HO DVT  HO AFIB on Xarelto  HO CAD SP CABG, Bioprosthetic AVR, HFrEF (EF 27%)     PLAN:    CT CHEST NC reviewed  check LE duplex  GI following  Pulmonary toilet  Incentive spirometry  HOB 45  Serial CBC  Will follow   IMPRESSION:    Non-lifethreatening Hemoptysis / mild (low hb unrelated to hemoptysis/ CXR clear )  Acute blood loss/ anemia  HO DVT  HO AFIB on Xarelto  HO CAD SP CABG, Bioprosthetic AVR, HFrEF (EF 27%)     PLAN:    CT CHEST NC reviewed  GI following  Pulmonary toilet  Incentive spirometry  HOB 45  Serial CBC  pul standpoint OP fup

## 2022-03-05 ENCOUNTER — TRANSCRIPTION ENCOUNTER (OUTPATIENT)
Age: 82
End: 2022-03-05

## 2022-03-05 VITALS
RESPIRATION RATE: 18 BRPM | SYSTOLIC BLOOD PRESSURE: 143 MMHG | HEART RATE: 67 BPM | TEMPERATURE: 96 F | DIASTOLIC BLOOD PRESSURE: 66 MMHG

## 2022-03-05 LAB
APTT BLD: 37.5 SEC — SIGNIFICANT CHANGE UP (ref 27–39.2)
INR BLD: 1.13 RATIO — SIGNIFICANT CHANGE UP (ref 0.65–1.3)
MAGNESIUM SERPL-MCNC: 2 MG/DL — SIGNIFICANT CHANGE UP (ref 1.8–2.4)
PROT SERPL-MCNC: 4.7 G/DL — LOW (ref 6–8.3)
PROT SERPL-MCNC: 4.7 G/DL — LOW (ref 6–8.3)
PROTHROM AB SERPL-ACNC: 13 SEC — HIGH (ref 9.95–12.87)

## 2022-03-05 PROCEDURE — 99239 HOSP IP/OBS DSCHRG MGMT >30: CPT

## 2022-03-05 RX ADMIN — Medication 50 MILLIGRAM(S): at 06:04

## 2022-03-05 RX ADMIN — Medication 10 MILLIGRAM(S): at 06:05

## 2022-03-05 RX ADMIN — AMIODARONE HYDROCHLORIDE 200 MILLIGRAM(S): 400 TABLET ORAL at 06:05

## 2022-03-05 RX ADMIN — Medication 40 MILLIGRAM(S): at 06:05

## 2022-03-05 RX ADMIN — PANTOPRAZOLE SODIUM 40 MILLIGRAM(S): 20 TABLET, DELAYED RELEASE ORAL at 06:04

## 2022-03-05 NOTE — DISCHARGE NOTE PROVIDER - CARE PROVIDER_API CALL
Brando Murphy)  Internal Medicine  [address] [place] [address]  Phone: [phone]  Fax: [phone]  Follow Up Time: 1-3 days

## 2022-03-05 NOTE — DISCHARGE NOTE PROVIDER - HOSPITAL COURSE
81 year old male with PMHx of CAD s/p CABG x4 in 2006 (Cardiologist Dr Sebastian Mendoza), HFrEF s/p AVR 2011, CKD III, COVID complicated by DVT, HTN, recently diagnosed with AF on Xarelto. Presents to the ED after routine blood work obtained o/p. Pt was found to have Hb of 5.8. Rpt Hb in the ED is 6.6. s/p 2u pRBC. Xarelto to be held per cardiologist.    In the ED, pt remains hemodynamically stable. Labs show Hb 6.6 , Cr 2.1     #Acute on chronic anemia ( macrocytic )  - Non-lifethreatening Hemoptysis / mild (low hb unrelated to hemoptysis/ CXR clear )  - Denies melena, bloody BM, coffee ground emesis  - RYAN performed - negative   - Baseline Hgb around 11  - s/p 2u pRBC - f/u rpt CBC to check for appropriate response  - Trend H/H ; next Hb at 8pm   - transfuse to Hb  >8 (IV lasix with transfusion)  - Keep active type and screen   - f/u iron profile, vit B12: 449, folate : 7.5  - Holding anticoagulation   - FOBT positive    - Venofer 100mg iv q24h x 5 days  - ldh :172, haptoglobin: 87     - GI :   - pt will need colonoscopy +/- EGD however pt is refusing to undergo endoscopic workup at this time.   - if HB keeps dropping / jignesh blood recall GI  ; Xarelto will need to be held for 4 days for procedure  - bowel regimen - senna x2 tabs and miralax  - INR corrected to <1.5   - If unstable stat CTA and call IR    Pulmonary :   - CT chest non con : Nonspecific  bilateral peripheral predominant ground-glass opacities, left greater than  right, with questionable peripheral reticulation and bronchiolectasis. No  obvious consolidation  - pulmonary not concerned for alveolar hemorrhage   - Pulmonary toilet, Incentive spirometry    #HO CAD SP CABG, Bioprosthetic AVR, HFrEF (EF 27%)   - Not in exacerbation   - s/p AVR 2011  -restarted on lasix 40 mg po once daily  - Monitor accurate in/out  - Daily weight   - Monitor SaO2 and O2 requirements: Currently SAO2 98% on RA  - Trend electrolytes and keep K>4 and Mg>2    #HO AFIB on Xarelto  # HO DVT   - Rate controlled  - Hold Xarelto for now   - Keep K>4 and Mg>2  - cont home meds     #CKD III  - Trend BUN and Cr daily  - Avoid nephrotoxic agents  - cont Olmesartan 20mg QD for now  - UA : moderate hematuria   - U protein / creatinine :0.8    #CAD s/p CABG x4 in 2006  - cont. Home Lopressor 25mg AM and 12.5mg PM, Aspirin 81mg QD, Rosuvastatin 5mg QD  - No active chest pain      #Right Peroneal, Popliteal, and Posterior Tibial DVT  - Holding Xarelto for now   - LE duplex : no DVT     #HTN  -c/w home meds     #s/p COVID Pneumonia in Dec   Resolved    Patient is signing out AMA.   Will not wait for repeat Hb to ensure stabilized.  patient understands the risk of leaving

## 2022-03-05 NOTE — DISCHARGE NOTE PROVIDER - NSDCFUSCHEDAPPT_GEN_ALL_CORE_FT
SAÚL HODGSON ; 03/14/2022 ; [facility] SAÚL Slade ; 03/24/2022 ; [facility] [address]

## 2022-03-05 NOTE — PROGRESS NOTE ADULT - SUBJECTIVE AND OBJECTIVE BOX
81 year old male with PMHx of CAD s/p CABG x4 in 2006 (Cardiologist Dr Sebastian Mendoza), HFrEF s/p AVR 2011, CKD III, COVID complicated by DVT, HTN, recently diagnosed with AF on Xarelto. Presents to the ED after routine blood work obtained o/p. Pt was found to have Hb of 5.8. Rpt Hb in the ED is 6.6. s/p 2u pRBC. Xarelto to be held per cardiologist.    no active bleeding at this time  sp prbc x3 units        ROS: no cp, no sob, no n/v, no fever    PAST MEDICAL & SURGICAL HISTORY:  Chronic systolic congestive heart failure    HTN (hypertension)    Dyslipidemia        Vital Signs Last 24 Hrs  T(C): 35.8 (05 Mar 2022 05:04), Max: 37 (04 Mar 2022 20:34)  T(F): 96.4 (05 Mar 2022 05:04), Max: 98.6 (04 Mar 2022 20:34)  HR: 67 (05 Mar 2022 05:04) (65 - 67)  BP: 143/66 (05 Mar 2022 05:04) (140/66 - 147/68)  BP(mean): --  RR: 18 (05 Mar 2022 05:04) (18 - 19)  SpO2: 97% (04 Mar 2022 20:34) (97% - 99%)    physical exam  constitutional NAD, AAOX3, Respiratory  lungs CTA, CVS heart RRR, GI: abdomen Soft NT, ND, BS+, skin: intact  neuro exam Motor, sensory and CN normal, no deficit     MEDICATIONS  (STANDING):  aMIOdarone    Tablet 200 milliGRAM(s) Oral two times a day  atorvastatin 20 milliGRAM(s) Oral at bedtime  furosemide    Tablet 40 milliGRAM(s) Oral daily  hydrALAZINE 10 milliGRAM(s) Oral three times a day  iron sucrose IVPB 100 milliGRAM(s) IV Intermittent every 24 hours  isosorbide   mononitrate ER Tablet (IMDUR) 30 milliGRAM(s) Oral daily  metoprolol succinate ER 50 milliGRAM(s) Oral daily  pantoprazole    Tablet 40 milliGRAM(s) Oral two times a day  polyethylene glycol 3350 17 Gram(s) Oral daily  senna 2 Tablet(s) Oral at bedtime  tamsulosin 0.4 milliGRAM(s) Oral at bedtime    MEDICATIONS  (PRN):  acetaminophen     Tablet .. 650 milliGRAM(s) Oral every 6 hours PRN Temp greater or equal to 38C (100.4F), Mild Pain (1 - 3)  aluminum hydroxide/magnesium hydroxide/simethicone Suspension 30 milliLiter(s) Oral every 4 hours PRN Dyspepsia  melatonin 3 milliGRAM(s) Oral at bedtime PRN Insomnia  ondansetron Injectable 4 milliGRAM(s) IV Push every 8 hours PRN Nausea and/or Vomiting      CAPILLARY BLOOD GLUCOSE                              9.4    7.47  )-----------( 156      ( 04 Mar 2022 20:00 )             29.6     03-04    139  |  106  |  22<H>  ----------------------------<  74  3.9   |  25  |  1.9<H>    Ca    8.5      04 Mar 2022 06:40  Mg     2.0     03-05    TPro  4.7<L>  /  Alb  3.0<L>  /  TBili  0.3  /  DBili  x   /  AST  11  /  ALT  8   /  AlkPhos  52  03-04    Ferritin, Serum: 264 ng/mL [30 - 400] (03-03-22 @ 11:00)  Ferritin, Serum: 223 ng/mL [30 - 400] (01-28-22 @ 16:00)  D-Dimer Assay, Quantitative: 165 ng/mL DDU [0 - 230] (01-28-22 @ 16:00)    COVID-19 PCR: NotDetec (03-02-22 @ 22:35)    creatinine trend  1.9 (03-04-22 @ 06:40)  1.9 (03-03-22 @ 23:00)  1.9 (03-03-22 @ 11:00)  1.9 (03-03-22 @ 11:00)  2.1 (03-02-22 @ 22:25)      PT/INR - ( 05 Mar 2022 04:30 )   PT: 13.00 sec;   INR: 1.13 ratio      PTT - ( 05 Mar 2022 04:30 )  PTT:37.5 sec    < from: CT Chest No Cont (03.03.22 @ 19:39) >  No comparison CT chest available.    Breathing motion artifact is present limiting evaluation. Nonspecific   bilateral peripheral predominant groundglass opacities, left greater than   right, with questionable peripheral reticulation and bronchiolectasis. No   obvious consolidation    Trace right pleural effusion. Small left pleural effusion.    Age indeterminate compression L1 vertebral body.    < end of copied text >    a/p  # severe anemia, due to active bleeding , source not confirmed yet.    sp transfusion, x3 units of PRBC  holding anticoagulation   no bleeding at this time   needs GI workup which he is refusing   also need to monitor cbc for at least another 24hrs to make sure he is stable but pt does not want to stay and wants to sign ama, risks and benefits was explained to him, he understands and still wants to leave ama     # ckd stable     # cad and afib, fu with  cardio     #Progress Note Handoff  Pending (specify): if he stays , needs trending cbc   Family discussion: dw pt extensively, tried to convince him to stay. but he left ama   Disposition: Home, AMA

## 2022-03-05 NOTE — DISCHARGE NOTE PROVIDER - NSDCMRMEDTOKEN_GEN_ALL_CORE_FT
amiodarone 200 mg oral tablet: 1 tab(s) orally 2 times a day  aspirin 81 mg oral tablet, chewable: 1 tab(s) orally once a day  atorvastatin 20 mg oral tablet: 1 tab(s) orally once a day (at bedtime)  hydrALAZINE 10 mg oral tablet: 1 tab(s) orally 3 times a day  isosorbide mononitrate 30 mg oral tablet, extended release: 1 tab(s) orally once a day  LATANOPROST  0.005 % SOLN:   Metoprolol Succinate ER 50 mg oral tablet, extended release: 1 tab(s) orally once a day   rivaroxaban 15 mg oral tablet: 1 tab(s) orally once a day (before a meal)  tamsulosin 0.4 mg oral capsule: 1 cap(s) orally once a day (at bedtime)

## 2022-03-05 NOTE — DISCHARGE NOTE PROVIDER - NSDCCPCAREPLAN_GEN_ALL_CORE_FT
PRINCIPAL DISCHARGE DIAGNOSIS  Diagnosis: Anemia  Assessment and Plan of Treatment: You came to the hospital anemic and coughing with streaks of blood   You were seen by pulmonology and they worked you up with no concern for an acute pumonary casue of your bleed.  Your bleeding and anemia is likely from a gastrointestinal origin. As you refused endoscopy and colonoscopy inpatinet please follow up with your primary and GI outpatinet to have them perfomed.  We trasnfused you with blood and were watching your hemoglobin. A confirmatiory test was not done (as you left AMA) to check if your hemoglobin had stabalized.   Please follow up ASAP with your outpatient provider.

## 2022-03-08 LAB
% ALBUMIN: 55.8 % — SIGNIFICANT CHANGE UP
% ALPHA 1: 6.3 % — SIGNIFICANT CHANGE UP
% ALPHA 2: 14.2 % — SIGNIFICANT CHANGE UP
% BETA: 10.4 % — SIGNIFICANT CHANGE UP
% GAMMA: 13.3 % — SIGNIFICANT CHANGE UP
% M SPIKE: SIGNIFICANT CHANGE UP
ALBUMIN SERPL ELPH-MCNC: 2.6 G/DL — LOW (ref 3.6–5.5)
ALBUMIN/GLOB SERPL ELPH: 1.2 RATIO — SIGNIFICANT CHANGE UP
ALPHA1 GLOB SERPL ELPH-MCNC: 0.3 G/DL — SIGNIFICANT CHANGE UP (ref 0.1–0.4)
ALPHA2 GLOB SERPL ELPH-MCNC: 0.7 G/DL — SIGNIFICANT CHANGE UP (ref 0.5–1)
B-GLOBULIN SERPL ELPH-MCNC: 0.5 G/DL — SIGNIFICANT CHANGE UP (ref 0.5–1)
GAMMA GLOBULIN: 0.6 G/DL — SIGNIFICANT CHANGE UP (ref 0.6–1.6)
INTERPRETATION SERPL IFE-IMP: SIGNIFICANT CHANGE UP
M-SPIKE: SIGNIFICANT CHANGE UP (ref 0–0)
PROT PATTERN SERPL ELPH-IMP: SIGNIFICANT CHANGE UP

## 2022-03-09 DIAGNOSIS — I50.22 CHRONIC SYSTOLIC (CONGESTIVE) HEART FAILURE: ICD-10-CM

## 2022-03-09 DIAGNOSIS — Z86.718 PERSONAL HISTORY OF OTHER VENOUS THROMBOSIS AND EMBOLISM: ICD-10-CM

## 2022-03-09 DIAGNOSIS — D62 ACUTE POSTHEMORRHAGIC ANEMIA: ICD-10-CM

## 2022-03-09 DIAGNOSIS — R31.9 HEMATURIA, UNSPECIFIED: ICD-10-CM

## 2022-03-09 DIAGNOSIS — Z66 DO NOT RESUSCITATE: ICD-10-CM

## 2022-03-09 DIAGNOSIS — I48.20 CHRONIC ATRIAL FIBRILLATION, UNSPECIFIED: ICD-10-CM

## 2022-03-09 DIAGNOSIS — K92.1 MELENA: ICD-10-CM

## 2022-03-09 DIAGNOSIS — E78.5 HYPERLIPIDEMIA, UNSPECIFIED: ICD-10-CM

## 2022-03-09 DIAGNOSIS — K64.9 UNSPECIFIED HEMORRHOIDS: ICD-10-CM

## 2022-03-09 DIAGNOSIS — Z95.1 PRESENCE OF AORTOCORONARY BYPASS GRAFT: ICD-10-CM

## 2022-03-09 DIAGNOSIS — I13.0 HYPERTENSIVE HEART AND CHRONIC KIDNEY DISEASE WITH HEART FAILURE AND STAGE 1 THROUGH STAGE 4 CHRONIC KIDNEY DISEASE, OR UNSPECIFIED CHRONIC KIDNEY DISEASE: ICD-10-CM

## 2022-03-09 DIAGNOSIS — D64.9 ANEMIA, UNSPECIFIED: ICD-10-CM

## 2022-03-09 DIAGNOSIS — N18.30 CHRONIC KIDNEY DISEASE, STAGE 3 UNSPECIFIED: ICD-10-CM

## 2022-03-09 DIAGNOSIS — Z86.16 PERSONAL HISTORY OF COVID-19: ICD-10-CM

## 2022-03-09 DIAGNOSIS — Z86.73 PERSONAL HISTORY OF TRANSIENT ISCHEMIC ATTACK (TIA), AND CEREBRAL INFARCTION WITHOUT RESIDUAL DEFICITS: ICD-10-CM

## 2022-03-09 DIAGNOSIS — Z95.3 PRESENCE OF XENOGENIC HEART VALVE: ICD-10-CM

## 2022-03-09 DIAGNOSIS — Z79.01 LONG TERM (CURRENT) USE OF ANTICOAGULANTS: ICD-10-CM

## 2022-03-09 DIAGNOSIS — D53.9 NUTRITIONAL ANEMIA, UNSPECIFIED: ICD-10-CM

## 2022-03-09 DIAGNOSIS — Z53.20 PROCEDURE AND TREATMENT NOT CARRIED OUT BECAUSE OF PATIENT'S DECISION FOR UNSPECIFIED REASONS: ICD-10-CM

## 2022-03-09 DIAGNOSIS — R04.2 HEMOPTYSIS: ICD-10-CM

## 2022-03-09 DIAGNOSIS — Z79.82 LONG TERM (CURRENT) USE OF ASPIRIN: ICD-10-CM

## 2022-03-09 DIAGNOSIS — I25.10 ATHEROSCLEROTIC HEART DISEASE OF NATIVE CORONARY ARTERY WITHOUT ANGINA PECTORIS: ICD-10-CM

## 2022-03-09 DIAGNOSIS — F41.9 ANXIETY DISORDER, UNSPECIFIED: ICD-10-CM

## 2022-03-14 ENCOUNTER — APPOINTMENT (OUTPATIENT)
Dept: CARDIOLOGY | Facility: CLINIC | Age: 82
End: 2022-03-14

## 2022-03-24 ENCOUNTER — APPOINTMENT (OUTPATIENT)
Dept: CARDIOLOGY | Facility: CLINIC | Age: 82
End: 2022-03-24
Payer: MEDICARE

## 2022-03-24 VITALS
DIASTOLIC BLOOD PRESSURE: 80 MMHG | HEART RATE: 61 BPM | SYSTOLIC BLOOD PRESSURE: 150 MMHG | BODY MASS INDEX: 30.92 KG/M2 | TEMPERATURE: 97.5 F | HEIGHT: 70 IN | WEIGHT: 216 LBS

## 2022-03-24 PROCEDURE — 93000 ELECTROCARDIOGRAM COMPLETE: CPT

## 2022-03-24 PROCEDURE — 99214 OFFICE O/P EST MOD 30 MIN: CPT

## 2022-03-24 RX ORDER — VALSARTAN AND HYDROCHLOROTHIAZIDE 320; 12.5 MG/1; MG/1
320-12.5 TABLET, FILM COATED ORAL
Qty: 90 | Refills: 3 | Status: DISCONTINUED | COMMUNITY
Start: 2020-10-29 | End: 2022-03-24

## 2022-03-24 RX ORDER — CLOPIDOGREL BISULFATE 75 MG/1
75 TABLET, FILM COATED ORAL
Qty: 90 | Refills: 0 | Status: DISCONTINUED | COMMUNITY
Start: 2021-09-29 | End: 2022-03-24

## 2022-03-24 RX ORDER — METOPROLOL TARTRATE 50 MG/1
50 TABLET, FILM COATED ORAL
Qty: 60 | Refills: 0 | Status: DISCONTINUED | COMMUNITY
End: 2022-03-24

## 2022-03-24 RX ORDER — ROSUVASTATIN CALCIUM 5 MG/1
5 TABLET, FILM COATED ORAL DAILY
Refills: 0 | Status: DISCONTINUED | COMMUNITY
End: 2022-03-24

## 2022-03-24 RX ORDER — EZETIMIBE AND SIMVASTATIN 10; 20 MG/1; MG/1
10-20 TABLET ORAL DAILY
Refills: 0 | Status: DISCONTINUED | COMMUNITY
End: 2022-03-24

## 2022-03-24 NOTE — PHYSICAL EXAM

## 2022-03-24 NOTE — DISCUSSION/SUMMARY
[FreeTextEntry1] : Pt is a 81 yo M with PMHx of CABG x 4 (2006), porcine aortic valve replacement (2011), HTN, HLD, CKD, who is being evaluated for abnormal MRI brain results. Reviewed pt's imaging, there is subtle restricted diffusion of the inferior anterior right cerebellum and right lateral medulla, [id number] hyperintensity in this same distribution. 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 Symptoms/history consistent with stroke in this region as well. 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 Continue ASA and atorvastatin. Received lab results from PCP office. HbA1c 5.5, LDL is 85 and  as reported by Neurology.  Patient is now on atorvastatin  20 mg po daily changed from crestor  by the hospital staff.\par Patient is at target goal with respect to his lipid levels.\par He was previously on vytorin 10/20 mg with good  lipid levels as well.\par 2D echo doppler results were reviewed from his prior admission.\par RV in 6 weeks.

## 2022-03-24 NOTE — HISTORY OF PRESENT ILLNESS
Chief Complaint   Patient presents with    Diabetes    Follow-up        Patient ID: Danny Beltrán is a 62 y o  female  81-year-old patient in for follow-up  Diabetes uncontrolled  Admits to noncompliance with meds and diet  Not exercising,  Continues to smoke -aware of potential health hazards assoc with nicotine use  Has tried Nicoderm patches as well pharm  agents to help with cessation  Due for eye exam   Up-to-date with pneumonia vaccines  Yearly flu shot recommended  Past Medical History:   Diagnosis Date    Anxiety     Arthritis     Asthma     Back pain     Breast lump     last assessed 10/14/14     Carpal tunnel syndrome 2006    unspecifiedd laterality / last assessed 10/14/14     COPD (chronic obstructive pulmonary disease) ([facility] )     with exacerbation / last assessed 14     Coronary artery disease involving native coronary artery of native heart with angina pectoris ([facility] ) 2019    Depression     Diabetes mellitus ([facility] )     GERD (gastroesophageal reflux disease)     Hypercholesterolemia     Hyperlipidemia     Hypertension     Insomnia     Intolerance to cold     Intolerance to heat     Irregular heart beat     Low back pain     Lumbosacral disc disease     Nodule of tendon sheath     last assessed 2/5/15     Obesity     Peripheral neuropathy     Shortness of breath     Sleep apnea     Type 2 diabetes mellitus with hyperglycemia ([facility] )     last assessed 17     Varicose vein of leg        Past Surgical History:   Procedure Laterality Date    BACK SURGERY      lower fusion    CAUDAL BLOCK N/A 2017    Procedure: BLOCK / INJECTION CAUDAL;  Surgeon: Austin Ayon MD;  [facility] MA[facility] OR;  Service: Pain Management     CAUDAL BLOCK N/A 2018    Procedure: Caudal Epidural Steroid Injection (08460);   Surgeon: Austin Ayon MD;  Location: [facility] MAIN OR;  Service: Pain Management      SECTION      LAMINECTOMY      Lumbar 2005       Patient Active Problem List   Diagnosis    Chronic pain syndrome    Chronic low back pain    Postlaminectomy syndrome, not elsewhere classified    Adjacent segment disease with spinal stenosis    Spondylolisthesis of lumbar region    Groin pain, left    Chronic obstructive pulmonary disease ([facility] )    Depression with anxiety    Type 2 diabetes mellitus with hyperglycemia, without long-term current use of insulin ([facility])    Disc degeneration, lumbosacral    Hypertension    Hyperlipidemia    Insomnia    Lumbar radiculopathy    Nicotine dependence    Complication of surgical procedure    Varicose veins of both lower extremities with pain    Varicose veins with inflammation    Cervical pain (neck)    Myofascial pain syndrome    Primary osteoarthritis of one hip, left    Pain in left hip    Morbid obesity (HCC)    Postlaminectomy syndrome, lumbar region    Low back pain    Shortness of breath    Centrilobular emphysema (HCC)    Daytime hypersomnolence    Obstructive sleep apnea    Alveolar hypoventilation    Abnormal stress test    Coronary artery disease involving native coronary artery of native heart with angina pectoris ([facility])    Tobacco abuse    Colon cancer screening    BMI 40 0-44 9, adult ([facility] )       Family History   Problem Relation Age of Onset    Diabetes Mother     Heart disease Mother     Dementia Father     Heart disease Father     Heart disease Sister     Diabetes Sister     Diabetes Brother        Immunization History   Administered Date(s) Administered    Influenza Quadrivalent Preservative Free 3 years and older IM 11/01/2016, 10/02/2017    Influenza TIV (IM) 01/06/2005, 10/07/2010, 10/07/2013, 10/14/2014, 09/16/2015    Influenza, injectable, quadrivalent, preservative free 0 5 mL 10/10/2018    Influenza, recombinant, quadrivalent,injectable, preservative free 09/10/2019    Pneumococcal Conjugate 13-Valent 05/18/2016    Pneumococcal Polysaccharide PPV23 10/07/2013, 10/10/2018    Tdap 09/21/2016       No Known Allergies    Current Outpatient Medications   Medication Sig Dispense Refill    albuterol (PROVENTIL HFA,VENTOLIN HFA) 90 mcg/act inhaler INHALE TWO PUFFS BY MOUTH EVERY FOUR HOURS AS NEEDED FOR WHEEZING 18 Inhaler 5    atorvastatin (LIPITOR) 80 mg tablet TAKE 1 TABLET BY MOUTH ONCE A DAY 90 tablet 3    buPROPion (WELLBUTRIN) 100 mg tablet Take 1 tablet (100 mg total) by mouth 2 (two) times a day 60 tablet 5    DULoxetine (CYMBALTA) 30 mg delayed release capsule Take 1 capsule (30 mg total) by mouth daily 90 capsule 3    DULoxetine (CYMBALTA) 60 mg delayed release capsule Take 1 capsule (60 mg total) by mouth daily 90 capsule 3    fluticasone (FLONASE) 50 mcg/act nasal spray SPRAY 2 SPRAYS INTO EACH NOSTRIL EVERY DAY 16 mL 3    fluticasone-salmeterol (AIRDUO RESPICLICK) 075-32 mcg/act dry powder inhaler Inhale 1 puff 2 (two) times a day Rinse mouth after use   1 Inhaler 3    glucose blood (ONE TOUCH ULTRA TEST) test strip TEST ONCE A DAY-dx code: E11 9 100 each 3    Insulin Pen Needle 31G X 5 MM MISC Inject as directed daily For use w victoza pen 30 each 3    lisinopril-hydrochlorothiazide (PRINZIDE,ZESTORETIC) 20-25 MG per tablet Take 1 tablet by mouth daily 90 tablet 3    pregabalin (LYRICA) 100 mg capsule Take 1 capsule (100 mg total) by mouth 3 (three) times a day 90 capsule 0    VICTOZA injection INJECT 0 3 ML (1 8 MG TOTAL) UNDER THE SKIN DAILY 3 pen 3    VICTOZA injection INJECT 0 3 ML (1 8 MG TOTAL) UNDER THE SKIN DAILY (Patient not taking: Reported on 9/26/2019) 6 pen 3    metFORMIN (GLUCOPHAGE) 1000 MG tablet TAKE 2 TABLETS (2,000 MG TOTAL) BY MOUTH DAILY FOR 90 DAYS 180 tablet 1    nicotine (NICODERM CQ) 21 mg/24 hr TD 24 hr patch Place 1 patch on the skin every 24 hours (Patient not taking: Reported on 9/26/2019) 28 patch 0    NICOTROL 10 MG inhaler INHALE 1 PUFF AS NEEDED FOR SMOKING CESSATION FOR UP TO 30 DAYS (Patient not [FreeTextEntry1] : Pt is a 82 yo M with PMHx of CABG x 4 (2006), porcine aortic valve replacement (2011), HTN, HLD, CKD, who presents c/o abnormal MRI brain. States that he woke up on 7/28 with sudden right sided hearing loss, gait imbalance and vertical binocular diplopia. Symptoms have slowly improved since then, diplopia is nearly resolved, only occurs first thing in the morning.  Balance has improved, occasionally has difficulty and requires a sitting stool in the shower. Ambulates without a walking device. No known prior strokes. He was not on any ATP  is not true. The patient was on ASA 81 mg. on his last Cardiology office visit in 2019 as well as a statin Vytorin.  No known h/o afib/flutter. He saw an ENT specialist when this all first began, was given a steroid taper for possible viral etiology.  taking: Reported on 8/8/2019) 168 each 1    QUEtiapine (SEROquel) 25 mg tablet TAKE 1 TABLET BY MOUTH TWICE A DAY 60 tablet 5    Tapentadol HCl ER (NUCYNTA ER) 50 MG TB12 Take 1 tablet (50 mg total) by mouth every 12 (twelve) hoursMax Daily Amount: 100 mg 60 tablet 0    umeclidinium bromide (INCRUSE ELLIPTA) 62 5 mcg/inh AEPB inhaler Inhale 1 puff daily 30 each 3     No current facility-administered medications for this visit  Social History     Socioeconomic History    Marital status: Single     Spouse name: None    Number of children: None    Years of education: None    Highest education level: None   Occupational History     Comment: unemployed   Social Needs    Financial resource strain: None    Food insecurity:     Worry: None     Inability: None    Transportation needs:     Medical: None     Non-medical: None   Tobacco Use    Smoking status: Current Every Day Smoker     Packs/day: 0 25     Years: 30 00     Pack years: 7 50     Types: Cigarettes    Smokeless tobacco: Never Used    Tobacco comment: 10-12 cigs  a day   Substance and Sexual Activity    Alcohol use: No    Drug use: No    Sexual activity: None   Lifestyle    Physical activity:     Days per week: None     Minutes per session: None    Stress: None   Relationships    Social connections:     Talks on phone: None     Gets together: None     Attends [employer] service: None     Active member of club or organization: None     Attends meetings of clubs or organizations: None     Relationship status: None    Intimate partner violence:     Fear of current or ex partner: None     Emotionally abused: None     Physically abused: None     Forced sexual activity: None   Other Topics Concern    None   Social History Narrative     per allscript     Lives alone without help available       Review of Systems   Constitutional: Positive for fatigue  Respiratory:        [place]IN   Cardiovascular: Negative for chest pain  Gastrointestinal:        Rhetta Costilla   Endocrine:        Uncontrolled DM   Musculoskeletal: Positive for arthralgias, back pain and myalgias  Psychiatric/Behavioral: Positive for sleep disturbance  The patient is nervous/anxious  Objective:    /70 (BP Location: Left arm, Patient Position: Sitting, Cuff Size: Standard)   Pulse 78   Temp 98 6 °F (37 °C) (Tympanic)   Resp 18   Ht 5' 3" (1 6 m)   Wt 110 kg (242 lb)   BMI 42 87 kg/m²        Physical Exam   Constitutional: She is oriented to person, place, and time  Obese, chronically ill   Eyes: Conjunctivae are normal    Neck: Neck supple  Cardiovascular: Normal rate, regular rhythm and intact distal pulses  Pulmonary/Chest: Effort normal  No respiratory distress  She has wheezes (few scattered exp, no localization)  Abdominal: Soft  Bowel sounds are normal  There is no tenderness  Neurological: She is alert and oriented to person, place, and time  No cranial nerve deficit  Skin: Skin is warm and dry  No rash noted  Psychiatric: She has a normal mood and affect  Nursing note and vitals reviewed  Assessment/Plan:    BMI 40 0-44 9, adult ([facility])  BMI Counseling: Body mass index is 42 87 kg/m²  Discussed the patient's BMI with her  The BMI is above normal  Nutrition recommendations include reducing portion sizes, decreasing overall calorie intake, 3-5 servings of fruits/vegetables daily, reducing fast food intake, consuming healthier snacks and decreasing soda and/or juice intake  Exercise recommendations include exercising 3-5 times per week and strength training exercises  Diagnoses and all orders for this visit:    Type 2 diabetes mellitus with hyperglycemia, without long-term current use of insulin ([facility] )  Comments:  uncontrolled  encouraged compliance w meds and diet  t/c ref to endoc for dm & obesity if no improvement w next check  Orders:  -     Cancel: Urine culture;  Future  -     Cancel: Urine culture  - Microalbumin / creatinine urine ratio  -     Ambulatory referral to Ophthalmology; Future  -     POCT urine dip auto non-scope    Nicotine dependence with nicotine-induced disorder, unspecified nicotine product type  Comments:  +COPD-rx bupropion t/c restart nicoderm patch-otc  Orders:  -     nicotine (NICODERM CQ) 21 mg/24 hr TD 24 hr patch; Place 1 patch on the skin every 24 hours (Patient not taking: Reported on 9/26/2019)    Essential hypertension  Comments:  BP wnl -cont same meds  mainatin yearly eye exams  -ref to ophth given  Orders:  -     Ambulatory referral to Ophthalmology;  Future    BMI 40 0-44 9, adult ([facility] )    Other orders  -     Please Note          Ronnie Santacruz MD

## 2022-03-24 NOTE — REASON FOR VISIT
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

## 2022-05-24 ENCOUNTER — APPOINTMENT (OUTPATIENT)
Dept: CARDIOLOGY | Facility: CLINIC | Age: 82
End: 2022-05-24

## 2022-06-16 ENCOUNTER — APPOINTMENT (OUTPATIENT)
Dept: CARDIOLOGY | Facility: CLINIC | Age: 82
End: 2022-06-16
Payer: MEDICARE

## 2022-06-16 VITALS
DIASTOLIC BLOOD PRESSURE: 90 MMHG | HEART RATE: 80 BPM | BODY MASS INDEX: 32.35 KG/M2 | SYSTOLIC BLOOD PRESSURE: 170 MMHG | WEIGHT: 226 LBS | HEIGHT: 70 IN | TEMPERATURE: 97.5 F

## 2022-06-16 PROCEDURE — 93000 ELECTROCARDIOGRAM COMPLETE: CPT

## 2022-06-16 PROCEDURE — 99214 OFFICE O/P EST MOD 30 MIN: CPT

## 2022-06-16 RX ORDER — TAMSULOSIN HYDROCHLORIDE 0.4 MG/1
0.4 CAPSULE ORAL
Qty: 30 | Refills: 0 | Status: DISCONTINUED | COMMUNITY
Start: 2022-02-04 | End: 2022-06-16

## 2022-06-16 RX ORDER — RIVAROXABAN 15 MG/1
15 TABLET, FILM COATED ORAL
Qty: 30 | Refills: 0 | Status: DISCONTINUED | COMMUNITY
Start: 2022-02-02 | End: 2022-06-16

## 2022-06-16 NOTE — DISCUSSION/SUMMARY
[FreeTextEntry1] : Pt is a 79 yo M with PMHx of CABG x 4 (2006), porcine aortic valve replacement (2011), HTN, HLD, CKD, who is being evaluated for abnormal MRI brain results. Reviewed pt's imaging, there is subtle restricted diffusion of the inferior anterior right cerebellum and right lateral medulla, [id number] hyperintensity in this same distribution. 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 Symptoms/history consistent with stroke in this region as well. 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 Continue ASA and atorvastatin. Received lab results  and reviewed them with the patient. Patient is now on atorvastatin  20 mg po daily changed from crestor  by the hospital staff.\par Patient is at target goal with respect to his lipid levels.\par He was previously on vytorin 10/20 mg with good  lipid levels as well.\par 2D echo doppler results were reviewed.\par RV in 6 months

## 2022-06-16 NOTE — HISTORY OF PRESENT ILLNESS
[FreeTextEntry1] : Pt is a 80 yo M with PMHx of CABG x 4 (2006), porcine aortic valve replacement (2011), HTN, HLD, CKD, who presents c/o abnormal MRI brain. States that he woke up on 7/28 with sudden right sided hearing loss, gait imbalance and vertical binocular diplopia. Symptoms have slowly improved since then, diplopia is nearly resolved, only occurs first thing in the morning.  Balance has improved, occasionally has difficulty and requires a sitting stool in the shower. Ambulates without a walking device. No known prior strokes. He was not on any ATP  is not true. The patient was on ASA 81 mg. on his last Cardiology office visit in 2019 as well as a statin Vytorin.  No known h/o afib/flutter. He saw an ENT specialist when this all first began, was given a steroid taper for possible viral etiology.

## 2022-06-16 NOTE — PHYSICAL EXAM
[Well Developed] : well developed [Well Nourished] : well nourished [No Acute Distress] : no acute distress [Normal Conjunctiva] : normal conjunctiva [Normal Venous Pressure] : normal venous pressure [No Carotid Bruit] : no carotid bruit [Normal S1, S2] : normal S1, S2 [No Rub] : no rub [No Gallop] : no gallop [Murmur] : murmur [Clear Lung Fields] : clear lung fields [Good Air Entry] : good air entry [No Respiratory Distress] : no respiratory distress  [Non Tender] : non-tender [Soft] : abdomen soft [No Masses/organomegaly] : no masses/organomegaly [Normal Bowel Sounds] : normal bowel sounds [Normal Gait] : normal gait [No Edema] : no edema [No Cyanosis] : no cyanosis [No Clubbing] : no clubbing [No Varicosities] : no varicosities [No Rash] : no rash [No Skin Lesions] : no skin lesions [Moves all extremities] : moves all extremities [Normal Speech] : normal speech [No Focal Deficits] : no focal deficits [Alert and Oriented] : alert and oriented [Normal memory] : normal memory [de-identified] : II/VI systolic murmur at RSB

## 2022-10-06 NOTE — DISCHARGE NOTE PROVIDER - DISCHARGE DIET
Patient is a 68y old  Female who presents with a chief complaint of abdominal pain/ acute diverticulitis (06 Oct 2022 06:47)       INTERVAL HPI/OVERNIGHT EVENTS: Seen and examined at bedside. Abdominal pain is better. Denies chest pain, sob     MEDICATIONS  (STANDING):  influenza  Vaccine (HIGH DOSE) 0.7 milliLiter(s) IntraMuscular once  losartan 50 milliGRAM(s) Oral daily  melatonin 5 milliGRAM(s) Oral at bedtime  pantoprazole  Injectable 40 milliGRAM(s) IV Push daily  piperacillin/tazobactam IVPB.. 3.375 Gram(s) IV Intermittent every 8 hours  sodium chloride 0.9%. 1000 milliLiter(s) (75 mL/Hr) IV Continuous <Continuous>    MEDICATIONS  (PRN):  acetaminophen     Tablet .. 1000 milliGRAM(s) Oral every 6 hours PRN Mild Pain (1 - 3)  ketorolac 10 milliGRAM(s) Oral every 6 hours PRN Moderate Pain (4 - 6)  ondansetron Injectable 4 milliGRAM(s) IV Push every 6 hours PRN Nausea  zolpidem 5 milliGRAM(s) Oral at bedtime PRN Insomnia  zolpidem 5 milliGRAM(s) Oral at bedtime PRN Insomnia      Allergies    metal jewelry other than gold (Rash)  No Known Drug Allergies    Intolerances        REVIEW OF SYSTEMS:  CONSTITUTIONAL: No fever, weight loss, or fatigue  EYES: No eye pain, visual disturbances, or discharge  ENMT:  No difficulty hearing, tinnitus, vertigo; No sinus or throat pain  NECK: No pain or stiffness  RESPIRATORY: No cough, wheezing, chills or hemoptysis; No shortness of breath  CARDIOVASCULAR: No chest pain, palpitations, dizziness, or leg swelling  GASTROINTESTINAL: + abdominal  pain. No nausea, vomiting, or hematemesis  GENITOURINARY: No dysuria, frequency, hematuria, or incontinence  NEUROLOGICAL: No headaches, memory loss, loss of strength, numbness, or tremors  SKIN: No itching, burning, rashes, or lesions   LYMPH NODES: No enlarged glands  MUSCULOSKELETAL: No joint pain or swelling; No muscle, back, or extremity pain  HEME/LYMPH: No easy bruising, or bleeding gums  ALLERGY AND IMMUNOLOGIC: No hives or eczema    Vital Signs Last 24 Hrs  T(C): 36.5 (06 Oct 2022 05:15), Max: 37.1 (05 Oct 2022 20:10)  T(F): 97.7 (06 Oct 2022 05:15), Max: 98.7 (05 Oct 2022 20:10)  HR: 68 (06 Oct 2022 05:15) (59 - 68)  BP: 153/73 (06 Oct 2022 05:15) (150/72 - 166/75)  BP(mean): --  RR: 16 (06 Oct 2022 05:15) (16 - 20)  SpO2: 94% (06 Oct 2022 05:15) (93% - 95%)    Parameters below as of 06 Oct 2022 05:15  Patient On (Oxygen Delivery Method): room air        PHYSICAL EXAM:  GENERAL: NAD  HEAD:  Atraumatic, Normocephalic  EYES: EOMI, PERRLA, conjunctiva and sclera clear  ENMT: No tonsillar erythema, exudates, or enlargement; Moist mucous membranes  NECK: Supple, No JVD, Normal thyroid  NERVOUS SYSTEM:  Alert & Oriented X3, Good concentration; Motor Strength 5/5 B/L upper and lower extremities; DTRs 2+ intact and symmetric  CHEST/LUNG: Clear to auscultation bilaterally; No rales, rhonchi, wheezing, or rubs  HEART: Regular rate and rhythm; No murmurs, rubs, or gallops  ABDOMEN: Soft, Nondistended; Bowel sounds present  EXTREMITIES:  2+ Peripheral Pulses, No clubbing, cyanosis, or edema  LYMPH: No lymphadenopathy noted  SKIN: No rashes or lesions  LABS:                        11.4   7.78  )-----------( 202      ( 06 Oct 2022 07:10 )             34.4     06 Oct 2022 07:10    144    |  111    |  9      ----------------------------<  106    3.9     |  28     |  0.77     Ca    8.7        06 Oct 2022 07:10      PT/INR - ( 06 Oct 2022 07:10 )   PT: 36.3 sec;   INR: 3.07 ratio         PTT - ( 06 Oct 2022 07:10 )  PTT:46.7 sec  CAPILLARY BLOOD GLUCOSE        BLOOD CULTURE  10-04 @ 15:40   >=3 organisms. Probable collection contamination.  --  --    RADIOLOGY & ADDITIONAL TESTS:    Imaging Personally Reviewed:  [ ] YES     Consultant(s) Notes Reviewed:      Care Discussed with Consultants/Other Providers: DASH Diet

## 2023-01-24 NOTE — ED ADULT NURSE NOTE - HOW OFTEN DO YOU HAVE A DRINK CONTAINING ALCOHOL?
Erector spinae Procedure Note    Pre-Procedure   Staff -        Anesthesiologist:  Keny Gross MD       Performed By: Anesthesiologist       Location: pre-op       Pre-Anesthestic Checklist: patient identified, IV checked, site marked, risks and benefits discussed, informed consent, monitors and equipment checked, at physician/surgeon's request and post-op pain management  Timeout:       Correct Patient: Yes        Correct Procedure: Yes        Correct Site: Yes        Correct Position: Yes        Correct Laterality: Yes        Site Marked: Yes  Procedure Documentation  Procedure: Erector spinae       Laterality: left       Patient Position: sitting       Patient Prep/Sterile Barriers: sterile gloves, mask       Skin prep: Chloraprep       Local skin infiltrated with mL of 1% lidocaine.        Needle Type: insulated and short bevel (Arrow)       Needle Gauge: 21.        Needle Length (millimeters): 90        Ultrasound guided       1. Ultrasound was used to identify targeted nerve, plexus, vascular marker, or fascial plane and place a needle adjacent to it in real-time.       2. Ultrasound was used to visualize the spread of anesthetic in close proximity to the above referenced structure.       3. A permanent image is entered into the patient's record.       4. The visualized anatomic structures appeared normal.       5. There were no apparent abnormal pathologic findings.    Assessment/Narrative         The placement was negative for: blood aspirated, painful injection and site bleeding       Paresthesias: No.       Bolus given via needle..        Secured via.        Insertion/Infusion Method: Single Shot       Complications: none    Medication(s) Administered   Ropivacaine 0.5% w/ 1:400K Epi (Injection) - Injection   20 mL - 1/24/2023 12:30:00 PM   Comments:  Peripheral nerve block performed at request of the primary medical team.      Postoperative pain block requested by surgeon for severe postoperative  "pain.  Patient brought to Preop for procedure.      Pt tolerated well.    No complications.      The surgeon has given a verbal order transferring care of this patient to me for the performance of a regional analgesia block for post-op pain control. It is requested of me because I am uniquely trained and qualified to perform this block and the surgeon is neither trained nor qualified to perform this procedure.    DREAD GIBBS MD   January 24, 2023 12:34 PM         FOR [facility] ([facility] ONLY:   Pain Team Contact information: please page the Pain Team Via [employer]. Search \"Pain\". During daytime hours, please page the attending first. At night please page the resident first.    " Never

## 2023-01-30 ENCOUNTER — TRANSCRIPTION ENCOUNTER (OUTPATIENT)
Age: 83
End: 2023-01-30

## 2023-01-30 ENCOUNTER — APPOINTMENT (OUTPATIENT)
Dept: CARDIOLOGY | Facility: CLINIC | Age: 83
End: 2023-01-30
Payer: MEDICARE

## 2023-01-30 VITALS
HEIGHT: 70 IN | HEART RATE: 86 BPM | SYSTOLIC BLOOD PRESSURE: 128 MMHG | WEIGHT: 243 LBS | BODY MASS INDEX: 34.79 KG/M2 | DIASTOLIC BLOOD PRESSURE: 74 MMHG

## 2023-01-30 PROCEDURE — 99214 OFFICE O/P EST MOD 30 MIN: CPT | Mod: 25

## 2023-01-30 PROCEDURE — 93000 ELECTROCARDIOGRAM COMPLETE: CPT

## 2023-01-30 NOTE — DISCUSSION/SUMMARY
[FreeTextEntry1] : Pt is a 79 yo M with PMHx of CABG x 4 (2006), porcine aortic valve replacement (2011), HTN, HLD, CKD, who is being evaluated for abnormal MRI brain results. Reviewed pt's imaging, there is subtle restricted diffusion of the inferior anterior right cerebellum and right lateral medulla, [id number] hyperintensity in this same distribution. 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 Symptoms/history consistent with stroke in this region as well. Likely small vessel lipohyalinosis, vs embolic from large vessel atherosclerotic vs. cardioembolic. Continue ASA and atorvastatin. Received lab results  and reviewed them with the patient. Patient is now on atorvastatin  20 mg po daily changed from crestor  by the hospital staff.\par Patient is at target goal with respect to his lipid levels.\par He was previously on vytorin 10/20 mg with good  lipid levels as well.\par EKG: NSR,rate of 86 bpm, PVC's.\par 2D echo doppler results were reviewed on prior office visit.\par Labtest results were reviewed with the patient\par RV in 6 months

## 2023-01-30 NOTE — REASON FOR VISIT
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 He is here to also review his lab results.

## 2023-01-30 NOTE — PHYSICAL EXAM
[Well Developed] : well developed [Well Nourished] : well nourished [No Acute Distress] : no acute distress [Normal Conjunctiva] : normal conjunctiva [Normal Venous Pressure] : normal venous pressure [No Carotid Bruit] : no carotid bruit [Normal S1, S2] : normal S1, S2 [No Rub] : no rub [No Gallop] : no gallop [Murmur] : murmur [Clear Lung Fields] : clear lung fields [Good Air Entry] : good air entry [No Respiratory Distress] : no respiratory distress  [Soft] : abdomen soft [Non Tender] : non-tender [No Masses/organomegaly] : no masses/organomegaly [Normal Bowel Sounds] : normal bowel sounds [Normal Gait] : normal gait [No Edema] : no edema [No Cyanosis] : no cyanosis [No Clubbing] : no clubbing [No Rash] : no rash [No Varicosities] : no varicosities [No Skin Lesions] : no skin lesions [Moves all extremities] : moves all extremities [No Focal Deficits] : no focal deficits [Normal Speech] : normal speech [Alert and Oriented] : alert and oriented [Normal memory] : normal memory [de-identified] : II/VI systolic murmur at RSB

## 2023-01-30 NOTE — ASSESSMENT
[FreeTextEntry1] : ASHD\par CABG\par AVR\par HHD\par Hyperlipidemia \par S/P CVA\par S/P COVID-19 infection with respiratory failure\par CKD\par Anemia\par MARJORIE

## 2023-07-30 ENCOUNTER — NON-APPOINTMENT (OUTPATIENT)
Age: 83
End: 2023-07-30

## 2023-07-31 ENCOUNTER — APPOINTMENT (OUTPATIENT)
Dept: CARDIOLOGY | Facility: CLINIC | Age: 83
End: 2023-07-31
Payer: MEDICARE

## 2023-07-31 VITALS
BODY MASS INDEX: 34.79 KG/M2 | HEART RATE: 98 BPM | SYSTOLIC BLOOD PRESSURE: 140 MMHG | DIASTOLIC BLOOD PRESSURE: 80 MMHG | HEIGHT: 70 IN | WEIGHT: 243 LBS

## 2023-07-31 DIAGNOSIS — U07.1 COVID-19: ICD-10-CM

## 2023-07-31 PROCEDURE — 99214 OFFICE O/P EST MOD 30 MIN: CPT | Mod: 25

## 2023-07-31 PROCEDURE — 93000 ELECTROCARDIOGRAM COMPLETE: CPT

## 2023-07-31 NOTE — HISTORY OF PRESENT ILLNESS
[FreeTextEntry1] : Pt is a 82 yo M with PMHx of CABG x 4 (2006), porcine aortic valve replacement (2011), HTN, HLD, CKD, who presents c/o abnormal MRI brain. States that he woke up on 7/28 with sudden right sided hearing loss, gait imbalance and vertical binocular diplopia. Symptoms have slowly improved since then, diplopia is nearly resolved, only occurs first thing in the morning.  Balance has improved, occasionally has difficulty and requires a sitting stool in the shower. Ambulates without a walking device. No known prior strokes. He was not on any ATP  is not true. The patient was on ASA 81 mg. on his last Cardiology office visit in 2019 as well as a statin Vytorin.  No known h/o afib/flutter. He saw an ENT specialist when this all first began, was given a steroid taper for possible viral etiology.

## 2023-07-31 NOTE — DISCUSSION/SUMMARY
[FreeTextEntry1] : Pt is a 79 yo M with PMHx of CABG x 4 (2006), porcine aortic valve replacement (2011), HTN, HLD, CKD, who is being evaluated as part of a follow up visit. Reviewed pt's imaging, there is subtle restricted diffusion of the inferior anterior right cerebellum and right lateral medulla, [id number] hyperintensity in this same distribution. 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 Symptoms/history consistent with stroke in this region as well. Likely small vessel lipohyalinosis, vs embolic from large vessel atherosclerotic vs. cardioembolic. Continue ASA and atorvastatin. Received lab results  and reviewed them with the patient. Patient is now on atorvastatin  20 mg po daily changed from crestor  by the hospital staff. Patient is at target goal with respect to his lipid levels. He was previously on vytorin 10/20 mg with good  lipid levels as well. EKG: NSR,rate of 86 bpm, PVC's. 2D echo doppler results were reviewed on prior office visit. Lab test results were reviewed with the patient. EKG: NSR, LBBB RV in 6 months [EKG obtained to assist in diagnosis and management of assessed problem(s)] : EKG obtained to assist in diagnosis and management of assessed problem(s)

## 2023-07-31 NOTE — PHYSICAL EXAM

## 2023-08-12 NOTE — DISCHARGE NOTE NURSING/CASE MANAGEMENT/SOCIAL WORK - NSDCPEXARELTODIET_GEN_ALL_CORE
Eat healthy foods you enjoy. Rivaroxaban/Xarelto DOES NOT have a special diet. Limit your alcohol intake. Oriented to time, place, person, situation

## 2023-10-16 ENCOUNTER — INPATIENT (INPATIENT)
Facility: HOSPITAL | Age: 83
LOS: 1 days | Discharge: HOME | DRG: 291 | End: 2023-10-18
Attending: INTERNAL MEDICINE | Admitting: INTERNAL MEDICINE
Payer: MEDICARE

## 2023-10-16 VITALS
SYSTOLIC BLOOD PRESSURE: 194 MMHG | HEIGHT: 69 IN | OXYGEN SATURATION: 96 % | HEART RATE: 82 BPM | RESPIRATION RATE: 28 BRPM | TEMPERATURE: 98 F | WEIGHT: 237 LBS | DIASTOLIC BLOOD PRESSURE: 104 MMHG

## 2023-10-16 DIAGNOSIS — E87.70 FLUID OVERLOAD, UNSPECIFIED: ICD-10-CM

## 2023-10-16 DIAGNOSIS — Z95.1 PRESENCE OF AORTOCORONARY BYPASS GRAFT: Chronic | ICD-10-CM

## 2023-10-16 LAB
ALBUMIN SERPL ELPH-MCNC: 4.3 G/DL — SIGNIFICANT CHANGE UP (ref 3.5–5.2)
ALP SERPL-CCNC: 80 U/L — SIGNIFICANT CHANGE UP (ref 30–115)
ALT FLD-CCNC: 18 U/L — SIGNIFICANT CHANGE UP (ref 0–41)
ANION GAP SERPL CALC-SCNC: 14 MMOL/L — SIGNIFICANT CHANGE UP (ref 7–14)
AST SERPL-CCNC: 26 U/L — SIGNIFICANT CHANGE UP (ref 0–41)
BASOPHILS # BLD AUTO: 0.04 K/UL — SIGNIFICANT CHANGE UP (ref 0–0.2)
BASOPHILS NFR BLD AUTO: 0.5 % — SIGNIFICANT CHANGE UP (ref 0–1)
BILIRUB SERPL-MCNC: 0.7 MG/DL — SIGNIFICANT CHANGE UP (ref 0.2–1.2)
BUN SERPL-MCNC: 32 MG/DL — HIGH (ref 10–20)
CALCIUM SERPL-MCNC: 9.7 MG/DL — SIGNIFICANT CHANGE UP (ref 8.4–10.5)
CHLORIDE SERPL-SCNC: 105 MMOL/L — SIGNIFICANT CHANGE UP (ref 98–110)
CO2 SERPL-SCNC: 22 MMOL/L — SIGNIFICANT CHANGE UP (ref 17–32)
CREAT SERPL-MCNC: 2.3 MG/DL — HIGH (ref 0.7–1.5)
EGFR: 28 ML/MIN/1.73M2 — LOW
EOSINOPHIL # BLD AUTO: 0.07 K/UL — SIGNIFICANT CHANGE UP (ref 0–0.7)
EOSINOPHIL NFR BLD AUTO: 0.9 % — SIGNIFICANT CHANGE UP (ref 0–8)
GLUCOSE SERPL-MCNC: 99 MG/DL — SIGNIFICANT CHANGE UP (ref 70–99)
HCT VFR BLD CALC: 37.9 % — LOW (ref 42–52)
HGB BLD-MCNC: 11.7 G/DL — LOW (ref 14–18)
IMM GRANULOCYTES NFR BLD AUTO: 0.7 % — HIGH (ref 0.1–0.3)
LYMPHOCYTES # BLD AUTO: 0.53 K/UL — LOW (ref 1.2–3.4)
LYMPHOCYTES # BLD AUTO: 7.1 % — LOW (ref 20.5–51.1)
MCHC RBC-ENTMCNC: 29 PG — SIGNIFICANT CHANGE UP (ref 27–31)
MCHC RBC-ENTMCNC: 30.9 G/DL — LOW (ref 32–37)
MCV RBC AUTO: 93.8 FL — SIGNIFICANT CHANGE UP (ref 80–94)
MONOCYTES # BLD AUTO: 0.52 K/UL — SIGNIFICANT CHANGE UP (ref 0.1–0.6)
MONOCYTES NFR BLD AUTO: 7 % — SIGNIFICANT CHANGE UP (ref 1.7–9.3)
NEUTROPHILS # BLD AUTO: 6.21 K/UL — SIGNIFICANT CHANGE UP (ref 1.4–6.5)
NEUTROPHILS NFR BLD AUTO: 83.8 % — HIGH (ref 42.2–75.2)
NRBC # BLD: 0 /100 WBCS — SIGNIFICANT CHANGE UP (ref 0–0)
NT-PROBNP SERPL-SCNC: HIGH PG/ML (ref 0–300)
PLATELET # BLD AUTO: 211 K/UL — SIGNIFICANT CHANGE UP (ref 130–400)
PMV BLD: 10.9 FL — HIGH (ref 7.4–10.4)
POTASSIUM SERPL-MCNC: 4.8 MMOL/L — SIGNIFICANT CHANGE UP (ref 3.5–5)
POTASSIUM SERPL-SCNC: 4.8 MMOL/L — SIGNIFICANT CHANGE UP (ref 3.5–5)
PROT SERPL-MCNC: 6.4 G/DL — SIGNIFICANT CHANGE UP (ref 6–8)
RBC # BLD: 4.04 M/UL — LOW (ref 4.7–6.1)
RBC # FLD: 17.1 % — HIGH (ref 11.5–14.5)
SODIUM SERPL-SCNC: 141 MMOL/L — SIGNIFICANT CHANGE UP (ref 135–146)
TROPONIN T SERPL-MCNC: 0.04 NG/ML — CRITICAL HIGH
WBC # BLD: 7.42 K/UL — SIGNIFICANT CHANGE UP (ref 4.8–10.8)
WBC # FLD AUTO: 7.42 K/UL — SIGNIFICANT CHANGE UP (ref 4.8–10.8)

## 2023-10-16 PROCEDURE — 71045 X-RAY EXAM CHEST 1 VIEW: CPT

## 2023-10-16 PROCEDURE — 83036 HEMOGLOBIN GLYCOSYLATED A1C: CPT

## 2023-10-16 PROCEDURE — 84466 ASSAY OF TRANSFERRIN: CPT

## 2023-10-16 PROCEDURE — 76770 US EXAM ABDO BACK WALL COMP: CPT

## 2023-10-16 PROCEDURE — 83540 ASSAY OF IRON: CPT

## 2023-10-16 PROCEDURE — 85025 COMPLETE CBC W/AUTO DIFF WBC: CPT

## 2023-10-16 PROCEDURE — 80061 LIPID PANEL: CPT

## 2023-10-16 PROCEDURE — 93970 EXTREMITY STUDY: CPT

## 2023-10-16 PROCEDURE — 71045 X-RAY EXAM CHEST 1 VIEW: CPT | Mod: 26

## 2023-10-16 PROCEDURE — 99285 EMERGENCY DEPT VISIT HI MDM: CPT

## 2023-10-16 PROCEDURE — 99223 1ST HOSP IP/OBS HIGH 75: CPT

## 2023-10-16 PROCEDURE — 83735 ASSAY OF MAGNESIUM: CPT

## 2023-10-16 PROCEDURE — 93306 TTE W/DOPPLER COMPLETE: CPT

## 2023-10-16 PROCEDURE — 85027 COMPLETE CBC AUTOMATED: CPT

## 2023-10-16 PROCEDURE — 84156 ASSAY OF PROTEIN URINE: CPT

## 2023-10-16 PROCEDURE — 83550 IRON BINDING TEST: CPT

## 2023-10-16 PROCEDURE — 82570 ASSAY OF URINE CREATININE: CPT

## 2023-10-16 PROCEDURE — 80053 COMPREHEN METABOLIC PANEL: CPT

## 2023-10-16 PROCEDURE — 84443 ASSAY THYROID STIM HORMONE: CPT

## 2023-10-16 PROCEDURE — 81001 URINALYSIS AUTO W/SCOPE: CPT

## 2023-10-16 PROCEDURE — 84484 ASSAY OF TROPONIN QUANT: CPT

## 2023-10-16 PROCEDURE — 36415 COLL VENOUS BLD VENIPUNCTURE: CPT

## 2023-10-16 PROCEDURE — 93010 ELECTROCARDIOGRAM REPORT: CPT

## 2023-10-16 PROCEDURE — 82728 ASSAY OF FERRITIN: CPT

## 2023-10-16 RX ORDER — HEPARIN SODIUM 5000 [USP'U]/ML
5000 INJECTION INTRAVENOUS; SUBCUTANEOUS EVERY 8 HOURS
Refills: 0 | Status: DISCONTINUED | OUTPATIENT
Start: 2023-10-16 | End: 2023-10-18

## 2023-10-16 RX ORDER — ACETAMINOPHEN 500 MG
650 TABLET ORAL EVERY 6 HOURS
Refills: 0 | Status: DISCONTINUED | OUTPATIENT
Start: 2023-10-16 | End: 2023-10-18

## 2023-10-16 RX ORDER — LATANOPROST 0.05 MG/ML
0 SOLUTION/ DROPS OPHTHALMIC; TOPICAL
Qty: 0 | Refills: 0 | DISCHARGE

## 2023-10-16 RX ORDER — HYDRALAZINE HCL 50 MG
10 TABLET ORAL THREE TIMES A DAY
Refills: 0 | Status: DISCONTINUED | OUTPATIENT
Start: 2023-10-16 | End: 2023-10-18

## 2023-10-16 RX ORDER — LEVOTHYROXINE SODIUM 125 MCG
50 TABLET ORAL DAILY
Refills: 0 | Status: DISCONTINUED | OUTPATIENT
Start: 2023-10-16 | End: 2023-10-18

## 2023-10-16 RX ORDER — ATORVASTATIN CALCIUM 80 MG/1
20 TABLET, FILM COATED ORAL AT BEDTIME
Refills: 0 | Status: DISCONTINUED | OUTPATIENT
Start: 2023-10-16 | End: 2023-10-18

## 2023-10-16 RX ORDER — BUMETANIDE 0.25 MG/ML
2 INJECTION INTRAMUSCULAR; INTRAVENOUS ONCE
Refills: 0 | Status: COMPLETED | OUTPATIENT
Start: 2023-10-16 | End: 2023-10-16

## 2023-10-16 RX ORDER — LEVOTHYROXINE SODIUM 125 MCG
1 TABLET ORAL
Refills: 0 | DISCHARGE

## 2023-10-16 RX ORDER — METOPROLOL TARTRATE 50 MG
50 TABLET ORAL DAILY
Refills: 0 | Status: DISCONTINUED | OUTPATIENT
Start: 2023-10-16 | End: 2023-10-18

## 2023-10-16 RX ORDER — ASPIRIN/CALCIUM CARB/MAGNESIUM 324 MG
81 TABLET ORAL DAILY
Refills: 0 | Status: DISCONTINUED | OUTPATIENT
Start: 2023-10-16 | End: 2023-10-18

## 2023-10-16 RX ORDER — BUMETANIDE 0.25 MG/ML
2 INJECTION INTRAMUSCULAR; INTRAVENOUS
Refills: 0 | Status: DISCONTINUED | OUTPATIENT
Start: 2023-10-16 | End: 2023-10-16

## 2023-10-16 RX ORDER — BUMETANIDE 0.25 MG/ML
1 INJECTION INTRAMUSCULAR; INTRAVENOUS
Refills: 0 | Status: DISCONTINUED | OUTPATIENT
Start: 2023-10-16 | End: 2023-10-17

## 2023-10-16 RX ADMIN — Medication 10 MILLIGRAM(S): at 16:55

## 2023-10-16 RX ADMIN — BUMETANIDE 1 MILLIGRAM(S): 0.25 INJECTION INTRAMUSCULAR; INTRAVENOUS at 19:51

## 2023-10-16 RX ADMIN — BUMETANIDE 2 MILLIGRAM(S): 0.25 INJECTION INTRAMUSCULAR; INTRAVENOUS at 10:43

## 2023-10-16 RX ADMIN — ATORVASTATIN CALCIUM 20 MILLIGRAM(S): 80 TABLET, FILM COATED ORAL at 23:21

## 2023-10-16 RX ADMIN — HEPARIN SODIUM 5000 UNIT(S): 5000 INJECTION INTRAVENOUS; SUBCUTANEOUS at 23:21

## 2023-10-16 NOTE — H&P ADULT - NSHPPHYSICALEXAM_GEN_ALL_CORE
GENERAL: NAD, lying in bed comfortably  NECK: Supple  CHEST/LUNG: Clear to auscultation bilaterally; No wheezing/crackles  HEART: Regular rate and rhythm; No murmurs  ABDOMEN: Bowel sounds present; Soft, Nontender, Nondistended  EXTREMITIES:  + Peripheral Pulses, no edema  NERVOUS SYSTEM:  Alert & Oriented X3, no new focal deficits  SKIN: No rashes or lesions GENERAL: NAD, sitting in chair comfortably  NECK: Supple  CHEST/LUNG: Clear to auscultation bilaterally;   HEART: Regular rate and rhythm; No murmurs  ABDOMEN: Bowel sounds present; Soft, Nontender, Nondistended  EXTREMITIES:  + Peripheral Pulses, no edema  NERVOUS SYSTEM:  Alert & Oriented X3, no new focal deficits  SKIN: No rashes or lesions

## 2023-10-16 NOTE — H&P ADULT - ATTENDING COMMENTS
81 y/o M with PMH Afib initially on Xarelto but taken off due to GI bleed and epistaxis, CAD s/p CABG  follows with Dr Hanson, HFrEF, AVR , CKD III, HTN presenting for shortness of breath on exertion, orthopnea x1 week and sinus syptoms. Pt reports he stopped taking his Bumex about 2 weeks ago because he did not like having to go to the bathroom so often. Pt describes the SOB to be exacerbated by lying down and upon walking. Pt denies any new leg swelling although he does have Rt extremity more swollen than the rest but he states it has been like that for years since he had a procedure done on his LE. Pt states he saw his Cardiologist 2 months ago and was told that all his tests were normal. Upon my interview pt endorses significant improvement of his symptoms after the bumex push.    In the ED pts VS were T(C): 36.9  T(F): 98.4  HR: 82  BP: 194/104  RR: 28  SpO2: 96%  Labs show: H.7   Creatinine:2.3 (baseline 2-3)  CXR shows BL Pleural effusions    Acute on Chronic HF rEF possibly due to Volume overload from medication noncompliance.  Hx of CABG   >>  IV Bumex, fluid restriction, I/o , daily weight.  >> f/u TTE to assess LVEF , Trops   >> Consider Heart failure consult if no clinical improvement     >> plan d/w the patient at bedside. 81 y/o M with PMH Afib initially on Xarelto but taken off due to GI bleed and epistaxis, CAD s/p CABG  follows with Dr Hanson, HFrEF, AVR , CKD III, HTN presenting for shortness of breath on exertion, orthopnea x1 week and sinus syptoms. Pt reports he stopped taking his Bumex about 2 weeks ago because he did not like having to go to the bathroom so often. Pt describes the SOB to be exacerbated by lying down and upon walking. Pt denies any new leg swelling although he does have Rt extremity more swollen than the rest but he states it has been like that for years since he had a procedure done on his LE. Pt states he saw his Cardiologist 2 months ago and was told that all his tests were normal. Upon my interview pt endorses significant improvement of his symptoms after the bumex push.    In the ED pts VS were T(C): 36.9  T(F): 98.4  HR: 82  BP: 194/104  RR: 28  SpO2: 96%  Labs show: H.7   Creatinine:2.3 (baseline 2-3)  CXR shows BL Pleural effusions    Acute on Chronic HF rEF possibly due to Volume overload from medication noncompliance.  Hx of CABG   >>  IV Bumex, fluid restriction, I/o , daily weight.  >> f/u TTE to assess LVEF , Trops   >> Consider Heart failure consult if no clinical improvement     HTN (uncontrolled)  >> likely due to volume overload, re assess after adequate diuresis.    >> plan d/w the patient at bedside.

## 2023-10-16 NOTE — H&P ADULT - NSHPLABSRESULTS_GEN_ALL_CORE
11.7   7.42  )-----------( 211      ( 16 Oct 2023 11:00 )             37.9       10-16    141  |  105  |  32<H>  ----------------------------<  99  4.8   |  22  |  2.3<H>    Ca    9.7      16 Oct 2023 11:00    TPro  6.4  /  Alb  4.3  /  TBili  0.7  /  DBili  x   /  AST  26  /  ALT  18  /  AlkPhos  80  10-16              Urinalysis Basic - ( 16 Oct 2023 11:00 )    Color: x / Appearance: x / SG: x / pH: x  Gluc: 99 mg/dL / Ketone: x  / Bili: x / Urobili: x   Blood: x / Protein: x / Nitrite: x   Leuk Esterase: x / RBC: x / WBC x   Sq Epi: x / Non Sq Epi: x / Bacteria: x            Lactate Trend      CARDIAC MARKERS ( 16 Oct 2023 11:00 )  x     / 0.04 ng/mL / x     / x     / x            CAPILLARY BLOOD GLUCOSE

## 2023-10-16 NOTE — ED ADULT TRIAGE NOTE - CHIEF COMPLAINT QUOTE
Pt sent in from doctor for new onset A-Fib Pt sent in from doctor for new onset A-Fib. Pt also with history of CHF with recent weight gain.

## 2023-10-16 NOTE — H&P ADULT - NSHPREVIEWOFSYSTEMS_GEN_ALL_CORE
CONSTITUTIONAL - No fever, No diaphoresis, No weight change  SKIN - No rash  HEMATOLOGIC - No abnormal bleeding or bruising  EYES - No eye pain, No blurred vision  ENT - No change in hearing, No sore throat, No neck pain, No rhinorrhea, No ear pain  RESPIRATORY - No shortness of breath, No cough  CARDIAC -No chest pain, No palpitations  GI - No abdominal pain, No nausea, No vomiting, No diarrhea, No constipation, No bright red blood per rectum or melena. No flank pain  - No dysuria, frequency, hematuria.   ENDO - No polydypsia, No polyuria, No heat/cold intolerance  MUSCULOSKELETAL - No joint paint, No swelling, No back pain  NEUROLOGIC - No numbness, No focal weakness, No headache, No dizziness  All other systems negative, unless specified in HPI CONSTITUTIONAL - No fever, No diaphoresis, No weight change  SKIN - No rash  HEMATOLOGIC - No abnormal bleeding or bruising  EYES - No eye pain, No blurred vision  ENT - No change in hearing, No sore throat, No neck pain, No rhinorrhea, No ear pain  RESPIRATORY - see HPI  CARDIAC -No chest pain, No palpitations  GI - No abdominal pain, No nausea, No vomiting, No diarrhea, No constipation, No bright red blood per rectum or melena. No flank pain  - No dysuria, frequency, hematuria.   ENDO - No polydypsia, No polyuria, No heat/cold intolerance  MUSCULOSKELETAL - No joint paint, No swelling, No back pain  NEUROLOGIC - No numbness, No focal weakness, No headache, No dizziness  All other systems negative, unless specified in HPI

## 2023-10-16 NOTE — H&P ADULT - HISTORY OF PRESENT ILLNESS
83 y/o M with PMH Afib initially on Xarelto but taken off due to GI bleed and epistaxis, CAD s/p CABG , HFrEF, AVR , CKD III, HTN presenting for shortness of breath on exertion, orthopnea x1 week. Pt reports he stopped taking his Bumex about 2 weeks ago because he did not like having to go to the bathroom so often. Endorses leg swelling but at baseline. No chest pain. Denies extremity pain/swelling, abd pain, N/V, cough, fever, URI symptoms. No recent travelling or procedures. Pt sees Dr. Mendoza, states he was seen a few months ago and everything was normal. Nephrologist Dr. Pimentel - but states he does not have any kidney issues    In the ED pts VS were T(C): 36.9  T(F): 98.4  HR: 82  BP: 194/104  RR: 28  SpO2: 96%  Labs show: H.7   Creatinine:2.3 (baseline 2-3)  CXR shows BL Pleural effusions      Pt is admitted for further workup and management of HF exacerbation   81 y/o M with PMH Afib initially on Xarelto but taken off due to GI bleed and epistaxis, CAD s/p CABG  follows with Dr Hanson, HFrEF, AVR , CKD III, HTN presenting for shortness of breath on exertion, orthopnea x1 week and sinus syptoms. Pt reports he stopped taking his Bumex about 2 weeks ago because he did not like having to go to the bathroom so often. Pt describes the SOB to be exacerbated by lying down and upon walking. Pt denies any new leg swelling although he does have Rt extremity more swollen than the rest but he states it has been like that for years since he had a procedure done on his LE. Pt states he saw his Cardiologist 2 months ago and was told that all his tests were normal. Upon my interview pt endorses significant improvement of his symptoms after the bumex push.    In the ED pts VS were T(C): 36.9  T(F): 98.4  HR: 82  BP: 194/104  RR: 28  SpO2: 96%  Labs show: H.7   Creatinine:2.3 (baseline 2-3)  CXR shows BL Pleural effusions      Pt is admitted for further workup and management of HF exacerbation   81 y/o M with PMH Afib initially on Xarelto but taken off due to GI bleed and epistaxis, CAD s/p CABG  follows with Dr Hanson, HFrEF, AVR , CKD III, HTN presenting for shortness of breath on exertion, orthopnea x1 week and sinus syptoms. Pt reports he stopped taking his Bumex about 2 weeks ago because he did not like having to go to the bathroom so often. Pt describes the SOB to be exacerbated by lying down and upon walking. Pt denies any new leg swelling although he does have Rt extremity more swollen than the rest but he states it has been like that for years since he had a procedure done on his LE. Pt states he saw his Cardiologist 2 months ago. Upon my interview pt endorses significant improvement of his symptoms after the bumex push.    In the ED pts VS were T(C): 36.9  T(F): 98.4  HR: 82  BP: 194/104  RR: 28  SpO2: 96%  Labs show: H.7   Creatinine:2.3 (baseline 2-3)  CXR shows BL Pleural effusions      Pt is admitted for further workup and management of HF exacerbation

## 2023-10-16 NOTE — ED PROVIDER NOTE - PHYSICAL EXAMINATION
CONSTITUTIONAL: Well-developed; well-nourished; in no acute distress.   SKIN: Warm, dry  HEAD: Normocephalic; atraumatic  EYES: PERRL, EOMI, normal sclera and conjunctiva. No conjunctival pallor  ENT: No nasal discharge; airway clear. MMM.   CARD:  Regular rate and rhythm. Normal S1, S2  RESP: No increased WOB. Bibasilar crackles. No wheezing.  ABD: Normoactive BS. Soft, nontender, nondistended.  EXT: Normal ROM. B/l LE 3+ pitting edema. No calf tenderness; equal sizes.  NEURO: Alert, oriented, grossly unremarkable  PSYCH: Cooperative, appropriate.

## 2023-10-16 NOTE — ED PROVIDER NOTE - CLINICAL SUMMARY MEDICAL DECISION MAKING FREE TEXT BOX
The pt is a 81 y/o M with PMH Afib initially on Xarelto but taken off due to GI bleed and epistaxis, CAD s/p CABG 2006 follows with Dr Hanson, HFrEF, AVR 2011, CKD III, HTN presenting for shortness of breath on exertion, orthopnea x1.  Additionally patient recently stopped taking his Bumex medication.  Here patient found to have shortness of breath due to acute heart failure exacerbation given Bumex in the ED admitted for further evaluation and treatment.

## 2023-10-16 NOTE — H&P ADULT - ASSESSMENT
# SOB due to Acute HFrEF/HFpEF exacerbation  # CAD s/p CABG 2006  # AVR 2011  - non compliance vs MI vs Arrhythmias  - Last admission: Dry weight, Cr and BUN  - Last Echo  - Physical exam, CXR  - check  pro-BNP, Trops, TSH, iron studies, and repeat CXR   - check 2 D Echo  - Which GDMT (BB, Enteresto/ace/arbs, spirino, SGLT on d/c (jardiance))   - Strict I and O, daily weight, monitor BUN/Cret and electrolytes  - Low salt diet, fluid restrition to 1200ml/day  - I/V lasix vs bumex  - cardio eval      # CKD    # HTN      #Misc  - DVT Prophylaxis: Heparin S/C Lovenox S/C  - GI Prophylaxis: Protonix Not needed  - Diet: Regular DASH Carb Consistent Renal  - Activity: Increase as tolerated  - Dispo:         83 y/o M with PMH Afib initially on Xarelto but taken off due to GI bleed and epistaxis, CAD s/p CABG 2006 follows with Dr Hanson, HFrEF, AVR 2011, CKD III, HTN presenting for shortness of breath on exertion, orthopnea x1 week and sinus syptoms. Pt reports he stopped taking his Bumex about 2 weeks ago because he did not like having to go to the bathroom so often. Pt describes the SOB to be exacerbated by lying down and upon walking. Pt denies any new leg swelling although he does have Rt extremity more swollen than the rest but he states it has been like that for years since he had a procedure done on his LE. Pt states he saw his Cardiologist 2 months ago and was told that all his tests were normal. Upon my interview pt endorses significant improvement of his symptoms after the bumex push.    # SOB due to Acute HFrEFexacerbation  # CAD s/p CABG 2006  # AVR 2011  - Hx of Afib not on AC (GI bleed)  - non compliant with diuretics  - Last Echo 2/22: 25-30% EF  - CXR BL effusion  -  pro-BNP [id number]  - Trops 0.04 check repeat  - TSH, iron studies, and repeat CXR   - check 2 D Echo  - Strict I and O, daily weight, monitor BUN/Cret and electrolytes  - Low salt diet, fluid restrition to 1200ml/day  - I/V bumex    # CKD  - Creatinine 2.3 (baseline 2-3)  - MOnitor Creatinine  - avoid nephrotoxic meds    # HTN  - BP on admission 194/104  - C/w home meds: hydralazine 10 TID      #Misc  - DVT Prophylaxis: Heparin S/C  - GI Prophylaxis:  Not needed  - Diet: DASH  - Dispo: ACute         83 y/o M with PMH Afib initially on Xarelto but taken off due to GI bleed and epistaxis, CAD s/p CABG 2006 follows with Dr Hanson, HFrEF, AVR 2011, CKD III, HTN presenting for shortness of breath on exertion, orthopnea x1 week and sinus syptoms. Pt reports he stopped taking his Bumex about 2 weeks ago because he did not like having to go to the bathroom so often. Pt describes the SOB to be exacerbated by lying down and upon walking. Pt denies any new leg swelling although he does have Rt extremity more swollen than the rest but he states it has been like that for years since he had a procedure done on his LE. Pt states he saw his Cardiologist 2 months ago. Upon my interview pt endorses significant improvement of his symptoms after the bumex push.    # SOB due to Acute HFrEFexacerbation  # CAD s/p CABG 2006  # AVR 2011  - Hx of Afib not on AC (GI bleed)  - non compliant with diuretics  - Last Echo 2/22: 25-30% EF  - CXR BL effusion  -  pro-BNP [id number]  - Trops 0.04 check repeat  - TSH, iron studies, and repeat CXR   - check 2 D Echo  - Strict I and O, daily weight, monitor BUN/Cret and electrolytes  - Low salt diet, fluid restrition to 1200ml/day  - I/V bumex    # CKD  - Creatinine 2.3 (baseline 2-3)  - MOnitor Creatinine  - avoid nephrotoxic meds    # HTN  - BP on admission 194/104  - C/w home meds: hydralazine 10 TID      #Misc  - DVT Prophylaxis: Heparin S/C  - GI Prophylaxis:  Not needed  - Diet: DASH  - Dispo: ACute

## 2023-10-16 NOTE — ED PROVIDER NOTE - NSICDXPASTMEDICALHX_GEN_ALL_CORE_FT
PAST MEDICAL HISTORY:  AF (atrial fibrillation)     CAD (coronary artery disease)     Chronic systolic congestive heart failure     Dyslipidemia     HTN (hypertension)

## 2023-10-16 NOTE — ED ADULT NURSE NOTE - HOW OFTEN DO YOU HAVE A DRINK CONTAINING ALCOHOL?
Never CONSTITUTIONAL: Well-appearing; well-nourished; in no apparent distress.   HEAD: Normocephalic; atraumatic.   EYES: PERRL; EOM intact; conjunctiva and sclera clear  NECK: Supple; non-tender; no LAD  CARDIOVASCULAR: Normal S1, S2; no murmurs, rubs, or gallops. Regular rate and rhythm.   RESPIRATORY: Breathing easily; breath sounds clear and equal bilaterally; no wheezes, rhonchi, or rales.  GI: Soft; non-distended; non-tender; no palpable organomegaly.   : pelvic- white discharge, no foul smell, no CMT or adnexal tenderness   MSK: FROM at all extremities, normal tone   EXT: No cyanosis or edema; N/V intact  SKIN: Normal for age and race; warm; dry; good turgor; no apparent lesions or rash.

## 2023-10-16 NOTE — ED ADULT NURSE REASSESSMENT NOTE - NS ED NURSE REASSESS COMMENT FT1
Pt A&Ox4. Falls precautions maintained. Pt educated on falls risk and importance of not getting up. Urinal at bedside, continuing to refuse to use it. Pt further educated on importance of using call bell for assistance. Care ongoing.

## 2023-10-16 NOTE — ED PROVIDER NOTE - OBJECTIVE STATEMENT
81 y/o M with PMH Afib initially on Xarelto but taken off due to GI bleed and epistaxis, CAD s/p CABG 2006, HFrEF, AVR 2011, CKD III, HTN presenting for shortness of breath on exertion, orthopnea x1 week. Pt reports he stopped taking his Bumex about 2 weeks ago because he did not like having to go to the bathroom so often. Endorses leg swelling but at baseline. No chest pain. Denies extremity pain/swelling, abd pain, N/V, cough, fever, URI symptoms. No recent travelling or procedures. Pt sees Dr. Mendoza, states he was seen a few months ago and everything was normal. Nephrologist Dr. Pimentel - but states he does not have any kidney issues.

## 2023-10-16 NOTE — ED ADULT NURSE NOTE - NSFALLUNIVINTERV_ED_ALL_ED
Bed/Stretcher in lowest position, wheels locked, appropriate side rails in place/Call bell, personal items and telephone in reach/Instruct patient to call for assistance before getting out of bed/chair/stretcher/Non-slip footwear applied when patient is off stretcher/[place] to call system/Physically safe environment - no spills, clutter or unnecessary equipment/Purposeful proactive rounding/Room/bathroom lighting operational, light cord in reach

## 2023-10-17 LAB
A1C WITH ESTIMATED AVERAGE GLUCOSE RESULT: 5.5 % — SIGNIFICANT CHANGE UP (ref 4–5.6)
A1C WITH ESTIMATED AVERAGE GLUCOSE RESULT: 5.5 % — SIGNIFICANT CHANGE UP (ref 4–5.6)
ALBUMIN SERPL ELPH-MCNC: 4 G/DL — SIGNIFICANT CHANGE UP (ref 3.5–5.2)
ALBUMIN SERPL ELPH-MCNC: 4 G/DL — SIGNIFICANT CHANGE UP (ref 3.5–5.2)
ALP SERPL-CCNC: 84 U/L — SIGNIFICANT CHANGE UP (ref 30–115)
ALP SERPL-CCNC: 84 U/L — SIGNIFICANT CHANGE UP (ref 30–115)
ALT FLD-CCNC: 16 U/L — SIGNIFICANT CHANGE UP (ref 0–41)
ALT FLD-CCNC: 16 U/L — SIGNIFICANT CHANGE UP (ref 0–41)
ANION GAP SERPL CALC-SCNC: 14 MMOL/L — SIGNIFICANT CHANGE UP (ref 7–14)
ANION GAP SERPL CALC-SCNC: 14 MMOL/L — SIGNIFICANT CHANGE UP (ref 7–14)
AST SERPL-CCNC: 17 U/L — SIGNIFICANT CHANGE UP (ref 0–41)
AST SERPL-CCNC: 17 U/L — SIGNIFICANT CHANGE UP (ref 0–41)
BASOPHILS # BLD AUTO: 0.04 K/UL — SIGNIFICANT CHANGE UP (ref 0–0.2)
BASOPHILS # BLD AUTO: 0.04 K/UL — SIGNIFICANT CHANGE UP (ref 0–0.2)
BASOPHILS NFR BLD AUTO: 0.6 % — SIGNIFICANT CHANGE UP (ref 0–1)
BASOPHILS NFR BLD AUTO: 0.6 % — SIGNIFICANT CHANGE UP (ref 0–1)
BILIRUB SERPL-MCNC: 0.9 MG/DL — SIGNIFICANT CHANGE UP (ref 0.2–1.2)
BILIRUB SERPL-MCNC: 0.9 MG/DL — SIGNIFICANT CHANGE UP (ref 0.2–1.2)
BUN SERPL-MCNC: 36 MG/DL — HIGH (ref 10–20)
BUN SERPL-MCNC: 36 MG/DL — HIGH (ref 10–20)
CALCIUM SERPL-MCNC: 9.7 MG/DL — SIGNIFICANT CHANGE UP (ref 8.4–10.4)
CALCIUM SERPL-MCNC: 9.7 MG/DL — SIGNIFICANT CHANGE UP (ref 8.4–10.4)
CHLORIDE SERPL-SCNC: 104 MMOL/L — SIGNIFICANT CHANGE UP (ref 98–110)
CHLORIDE SERPL-SCNC: 104 MMOL/L — SIGNIFICANT CHANGE UP (ref 98–110)
CHOLEST SERPL-MCNC: 188 MG/DL — SIGNIFICANT CHANGE UP
CHOLEST SERPL-MCNC: 188 MG/DL — SIGNIFICANT CHANGE UP
CO2 SERPL-SCNC: 22 MMOL/L — SIGNIFICANT CHANGE UP (ref 17–32)
CO2 SERPL-SCNC: 22 MMOL/L — SIGNIFICANT CHANGE UP (ref 17–32)
CREAT SERPL-MCNC: 2.3 MG/DL — HIGH (ref 0.7–1.5)
CREAT SERPL-MCNC: 2.3 MG/DL — HIGH (ref 0.7–1.5)
EGFR: 28 ML/MIN/1.73M2 — LOW
EGFR: 28 ML/MIN/1.73M2 — LOW
EOSINOPHIL # BLD AUTO: 0.13 K/UL — SIGNIFICANT CHANGE UP (ref 0–0.7)
EOSINOPHIL # BLD AUTO: 0.13 K/UL — SIGNIFICANT CHANGE UP (ref 0–0.7)
EOSINOPHIL NFR BLD AUTO: 1.9 % — SIGNIFICANT CHANGE UP (ref 0–8)
EOSINOPHIL NFR BLD AUTO: 1.9 % — SIGNIFICANT CHANGE UP (ref 0–8)
ESTIMATED AVERAGE GLUCOSE: 111 MG/DL — SIGNIFICANT CHANGE UP (ref 68–114)
ESTIMATED AVERAGE GLUCOSE: 111 MG/DL — SIGNIFICANT CHANGE UP (ref 68–114)
FERRITIN SERPL-MCNC: 98 NG/ML — SIGNIFICANT CHANGE UP (ref 30–400)
FERRITIN SERPL-MCNC: 98 NG/ML — SIGNIFICANT CHANGE UP (ref 30–400)
GLUCOSE SERPL-MCNC: 109 MG/DL — HIGH (ref 70–99)
GLUCOSE SERPL-MCNC: 109 MG/DL — HIGH (ref 70–99)
HCT VFR BLD CALC: 35.8 % — LOW (ref 42–52)
HCT VFR BLD CALC: 35.8 % — LOW (ref 42–52)
HDLC SERPL-MCNC: 41 MG/DL — SIGNIFICANT CHANGE UP
HDLC SERPL-MCNC: 41 MG/DL — SIGNIFICANT CHANGE UP
HGB BLD-MCNC: 11.2 G/DL — LOW (ref 14–18)
HGB BLD-MCNC: 11.2 G/DL — LOW (ref 14–18)
IMM GRANULOCYTES NFR BLD AUTO: 0.3 % — SIGNIFICANT CHANGE UP (ref 0.1–0.3)
IMM GRANULOCYTES NFR BLD AUTO: 0.3 % — SIGNIFICANT CHANGE UP (ref 0.1–0.3)
IRON SATN MFR SERPL: 19 % — SIGNIFICANT CHANGE UP (ref 15–50)
IRON SATN MFR SERPL: 19 % — SIGNIFICANT CHANGE UP (ref 15–50)
IRON SATN MFR SERPL: 50 UG/DL — SIGNIFICANT CHANGE UP (ref 35–150)
IRON SATN MFR SERPL: 50 UG/DL — SIGNIFICANT CHANGE UP (ref 35–150)
LIPID PNL WITH DIRECT LDL SERPL: 127 MG/DL — HIGH
LIPID PNL WITH DIRECT LDL SERPL: 127 MG/DL — HIGH
LYMPHOCYTES # BLD AUTO: 0.82 K/UL — LOW (ref 1.2–3.4)
LYMPHOCYTES # BLD AUTO: 0.82 K/UL — LOW (ref 1.2–3.4)
LYMPHOCYTES # BLD AUTO: 11.9 % — LOW (ref 20.5–51.1)
LYMPHOCYTES # BLD AUTO: 11.9 % — LOW (ref 20.5–51.1)
MAGNESIUM SERPL-MCNC: 2.1 MG/DL — SIGNIFICANT CHANGE UP (ref 1.8–2.4)
MAGNESIUM SERPL-MCNC: 2.1 MG/DL — SIGNIFICANT CHANGE UP (ref 1.8–2.4)
MCHC RBC-ENTMCNC: 28.2 PG — SIGNIFICANT CHANGE UP (ref 27–31)
MCHC RBC-ENTMCNC: 28.2 PG — SIGNIFICANT CHANGE UP (ref 27–31)
MCHC RBC-ENTMCNC: 31.3 G/DL — LOW (ref 32–37)
MCHC RBC-ENTMCNC: 31.3 G/DL — LOW (ref 32–37)
MCV RBC AUTO: 90.2 FL — SIGNIFICANT CHANGE UP (ref 80–94)
MCV RBC AUTO: 90.2 FL — SIGNIFICANT CHANGE UP (ref 80–94)
MONOCYTES # BLD AUTO: 0.8 K/UL — HIGH (ref 0.1–0.6)
MONOCYTES # BLD AUTO: 0.8 K/UL — HIGH (ref 0.1–0.6)
MONOCYTES NFR BLD AUTO: 11.6 % — HIGH (ref 1.7–9.3)
MONOCYTES NFR BLD AUTO: 11.6 % — HIGH (ref 1.7–9.3)
NEUTROPHILS # BLD AUTO: 5.08 K/UL — SIGNIFICANT CHANGE UP (ref 1.4–6.5)
NEUTROPHILS # BLD AUTO: 5.08 K/UL — SIGNIFICANT CHANGE UP (ref 1.4–6.5)
NEUTROPHILS NFR BLD AUTO: 73.7 % — SIGNIFICANT CHANGE UP (ref 42.2–75.2)
NEUTROPHILS NFR BLD AUTO: 73.7 % — SIGNIFICANT CHANGE UP (ref 42.2–75.2)
NON HDL CHOLESTEROL: 147 MG/DL — HIGH
NON HDL CHOLESTEROL: 147 MG/DL — HIGH
NRBC # BLD: 0 /100 WBCS — SIGNIFICANT CHANGE UP (ref 0–0)
NRBC # BLD: 0 /100 WBCS — SIGNIFICANT CHANGE UP (ref 0–0)
PLATELET # BLD AUTO: 217 K/UL — SIGNIFICANT CHANGE UP (ref 130–400)
PLATELET # BLD AUTO: 217 K/UL — SIGNIFICANT CHANGE UP (ref 130–400)
PMV BLD: 10.7 FL — HIGH (ref 7.4–10.4)
PMV BLD: 10.7 FL — HIGH (ref 7.4–10.4)
POTASSIUM SERPL-MCNC: 3.8 MMOL/L — SIGNIFICANT CHANGE UP (ref 3.5–5)
POTASSIUM SERPL-MCNC: 3.8 MMOL/L — SIGNIFICANT CHANGE UP (ref 3.5–5)
POTASSIUM SERPL-SCNC: 3.8 MMOL/L — SIGNIFICANT CHANGE UP (ref 3.5–5)
POTASSIUM SERPL-SCNC: 3.8 MMOL/L — SIGNIFICANT CHANGE UP (ref 3.5–5)
PROT SERPL-MCNC: 6.4 G/DL — SIGNIFICANT CHANGE UP (ref 6–8)
PROT SERPL-MCNC: 6.4 G/DL — SIGNIFICANT CHANGE UP (ref 6–8)
RBC # BLD: 3.97 M/UL — LOW (ref 4.7–6.1)
RBC # BLD: 3.97 M/UL — LOW (ref 4.7–6.1)
RBC # FLD: 16.9 % — HIGH (ref 11.5–14.5)
RBC # FLD: 16.9 % — HIGH (ref 11.5–14.5)
SODIUM SERPL-SCNC: 140 MMOL/L — SIGNIFICANT CHANGE UP (ref 135–146)
SODIUM SERPL-SCNC: 140 MMOL/L — SIGNIFICANT CHANGE UP (ref 135–146)
TIBC SERPL-MCNC: 266 UG/DL — SIGNIFICANT CHANGE UP (ref 220–430)
TIBC SERPL-MCNC: 266 UG/DL — SIGNIFICANT CHANGE UP (ref 220–430)
TRANSFERRIN SERPL-MCNC: 223 MG/DL — SIGNIFICANT CHANGE UP (ref 200–360)
TRANSFERRIN SERPL-MCNC: 223 MG/DL — SIGNIFICANT CHANGE UP (ref 200–360)
TRIGL SERPL-MCNC: 106 MG/DL — SIGNIFICANT CHANGE UP
TRIGL SERPL-MCNC: 106 MG/DL — SIGNIFICANT CHANGE UP
TROPONIN T SERPL-MCNC: 0.05 NG/ML — CRITICAL HIGH
TROPONIN T SERPL-MCNC: 0.05 NG/ML — CRITICAL HIGH
TSH SERPL-MCNC: 6.93 UIU/ML — HIGH (ref 0.27–4.2)
TSH SERPL-MCNC: 6.93 UIU/ML — HIGH (ref 0.27–4.2)
UIBC SERPL-MCNC: 216 UG/DL — SIGNIFICANT CHANGE UP (ref 110–370)
UIBC SERPL-MCNC: 216 UG/DL — SIGNIFICANT CHANGE UP (ref 110–370)
WBC # BLD: 6.89 K/UL — SIGNIFICANT CHANGE UP (ref 4.8–10.8)
WBC # BLD: 6.89 K/UL — SIGNIFICANT CHANGE UP (ref 4.8–10.8)
WBC # FLD AUTO: 6.89 K/UL — SIGNIFICANT CHANGE UP (ref 4.8–10.8)
WBC # FLD AUTO: 6.89 K/UL — SIGNIFICANT CHANGE UP (ref 4.8–10.8)

## 2023-10-17 PROCEDURE — 93970 EXTREMITY STUDY: CPT | Mod: 26

## 2023-10-17 PROCEDURE — 71045 X-RAY EXAM CHEST 1 VIEW: CPT | Mod: 26

## 2023-10-17 PROCEDURE — 93306 TTE W/DOPPLER COMPLETE: CPT | Mod: 26

## 2023-10-17 PROCEDURE — 99233 SBSQ HOSP IP/OBS HIGH 50: CPT

## 2023-10-17 PROCEDURE — 76770 US EXAM ABDO BACK WALL COMP: CPT | Mod: 26

## 2023-10-17 RX ORDER — BUMETANIDE 0.25 MG/ML
2 INJECTION INTRAMUSCULAR; INTRAVENOUS
Refills: 0 | Status: DISCONTINUED | OUTPATIENT
Start: 2023-10-17 | End: 2023-10-18

## 2023-10-17 RX ADMIN — Medication 10 MILLIGRAM(S): at 08:55

## 2023-10-17 RX ADMIN — HEPARIN SODIUM 5000 UNIT(S): 5000 INJECTION INTRAVENOUS; SUBCUTANEOUS at 14:50

## 2023-10-17 RX ADMIN — HEPARIN SODIUM 5000 UNIT(S): 5000 INJECTION INTRAVENOUS; SUBCUTANEOUS at 07:00

## 2023-10-17 RX ADMIN — BUMETANIDE 2 MILLIGRAM(S): 0.25 INJECTION INTRAMUSCULAR; INTRAVENOUS at 14:49

## 2023-10-17 RX ADMIN — HEPARIN SODIUM 5000 UNIT(S): 5000 INJECTION INTRAVENOUS; SUBCUTANEOUS at 21:49

## 2023-10-17 RX ADMIN — Medication 10 MILLIGRAM(S): at 01:25

## 2023-10-17 RX ADMIN — Medication 50 MICROGRAM(S): at 06:01

## 2023-10-17 RX ADMIN — Medication 10 MILLIGRAM(S): at 14:49

## 2023-10-17 RX ADMIN — Medication 50 MILLIGRAM(S): at 06:01

## 2023-10-17 RX ADMIN — ATORVASTATIN CALCIUM 20 MILLIGRAM(S): 80 TABLET, FILM COATED ORAL at 21:48

## 2023-10-17 RX ADMIN — Medication 10 MILLIGRAM(S): at 21:48

## 2023-10-17 RX ADMIN — BUMETANIDE 1 MILLIGRAM(S): 0.25 INJECTION INTRAMUSCULAR; INTRAVENOUS at 06:01

## 2023-10-17 RX ADMIN — Medication 81 MILLIGRAM(S): at 11:07

## 2023-10-17 NOTE — PATIENT PROFILE ADULT - FALL HARM RISK - RISK INTERVENTIONS

## 2023-10-17 NOTE — PATIENT PROFILE ADULT - STATED REASON FOR ADMISSION
Pt sent in from doctor for new onset A-Fib. Pt also with history of CHF with recent weight gain.    irregular heartbeat

## 2023-10-17 NOTE — CONSULT NOTE ADULT - SUBJECTIVE AND OBJECTIVE BOX
NEPHROLOGY CONSULTATION NOTE    83 yo male with pmh as below, known to me, CKD stage 3-4, CHF sent in from Dr. Murphy's office for SOB, orthopnea, worsening pedal edema and wt gain.  Pt recently stopped diuretics due to increased urination.  Found to have rapid afib, now rate controlled.  Last Cr 1.9, now 2.3.  No flank pain, hematuria, foamy urine.  Compliant with other meds.  Has some difficulty ambulating to bathroom.  No CP, fever, n/v/abd pain/d.    PAST MEDICAL & SURGICAL HISTORY:  Chronic systolic congestive heart failure      HTN (hypertension)      Dyslipidemia      CAD (coronary artery disease)      AF (atrial fibrillation)      S/P CABG x 4        Allergies:  No Known Allergies    Home Medications Reviewed    SOCIAL HISTORY:  Denies ETOH,Smoking,   FAMILY HISTORY:        REVIEW OF SYSTEMS:  CONSTITUTIONAL: No weakness, fevers or chills  EYES/ENT: No visual changes;  No vertigo or throat pain   NECK: No pain or stiffness  RESPIRATORY: No cough, wheezing, hemoptysis; + shortness of breath  CARDIOVASCULAR: No chest pain or palpitations.  GASTROINTESTINAL: No abdominal or epigastric pain. No nausea, vomiting, or hematemesis; No diarrhea or constipation. No melena or hematochezia.  GENITOURINARY: No dysuria, frequency, foamy urine, urinary urgency, incontinence or hematuria  NEUROLOGICAL: No numbness or weakness  SKIN: No itching, burning, rashes, or lesions   VASCULAR: + bilateral lower extremity edema.   All other review of systems is negative unless indicated above.    PHYSICAL EXAM:  Constitutional: NAD  HEENT: anicteric sclera, oropharynx clear, MMM  Neck: + JVD  Respiratory: b/l BS  Cardiovascular: S1, S2, irreg  Gastrointestinal: BS+, soft, NT/ND  Extremities: No cyanosis or clubbing. + peripheral edema  Neurological: A/O x 3, no focal deficits  Psychiatric: Normal mood, normal affect  : No CVA tenderness. No covarrubias.   Skin: No rashes     Hospital Medications:   MEDICATIONS  (STANDING):  aspirin  chewable 81 milliGRAM(s) Oral daily  atorvastatin 20 milliGRAM(s) Oral at bedtime  buMETAnide Injectable 2 milliGRAM(s) IV Push two times a day  heparin   Injectable 5000 Unit(s) SubCutaneous every 8 hours  hydrALAZINE 10 milliGRAM(s) Oral three times a day  levothyroxine 50 MICROGram(s) Oral daily  metoprolol succinate ER 50 milliGRAM(s) Oral daily        VITALS:  T(F): 97.2 (10-17-23 @ 07:35), Max: 98.3 (10-16-23 @ 20:23)  HR: 78 (10-17-23 @ 07:35)  BP: 178/79 (10-17-23 @ 07:35)  RR: 18 (10-17-23 @ 07:35)  SpO2: 98% (10-17-23 @ 07:35)  Wt(kg): --    10-16 @ 07:01  -  10-17 @ 07:00  --------------------------------------------------------  IN: 60 mL / OUT: 1620 mL / NET: -1560 mL          LABS:  10-17    140  |  104  |  36<H>  ----------------------------<  109<H>  3.8   |  22  |  2.3<H>    Ca    9.7      17 Oct 2023 07:30  Mg     2.1     10-17    TPro  6.4  /  Alb  4.0  /  TBili  0.9  /  DBili      /  AST  17  /  ALT  16  /  AlkPhos  84  10-17                          11.2   6.89  )-----------( 217      ( 17 Oct 2023 07:30 )             35.8       Urine Studies:  Urinalysis Basic - ( 17 Oct 2023 07:30 )    Color:  / Appearance:  / SG:  / pH:   Gluc: 109 mg/dL / Ketone:   / Bili:  / Urobili:    Blood:  / Protein:  / Nitrite:    Leuk Esterase:  / RBC:  / WBC    Sq Epi:  / Non Sq Epi:  / Bacteria:           RADIOLOGY & ADDITIONAL STUDIES:

## 2023-10-17 NOTE — PROGRESS NOTE ADULT - SUBJECTIVE AND OBJECTIVE BOX
REMA, SAÚL  82y Male    INTERVAL HPI/OVERNIGHT EVENTS:    pt seen with wife at bedside  He feels better on IV diuresis - he states that he will now be compliant with his meds  less SOB  no pain, fever, N/V    T(F): 98 (10-17-23 @ 15:51), Max: 98.3 (10-16-23 @ 20:23)  HR: 84 (10-17-23 @ 15:51) (76 - 84)  BP: 141/75 (10-17-23 @ 15:51) (132/85 - 178/79)  RR: 18 (10-17-23 @ 15:51) (18 - 18)  SpO2: 98% (10-17-23 @ 15:51) (96% - 98%)    I&O's Summary    16 Oct 2023 07:01  -  17 Oct 2023 07:00  --------------------------------------------------------  IN: 60 mL / OUT: 1620 mL / NET: -1560 mL    17 Oct 2023 07:01  -  17 Oct 2023 17:06  --------------------------------------------------------  IN: 0 mL / OUT: 600 mL / NET: -600 mL    PHYSICAL EXAM:  GENERAL: NAD  HEAD:  Normocephalic  EYES:  conjunctiva and sclera clear  ENMT: Moist mucous membranes  NECK: Supple, +JVD  NERVOUS SYSTEM:  Alert, awake, Good concentration  CHEST/LUNG: crackles b/l  HEART: IRR  ABDOMEN: Soft, Nontender, Nondistended; Bowel sounds present  EXTREMITIES:  edema  SKIN:     Consultant(s) Notes Reviewed:  [x ] YES  [ ] NO  Care Discussed with Consultants/Other Providers [ x] YES  [ ] NO    MEDICATIONS  (STANDING):  aspirin  chewable 81 milliGRAM(s) Oral daily  atorvastatin 20 milliGRAM(s) Oral at bedtime  buMETAnide Injectable 2 milliGRAM(s) IV Push two times a day  heparin   Injectable 5000 Unit(s) SubCutaneous every 8 hours  hydrALAZINE 10 milliGRAM(s) Oral three times a day  levothyroxine 50 MICROGram(s) Oral daily  metoprolol succinate ER 50 milliGRAM(s) Oral daily    MEDICATIONS  (PRN):  acetaminophen     Tablet .. 650 milliGRAM(s) Oral every 6 hours PRN Temp greater or equal to 38C (100.4F), Mild Pain (1 - 3)      Telemetry reviewed by me    LABS:                        11.2   6.89  )-----------( 217      ( 17 Oct 2023 07:30 )             35.8     10-17    140  |  104  |  36<H>  ----------------------------<  109<H>  3.8   |  22  |  2.3<H>    Ca    9.7      17 Oct 2023 07:30  Mg     2.1     10-17    TPro  6.4  /  Alb  4.0  /  TBili  0.9  /  DBili  x   /  AST  17  /  ALT  16  /  AlkPhos  84  10-17      CARDIAC MARKERS ( 17 Oct 2023 07:30 )  x     / 0.05 ng/mL / x     / x     / x      CARDIAC MARKERS ( 16 Oct 2023 21:42 )  x     / 0.04 ng/mL / x     / x     / x      CARDIAC MARKERS ( 16 Oct 2023 11:00 )  x     / 0.04 ng/mL / x     / x     / x              RADIOLOGY & ADDITIONAL TESTS:    Imaging or report Personally Reviewed:  [ ] YES  [ ] NO    Case discussed with residents and RN on rounds today    Care discussed with pt/family           REMA, SAÚL  82y Male    INTERVAL HPI/OVERNIGHT EVENTS:    pt seen with wife at bedside  He feels better on IV diuresis - he states that he will now be compliant with his meds  less SOB  no pain, fever, N/V    T(F): 98 (10-17-23 @ 15:51), Max: 98.3 (10-16-23 @ 20:23)  HR: 84 (10-17-23 @ 15:51) (76 - 84)  BP: 141/75 (10-17-23 @ 15:51) (132/85 - 178/79)  RR: 18 (10-17-23 @ 15:51) (18 - 18)  SpO2: 98% (10-17-23 @ 15:51) (96% - 98%)    I&O's Summary    16 Oct 2023 07:01  -  17 Oct 2023 07:00  --------------------------------------------------------  IN: 60 mL / OUT: 1620 mL / NET: -1560 mL    17 Oct 2023 07:01  -  17 Oct 2023 17:06  --------------------------------------------------------  IN: 0 mL / OUT: 600 mL / NET: -600 mL    PHYSICAL EXAM:  GENERAL: NAD  HEAD:  Normocephalic  EYES:  conjunctiva and sclera clear  ENMT: Moist mucous membranes  NECK: Supple, +JVD  NERVOUS SYSTEM:  Alert, awake, Good concentration  CHEST/LUNG: crackles b/l  HEART: IRR  ABDOMEN: Soft, Nontender, Nondistended; Bowel sounds present  EXTREMITIES: + LE edema b/l (right >left - chronic asymmetry)  SKIN: warm, dry    Consultant(s) Notes Reviewed:  [x ] YES  [ ] NO  Care Discussed with Consultants/Other Providers [ x] YES  [ ] NO    MEDICATIONS  (STANDING):  aspirin  chewable 81 milliGRAM(s) Oral daily  atorvastatin 20 milliGRAM(s) Oral at bedtime  buMETAnide Injectable 2 milliGRAM(s) IV Push two times a day  heparin   Injectable 5000 Unit(s) SubCutaneous every 8 hours  hydrALAZINE 10 milliGRAM(s) Oral three times a day  levothyroxine 50 MICROGram(s) Oral daily  metoprolol succinate ER 50 milliGRAM(s) Oral daily    MEDICATIONS  (PRN):  acetaminophen     Tablet .. 650 milliGRAM(s) Oral every 6 hours PRN Temp greater or equal to 38C (100.4F), Mild Pain (1 - 3)      Telemetry reviewed by me    LABS:                        11.2   6.89  )-----------( 217      ( 17 Oct 2023 07:30 )             35.8     10-17    140  |  104  |  36<H>  ----------------------------<  109<H>  3.8   |  22  |  2.3<H>    Ca    9.7      17 Oct 2023 07:30  Mg     2.1     10-17    TPro  6.4  /  Alb  4.0  /  TBili  0.9  /  DBili  x   /  AST  17  /  ALT  16  /  AlkPhos  84  10-17      CARDIAC MARKERS ( 17 Oct 2023 07:30 )  x     / 0.05 ng/mL / x     / x     / x      CARDIAC MARKERS ( 16 Oct 2023 21:42 )  x     / 0.04 ng/mL / x     / x     / x      CARDIAC MARKERS ( 16 Oct 2023 11:00 )  x     / 0.04 ng/mL / x     / x     / x              RADIOLOGY & ADDITIONAL TESTS:    Imaging and report Personally Reviewed:  [x ] YES  [ ] NO    < from: US Kidney and Bladder (10.17.23 @ 14:42) >  IMPRESSION:    Nonobstructing left renal calculus.      < end of copied text >  < from: Xray Chest 1 View- PORTABLE-Routine (Xray Chest 1 View- PORTABLE-Routine in AM.) (10.17.23 @ 05:20) >  Impression:    Mild decrease in interstitial opacities. Bilateral pleural effusions   without significant change.    < end of copied text >      < from: TTE Echo Complete w/ Contrast w/ Doppler (10.17.23 @ 13:12) >  Summary:   1. LV Ejection Fraction by Mcdonough's Method with a biplane EF of 36 %.   2. Moderately to severely decreased global left ventricular systolic   function.   3. Endocardial visualization was enhanced with intravenous echo contrast.   4. Multiple left ventricular regional wall motion abnormalities exist.   See wall motion findings.   5. Moderate concentric left ventricular hypertrophy.   6. Spectral Doppler shows pseudonormal pattern of left ventricular   myocardial filling (Grade II diastolic dysfunction).   7. Elevated mean left atrial pressure.   8. Moderately reduced RV systolic function.   9. Severely enlarged right ventricle.  10. Mild thickening and calcification of the anterior and posterior   mitral valve leaflets.  11. Mild to moderate mitral annular calcification.  12. Trace mitral valve regurgitation.  13. Bioprosthesis in the aortic position.  14. Mild aortic regurgitation.  15. Estimated pulmonary artery systolic pressure is 79.0 mmHg assuming a   right atrial pressure of 15 mmHg, which is consistent with severe   pulmonary hypertension.  16. Moderately enlarged left atrium.  17. LA volume Index is 50.0 ml/m² ml/m2.  18. Moderate to severe right atrial enlargement.    < end of copied text >      Case discussed with residents and RN on rounds today    Care discussed with pt and family

## 2023-10-17 NOTE — PROGRESS NOTE ADULT - ASSESSMENT
83 y/o M with PMH of Afib initially on Xarelto but taken off due to GI bleed and epistaxis, CAD s/p CABG 2006 ( follows with Dr Mendoza), chronic HFrEF, s/p bioprosthetic AVR 2011, CKD III and HTN presented for shortness of breath on exertion and orthopnea x1 week after stopping bumex about 2 weeks ago.    1. Acute over chronic HFrEF likely due to noncompliance with diuretic  + JVD, crackles, LE edema (right >left)  pro-BNP 83311  troponins 0.04->0.05  ECHO: EF 36%, grade 2 diastolic dysfunction, severe PHTN, multiple regional WMA, bioprosthetic AV  continue diuresis with bumex 2mg IV q12hr  CXR today with improvement in PVC compared to 10/16 and pt feels better today  monitor daily wts, I's and O's, fluid restriction  importance of med compliance stressed to the pt   telemetry for now    2. h/o CAD s/p CABG 2006  on ASA/statin, metoprolol    3. Paroxysmal Afib  not on anticoagulation due to GI bleed  EKG with NSR, LBBB (chronic)    4. CKD 3  baseline around 2  renal consult Dr. Pimentel   monitor BMP with diuresis  renal US: nonobstructing left renal calculus, no hydronephrosis  bladder US: no PVR  avoid nephrotoxic meds    5. HTN  BP better now - on hydralazine and metoprolol    6. Anemia  iron studies and ferritin reviewed - WNL    7. Hypothyroid on synthroid  TSH 6.93 - ideally should be tested as outpt - find out from pt and wife if recently done or dose recently adjusted    8. DVT prophylaxis  heparin SQ q8hr    full code  guarded prognosis      PROGRESS NOTE HANDOFF    Pending: improvement in volume status, labs in AM    Family discussion: with wife at bedside    Disposition: home with services 81 y/o M with PMH of Afib initially on Xarelto but taken off due to GI bleed and epistaxis, CAD s/p CABG 2006 ( follows with Dr Mendoza), chronic HFrEF, s/p bioprosthetic AVR 2011, CKD III and HTN presented for shortness of breath on exertion and orthopnea x1 week after stopping bumex about 2 weeks ago.    1. Acute over chronic HFrEF likely due to noncompliance with diuretic  + JVD, crackles, LE edema (right >left)  pro-BNP 49720  troponins 0.04->0.05  ECHO: EF 36%, grade 2 diastolic dysfunction, severe PHTN, multiple regional WMA, bioprosthetic AV  continue diuresis with bumex 2mg IV q12hr  CXR today with improvement in PVC compared to 10/16 and pt feels better today  monitor daily wts, I's and O's, fluid restriction  importance of med compliance stressed to the pt   telemetry for now    2. h/o CAD s/p CABG 2006  on ASA/statin, metoprolol    3. Paroxysmal Afib  not on anticoagulation due to GI bleed  EKG with NSR, LBBB (chronic)    4. CKD 3  baseline around 2  renal consult Dr. Pimentel   monitor BMP with diuresis  renal US: nonobstructing left renal calculus, no hydronephrosis  bladder US: no PVR  avoid nephrotoxic meds    5. HTN  BP better now - on hydralazine and metoprolol    6. Anemia  iron studies and ferritin reviewed - WNL    7. Hypothyroid on synthroid  TSH 6.93 - ideally should be tested as outpt - find out from pt and wife if recently done or dose recently adjusted    8. DVT prophylaxis  heparin SQ q8hr    Need to clarify code status with the pt - MOLST form dated 12/2021 states DNR/DNI  guarded prognosis      PROGRESS NOTE HANDOFF    Pending: improvement in volume status, labs in AM    Family discussion: with wife at bedside    Disposition: home with services

## 2023-10-17 NOTE — CONSULT NOTE ADULT - ASSESSMENT
CKD stage 4  acute on chronic HFrEF  recently stopped diuretics on own   chronic pedal edema  Afib / s/p AVR (procine) / CAD / CABG / s/p DCC  hx of DVT / PE    HTN   anxiety        plan:    increase bumex 2mg iv bid  monitor renal function, lytes including Mg++ with iv diuresis  accurate urine output monitoring   fluid restriction  cont hydralazine, lopressor, monitor BP's, should improve with diuresis  obtain UA and U prot / Cr  obtain renal / bladder sonogram  consider cardio kerri, Dr. Mendoza / f/u echo  d/w Dr. Murphy, Dr. Ayala and  wife at bedside

## 2023-10-18 ENCOUNTER — TRANSCRIPTION ENCOUNTER (OUTPATIENT)
Age: 83
End: 2023-10-18

## 2023-10-18 VITALS
DIASTOLIC BLOOD PRESSURE: 72 MMHG | OXYGEN SATURATION: 97 % | TEMPERATURE: 98 F | HEART RATE: 65 BPM | RESPIRATION RATE: 18 BRPM | SYSTOLIC BLOOD PRESSURE: 152 MMHG

## 2023-10-18 LAB
ALBUMIN SERPL ELPH-MCNC: 4.3 G/DL — SIGNIFICANT CHANGE UP (ref 3.5–5.2)
ALBUMIN SERPL ELPH-MCNC: 4.3 G/DL — SIGNIFICANT CHANGE UP (ref 3.5–5.2)
ALP SERPL-CCNC: 85 U/L — SIGNIFICANT CHANGE UP (ref 30–115)
ALP SERPL-CCNC: 85 U/L — SIGNIFICANT CHANGE UP (ref 30–115)
ALT FLD-CCNC: 17 U/L — SIGNIFICANT CHANGE UP (ref 0–41)
ALT FLD-CCNC: 17 U/L — SIGNIFICANT CHANGE UP (ref 0–41)
ANION GAP SERPL CALC-SCNC: 15 MMOL/L — HIGH (ref 7–14)
ANION GAP SERPL CALC-SCNC: 15 MMOL/L — HIGH (ref 7–14)
APPEARANCE UR: CLEAR — SIGNIFICANT CHANGE UP
APPEARANCE UR: CLEAR — SIGNIFICANT CHANGE UP
AST SERPL-CCNC: 19 U/L — SIGNIFICANT CHANGE UP (ref 0–41)
AST SERPL-CCNC: 19 U/L — SIGNIFICANT CHANGE UP (ref 0–41)
BILIRUB SERPL-MCNC: 0.9 MG/DL — SIGNIFICANT CHANGE UP (ref 0.2–1.2)
BILIRUB SERPL-MCNC: 0.9 MG/DL — SIGNIFICANT CHANGE UP (ref 0.2–1.2)
BILIRUB UR-MCNC: NEGATIVE — SIGNIFICANT CHANGE UP
BILIRUB UR-MCNC: NEGATIVE — SIGNIFICANT CHANGE UP
BUN SERPL-MCNC: 38 MG/DL — HIGH (ref 10–20)
BUN SERPL-MCNC: 38 MG/DL — HIGH (ref 10–20)
CALCIUM SERPL-MCNC: 9.8 MG/DL — SIGNIFICANT CHANGE UP (ref 8.4–10.5)
CALCIUM SERPL-MCNC: 9.8 MG/DL — SIGNIFICANT CHANGE UP (ref 8.4–10.5)
CHLORIDE SERPL-SCNC: 104 MMOL/L — SIGNIFICANT CHANGE UP (ref 98–110)
CHLORIDE SERPL-SCNC: 104 MMOL/L — SIGNIFICANT CHANGE UP (ref 98–110)
CO2 SERPL-SCNC: 25 MMOL/L — SIGNIFICANT CHANGE UP (ref 17–32)
CO2 SERPL-SCNC: 25 MMOL/L — SIGNIFICANT CHANGE UP (ref 17–32)
COLOR SPEC: YELLOW — SIGNIFICANT CHANGE UP
COLOR SPEC: YELLOW — SIGNIFICANT CHANGE UP
CREAT ?TM UR-MCNC: 74 MG/DL — SIGNIFICANT CHANGE UP
CREAT ?TM UR-MCNC: 74 MG/DL — SIGNIFICANT CHANGE UP
CREAT SERPL-MCNC: 2.5 MG/DL — HIGH (ref 0.7–1.5)
CREAT SERPL-MCNC: 2.5 MG/DL — HIGH (ref 0.7–1.5)
DIFF PNL FLD: NEGATIVE — SIGNIFICANT CHANGE UP
DIFF PNL FLD: NEGATIVE — SIGNIFICANT CHANGE UP
EGFR: 25 ML/MIN/1.73M2 — LOW
EGFR: 25 ML/MIN/1.73M2 — LOW
GLUCOSE SERPL-MCNC: 102 MG/DL — HIGH (ref 70–99)
GLUCOSE SERPL-MCNC: 102 MG/DL — HIGH (ref 70–99)
GLUCOSE UR QL: NEGATIVE MG/DL — SIGNIFICANT CHANGE UP
GLUCOSE UR QL: NEGATIVE MG/DL — SIGNIFICANT CHANGE UP
HCT VFR BLD CALC: 38 % — LOW (ref 42–52)
HCT VFR BLD CALC: 38 % — LOW (ref 42–52)
HGB BLD-MCNC: 11.9 G/DL — LOW (ref 14–18)
HGB BLD-MCNC: 11.9 G/DL — LOW (ref 14–18)
KETONES UR-MCNC: NEGATIVE MG/DL — SIGNIFICANT CHANGE UP
KETONES UR-MCNC: NEGATIVE MG/DL — SIGNIFICANT CHANGE UP
LEUKOCYTE ESTERASE UR-ACNC: NEGATIVE — SIGNIFICANT CHANGE UP
LEUKOCYTE ESTERASE UR-ACNC: NEGATIVE — SIGNIFICANT CHANGE UP
MAGNESIUM SERPL-MCNC: 2.1 MG/DL — SIGNIFICANT CHANGE UP (ref 1.8–2.4)
MAGNESIUM SERPL-MCNC: 2.1 MG/DL — SIGNIFICANT CHANGE UP (ref 1.8–2.4)
MCHC RBC-ENTMCNC: 28.3 PG — SIGNIFICANT CHANGE UP (ref 27–31)
MCHC RBC-ENTMCNC: 28.3 PG — SIGNIFICANT CHANGE UP (ref 27–31)
MCHC RBC-ENTMCNC: 31.3 G/DL — LOW (ref 32–37)
MCHC RBC-ENTMCNC: 31.3 G/DL — LOW (ref 32–37)
MCV RBC AUTO: 90.3 FL — SIGNIFICANT CHANGE UP (ref 80–94)
MCV RBC AUTO: 90.3 FL — SIGNIFICANT CHANGE UP (ref 80–94)
NITRITE UR-MCNC: NEGATIVE — SIGNIFICANT CHANGE UP
NITRITE UR-MCNC: NEGATIVE — SIGNIFICANT CHANGE UP
NRBC # BLD: 0 /100 WBCS — SIGNIFICANT CHANGE UP (ref 0–0)
NRBC # BLD: 0 /100 WBCS — SIGNIFICANT CHANGE UP (ref 0–0)
PH UR: 6 — SIGNIFICANT CHANGE UP (ref 5–8)
PH UR: 6 — SIGNIFICANT CHANGE UP (ref 5–8)
PLATELET # BLD AUTO: 227 K/UL — SIGNIFICANT CHANGE UP (ref 130–400)
PLATELET # BLD AUTO: 227 K/UL — SIGNIFICANT CHANGE UP (ref 130–400)
PMV BLD: 10.7 FL — HIGH (ref 7.4–10.4)
PMV BLD: 10.7 FL — HIGH (ref 7.4–10.4)
POTASSIUM SERPL-MCNC: 4 MMOL/L — SIGNIFICANT CHANGE UP (ref 3.5–5)
POTASSIUM SERPL-MCNC: 4 MMOL/L — SIGNIFICANT CHANGE UP (ref 3.5–5)
POTASSIUM SERPL-SCNC: 4 MMOL/L — SIGNIFICANT CHANGE UP (ref 3.5–5)
POTASSIUM SERPL-SCNC: 4 MMOL/L — SIGNIFICANT CHANGE UP (ref 3.5–5)
PROT ?TM UR-MCNC: 50 MG/DLG/24H — SIGNIFICANT CHANGE UP
PROT ?TM UR-MCNC: 50 MG/DLG/24H — SIGNIFICANT CHANGE UP
PROT SERPL-MCNC: 6.6 G/DL — SIGNIFICANT CHANGE UP (ref 6–8)
PROT SERPL-MCNC: 6.6 G/DL — SIGNIFICANT CHANGE UP (ref 6–8)
PROT UR-MCNC: 100 MG/DL
PROT UR-MCNC: 100 MG/DL
PROT/CREAT UR-RTO: 0.7 RATIO — HIGH (ref 0–0.2)
PROT/CREAT UR-RTO: 0.7 RATIO — HIGH (ref 0–0.2)
RBC # BLD: 4.21 M/UL — LOW (ref 4.7–6.1)
RBC # BLD: 4.21 M/UL — LOW (ref 4.7–6.1)
RBC # FLD: 16.6 % — HIGH (ref 11.5–14.5)
RBC # FLD: 16.6 % — HIGH (ref 11.5–14.5)
SODIUM SERPL-SCNC: 144 MMOL/L — SIGNIFICANT CHANGE UP (ref 135–146)
SODIUM SERPL-SCNC: 144 MMOL/L — SIGNIFICANT CHANGE UP (ref 135–146)
SP GR SPEC: 1.01 — SIGNIFICANT CHANGE UP (ref 1–1.03)
SP GR SPEC: 1.01 — SIGNIFICANT CHANGE UP (ref 1–1.03)
UROBILINOGEN FLD QL: 0.2 MG/DL — SIGNIFICANT CHANGE UP (ref 0.2–1)
UROBILINOGEN FLD QL: 0.2 MG/DL — SIGNIFICANT CHANGE UP (ref 0.2–1)
WBC # BLD: 7.78 K/UL — SIGNIFICANT CHANGE UP (ref 4.8–10.8)
WBC # BLD: 7.78 K/UL — SIGNIFICANT CHANGE UP (ref 4.8–10.8)
WBC # FLD AUTO: 7.78 K/UL — SIGNIFICANT CHANGE UP (ref 4.8–10.8)
WBC # FLD AUTO: 7.78 K/UL — SIGNIFICANT CHANGE UP (ref 4.8–10.8)

## 2023-10-18 PROCEDURE — 99222 1ST HOSP IP/OBS MODERATE 55: CPT

## 2023-10-18 PROCEDURE — 99239 HOSP IP/OBS DSCHRG MGMT >30: CPT

## 2023-10-18 RX ORDER — BUMETANIDE 0.25 MG/ML
1 INJECTION INTRAMUSCULAR; INTRAVENOUS
Refills: 0 | DISCHARGE

## 2023-10-18 RX ORDER — BUMETANIDE 0.25 MG/ML
2 INJECTION INTRAMUSCULAR; INTRAVENOUS ONCE
Refills: 0 | Status: COMPLETED | OUTPATIENT
Start: 2023-10-18 | End: 2023-10-18

## 2023-10-18 RX ORDER — BUMETANIDE 0.25 MG/ML
1 INJECTION INTRAMUSCULAR; INTRAVENOUS
Qty: 60 | Refills: 0
Start: 2023-10-18

## 2023-10-18 RX ADMIN — Medication 81 MILLIGRAM(S): at 13:47

## 2023-10-18 RX ADMIN — Medication 50 MICROGRAM(S): at 06:09

## 2023-10-18 RX ADMIN — Medication 50 MILLIGRAM(S): at 06:09

## 2023-10-18 RX ADMIN — BUMETANIDE 2 MILLIGRAM(S): 0.25 INJECTION INTRAMUSCULAR; INTRAVENOUS at 13:47

## 2023-10-18 RX ADMIN — HEPARIN SODIUM 5000 UNIT(S): 5000 INJECTION INTRAVENOUS; SUBCUTANEOUS at 06:10

## 2023-10-18 RX ADMIN — BUMETANIDE 2 MILLIGRAM(S): 0.25 INJECTION INTRAMUSCULAR; INTRAVENOUS at 06:10

## 2023-10-18 RX ADMIN — Medication 10 MILLIGRAM(S): at 10:25

## 2023-10-18 NOTE — DISCHARGE NOTE PROVIDER - CARE PROVIDER_API CALL
Brando Murphy  Internal Medicine  [address] [place] [address]  Phone: [phone]  Fax: [phone]  Follow Up Time: 2 weeks    Sebastian Mendoza  Interventional Cardiology  [address] [place] [address], [address]  [place] [address]  Phone: [phone]  Fax: [phone]  Follow Up Time: 2 weeks    José Luis Pimentel  Nephrology  [address] [facility] [place] [address]  Phone: [phone]  Fax: [phone]  Follow Up Time: 2 weeks   Brando Murphy  Internal Medicine  [address] [place] [address]  Phone: [phone]  Fax: [phone]  Established Patient  Follow Up Time: 1-3 days    Sebastian Mendoza  Interventional Cardiology  [address] [place] [address], [facility] [address]  [place] [address]  Phone: [phone]  Fax: [phone]  Established Patient  Follow Up Time: 2 weeks    José Luis Pimentel  Nephrology  [address] [place] [address]  Phone: [phone]  Fax: [phone]  Established Patient  Follow Up Time: 1 week

## 2023-10-18 NOTE — CONSULT NOTE ADULT - ASSESSMENT
81 YO M with PMHx of Afib (previously on Xarelto but taken off due to GI bleed and epistaxis), CAD s/p CABG 2006 (follows with Dr Mendoza), HFrEF, AVR 2011, CKD III, HTN presented for shortness of breath   Admitted for decompensated HFrEF.    TTE 10/17/23: EF 36%. Mod-sev dec global LV systolic function. Multiple LV RWMA. Mod conc LVH. GIIDD. Mod reduced RV systolic function. Severely enlarged RV. AV bioprosthesis. Severe pHTN (PASP 79 mmHg). Mod enlarged LA and RA    IMPRESSION  #Decompensated HFrEF; likely secondary to medication non-compliance  - b/l LE edema. Lung crackles. pro-BNP 96557.  - Troponin trend: 0.04  (16 Oct 2023 11:00), 0.04  (16 Oct 2023 21:42), 0.05  (17 Oct 2023 07:30). No active chest pain  #Paroxysmal Afib; not on AC  #CAD s/p CABG (2006)  #HTN  #CKD stage IV    PLAN  - Accurate I and O. Daily weight.  - BMP + Mg BID. Keep Mg > 2 and K > 4.  - Chest x-ray on 10/18/23  - Continue Bumex 2 mg IV BID for now. Can be discharged on Bumex 2mg PO daily  - Continue Metoprolol and hydralazine.   - LE duplex    Plan of care discussed with Dr. Brando Murphy 81 YO M with PMHx of Afib (previously on Xarelto but taken off due to GI bleed and epistaxis), CAD s/p CABG 2006 (follows with Dr Mendoza), HFrEF, AVR 2011, CKD III, HTN presented for shortness of breath   Admitted for decompensated HFrEF.    TTE 10/17/23: EF 36%. Mod-sev dec global LV systolic function. Multiple LV RWMA. Mod conc LVH. GIIDD. Mod reduced RV systolic function. Severely enlarged RV. AV bioprosthesis. Severe pHTN (PASP 79 mmHg). Mod enlarged LA and RA    IMPRESSION  #Decompensated HFrEF; likely secondary to medication non-compliance  - b/l LE edema. Lung crackles. pro-BNP 13757.  - Troponin trend: 0.04  (16 Oct 2023 11:00), 0.04  (16 Oct 2023 21:42), 0.05  (17 Oct 2023 07:30). No active chest pain  #Paroxysmal Afib; not on AC due to hx of GIB  #Hx of CVA  #Hx of DVT  #CAD s/p CABG (2006)  #HTN  #CKD stage IV    PLAN  - Accurate I and O. Daily weight.  - BMP + Mg BID. Keep Mg > 2 and K > 4.  - Chest x-ray on 10/18/23  - Continue Bumex 2 mg IV BID for now. Can be discharged on Bumex 2mg PO daily  - Continue Metoprolol and hydralazine.   - LE duplex

## 2023-10-18 NOTE — DISCHARGE NOTE NURSING/CASE MANAGEMENT/SOCIAL WORK - NSDCVIVACCINE_GEN_ALL_CORE_FT
Tdap; 22-Jan-2019 21:12; Nathaniel Rodriguez (JACQUELINE); [employer]; [id number] (Exp. Date: 02-Nov-2020); IntraMuscular; Deltoid Right.; 0.5 milliLiter(s); VIS (VIS Published: 09-May-2013, VIS Presented: 22-Jan-2019);

## 2023-10-18 NOTE — PROGRESS NOTE ADULT - ASSESSMENT
83 y/o M with PMH of Afib initially on Xarelto but taken off due to GI bleed and epistaxis, CAD s/p CABG 2006 ( follows with Dr Mendoza), chronic HFrEF, s/p bioprosthetic AVR 2011, CKD III and HTN presented for shortness of breath on exertion and orthopnea x1 week after stopping bumex about 2 weeks ago.    1. Acute over chronic HFrEF likely due to noncompliance with diuretic  + JVD, crackles, LE edema (right >left) on presentation  pro-BNP 13320  troponins 0.04->0.05  ECHO: EF 36%, grade 2 diastolic dysfunction, severe PHTN, multiple regional WMA, bioprosthetic AV  pt clinically improved with diuresis with bumex 2mg IV q12hr  CXR 10/17 with improvement in PVC compared to 10/16  pt adamantly wants to go home today  case discussed with renal and cardiology attendings today  will give bumex 2mg IV x1 at 2PM and discharge on bumex 2mg PO q12hr x 3 days and then 2mg daily  outpt f/u with cardiology  monitor daily wts at home  importance of med compliance stressed to the pt     2. h/o CAD s/p CABG 2006  on ASA/statin, metoprolol    3. Paroxysmal Afib  not on anticoagulation due to GI bleed  EKG with NSR, LBBB (chronic)    4. CKD 3  baseline around 2  renal f/u Dr. Pimentel appreciated  renal US: nonobstructing left renal calculus, no hydronephrosis  bladder US: no PVR  avoid nephrotoxic meds    5. HTN  BP better now - on hydralazine and metoprolol    6. Anemia  iron studies and ferritin reviewed - WNL    7. Hypothyroid on synthroid  TSH 6.93 - ideally should be tested as outpt - find out from pt and wife if recently done or dose recently adjusted    8. DVT prophylaxis  heparin SQ q8hr      spoke to the pt re: MOLST form dated 12/2021 that stated DNR/DNI - pt wants to continue DNR/DNI status      will review med reconciliation and discharge papers when completed  spent 40 minutes on the discharge process of this pt

## 2023-10-18 NOTE — DISCHARGE NOTE PROVIDER - NSDCQMSTROKE_NEU_ALL_CORE
Date & Time: 1/6/2020, 4:45 PM  Patient: Saintclair Leap  Encounter Provider(s):    Lona Casey MD       To Whom It May Concern:    Lin Mota was seen and treated in our department on 1/6/2020. She should not return to work until 1/8/2020.     If you
No

## 2023-10-18 NOTE — DISCHARGE NOTE NURSING/CASE MANAGEMENT/SOCIAL WORK - PATIENT PORTAL LINK FT
You can access the FollowMyHealth Patient Portal offered by [facility] by registering at the following website: [email or web address]. By joining [employer]’s FollowMyHealth portal, you will also be able to view your health information using other applications (apps) compatible with our system.

## 2023-10-18 NOTE — PROGRESS NOTE ADULT - ASSESSMENT
CKD stage 4  nonobstructing left nephrolithiasis 0.4cm  0.7g/day proteinuria   acute on chronic HFrEF, improved   recently stopped diuretics on own   chronic pedal edema  Afib / s/p AVR (procine) / CAD / CABG / s/p DCC  hx of DVT / PE    HTN   anxiety        plan:    pt insistent on dc home today  complete iv bumex today  give bumex 2mg po bid x 3 days, then 2mg po qg  outpt renal follow up  outpt  f/u of nephrolithiasis  full code  d/w team

## 2023-10-18 NOTE — PROGRESS NOTE ADULT - SUBJECTIVE AND OBJECTIVE BOX
REMA, SAÚL  82y Male    INTERVAL HPI/OVERNIGHT EVENTS:    pt feels great and wants to go home today   improved dyspnea and LE edema  on RA  ambulating  no fever or pain      T(F): 98.3 (10-18-23 @ 08:24), Max: 98.3 (10-18-23 @ 08:24)  HR: 65 (10-18-23 @ 08:24) (65 - 89)  BP: 152/72 (10-18-23 @ 08:24) (130/60 - 192/88)  RR: 18 (10-18-23 @ 08:24) (18 - 18)  SpO2: 97% (10-18-23 @ 08:24) (97% - 99%)    I&O's Summary    17 Oct 2023 07:01  -  18 Oct 2023 07:00  --------------------------------------------------------  IN: 0 mL / OUT: 2235 mL / NET: -2235 mL    18 Oct 2023 07:01  -  18 Oct 2023 12:17  --------------------------------------------------------  IN: 0 mL / OUT: 250 mL / NET: -250 mL        PHYSICAL EXAM:  GENERAL: NAD  HEAD:  Normocephalic  EYES:  conjunctiva and sclera clear  ENMT: Moist mucous membranes  NECK: Supple  NERVOUS SYSTEM:  Alert, awake, Good concentration  CHEST/LUNG: improved air entry b/l, no crackles appreciated  HEART: IRR  ABDOMEN: Soft, Nontender, Nondistended; Bowel sounds present  EXTREMITIES: + right LE> left LE edema  SKIN: warm, dry    Consultant(s) Notes Reviewed:  [x ] YES  [ ] NO  Care Discussed with Consultants/Other Providers [ x] YES  [ ] NO    MEDICATIONS  (STANDING):  aspirin  chewable 81 milliGRAM(s) Oral daily  atorvastatin 20 milliGRAM(s) Oral at bedtime  buMETAnide Injectable 2 milliGRAM(s) IV Push once  heparin   Injectable 5000 Unit(s) SubCutaneous every 8 hours  hydrALAZINE 10 milliGRAM(s) Oral three times a day  levothyroxine 50 MICROGram(s) Oral daily  metoprolol succinate ER 50 milliGRAM(s) Oral daily    MEDICATIONS  (PRN):  acetaminophen     Tablet .. 650 milliGRAM(s) Oral every 6 hours PRN Temp greater or equal to 38C (100.4F), Mild Pain (1 - 3)      Telemetry reviewed by me    LABS:                        11.9   7.78  )-----------( 227      ( 18 Oct 2023 07:06 )             38.0     10-18    144  |  104  |  38<H>  ----------------------------<  102<H>  4.0   |  25  |  2.5<H>    Ca    9.8      18 Oct 2023 07:06  Mg     2.1     10-18    TPro  6.6  /  Alb  4.3  /  TBili  0.9  /  DBili  x   /  AST  19  /  ALT  17  /  AlkPhos  85  10-18      CARDIAC MARKERS ( 17 Oct 2023 07:30 )  x     / 0.05 ng/mL / x     / x     / x      CARDIAC MARKERS ( 16 Oct 2023 21:42 )  x     / 0.04 ng/mL / x     / x     / x              RADIOLOGY & ADDITIONAL TESTS:    Imaging or report Personally Reviewed:  [x ] YES  [ ] NO    < from: [facility] Duplex Lower Ext Vein Scan, Bilat (10.17.23 @ 22:46) >  Impression:    No evidence of deep venous thrombosis or superficial thrombophlebitis in   the bilateral lower extremities.    < end of copied text >  < from: US Kidney and Bladder (10.17.23 @ 14:42) >  IMPRESSION:    Nonobstructing left renal calculus.    < end of copied text >      < from: TTE Echo Complete w/ Contrast w/ Doppler (10.17.23 @ 13:12) >  Summary:   1. LV Ejection Fraction by Mcdonough's Method with a biplane EF of 36 %.   2. Moderately to severely decreased global left ventricular systolic   function.   3. Endocardial visualization was enhanced with intravenous echo contrast.   4. Multiple left ventricular regional wall motion abnormalities exist.   See wall motion findings.   5. Moderate concentric left ventricular hypertrophy.   6. Spectral Doppler shows pseudonormal pattern of left ventricular   myocardial filling (Grade II diastolic dysfunction).   7. Elevated mean left atrial pressure.   8. Moderately reduced RV systolic function.   9. Severely enlarged right ventricle.  10. Mild thickening and calcification of the anterior and posterior   mitral valve leaflets.  11. Mild to moderate mitral annular calcification.  12. Trace mitral valve regurgitation.  13. Bioprosthesis in the aortic position.  14. Mild aortic regurgitation.  15. Estimated pulmonary artery systolic pressure is 79.0 mmHg assuming a   right atrial pressure of 15 mmHg, which is consistent with severe   pulmonary hypertension.  16. Moderately enlarged left atrium.  17. LA volume Index is 50.0 ml/m² ml/m2.  18. Moderate to severe right atrial enlargement.    < end of copied text >      Case discussed with residents and RN on rounds today    Care discussed with pt

## 2023-10-18 NOTE — DISCHARGE NOTE PROVIDER - NSDCMRMEDTOKEN_GEN_ALL_CORE_FT
aspirin 81 mg oral tablet, chewable: 1 tab(s) orally once a day  atorvastatin 20 mg oral tablet: 1 tab(s) orally once a day (at bedtime)  bumetanide 2 mg oral tablet: 1 tab(s) orally START DATE: 10/19 TAKE ONE PILL TWO TIMES A DAY UNTIL END DATE: 10/21    START DATE 10/22: TAKE ONE PILL ONCE A DAY  hydrALAZINE 10 mg oral tablet: 1 tab(s) orally 3 times a day  Levothroid 50 mcg (0.05 mg) oral tablet: 1 tab(s) orally once a day  Metoprolol Succinate ER 50 mg oral tablet, extended release: 1 tab(s) orally once a day

## 2023-10-18 NOTE — DISCHARGE NOTE PROVIDER - HOSPITAL COURSE
Reason for Admission: SOB  History of Present Illness:   83 y/o M with PMH Afib initially on Xarelto but taken off due to GI bleed and epistaxis, CAD s/p CABG  follows with Dr Hanson, HFrEF, AVR , CKD III, HTN presenting for shortness of breath on exertion, orthopnea x1 week and sinus syptoms. Pt reports he stopped taking his Bumex about 2 weeks ago because he did not like having to go to the bathroom so often. Pt describes the SOB to be exacerbated by lying down and upon walking. Pt denies any new leg swelling although he does have Rt extremity more swollen than the rest but he states it has been like that for years since he had a procedure done on his LE. Pt states he saw his Cardiologist 2 months ago. Upon my interview pt endorses significant improvement of his symptoms after the bumex push.    In the ED pts VS were T(C): 36.9  T(F): 98.4  HR: 82  BP: 194/104  RR: 28  SpO2: 96%  Labs show: H.7   Creatinine:2.3 (baseline 2-3)  CXR shows BL Pleural effusions      Pt is admitted for further workup and management of HF exacerbation    Hospital Course:  - pt diuresed with bumex 2mg IV and clinically improved; echo showed EF 36^ w/ multiple regional WMA  - plan is to follow up with outpatient cardiology.  - Will discharge with bumex 2mg PO twice daily for 3 days and then 2mg daily.    1. Acute over chronic HFrEF likely due to noncompliance with diuretic  + JVD, crackles, LE edema (right >left) on presentation  pro-BNP 25790  troponins 0.04->0.05  ECHO: EF 36%, grade 2 diastolic dysfunction, severe PHTN, multiple regional WMA, bioprosthetic AV  pt clinically improved with diuresis with bumex 2mg IV q12hr  CXR 10/17 with improvement in PVC compared to 10/16  pt adamantly wants to go home today  case discussed with renal and cardiology attendings today  will give bumex 2mg IV x1 at 2PM and discharge on bumex 2mg PO q12hr x 3 days and then 2mg daily  outpt f/u with cardiology  monitor daily wts at home  importance of med compliance stressed to the pt     2. h/o CAD s/p CABG   on ASA/statin, metoprolol    3. Paroxysmal Afib  not on anticoagulation due to GI bleed  EKG with NSR, LBBB (chronic)    4. CKD 3  baseline around 2  renal f/u Dr. Pimentel appreciated  renal US: nonobstructing left renal calculus, no hydronephrosis  bladder US: no PVR  avoid nephrotoxic meds    5. HTN  BP better now - on hydralazine and metoprolol    6. Anemia  iron studies and ferritin reviewed - WNL    7. Hypothyroid on synthroid  TSH 6.93 - ideally should be tested as outpt - find out from pt and wife if recently done or dose recently adjusted    8. DVT prophylaxis  heparin SQ q8hr      spoke to the pt re: MOLST form dated 2021 that stated DNR/DNI - pt wants to continue DNR/DNI status

## 2023-10-18 NOTE — DISCHARGE NOTE PROVIDER - CARE PROVIDERS DIRECT ADDRESSES
,DirectAddress_Unknown,[email or web address],DirectAddress_Unknown

## 2023-10-18 NOTE — DISCHARGE NOTE PROVIDER - NSDCCPCAREPLAN_GEN_ALL_CORE_FT
PRINCIPAL DISCHARGE DIAGNOSIS  Diagnosis: Fluid overload  Assessment and Plan of Treatment: Congestive heart failure (CHF) is a chronic condition in which the heart has trouble pumping blood. In some cases of heart failure, fluid may back up into your lungs or you may have swelling (edema) in your lower legs. There are many causes of heart failure including high blood pressure, coronary artery disease, abnormal heart valves, heart muscle disease, lung disease, diabetes, etc. Symptoms include shortness of breath with activity or when lying flat, cough, swelling of the legs, fatigue, or increased urination during the night.   Treatment is aimed at managing the symptoms of heart failure and may include lifestyle changes, medications, or surgical procedures. Take medicines only as directed by your health care provider and do not stop unless instructed to do so. Eat heart-healthy foods with low or no trans/saturated fats, cholesterol and salt. Weigh yourself every day for early recognition of fluid accumulation.  SEEK IMMEDIATE MEDICAL CARE IF YOU HAVE ANY OF THE FOLLOWING SYMPTOMS: shortness of breath, change in mental status, chest pain, lightheadedness/dizziness/fainting, or worsening of symptoms including not being able to conduct normal physical activity.     PRINCIPAL DISCHARGE DIAGNOSIS  Diagnosis: Fluid overload  Assessment and Plan of Treatment: Please take the bumetanide (water pill) as directed.  Congestive heart failure (CHF) is a chronic condition in which the heart has trouble pumping blood. In some cases of heart failure, fluid may back up into your lungs or you may have swelling (edema) in your lower legs. There are many causes of heart failure including high blood pressure, coronary artery disease, abnormal heart valves, heart muscle disease, lung disease, diabetes, etc. Symptoms include shortness of breath with activity or when lying flat, cough, swelling of the legs, fatigue, or increased urination during the night.   Treatment is aimed at managing the symptoms of heart failure and may include lifestyle changes, medications, or surgical procedures. Take medicines only as directed by your health care provider and do not stop unless instructed to do so. Eat heart-healthy foods with low or no trans/saturated fats, cholesterol and salt. Weigh yourself every day for early recognition of fluid accumulation.  SEEK IMMEDIATE MEDICAL CARE IF YOU HAVE ANY OF THE FOLLOWING SYMPTOMS: shortness of breath, change in mental status, chest pain, lightheadedness/dizziness/fainting, or worsening of symptoms including not being able to conduct normal physical activity.

## 2023-10-18 NOTE — PROGRESS NOTE ADULT - SUBJECTIVE AND OBJECTIVE BOX
NEPHROLOGY FOLLOW UP NOTE    pt seen and examined  wants to go home  SOB better, no more orthopnea, leg swelling improved    PAST MEDICAL & SURGICAL HISTORY:  Chronic systolic congestive heart failure      HTN (hypertension)      Dyslipidemia      CAD (coronary artery disease)      AF (atrial fibrillation)      S/P CABG x 4        Allergies:  No Known Allergies    Home Medications Reviewed    SOCIAL HISTORY:  Denies ETOH,Smoking,   FAMILY HISTORY:        REVIEW OF SYSTEMS:  CONSTITUTIONAL: No weakness, fevers or chills  EYES/ENT: No visual changes;  No vertigo or throat pain   NECK: No pain or stiffness  RESPIRATORY: No cough, wheezing, hemoptysis; + shortness of breath  CARDIOVASCULAR: No chest pain or palpitations.  GASTROINTESTINAL: No abdominal or epigastric pain. No nausea, vomiting, or hematemesis; No diarrhea or constipation. No melena or hematochezia.  GENITOURINARY: No dysuria, frequency, foamy urine, urinary urgency, incontinence or hematuria  NEUROLOGICAL: No numbness or weakness  SKIN: No itching, burning, rashes, or lesions   VASCULAR: + bilateral lower extremity edema.   All other review of systems is negative unless indicated above.    PHYSICAL EXAM:  Constitutional: NAD  HEENT: anicteric sclera, oropharynx clear, MMM  Neck: + JVD  Respiratory: b/l BS  Cardiovascular: S1, S2, irreg  Gastrointestinal: BS+, soft, NT/ND  Extremities: No cyanosis or clubbing. + peripheral edema  Neurological: A/O x 3, no focal deficits  Psychiatric: Normal mood, normal affect  : No CVA tenderness. No covarrubias.   Skin: No rashes     Hospital Medications:   MEDICATIONS  (STANDING):  aspirin  chewable 81 milliGRAM(s) Oral daily  atorvastatin 20 milliGRAM(s) Oral at bedtime  buMETAnide Injectable 2 milliGRAM(s) IV Push once  heparin   Injectable 5000 Unit(s) SubCutaneous every 8 hours  hydrALAZINE 10 milliGRAM(s) Oral three times a day  levothyroxine 50 MICROGram(s) Oral daily  metoprolol succinate ER 50 milliGRAM(s) Oral daily        VITALS:  T(F): 98.3 (10-18-23 @ 08:24), Max: 98.3 (10-18-23 @ 08:24)  HR: 65 (10-18-23 @ 08:24)  BP: 152/72 (10-18-23 @ 08:24)  RR: 18 (10-18-23 @ 08:24)  SpO2: 97% (10-18-23 @ 08:24)  Wt(kg): --    10-16 @ 07:01  -  10-17 @ 07:00  --------------------------------------------------------  IN: 60 mL / OUT: 1620 mL / NET: -1560 mL    10-17 @ 07:01  -  10-18 @ 07:00  --------------------------------------------------------  IN: 0 mL / OUT: 2235 mL / NET: -2235 mL    10-18 @ 07:01  -  10-18 @ 13:41  --------------------------------------------------------  IN: 0 mL / OUT: 250 mL / NET: -250 mL          LABS:  10-18    144  |  104  |  38<H>  ----------------------------<  102<H>  4.0   |  25  |  2.5<H>    Ca    9.8      18 Oct 2023 07:06  Mg     2.1     10-18    TPro  6.6  /  Alb  4.3  /  TBili  0.9  /  DBili      /  AST  19  /  ALT  17  /  AlkPhos  85  10-18                          11.9   7.78  )-----------( 227      ( 18 Oct 2023 07:06 )             38.0       Urine Studies:  Urinalysis Basic - ( 18 Oct 2023 07:06 )    Color:  / Appearance:  / SG:  / pH:   Gluc: 102 mg/dL / Ketone:   / Bili:  / Urobili:    Blood:  / Protein:  / Nitrite:    Leuk Esterase:  / RBC:  / WBC    Sq Epi:  / Non Sq Epi:  / Bacteria:       Creatinine, Random Urine: 74 mg/dL (10-18 @ 01:50)  Protein/Creatinine Ratio Calculation: 0.7 Ratio (10-18 @ 01:50)      RADIOLOGY & ADDITIONAL STUDIES:

## 2023-10-18 NOTE — DISCHARGE NOTE PROVIDER - PROVIDER TOKENS
PROVIDER:[TOKEN:[[id number]],FOLLOWUP:[2 weeks]],PROVIDER:[TOKEN:[[id number]],FOLLOWUP:[2 weeks]],PROVIDER:[TOKEN:[[id number]],FOLLOWUP:[2 weeks]] PROVIDER:[TOKEN:[[id number]],FOLLOWUP:[1-3 days],ESTABLISHEDPATIENT:[T]],PROVIDER:[TOKEN:[[id number]],FOLLOWUP:[2 weeks],ESTABLISHEDPATIENT:[T]],PROVIDER:[TOKEN:[[id number]],FOLLOWUP:[1 week],ESTABLISHEDPATIENT:[T]]

## 2023-10-18 NOTE — CONSULT NOTE ADULT - SUBJECTIVE AND OBJECTIVE BOX
Date of Admission: 10-16-23    CHIEF COMPLAINT:     HISTORY OF PRESENT ILLNESS:    83 y/o M with PMH Afib initially on Xarelto but taken off due to GI bleed and epistaxis, CAD s/p CABG  follows with Dr Hanson, HFrEF, AVR , CKD III, HTN presenting for shortness of breath on exertion, orthopnea x1 week and sinus syptoms. Pt reports he stopped taking his Bumex about 2 weeks ago because he did not like having to go to the bathroom so often. Pt describes the SOB to be exacerbated by lying down and upon walking. Pt denies any new leg swelling although he does have Rt extremity more swollen than the rest but he states it has been like that for years since he had a procedure done on his LE. Pt states he saw his Cardiologist 2 months ago and was told that all his tests were normal. Upon my interview pt endorses significant improvement of his symptoms after the bumex push.    In the ED pts VS were T(C): 36.9  T(F): 98.4  HR: 82  BP: 194/104  RR: 28  SpO2: 96%  Labs show: H.7   Creatinine:2.3 (baseline 2-3)  CXR shows BL Pleural effusions    Pt is admitted for further workup and management of HF exacerbation   (16 Oct 2023 15:27)      CARDIO FELLOW ADDITIONAL NOTES:    Patient states making good urine since admission. Reports that he was non compliant with medications because he thought he only has to take when when he gets lower extremity swelling     PAST MEDICAL & SURGICAL HISTORY  Chronic systolic congestive heart failure    HTN (hypertension)    Dyslipidemia    CAD (coronary artery disease)    AF (atrial fibrillation)    S/P CABG x 4        FAMILY HISTORY:    None [X ]  Mother:   Father:   Siblings:     SOCIAL HISTORY:   [X ] Non-smoker  [ ] Smoker  [ ] Alcohol    Allergies    No Known Allergies    Intolerances    	    REVIEW OF SYMPTOMS:  CONSTITUTIONAL: Weakness +ve  EYES: No visual changes, eye pain, or discharge  ENT: Difficulty hearing   NECK: No pain or stiffness  RESPIRATORY: Shortness of breath  CARDIOVASCULAR: No chest pain or palpitations  GASTROINTESTINAL: No abdominal or epigastric pain; No nausea, vomiting, or hematemesis; No diarrhea or constipation; No melena or hematochezia  GENITOURINARY: No dysuria, frequency or hematuria  SKIN: No itching or rashes      VITALS:  T(C): 36.7 (10-17-23 @ 20:59), Max: 36.7 (10-17-23 @ 15:51)  HR: 74 (10-18-23 @ 00:27) (74 - 89)  BP: 130/60 (10-18-23 @ 00:27) (130/60 - 192/88)  RR: 18 (10-17-23 @ 20:59) (18 - 18)  SpO2: 99% (10-17-23 @ 20:59) (98% - 99%)  Wt(kg): --  I&O's Summary    16 Oct 2023 07:  -  17 Oct 2023 07:00  --------------------------------------------------------  IN: 60 mL / OUT: 1620 mL / NET: -1560 mL    17 Oct 2023 07:01  -  18 Oct 2023 02:20  --------------------------------------------------------  IN: 0 mL / OUT: 1960 mL / NET: -1960 mL      Daily     Daily     PHYSICAL EXAM:  GEN: Not in acute distress  HEENT: EOMI  LUNGS: b/l crackles  CARDIOVASCULAR: Irregular   ABD: Soft, non-tender, non-distended  EXT: 2+ JANEY  NEURO: AAOx3    LABS:	 	                          11.2   6.89  )-----------( 217      ( 17 Oct 2023 07:30 )             35.8     10-17    140  |  104  |  36<H>  ----------------------------<  109<H>  3.8   |  22  |  2.3<H>    Ca    9.7      17 Oct 2023 07:30  Mg     2.1     10-17    TPro  6.4  /  Alb  4.0  /  TBili  0.9  /  DBili  x   /  AST  17  /  ALT  16  /  AlkPhos  84  10    CARDIAC MARKERS ( 17 Oct 2023 07:30 )  x     / 0.05 ng/mL / x     / x     / x      CARDIAC MARKERS ( 16 Oct 2023 21:42 )  x     / 0.04 ng/mL / x     / x     / x      CARDIAC MARKERS ( 16 Oct 2023 11:00 )  x     / 0.04 ng/mL / x     / x     / x              CARDIAC MARKERS:  Troponin trend: 0.04  (16 Oct 2023 11:00), 0.04  (16 Oct 2023 21:42), 0.05  (17 Oct 2023 07:30)    TELEMETRY EVENTS:      ECG:    RADIOLOGY:    OTHER:    Echocardiogram:  < from: TTE Echo Complete w/ Contrast w/ Doppler (10.17.23 @ 13:12) >   1. LV Ejection Fraction by Mcdonough's Method with a biplane EF of 36 %.   2. Moderately to severely decreased global left ventricular systolic   function.   3. Endocardial visualization was enhanced with intravenous echo contrast.   4. Multiple left ventricular regional wall motion abnormalities exist.   See wall motion findings.   5. Moderate concentric left ventricular hypertrophy.   6. Spectral Doppler shows pseudonormal pattern of left ventricular   myocardial filling (Grade II diastolic dysfunction).   7. Elevated mean left atrial pressure.   8. Moderately reduced RV systolic function.   9. Severely enlarged right ventricle.  10. Mild thickening and calcification of the anterior and posterior   mitral valve leaflets.  11. Mild to moderate mitral annular calcification.  12. Trace mitral valve regurgitation.  13. Bioprosthesis in the aortic position.  14. Mild aortic regurgitation.  15. Estimated pulmonary artery systolic pressure is 79.0 mmHg assuming a   right atrial pressure of 15 mmHg, which is consistent with severe   pulmonary hypertension.  16. Moderately enlarged left atrium.  17. LA volume Index is 50.0 ml/m² ml/m2.  18. Moderate to severe right atrial enlargement.        < end of copied text >    Catheterization:    Stress Test: 	  < from: NM Nuclear Stress Pharmacologic Multiple (22 @ 18:03) >  1. SMALL REVERSIBLE DEFECT INVOLVING THE ANTERIOR/ANTEROSEPTAL WALL OF   THE LEFT VENTRICLE CONSISTENT WITH ISCHEMIA SUPERIMPOSED ON SCAR.    2. DIFFUSE GLOBAL LEFT VENTRICULAR HYPOKINESIA.    3. LEFT VENTRICULAR EJECTION FRACTION OF  25 % WHICH IS LOW.    < end of copied text >  	    Home Medications:  Bumex 2 mg oral tablet: 1 tab(s) orally once a day (16 Oct 2023 16:29)  Levothroid 50 mcg (0.05 mg) oral tablet: 1 tab(s) orally once a day (16 Oct 2023 16:29)    MEDICATIONS  (STANDING):  aspirin  chewable 81 milliGRAM(s) Oral daily  atorvastatin 20 milliGRAM(s) Oral at bedtime  buMETAnide Injectable 2 milliGRAM(s) IV Push two times a day  heparin   Injectable 5000 Unit(s) SubCutaneous every 8 hours  hydrALAZINE 10 milliGRAM(s) Oral three times a day  levothyroxine 50 MICROGram(s) Oral daily  metoprolol succinate ER 50 milliGRAM(s) Oral daily    MEDICATIONS  (PRN):  acetaminophen     Tablet .. 650 milliGRAM(s) Oral every 6 hours PRN Temp greater or equal to 38C (100.4F), Mild Pain (1 - 3)         Date of Admission: 10-16-23    CHIEF COMPLAINT:     HISTORY OF PRESENT ILLNESS:    81 y/o M with PMH Afib initially on Xarelto but taken off due to GI bleed and epistaxis, CAD s/p CABG  follows with Dr Hanson, HFrEF, AVR , CKD III, HTN presenting for shortness of breath on exertion, orthopnea x1 week and sinus syptoms. Pt reports he stopped taking his Bumex about 2 weeks ago because he did not like having to go to the bathroom so often. Pt describes the SOB to be exacerbated by lying down and upon walking. Pt denies any new leg swelling although he does have Rt extremity more swollen than the rest but he states it has been like that for years since he had a procedure done on his LE. Upon my interview pt endorses significant improvement of his symptoms after the bumex push.    In the ED pts VS were T(C): 36.9  T(F): 98.4  HR: 82  BP: 194/104  RR: 28  SpO2: 96%  Labs show: H.7   Creatinine:2.3 (baseline 2-3)  CXR shows BL Pleural effusions    Pt is admitted for further workup and management of HF exacerbation   (16 Oct 2023 15:27)      CARDIO FELLOW ADDITIONAL NOTES:    Patient states making good urine since admission. Reports that he was non compliant with medications because he thought he only has to take when when he gets lower extremity swelling     PAST MEDICAL & SURGICAL HISTORY  Chronic systolic congestive heart failure    HTN (hypertension)    Dyslipidemia    CAD (coronary artery disease)    AF (atrial fibrillation)    S/P CABG x 4        FAMILY HISTORY:    None [X ]  Mother:   Father:   Siblings:     SOCIAL HISTORY:   [X ] Non-smoker  [ ] Smoker  [ ] Alcohol    Allergies    No Known Allergies    Intolerances    	    REVIEW OF SYMPTOMS:  CONSTITUTIONAL: Weakness +ve  EYES: No visual changes, eye pain, or discharge  ENT: Difficulty hearing   NECK: No pain or stiffness  RESPIRATORY: Shortness of breath  CARDIOVASCULAR: No chest pain or palpitations  GASTROINTESTINAL: No abdominal or epigastric pain; No nausea, vomiting, or hematemesis; No diarrhea or constipation; No melena or hematochezia  GENITOURINARY: No dysuria, frequency or hematuria  SKIN: No itching or rashes      VITALS:  T(C): 36.7 (10-17-23 @ 20:59), Max: 36.7 (10-17-23 @ 15:51)  HR: 74 (10-18-23 @ 00:27) (74 - 89)  BP: 130/60 (10-18-23 @ 00:27) (130/60 - 192/88)  RR: 18 (10-17-23 @ 20:59) (18 - 18)  SpO2: 99% (10-17-23 @ 20:59) (98% - 99%)  Wt(kg): --  I&O's Summary    16 Oct 2023 07:01  -  17 Oct 2023 07:00  --------------------------------------------------------  IN: 60 mL / OUT: 1620 mL / NET: -1560 mL    17 Oct 2023 07:01  -  18 Oct 2023 02:20  --------------------------------------------------------  IN: 0 mL / OUT: 1960 mL / NET: -1960 mL      Daily     Daily     PHYSICAL EXAM:  GEN: Not in acute distress  HEENT: EOMI  LUNGS: b/l crackles  CARDIOVASCULAR: Irregular   ABD: Soft, non-tender, non-distended  EXT: 2+ JANEY  NEURO: AAOx3    LABS:	 	                          11.2   6.89  )-----------( 217      ( 17 Oct 2023 07:30 )             35.8     10-    140  |  104  |  36<H>  ----------------------------<  109<H>  3.8   |  22  |  2.3<H>    Ca    9.7      17 Oct 2023 07:30  Mg     2.1     10-17    TPro  6.4  /  Alb  4.0  /  TBili  0.9  /  DBili  x   /  AST  17  /  ALT  16  /  AlkPhos  84  10-17    CARDIAC MARKERS ( 17 Oct 2023 07:30 )  x     / 0.05 ng/mL / x     / x     / x      CARDIAC MARKERS ( 16 Oct 2023 21:42 )  x     / 0.04 ng/mL / x     / x     / x      CARDIAC MARKERS ( 16 Oct 2023 11:00 )  x     / 0.04 ng/mL / x     / x     / x              CARDIAC MARKERS:  Troponin trend: 0.04  (16 Oct 2023 11:00), 0.04  (16 Oct 2023 21:42), 0.05  (17 Oct 2023 07:30)    TELEMETRY EVENTS:      ECG:    RADIOLOGY:    OTHER:    Echocardiogram:  < from: TTE Echo Complete w/ Contrast w/ Doppler (10.17.23 @ 13:12) >   1. LV Ejection Fraction by Mcdonough's Method with a biplane EF of 36 %.   2. Moderately to severely decreased global left ventricular systolic   function.   3. Endocardial visualization was enhanced with intravenous echo contrast.   4. Multiple left ventricular regional wall motion abnormalities exist.   See wall motion findings.   5. Moderate concentric left ventricular hypertrophy.   6. Spectral Doppler shows pseudonormal pattern of left ventricular   myocardial filling (Grade II diastolic dysfunction).   7. Elevated mean left atrial pressure.   8. Moderately reduced RV systolic function.   9. Severely enlarged right ventricle.  10. Mild thickening and calcification of the anterior and posterior   mitral valve leaflets.  11. Mild to moderate mitral annular calcification.  12. Trace mitral valve regurgitation.  13. Bioprosthesis in the aortic position.  14. Mild aortic regurgitation.  15. Estimated pulmonary artery systolic pressure is 79.0 mmHg assuming a   right atrial pressure of 15 mmHg, which is consistent with severe   pulmonary hypertension.  16. Moderately enlarged left atrium.  17. LA volume Index is 50.0 ml/m² ml/m2.  18. Moderate to severe right atrial enlargement.        < end of copied text >    Catheterization:    Stress Test: 	  < from: NM Nuclear Stress Pharmacologic Multiple (22 @ 18:03) >  1. SMALL REVERSIBLE DEFECT INVOLVING THE ANTERIOR/ANTEROSEPTAL WALL OF   THE LEFT VENTRICLE CONSISTENT WITH ISCHEMIA SUPERIMPOSED ON SCAR.    2. DIFFUSE GLOBAL LEFT VENTRICULAR HYPOKINESIA.    3. LEFT VENTRICULAR EJECTION FRACTION OF  25 % WHICH IS LOW.    < end of copied text >  	    Home Medications:  Bumex 2 mg oral tablet: 1 tab(s) orally once a day (16 Oct 2023 16:29)  Levothroid 50 mcg (0.05 mg) oral tablet: 1 tab(s) orally once a day (16 Oct 2023 16:29)    MEDICATIONS  (STANDING):  aspirin  chewable 81 milliGRAM(s) Oral daily  atorvastatin 20 milliGRAM(s) Oral at bedtime  buMETAnide Injectable 2 milliGRAM(s) IV Push two times a day  heparin   Injectable 5000 Unit(s) SubCutaneous every 8 hours  hydrALAZINE 10 milliGRAM(s) Oral three times a day  levothyroxine 50 MICROGram(s) Oral daily  metoprolol succinate ER 50 milliGRAM(s) Oral daily    MEDICATIONS  (PRN):  acetaminophen     Tablet .. 650 milliGRAM(s) Oral every 6 hours PRN Temp greater or equal to 38C (100.4F), Mild Pain (1 - 3)         Date of Admission: 10-16-23    CHIEF COMPLAINT:     HISTORY OF PRESENT ILLNESS:    81 y/o M with PMH Afib initially on Xarelto but taken off due to GI bleed and epistaxis, CAD s/p CABG  follows with Dr Hanson, HFrEF, AVR , CKD III, HTN presenting for shortness of breath on exertion, orthopnea x1 week and sinus syptoms. Pt reports he stopped taking his Bumex about 2 weeks ago because he did not like having to go to the bathroom so often. Pt describes the SOB to be exacerbated by lying down and upon walking. Pt denies any new leg swelling although he does have Rt extremity more swollen than the rest but he states it has been like that for years since he had a procedure done on his LE. Upon my interview pt endorses significant improvement of his symptoms after the bumex push.    In the ED pts VS were T(C): 36.9  T(F): 98.4  HR: 82  BP: 194/104  RR: 28  SpO2: 96%  Labs show: H.7   Creatinine:2.3 (baseline 2-3)  CXR shows BL Pleural effusions    Pt is admitted for further workup and management of HF exacerbation   (16 Oct 2023 15:27)      CARDIO FELLOW ADDITIONAL NOTES:    Patient states making good urine since admission. Reports that he was non compliant with medications because he thought he only has to take when when he gets lower extremity swelling     PAST MEDICAL & SURGICAL HISTORY  Chronic systolic congestive heart failure    HTN (hypertension)    Dyslipidemia    CAD (coronary artery disease)    AF (atrial fibrillation)    S/P CABG x 4        FAMILY HISTORY:    None [X ]  Mother:   Father:   Siblings:     SOCIAL HISTORY:   [X ] Non-smoker  [ ] Smoker  [ ] Alcohol    Allergies    No Known Allergies    Intolerances    	    REVIEW OF SYMPTOMS:  CONSTITUTIONAL: Weakness +ve  EYES: No visual changes, eye pain, or discharge  ENT: Difficulty hearing   NECK: No pain or stiffness  RESPIRATORY: Shortness of breath  CARDIOVASCULAR: No chest pain or palpitations  GASTROINTESTINAL: No abdominal or epigastric pain; No nausea, vomiting, or hematemesis; No diarrhea or constipation; No melena or hematochezia  GENITOURINARY: No dysuria, frequency or hematuria  SKIN: No itching or rashes  10 point ROS completed and negative other than stated above      VITALS:  T(C): 36.7 (10-17-23 @ 20:59), Max: 36.7 (10-17-23 @ 15:51)  HR: 74 (10-18-23 @ 00:27) (74 - 89)  BP: 130/60 (10-18-23 @ 00:27) (130/60 - 192/88)  RR: 18 (10-17-23 @ 20:59) (18 - 18)  SpO2: 99% (10-17-23 @ 20:59) (98% - 99%)  Wt(kg): --  I&O's Summary    16 Oct 2023 07:01  -  17 Oct 2023 07:00  --------------------------------------------------------  IN: 60 mL / OUT: 1620 mL / NET: -1560 mL    17 Oct 2023 07:01  -  18 Oct 2023 02:20  --------------------------------------------------------  IN: 0 mL / OUT: 1960 mL / NET: -1960 mL      Daily     Daily     PHYSICAL EXAM:  GEN: Not in acute distress, pleasant  HEENT: EOMI, PERRLA  Neck: supple, JVD 12 cm  LUNGS: b/l crackles, no wheezing  CARDIOVASCULAR: Irregular, 2/6 RUSTY JOAN border  ABD: Soft, non-tender, non-distended  EXT: 2+ JANEY, no rash  NEURO: AAOx3, no focal deficitis    LABS:	 	                          11.2   6.89  )-----------( 217      ( 17 Oct 2023 07:30 )             35.8     10    140  |  104  |  36<H>  ----------------------------<  109<H>  3.8   |  22  |  2.3<H>    Ca    9.7      17 Oct 2023 07:30  Mg     2.1     10-    TPro  6.4  /  Alb  4.0  /  TBili  0.9  /  DBili  x   /  AST  17  /  ALT  16  /  AlkPhos  84  10-    CARDIAC MARKERS ( 17 Oct 2023 07:30 )  x     / 0.05 ng/mL / x     / x     / x      CARDIAC MARKERS ( 16 Oct 2023 21:42 )  x     / 0.04 ng/mL / x     / x     / x      CARDIAC MARKERS ( 16 Oct 2023 11:00 )  x     / 0.04 ng/mL / x     / x     / x              CARDIAC MARKERS:  Troponin trend: 0.04  (16 Oct 2023 11:00), 0.04  (16 Oct 2023 21:42), 0.05  (17 Oct 2023 07:30)    TELEMETRY EVENTS:      ECG:    RADIOLOGY:    OTHER:    Echocardiogram:  < from: TTE Echo Complete w/ Contrast w/ Doppler (10.17.23 @ 13:12) >   1. LV Ejection Fraction by Mcdonough's Method with a biplane EF of 36 %.   2. Moderately to severely decreased global left ventricular systolic   function.   3. Endocardial visualization was enhanced with intravenous echo contrast.   4. Multiple left ventricular regional wall motion abnormalities exist.   See wall motion findings.   5. Moderate concentric left ventricular hypertrophy.   6. Spectral Doppler shows pseudonormal pattern of left ventricular   myocardial filling (Grade II diastolic dysfunction).   7. Elevated mean left atrial pressure.   8. Moderately reduced RV systolic function.   9. Severely enlarged right ventricle.  10. Mild thickening and calcification of the anterior and posterior   mitral valve leaflets.  11. Mild to moderate mitral annular calcification.  12. Trace mitral valve regurgitation.  13. Bioprosthesis in the aortic position.  14. Mild aortic regurgitation.  15. Estimated pulmonary artery systolic pressure is 79.0 mmHg assuming a   right atrial pressure of 15 mmHg, which is consistent with severe   pulmonary hypertension.  16. Moderately enlarged left atrium.  17. LA volume Index is 50.0 ml/m² ml/m2.  18. Moderate to severe right atrial enlargement.        < end of copied text >    Catheterization:    Stress Test: 	  < from: NM Nuclear Stress Pharmacologic Multiple (22 @ 18:03) >  1. SMALL REVERSIBLE DEFECT INVOLVING THE ANTERIOR/ANTEROSEPTAL WALL OF   THE LEFT VENTRICLE CONSISTENT WITH ISCHEMIA SUPERIMPOSED ON SCAR.    2. DIFFUSE GLOBAL LEFT VENTRICULAR HYPOKINESIA.    3. LEFT VENTRICULAR EJECTION FRACTION OF  25 % WHICH IS LOW.    < end of copied text >  	    Home Medications:  Bumex 2 mg oral tablet: 1 tab(s) orally once a day (16 Oct 2023 16:29)  Levothroid 50 mcg (0.05 mg) oral tablet: 1 tab(s) orally once a day (16 Oct 2023 16:29)    MEDICATIONS  (STANDING):  aspirin  chewable 81 milliGRAM(s) Oral daily  atorvastatin 20 milliGRAM(s) Oral at bedtime  buMETAnide Injectable 2 milliGRAM(s) IV Push two times a day  heparin   Injectable 5000 Unit(s) SubCutaneous every 8 hours  hydrALAZINE 10 milliGRAM(s) Oral three times a day  levothyroxine 50 MICROGram(s) Oral daily  metoprolol succinate ER 50 milliGRAM(s) Oral daily    MEDICATIONS  (PRN):  acetaminophen     Tablet .. 650 milliGRAM(s) Oral every 6 hours PRN Temp greater or equal to 38C (100.4F), Mild Pain (1 - 3)

## 2023-10-18 NOTE — DISCHARGE NOTE NURSING/CASE MANAGEMENT/SOCIAL WORK - NSDCPEFALRISK_GEN_ALL_CORE
For information on Fall & Injury Prevention, visit: [email or web address] OR  [email or web address] OR  https://www.cdc.gov/steadi/patient.html

## 2023-10-18 NOTE — CONSULT NOTE ADULT - ATTENDING COMMENTS
Briefly, 82 year old man for whom cardiology is consulted for acute decompensated heart failure. Patient is doing much better on IV bumex and is close to euvolemic. Echocardiogram personally reviewed. Continue home meds. Follow up with outpatient cardiologist.     Thank you for this consult.

## 2023-10-24 ENCOUNTER — APPOINTMENT (OUTPATIENT)
Dept: CARDIOLOGY | Facility: CLINIC | Age: 83
End: 2023-10-24

## 2023-10-25 DIAGNOSIS — Z66 DO NOT RESUSCITATE: ICD-10-CM

## 2023-10-25 DIAGNOSIS — Z87.891 PERSONAL HISTORY OF NICOTINE DEPENDENCE: ICD-10-CM

## 2023-10-25 DIAGNOSIS — I44.7 LEFT BUNDLE-BRANCH BLOCK, UNSPECIFIED: ICD-10-CM

## 2023-10-25 DIAGNOSIS — I13.0 HYPERTENSIVE HEART AND CHRONIC KIDNEY DISEASE WITH HEART FAILURE AND STAGE 1 THROUGH STAGE 4 CHRONIC KIDNEY DISEASE, OR UNSPECIFIED CHRONIC KIDNEY DISEASE: ICD-10-CM

## 2023-10-25 DIAGNOSIS — R06.01 ORTHOPNEA: ICD-10-CM

## 2023-10-25 DIAGNOSIS — N20.0 CALCULUS OF KIDNEY: ICD-10-CM

## 2023-10-25 DIAGNOSIS — Z91.148 PATIENT'S OTHER NONCOMPLIANCE WITH MEDICATION REGIMEN FOR OTHER REASON: ICD-10-CM

## 2023-10-25 DIAGNOSIS — I25.10 ATHEROSCLEROTIC HEART DISEASE OF NATIVE CORONARY ARTERY WITHOUT ANGINA PECTORIS: ICD-10-CM

## 2023-10-25 DIAGNOSIS — I48.0 PAROXYSMAL ATRIAL FIBRILLATION: ICD-10-CM

## 2023-10-25 DIAGNOSIS — N18.4 CHRONIC KIDNEY DISEASE, STAGE 4 (SEVERE): ICD-10-CM

## 2023-10-25 DIAGNOSIS — Z95.5 PRESENCE OF CORONARY ANGIOPLASTY IMPLANT AND GRAFT: ICD-10-CM

## 2023-10-25 DIAGNOSIS — Z79.82 LONG TERM (CURRENT) USE OF ASPIRIN: ICD-10-CM

## 2023-10-25 DIAGNOSIS — Z95.3 PRESENCE OF XENOGENIC HEART VALVE: ICD-10-CM

## 2023-10-25 DIAGNOSIS — F41.9 ANXIETY DISORDER, UNSPECIFIED: ICD-10-CM

## 2023-10-25 DIAGNOSIS — I50.23 ACUTE ON CHRONIC SYSTOLIC (CONGESTIVE) HEART FAILURE: ICD-10-CM

## 2024-02-26 ENCOUNTER — APPOINTMENT (OUTPATIENT)
Dept: CARDIOLOGY | Facility: CLINIC | Age: 84
End: 2024-02-26
Payer: MEDICARE

## 2024-02-26 VITALS
WEIGHT: 231 LBS | DIASTOLIC BLOOD PRESSURE: 84 MMHG | HEIGHT: 70 IN | HEART RATE: 98 BPM | SYSTOLIC BLOOD PRESSURE: 140 MMHG | BODY MASS INDEX: 33.07 KG/M2

## 2024-02-26 DIAGNOSIS — I35.1 NONRHEUMATIC AORTIC (VALVE) INSUFFICIENCY: ICD-10-CM

## 2024-02-26 PROBLEM — I48.91 UNSPECIFIED ATRIAL FIBRILLATION: Chronic | Status: ACTIVE | Noted: 2023-10-16

## 2024-02-26 PROBLEM — I25.10 ATHEROSCLEROTIC HEART DISEASE OF NATIVE CORONARY ARTERY WITHOUT ANGINA PECTORIS: Chronic | Status: ACTIVE | Noted: 2023-10-16

## 2024-02-26 PROCEDURE — 93000 ELECTROCARDIOGRAM COMPLETE: CPT

## 2024-02-26 PROCEDURE — 99214 OFFICE O/P EST MOD 30 MIN: CPT | Mod: 25

## 2024-02-26 RX ORDER — ASPIRIN 81 MG
81 TABLET, DELAYED RELEASE (ENTERIC COATED) ORAL DAILY
Refills: 0 | Status: DISCONTINUED | COMMUNITY
End: 2024-02-26

## 2024-02-26 NOTE — DISCUSSION/SUMMARY
[EKG obtained to assist in diagnosis and management of assessed problem(s)] : EKG obtained to assist in diagnosis and management of assessed problem(s) [FreeTextEntry1] : Pt is an 82 yo M with PMHx of CABG x 4 (2006), porcine aortic valve replacement (2011), HTN, HLD, CKD, who is being evaluated as part of a follow up visit. Reviewed pt's imaging, there is subtle restricted diffusion of the inferior anterior right cerebellum and right lateral medulla, [id number] hyperintensity in this same distribution. 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 Symptoms/history consistent with stroke in this region as well. Likely small vessel lipohyalinosis, vs embolic from large vessel atherosclerotic vs. cardioembolic. Continue atorvastatin. Received lab results and reviewed them with the patient. Patient is now on atorvastatin  20 mg po daily changed from crestor  by the hospital staff. Patient is at target goal with respect to his lipid levels. He was previously on vytorin 10/20 mg with good lipid levels as well. EKG: Atrial fibrillation, with a rate of 98 bpm. Will start low dose eliquis 2.5 mg Q12h given patient [id number] Stroke risk and score of 5. D/C ASA. Option to see EP, and proceed with a Watchman or Amulet device for BILLY occlusion was also discussed with the patient and his spouse. He does not wish to entertain that option at present. The patient had seen Dr. Sanchez previously but does not wish to be reevaluated. 2D echo doppler results were reviewed on prior office visit. Lab test results were reviewed with the patient in detail I spoke with Dr. Lynn and updated him on the patient's condition and he was in agreement to restart OAC therapy with low dose eliquis. RV in 3 months

## 2024-02-26 NOTE — PHYSICAL EXAM
[Well Developed] : well developed [Well Nourished] : well nourished [No Acute Distress] : no acute distress [Normal Conjunctiva] : normal conjunctiva [Normal Venous Pressure] : normal venous pressure [No Carotid Bruit] : no carotid bruit [No Rub] : no rub [No Gallop] : no gallop [Murmur] : murmur [Clear Lung Fields] : clear lung fields [Good Air Entry] : good air entry [No Respiratory Distress] : no respiratory distress  [Soft] : abdomen soft [Non Tender] : non-tender [No Masses/organomegaly] : no masses/organomegaly [Normal Bowel Sounds] : normal bowel sounds [Normal Gait] : normal gait [No Edema] : no edema [No Cyanosis] : no cyanosis [No Clubbing] : no clubbing [No Varicosities] : no varicosities [No Rash] : no rash [No Skin Lesions] : no skin lesions [Moves all extremities] : moves all extremities [No Focal Deficits] : no focal deficits [Normal Speech] : normal speech [Alert and Oriented] : alert and oriented [Normal memory] : normal memory [de-identified] : Irregular  [de-identified] : II/VI systolic murmur at RSB

## 2024-02-26 NOTE — ASSESSMENT
[FreeTextEntry1] : ASHD CABG Atrial fibrillation AVR HHD Hyperlipidemia  S/P CVA S/P COVID-19 infection with respiratory failure CKD Anemia Hg is now stable at 11.1 with a HCT of 33.8 MARJORIE

## 2024-02-26 NOTE — HISTORY OF PRESENT ILLNESS
HPI:    Patient ID: Buster June is a 73year old male. 73year old male presents with left sided back and buttock pain, persistent.  Prescribed doxepin 10mg qHS which does help him sleep to a mild degree, but he still awakens every 2 hours either 75 MG Oral Tab EC Take 1 tablet (75 mg total) by mouth daily. Disp: 20 tablet Rfl: 1   acetaminophen (TYLENOL EXTRA STRENGTH) 500 MG Oral Tab Take 1,500 mg by mouth every 8 (eight) hours as needed for Pain.  Disp:  Rfl:    tamsulosin HCl 0.4 MG Oral Cap MARIUSZ the right hip with right femoral head and acetabular osteophytes is partially seen.          ASSESSMENT/PLAN:   Pain of left sacroiliac joint  (primary encounter diagnosis)    Possible pain generators include left sacroiliac joint pain, left hip pain 2/2 OA [FreeTextEntry1] : Pt is a 82 yo M with PMHx of CABG x 4 (2006), porcine aortic valve replacement (2011), HTN, HLD, CKD, who presents c/o abnormal MRI brain. States that he woke up on 7/28 with sudden right sided hearing loss, gait imbalance and vertical binocular diplopia. Symptoms have slowly improved since then, diplopia is nearly resolved, only occurs first thing in the morning.  Balance has improved, occasionally has difficulty and requires a sitting stool in the shower. Ambulates without a walking device.

## 2024-02-26 NOTE — REASON FOR VISIT
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 He is here to also review his lab results. He is back in atrial fibrillation. At some point amiodarone was discontinued.

## 2024-03-12 ENCOUNTER — APPOINTMENT (OUTPATIENT)
Dept: ELECTROPHYSIOLOGY | Facility: CLINIC | Age: 84
End: 2024-03-12
Payer: MEDICARE

## 2024-03-12 VITALS
HEIGHT: 70 IN | SYSTOLIC BLOOD PRESSURE: 134 MMHG | BODY MASS INDEX: 32.78 KG/M2 | DIASTOLIC BLOOD PRESSURE: 78 MMHG | WEIGHT: 229 LBS | HEART RATE: 85 BPM

## 2024-03-12 DIAGNOSIS — I27.20 PULMONARY HYPERTENSION, UNSPECIFIED: ICD-10-CM

## 2024-03-12 DIAGNOSIS — D64.9 ANEMIA, UNSPECIFIED: ICD-10-CM

## 2024-03-12 PROCEDURE — 93000 ELECTROCARDIOGRAM COMPLETE: CPT

## 2024-03-12 PROCEDURE — 99214 OFFICE O/P EST MOD 30 MIN: CPT | Mod: 25

## 2024-03-12 PROCEDURE — 99204 OFFICE O/P NEW MOD 45 MIN: CPT | Mod: 25

## 2024-03-12 RX ORDER — HYDROCORTISONE 25 MG/G
2.5 CREAM TOPICAL
Qty: 30 | Refills: 0 | Status: DISCONTINUED | COMMUNITY
Start: 2022-02-09 | End: 2024-03-12

## 2024-03-12 RX ORDER — SULFAMETHOXAZOLE AND TRIMETHOPRIM 800; 160 MG/1; MG/1
800-160 TABLET ORAL
Qty: 6 | Refills: 0 | Status: DISCONTINUED | COMMUNITY
Start: 2022-02-04 | End: 2024-03-12

## 2024-03-12 RX ORDER — PANTOPRAZOLE 40 MG/1
40 TABLET, DELAYED RELEASE ORAL
Qty: 60 | Refills: 0 | Status: DISCONTINUED | COMMUNITY
Start: 2022-03-05 | End: 2024-03-12

## 2024-03-12 RX ORDER — SODIUM BICARBONATE 650 MG/1
650 TABLET ORAL
Qty: 30 | Refills: 0 | Status: DISCONTINUED | COMMUNITY
Start: 2021-12-20 | End: 2024-03-12

## 2024-03-12 RX ORDER — AMOXICILLIN AND CLAVULANATE POTASSIUM 875; 125 MG/1; MG/1
875-125 TABLET, COATED ORAL
Qty: 20 | Refills: 0 | Status: DISCONTINUED | COMMUNITY
Start: 2022-02-17 | End: 2024-03-12

## 2024-03-12 RX ORDER — OLMESARTAN MEDOXOMIL 20 MG/1
20 TABLET, FILM COATED ORAL
Qty: 90 | Refills: 0 | Status: DISCONTINUED | COMMUNITY
Start: 2022-01-21 | End: 2024-03-12

## 2024-03-12 RX ORDER — METOLAZONE 2.5 MG/1
2.5 TABLET ORAL
Qty: 30 | Refills: 0 | Status: DISCONTINUED | COMMUNITY
Start: 2022-01-27 | End: 2024-03-12

## 2024-03-12 RX ORDER — ISOSORBIDE DINITRATE 30 MG/1
30 TABLET ORAL DAILY
Refills: 0 | Status: DISCONTINUED | COMMUNITY
End: 2024-03-12

## 2024-03-12 RX ORDER — PREDNISONE 20 MG/1
20 TABLET ORAL
Qty: 4 | Refills: 0 | Status: DISCONTINUED | COMMUNITY
Start: 2021-12-20 | End: 2024-03-12

## 2024-03-12 RX ORDER — HYDRALAZINE HYDROCHLORIDE 10 MG/1
10 TABLET ORAL 3 TIMES DAILY
Refills: 0 | Status: DISCONTINUED | COMMUNITY
End: 2024-03-12

## 2024-03-12 RX ORDER — ISOSORBIDE MONONITRATE 30 MG/1
30 TABLET, EXTENDED RELEASE ORAL
Qty: 30 | Refills: 0 | Status: DISCONTINUED | COMMUNITY
Start: 2022-05-16 | End: 2024-03-12

## 2024-03-12 RX ORDER — FUROSEMIDE 20 MG/1
20 TABLET ORAL
Qty: 60 | Refills: 0 | Status: DISCONTINUED | COMMUNITY
Start: 2022-01-27 | End: 2024-03-12

## 2024-03-12 RX ORDER — AMIODARONE HYDROCHLORIDE 200 MG/1
200 TABLET ORAL TWICE DAILY
Refills: 0 | Status: DISCONTINUED | COMMUNITY
End: 2024-03-12

## 2024-03-23 NOTE — DISCUSSION/SUMMARY
[EKG obtained to assist in diagnosis and management of assessed problem(s)] : EKG obtained to assist in diagnosis and management of assessed problem(s) [FreeTextEntry1] : Mr. Raúl Silva is a pleasant 83-year-old man with hypertension, hyperlipidemia, hypothyroidism, frequent PVCs, CAD s/p CABG in 2006, aortic valve disease s/p bioprosthetic aortic valve replacement in 2011, paroxysmal atrial fibrillation, CKD, anemia, severe pulmonary hypertension, severe cardiomyopathy, biventricular heart failure, history of GI bleed, history of epistaxis.     Patient is here to discuss left atrial appendage closure. He had a history of anemia with Hgb of 5.8. Hgb improved to 11 on Procrit. He was restarted on Eliquis recently by his cardiologist. Patient is taking Eliquis 2.5 mg Q12. At time he takes it once a day, especially if there is a nosebleed.    I discussed with patient at length the importance of compliance with Eliquis as prescribed twice a day. I recommend the use of a humidifier in his bedroom to minimize the risk of nosebleed. I recommend to follow-up with ENT for evaluation for nosebleed.    Patient has no recent GI bleed. He is taking Eliquis without active GI bleed and Hgb is stable at 11. I discussed with patient at length the option of left atrial appendage closure as an alternative to anticoagulation due to side effects of anticoagulation in this patient, including anemia, history of GI bleed, and history of nosebleed. I explained the procedure in detail, including the risks and benefits. I explained follow-up after the procedure, including the use of Aspirin and Plavix for 6 months and the need for DINO to evaluate left atrial appendage seal after left atrial appendage closure.     Patient expressed his understanding of the conversation. I answered all of his questions. Patient is not sure whether he wants to proceed with left atrial appendage closure at this time.    I discussed with patient the benefits of BiV ICD in view of severe cardiomyopathy, LBBB, and heart failure. Patient is not interested in ICD implant at this moment. We had ample discussion and I answered all his questions regarding ICD therapies in detail. Patient will think about it, but he is not sure that he is interested in this at this moment.    I recommend to continue Eliquis 2.5 mg Q12.   I recommend to continue guideline-directed medical therapy.     I recommend echocardiogram to assess EF.    I discussed with patient plan of care in great details. I answered all his questions to his satisfaction. Patient was pleased with the visit.  Patient will follow with me in 3 months' time. Please do not hesitate to contact me at [phone] if you have any further questions regarding this patient care.

## 2024-03-23 NOTE — REASON FOR VISIT
[Cardiac Failure] : cardiac failure [Arrhythmia/ECG Abnorrmalities] : arrhythmia/ECG abnormalities [FreeTextEntry3] : Dr. Brando Murphy - Dr. Sebastian Mendoza

## 2024-03-23 NOTE — ADDENDUM
[FreeTextEntry1] : Lizett MORTENSEN assisted in documentation on 03/13/2024   acting as a scribe for Dr. Sadiq Gracia.

## 2024-03-23 NOTE — HISTORY OF PRESENT ILLNESS
[FreeTextEntry1] : Hypertension, hyperlipidemia, hyperthyroidism, CAD s/p CABG in 2006, aortic valve with disease s/p bioprosthetic aortic valve replacement in 2011, paroxysmal atrial fibrillation, CKD, anemia, severe pulmonary hypertension, severe cardiomyopathy, biventricular heart failure, history of GI bleed, history of epistaxis, DNR order done in October 2023 according to inpatient records.     Patient is here to discuss left atrial appendage closure. He has a history of anemia with Hgb dropping to 5.8. Hgb improved with Procrit injection. Patient had no recent GI bleeds. He has a history of nose bleeds on anticoagulation. He is currently on Eliquis 2.5 mg Q12.     Patient has no chest pain, no shortness of breath at rest, mild to moderate dyspnea on exertion, no dizziness, no lightheadedness, and no syncope. He presents for evaluation.

## 2024-03-23 NOTE — CARDIOLOGY SUMMARY
[de-identified] : (02/03/2022): Small reversible defect involving the anterior anteroseptal wall of the LV consistent with ischemic superimposed on scar. Diffused global LV hypokinesia. LVEF 25%.     [de-identified] : (03/12/2024): ECG. Sinus rhythm at 85 bpm, LBBB at 170 ms, frequent PVCs.   [de-identified] : (02/01/2022): Successful cardioversion.    [de-identified] : (10/17/2023): 2D echo. LVEF 36%. Moderate to severe global decreased LV systolic function. Moderate concentric LVH. Moderately reduced RV systolic function. Severely enlarged right ventricle. Trace MR. Bioprosthesis in the aortic position. Severe pulmonary hypertension with PA pressure at 79 mmHg. Moderate LAE. Moderate to severe NERY.     (02/01/2022): 2D echo. EF 25-30%. Moderate enlargement of the RV. Severely reduced RV systolic function. Bioprosthesis in the aortic position. Moderate TR. Trace MR. Smoke in the left atrial appendage.

## 2024-03-23 NOTE — END OF VISIT
[Time Spent: ___ minutes] : I have spent [unfilled] minutes of time on the encounter. [FreeTextEntry3] : I, Sadiq Gracia, personally performed the services described in this documentation. All medical record entries made by the scribe/nurse CTA were at my direction and in my presence. I have reviewed the chart and agree that the record reflects my personal performance and is accurate and complete.

## 2024-05-08 ENCOUNTER — APPOINTMENT (OUTPATIENT)
Dept: CARDIOLOGY | Facility: CLINIC | Age: 84
End: 2024-05-08
Payer: MEDICARE

## 2024-05-08 PROCEDURE — 93306 TTE W/DOPPLER COMPLETE: CPT

## 2024-05-20 ENCOUNTER — APPOINTMENT (OUTPATIENT)
Dept: CARDIOLOGY | Facility: CLINIC | Age: 84
End: 2024-05-20
Payer: MEDICARE

## 2024-05-20 VITALS
WEIGHT: 231 LBS | BODY MASS INDEX: 33.07 KG/M2 | HEART RATE: 110 BPM | HEIGHT: 70 IN | SYSTOLIC BLOOD PRESSURE: 140 MMHG | DIASTOLIC BLOOD PRESSURE: 90 MMHG

## 2024-05-20 DIAGNOSIS — G45.0 VERTEBRO-BASILAR ARTERY SYNDROME: ICD-10-CM

## 2024-05-20 DIAGNOSIS — I63.9 CEREBRAL INFARCTION, UNSPECIFIED: ICD-10-CM

## 2024-05-20 DIAGNOSIS — I25.10 ATHEROSCLEROTIC HEART DISEASE OF NATIVE CORONARY ARTERY W/OUT ANGINA PECTORIS: ICD-10-CM

## 2024-05-20 DIAGNOSIS — Z95.2 PRESENCE OF PROSTHETIC HEART VALVE: ICD-10-CM

## 2024-05-20 PROCEDURE — 99214 OFFICE O/P EST MOD 30 MIN: CPT | Mod: 25

## 2024-05-20 PROCEDURE — 93000 ELECTROCARDIOGRAM COMPLETE: CPT

## 2024-05-20 NOTE — PHYSICAL EXAM
[Well Developed] : well developed [Well Nourished] : well nourished [No Acute Distress] : no acute distress [Normal Conjunctiva] : normal conjunctiva [Normal Venous Pressure] : normal venous pressure [No Carotid Bruit] : no carotid bruit [No Rub] : no rub [No Gallop] : no gallop [Murmur] : murmur [Clear Lung Fields] : clear lung fields [Good Air Entry] : good air entry [No Respiratory Distress] : no respiratory distress  [Soft] : abdomen soft [Non Tender] : non-tender [No Masses/organomegaly] : no masses/organomegaly [Normal Bowel Sounds] : normal bowel sounds [Normal Gait] : normal gait [No Edema] : no edema [No Cyanosis] : no cyanosis [No Clubbing] : no clubbing [No Varicosities] : no varicosities [No Rash] : no rash [No Skin Lesions] : no skin lesions [Moves all extremities] : moves all extremities [No Focal Deficits] : no focal deficits [Normal Speech] : normal speech [Alert and Oriented] : alert and oriented [Normal memory] : normal memory [de-identified] : II/VI systolic murmur at RSB [de-identified] : Irregular

## 2024-05-20 NOTE — HISTORY OF PRESENT ILLNESS
[FreeTextEntry1] : Pt is a 82 yo M with PMHx of CABG x 4 (2006), porcine aortic valve replacement (2011), HTN, HLD, CKD, who presents c/o abnormal MRI brain. States that he woke up on 7/28 with sudden right sided hearing loss, gait imbalance and vertical binocular diplopia. Symptoms have slowly improved since then, diplopia is nearly resolved, only occurs first thing in the morning.  Balance has improved, occasionally has difficulty and requires a sitting stool in the shower. Ambulates without a walking device.

## 2024-05-20 NOTE — DISCUSSION/SUMMARY
[FreeTextEntry1] : Pt is an 84 yo M with PMHx of CABG x 4 (2006), porcine aortic valve replacement (2011), HTN, HLD, CKD, who is being evaluated as part of a follow up visit. Reviewed pt's imaging, there is subtle restricted diffusion of the inferior anterior right cerebellum and right lateral medulla, [id number] hyperintensity in this same distribution. 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 Symptoms/history consistent with stroke in this region as well. Likely small vessel lipohyalinosis, vs embolic from large vessel atherosclerotic vs. cardioembolic. Continue atorvastatin. Received lab results and reviewed them with the patient. Patient is now on atorvastatin  20 mg po daily changed from crestor  by the hospital staff. Patient is at target goal with respect to his lipid levels. He was previously on vytorin 10/20 mg with good lipid levels as well. EKG: Atrial fibrillation, with a rate of 98 bpm. Will start low dose eliquis 2.5 mg Q12h given patient [id number] Stroke risk and score of 5. D/C ASA. Option to see EP, and proceed with a Watchman or Amulet device for BILLY occlusion was also discussed with the patient and his spouse. He does not wish to entertain that option at present. The patient had seen Dr. Sanchez previously but does not wish to be reevaluated. 2D echo doppler results were reviewed on prior office visit. Lab test results were reviewed with the patient in detail I spoke with Dr. Lynn and updated him on the patient's condition and he was in agreement to restart OAC therapy with low dose eliquis. The patient will see Dr. Sanchez again next week and discuss again the options for an AICD and closure device. RV in 4 months [EKG obtained to assist in diagnosis and management of assessed problem(s)] : EKG obtained to assist in diagnosis and management of assessed problem(s)

## 2024-05-20 NOTE — REASON FOR VISIT
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 He is here to also review his lab results. He is back in atrial fibrillation. At some point amiodarone was discontinued. He was reevaluated by EP, Dr. Sanchez. He had seen the patient during his prior hospitalization. The patient declined option for an AICD or BILLY closure device. He was advised to reconsider.

## 2024-05-20 NOTE — ASSESSMENT
[FreeTextEntry1] : ASHD CABG Atrial fibrillation AVR LV systolic dysfunction since 2021 HHD Hyperlipidemia  S/P CVA S/P COVID-19 infection with respiratory failure CKD Anemia Hg is now stable at 11.1 with a HCT of 33.8 MARJORIE

## 2024-05-28 ENCOUNTER — INPATIENT (INPATIENT)
Facility: HOSPITAL | Age: 84
LOS: 2 days | Discharge: HOME HEALTH | DRG: 310 | End: 2024-05-31
Attending: STUDENT IN AN ORGANIZED HEALTH CARE EDUCATION/TRAINING PROGRAM | Admitting: STUDENT IN AN ORGANIZED HEALTH CARE EDUCATION/TRAINING PROGRAM
Payer: MEDICARE

## 2024-05-28 ENCOUNTER — APPOINTMENT (OUTPATIENT)
Dept: ELECTROPHYSIOLOGY | Facility: CLINIC | Age: 84
End: 2024-05-28
Payer: MEDICARE

## 2024-05-28 VITALS
WEIGHT: 130.95 LBS | OXYGEN SATURATION: 97 % | TEMPERATURE: 98 F | HEIGHT: 68 IN | RESPIRATION RATE: 18 BRPM | HEART RATE: 104 BPM | SYSTOLIC BLOOD PRESSURE: 171 MMHG | DIASTOLIC BLOOD PRESSURE: 88 MMHG

## 2024-05-28 VITALS
HEART RATE: 118 BPM | HEIGHT: 70 IN | SYSTOLIC BLOOD PRESSURE: 130 MMHG | WEIGHT: 230 LBS | BODY MASS INDEX: 32.93 KG/M2 | DIASTOLIC BLOOD PRESSURE: 90 MMHG | OXYGEN SATURATION: 98 %

## 2024-05-28 DIAGNOSIS — I49.9 CARDIAC ARRHYTHMIA, UNSPECIFIED: ICD-10-CM

## 2024-05-28 DIAGNOSIS — I11.9 HYPERTENSIVE HEART DISEASE W/OUT HEART FAILURE: ICD-10-CM

## 2024-05-28 DIAGNOSIS — I48.92 UNSPECIFIED ATRIAL FLUTTER: ICD-10-CM

## 2024-05-28 DIAGNOSIS — Z95.1 PRESENCE OF AORTOCORONARY BYPASS GRAFT: ICD-10-CM

## 2024-05-28 DIAGNOSIS — N18.9 CHRONIC KIDNEY DISEASE, UNSPECIFIED: ICD-10-CM

## 2024-05-28 DIAGNOSIS — Z95.1 PRESENCE OF AORTOCORONARY BYPASS GRAFT: Chronic | ICD-10-CM

## 2024-05-28 DIAGNOSIS — I50.22 CHRONIC SYSTOLIC (CONGESTIVE) HEART FAILURE: ICD-10-CM

## 2024-05-28 DIAGNOSIS — E78.5 HYPERLIPIDEMIA, UNSPECIFIED: ICD-10-CM

## 2024-05-28 LAB
ALBUMIN SERPL ELPH-MCNC: 4.4 G/DL — SIGNIFICANT CHANGE UP (ref 3.5–5.2)
ALP SERPL-CCNC: 65 U/L — SIGNIFICANT CHANGE UP (ref 30–115)
ALT FLD-CCNC: 14 U/L — SIGNIFICANT CHANGE UP (ref 0–41)
ANION GAP SERPL CALC-SCNC: 14 MMOL/L — SIGNIFICANT CHANGE UP (ref 7–14)
APTT BLD: 35.7 SEC — SIGNIFICANT CHANGE UP (ref 27–39.2)
AST SERPL-CCNC: 18 U/L — SIGNIFICANT CHANGE UP (ref 0–41)
BASOPHILS # BLD AUTO: 0.05 K/UL — SIGNIFICANT CHANGE UP (ref 0–0.2)
BASOPHILS NFR BLD AUTO: 0.7 % — SIGNIFICANT CHANGE UP (ref 0–1)
BILIRUB SERPL-MCNC: 0.3 MG/DL — SIGNIFICANT CHANGE UP (ref 0.2–1.2)
BLD GP AB SCN SERPL QL: SIGNIFICANT CHANGE UP
BUN SERPL-MCNC: 40 MG/DL — HIGH (ref 10–20)
CALCIUM SERPL-MCNC: 9.6 MG/DL — SIGNIFICANT CHANGE UP (ref 8.4–10.4)
CHLORIDE SERPL-SCNC: 104 MMOL/L — SIGNIFICANT CHANGE UP (ref 98–110)
CO2 SERPL-SCNC: 21 MMOL/L — SIGNIFICANT CHANGE UP (ref 17–32)
CREAT SERPL-MCNC: 2.1 MG/DL — HIGH (ref 0.7–1.5)
EGFR: 31 ML/MIN/1.73M2 — LOW
EOSINOPHIL # BLD AUTO: 0.06 K/UL — SIGNIFICANT CHANGE UP (ref 0–0.7)
EOSINOPHIL NFR BLD AUTO: 0.8 % — SIGNIFICANT CHANGE UP (ref 0–8)
GLUCOSE SERPL-MCNC: 89 MG/DL — SIGNIFICANT CHANGE UP (ref 70–99)
HCT VFR BLD CALC: 37 % — LOW (ref 42–52)
HGB BLD-MCNC: 12.2 G/DL — LOW (ref 14–18)
IMM GRANULOCYTES NFR BLD AUTO: 0.3 % — SIGNIFICANT CHANGE UP (ref 0.1–0.3)
INR BLD: 1.11 RATIO — SIGNIFICANT CHANGE UP (ref 0.65–1.3)
LYMPHOCYTES # BLD AUTO: 1.02 K/UL — LOW (ref 1.2–3.4)
LYMPHOCYTES # BLD AUTO: 14 % — LOW (ref 20.5–51.1)
MCHC RBC-ENTMCNC: 30 PG — SIGNIFICANT CHANGE UP (ref 27–31)
MCHC RBC-ENTMCNC: 33 G/DL — SIGNIFICANT CHANGE UP (ref 32–37)
MCV RBC AUTO: 91.1 FL — SIGNIFICANT CHANGE UP (ref 80–94)
MONOCYTES # BLD AUTO: 0.85 K/UL — HIGH (ref 0.1–0.6)
MONOCYTES NFR BLD AUTO: 11.6 % — HIGH (ref 1.7–9.3)
NEUTROPHILS # BLD AUTO: 5.31 K/UL — SIGNIFICANT CHANGE UP (ref 1.4–6.5)
NEUTROPHILS NFR BLD AUTO: 72.6 % — SIGNIFICANT CHANGE UP (ref 42.2–75.2)
NRBC # BLD: 0 /100 WBCS — SIGNIFICANT CHANGE UP (ref 0–0)
PLATELET # BLD AUTO: 188 K/UL — SIGNIFICANT CHANGE UP (ref 130–400)
PMV BLD: 10.8 FL — HIGH (ref 7.4–10.4)
POTASSIUM SERPL-MCNC: 3.9 MMOL/L — SIGNIFICANT CHANGE UP (ref 3.5–5)
POTASSIUM SERPL-SCNC: 3.9 MMOL/L — SIGNIFICANT CHANGE UP (ref 3.5–5)
PROT SERPL-MCNC: 7.1 G/DL — SIGNIFICANT CHANGE UP (ref 6–8)
PROTHROM AB SERPL-ACNC: 12.7 SEC — SIGNIFICANT CHANGE UP (ref 9.95–12.87)
RBC # BLD: 4.06 M/UL — LOW (ref 4.7–6.1)
RBC # FLD: 14.8 % — HIGH (ref 11.5–14.5)
SODIUM SERPL-SCNC: 139 MMOL/L — SIGNIFICANT CHANGE UP (ref 135–146)
WBC # BLD: 7.31 K/UL — SIGNIFICANT CHANGE UP (ref 4.8–10.8)
WBC # FLD AUTO: 7.31 K/UL — SIGNIFICANT CHANGE UP (ref 4.8–10.8)

## 2024-05-28 PROCEDURE — 83036 HEMOGLOBIN GLYCOSYLATED A1C: CPT

## 2024-05-28 PROCEDURE — 36415 COLL VENOUS BLD VENIPUNCTURE: CPT

## 2024-05-28 PROCEDURE — 80061 LIPID PANEL: CPT

## 2024-05-28 PROCEDURE — 93284 PRGRMG EVAL IMPLANTABLE DFB: CPT

## 2024-05-28 PROCEDURE — 33225 L VENTRIC PACING LEAD ADD-ON: CPT

## 2024-05-28 PROCEDURE — C1892: CPT

## 2024-05-28 PROCEDURE — C1777: CPT

## 2024-05-28 PROCEDURE — C1882: CPT

## 2024-05-28 PROCEDURE — C1898: CPT

## 2024-05-28 PROCEDURE — C1889: CPT

## 2024-05-28 PROCEDURE — 71045 X-RAY EXAM CHEST 1 VIEW: CPT

## 2024-05-28 PROCEDURE — 83880 ASSAY OF NATRIURETIC PEPTIDE: CPT

## 2024-05-28 PROCEDURE — 71046 X-RAY EXAM CHEST 2 VIEWS: CPT

## 2024-05-28 PROCEDURE — 33249 INSJ/RPLCMT DEFIB W/LEAD(S): CPT

## 2024-05-28 PROCEDURE — 85027 COMPLETE CBC AUTOMATED: CPT

## 2024-05-28 PROCEDURE — 80048 BASIC METABOLIC PNL TOTAL CA: CPT

## 2024-05-28 PROCEDURE — 83735 ASSAY OF MAGNESIUM: CPT

## 2024-05-28 PROCEDURE — 84439 ASSAY OF FREE THYROXINE: CPT

## 2024-05-28 PROCEDURE — 99285 EMERGENCY DEPT VISIT HI MDM: CPT

## 2024-05-28 PROCEDURE — 93000 ELECTROCARDIOGRAM COMPLETE: CPT

## 2024-05-28 PROCEDURE — 84480 ASSAY TRIIODOTHYRONINE (T3): CPT

## 2024-05-28 PROCEDURE — 71045 X-RAY EXAM CHEST 1 VIEW: CPT | Mod: 26

## 2024-05-28 PROCEDURE — 93010 ELECTROCARDIOGRAM REPORT: CPT

## 2024-05-28 PROCEDURE — C1769: CPT

## 2024-05-28 PROCEDURE — 99215 OFFICE O/P EST HI 40 MIN: CPT | Mod: 25

## 2024-05-28 PROCEDURE — 84443 ASSAY THYROID STIM HORMONE: CPT

## 2024-05-28 PROCEDURE — 93005 ELECTROCARDIOGRAM TRACING: CPT

## 2024-05-28 RX ORDER — APIXABAN 2.5 MG/1
2.5 TABLET, FILM COATED ORAL ONCE
Refills: 0 | Status: COMPLETED | OUTPATIENT
Start: 2024-05-28 | End: 2024-05-28

## 2024-05-28 RX ORDER — APIXABAN 2.5 MG/1
2.5 TABLET, FILM COATED ORAL
Refills: 0 | Status: DISCONTINUED | OUTPATIENT
Start: 2024-05-28 | End: 2024-05-28

## 2024-05-28 RX ORDER — METOPROLOL TARTRATE 50 MG
50 TABLET ORAL
Refills: 0 | Status: DISCONTINUED | OUTPATIENT
Start: 2024-05-28 | End: 2024-05-30

## 2024-05-28 RX ORDER — ATORVASTATIN CALCIUM 80 MG/1
20 TABLET, FILM COATED ORAL AT BEDTIME
Refills: 0 | Status: DISCONTINUED | OUTPATIENT
Start: 2024-05-28 | End: 2024-05-31

## 2024-05-28 RX ORDER — HYDRALAZINE HCL 50 MG
10 TABLET ORAL THREE TIMES A DAY
Refills: 0 | Status: DISCONTINUED | OUTPATIENT
Start: 2024-05-28 | End: 2024-05-31

## 2024-05-28 RX ORDER — BUMETANIDE 0.25 MG/ML
2 INJECTION INTRAMUSCULAR; INTRAVENOUS
Refills: 0 | Status: DISCONTINUED | OUTPATIENT
Start: 2024-05-28 | End: 2024-05-30

## 2024-05-28 RX ADMIN — APIXABAN 2.5 MILLIGRAM(S): 2.5 TABLET, FILM COATED ORAL at 20:02

## 2024-05-28 RX ADMIN — ATORVASTATIN CALCIUM 20 MILLIGRAM(S): 80 TABLET, FILM COATED ORAL at 21:26

## 2024-05-28 RX ADMIN — Medication 10 MILLIGRAM(S): at 21:26

## 2024-05-28 NOTE — HISTORY OF PRESENT ILLNESS
[FreeTextEntry1] : Hypertension, hyperlipidemia, hyperthyroidism, CAD s/p CABG in 2006, aortic valve with disease s/p bioprosthetic aortic valve replacement in 2011, paroxysmal atrial fibrillation, atrial fibrillation, CKD, anemia, severe pulmonary hypertension, severe cardiomyopathy, biventricular heart failure, history of GI bleed, history of epistaxis, not interested in BILLY closure.   He had history of anemia with Hgb dropping to 5.8. Hgb improved with Procrit injection. Patient had no recent GI bleeds. He has a history of nose bleeds on anticoagulation. He is currently on Eliquis 2.5 mg Q12.     Patient has no chest pain, no shortness of breath at rest, mild to moderate dyspnea on exertion, no dizziness, no lightheadedness, and no syncope. He presents for evaluation.

## 2024-05-28 NOTE — H&P ADULT - NSHPPHYSICALEXAM_GEN_ALL_CORE
VITALS:   T(C): 36.5 (05-28-24 @ 15:57), Max: 36.5 (05-28-24 @ 15:57)  HR: 104 (05-28-24 @ 15:57) (104 - 104)  BP: 171/88 (05-28-24 @ 15:57) (171/88 - 171/88)  RR: 18 (05-28-24 @ 15:57) (18 - 18)  SpO2: 97% (05-28-24 @ 15:57) (97% - 97%)    GENERAL: NAD, lying in bed comfortably  HEAD:  Atraumatic, normocephalic  EYES: EOMI, PERRLA, conjunctiva and sclera clear  ENT: Moist mucous membranes  NECK: Supple, no JVD  HEART: Regularly irregular rate and rhythm, no murmurs, rubs, or gallops  LUNGS: Unlabored respirations.  Clear to auscultation bilaterally, no crackles, wheezing, or rhonchi  ABDOMEN: Soft, nontender, nondistended, +BS  EXTREMITIES: 2+ peripheral pulses bilaterally. 1+ bilateral edema.  No clubbing, cyanosis.  NERVOUS SYSTEM:  A&Ox3, no focal deficits   SKIN: No rashes or lesions

## 2024-05-28 NOTE — H&P ADULT - NSHPLABSRESULTS_GEN_ALL_CORE
- ECG (date 5/28/24): Wide complex QRS tachycardia at 115 bpm with PVC and PACs, LBBB  - Echo (date 10/17/23): EF 36%, moderately to severely decreased LV systolic function, moderate LVH, grade II diastolic dysfunction, moderately reduced RV systolic function, severely enlarged RV, mild thickening and calcification, trace MR, bioprostehsis in the aortic position, mild AR, severe pulmonary HTN (PASP 79.0mmHg RAP 15 mmHg), moderately enlarged LA, moderate to severe right atrial enlargement  - Radiology:  Chest X-ray: 5/28/24: pending   - Labs: pending from ED                       Lactate Trend

## 2024-05-28 NOTE — H&P ADULT - NSHPSOCIALHISTORY_GEN_ALL_CORE
Patient admits to social alcohol use. Patient was a former tobacco user at a pack per week until 1975.  Patient does not remember how long he smoked for.  Patient denies illicit drug use.

## 2024-05-28 NOTE — ED PROVIDER NOTE - INTERPRETATION
ED EKG: my independent interpretation - Dr. Yohan Neville : Regular rate and 115, likely a flutter, no ST elevations, wide QRS consistent with left bundle branch block.

## 2024-05-28 NOTE — ED PROVIDER NOTE - CLINICAL SUMMARY MEDICAL DECISION MAKING FREE TEXT BOX
Cardiac arrhythmia.  Needs optimization prior to AICD placement.  Cardiac telemetry.  EP consulted.  Labs reviewed.

## 2024-05-28 NOTE — H&P ADULT - HISTORY OF PRESENT ILLNESS
Patient is a 83 year old male with PMHx of HTN, HLD, Afib (on eliquis 2.5 mg), hyperthyroidism, CAD s/p CABG x 4 in 2006, aortic valve s/p bioprostehtic aortic valve replacement in 2011, HfrEF (EF 36%), CKD, GI bleed, epistaxis (refused BILLY closure), severe pulmonary presents to [facility] after an outpatient appointment with Dr. Gracia in which the patient was in atrial flutter with RVR.  Patient referred to the emergency room by Dr. Gracia for rate control and medical optimization prior to BiV-ICD placement on Thursday.  Patient planned for BiV-ICD due to primary prevent for sudden cardiac death, chronic HFrEF, and LBBB.  Patient denies Chest pain, palpitations, SOB, LE edema, dizziness, lightheadedness, syncope, orthopnea, PND, fever, chills, recent illness, cough, nausea, vomiting, and recent changes in weight.    In the ED:  VS: /88,  bpm, temp 97.7 degrees F, RR 18, O2 sat 97%  Labs: pending  EKG: Wide complex QRS tachycardia at 115 bpm with PVC and PACs, LBBB  Chest X-ray: pending     Patient is a 83 year old male with PMHx of HTN, HLD, Afib (on eliquis 2.5 mg), hypothyroidism, CAD s/p CABG x 4 in 2006, aortic valve s/p bioprostehtic aortic valve replacement in 2011, HfrEF (EF 36%), CKD, GI bleed, epistaxis (refused BILLY closure), severe pulmonary presents to [facility] after an outpatient appointment with Dr. Gracia in which the patient was in atrial flutter with RVR.  Patient referred to the emergency room by Dr. Gracia for rate control and medical optimization prior to BiV-ICD placement on Thursday.  Patient planned for BiV-ICD due to primary prevent for sudden cardiac death, chronic HFrEF, and LBBB.  Patient denies Chest pain, palpitations, SOB, LE edema, dizziness, lightheadedness, syncope, orthopnea, PND, fever, chills, recent illness, cough, nausea, vomiting, and recent changes in weight.    In the ED:  VS: /88,  bpm, temp 97.7 degrees F, RR 18, O2 sat 97%  Labs: pending  EKG: Wide complex QRS tachycardia at 115 bpm with PVC and PACs, LBBB  Chest X-ray: pending

## 2024-05-28 NOTE — ED ADULT NURSE NOTE - NSFALLUNIVINTERV_ED_ALL_ED
Bed/Stretcher in lowest position, wheels locked, appropriate side rails in place/Call bell, personal items and telephone in reach/Instruct patient to call for assistance before getting out of bed/chair/stretcher/Non-slip footwear applied when patient is off stretcher/[place] to call system/Physically safe environment - no spills, clutter or unnecessary equipment/Purposeful proactive rounding/Room/bathroom lighting operational, light cord in reach

## 2024-05-28 NOTE — REASON FOR VISIT
[Cardiac Failure] : cardiac failure [Arrhythmia/ECG Abnorrmalities] : arrhythmia/ECG abnormalities [FreeTextEntry3] : Dr. Brando Murphy - Dr. Sebastian Mendoza  [FreeTextEntry1] : Chantal [phone]

## 2024-05-28 NOTE — ED PROVIDER NOTE - WR INTERPRETATION 1
ED CXR prelim, my independent interpretation - Dr. Yohan Neville: No PTX, No infiltrates, No Free air

## 2024-05-28 NOTE — H&P ADULT - NS ATTEND AMEND GEN_ALL_CORE FT
Patient seen and examined. Pertinent labs, imaging and telemetry reviewed. I agree with the above:     Patient feeling well. No new complaints.   He feels fatigued at times but denies CP, SOB, SPARKS, palpitations.   -Patient was found to be in Aflutter with HR >100 at Dr. Gracia's office.   -Sent for rate control and optimization prior to BiV ICD placement.   -Pt with EF <35% with LBBB and some symptoms.   -Patient is currently in NSR with PVCs.   -Increased metoprolol 05/28. Will wait for full effect before dose change.   -Hold Eliquis.   -Further EP recs.

## 2024-05-28 NOTE — DISCUSSION/SUMMARY
[FreeTextEntry1] : Mr. Raúl Silva is a pleasant 83-year-old man with hypertension, hyperlipidemia, hypothyroidism, frequent PVCs, CAD s/p CABG in 2006, aortic valve disease s/p bioprosthetic aortic valve replacement in 2011, paroxysmal atrial fibrillation, CKD, anemia, severe pulmonary hypertension, severe cardiomyopathy, biventricular heart failure, history of GI bleed, history of epistaxis.     Patient has DNR order on file. I discussed at length with patient DNR status. His understanding of it he wants all aggressive including invasive treatments. His request is not to kept alive on machines if there is no hope. Therefore DNR will be rescinded at time of ICD implant.  He had a history of anemia with Hgb of 5.8. Hgb improved to 11 on Procrit. He was restarted on Eliquis recently by his cardiologist. Patient is taking Eliquis 2.5 mg Q12. At time he takes it once a day, especially if there is a nosebleed.  Patient has no recent GI bleed. He is taking Eliquis without active GI bleed and Hgb is stable at 11. I discussed with patient at length the option of left atrial appendage closure as an alternative to anticoagulation due to side effects of anticoagulation in this patient, including anemia, history of GI bleed, and history of nosebleed. I explained the procedure in detail, including the risks and benefits. I explained follow-up after the procedure, including the use of Aspirin and Plavix for 6 months and the need for DINO to evaluate left atrial appendage seal after left atrial appendage closure. Patient expressed his understanding of the conversation. I answered all of his questions. Patient is not sure whether he wants to proceed with left atrial appendage closure at this time.    Patient has AFL with RVR - I recommend patient goes to ER for management Atrial Flutter and admission to cardiac telemetry.  I recommend to continue Eliquis 2.5 mg Q12.   I recommend to continue guideline-directed medical therapy.    I discussed with patient at length the importance of compliance with Eliquis as prescribed twice a day. I recommend the use of a humidifier in his bedroom to minimize the risk of nosebleed. I recommend to follow-up with ENT for evaluation for nosebleed.    I recommend BiV ICD in view of severe cardiomyopathy, LBBB, and heart failure.   I am recommending a defibrillator implant for the primary prevention of sudden cardiac death. A thorough discussion was held with the patient concerning all aspects of ICD therapy. We reviewed the data supporting ICD therapy and how it applies individually. We discussed the risks, nature of procedure, and follow up care after device is implanted, including outcomes of ICD implantation and living with an ICD. We discussed management of ICD therapy throughout life, including deactivation of the ICD. Patient is in agreement with proceeding with the device implant. We discussed the risks of bleeding, hematoma, injury to vessels and heart, perforation, tamponade, pneumothorax, infection, lead dislodgment, device malfunction, and rare risks of stroke/heart attack/death. Patient expressed understanding of the discussion. After all questions were answered, it was a shared decision to proceed with ICD therapy. My  will contact patient with date and instruction prior to the procedure.  Patient will follow with me in 4-6 weeks' time or earlier if symptoms develop worsen. Please do not hesitate to contact me at [phone] if you have any further questions regarding this patient care. [EKG obtained to assist in diagnosis and management of assessed problem(s)] : EKG obtained to assist in diagnosis and management of assessed problem(s)

## 2024-05-28 NOTE — ED PROVIDER NOTE - PHYSICAL EXAMINATION
VITAL SIGNS: I have reviewed nursing notes and confirm.  CONSTITUTIONAL: non-toxic, no respiratory distress   SKIN: no rash, no petechiae.  EYES: Pink conjunctiva, anicteric  ENT: MMM  NECK: Supple;, no JVD  CARD: Irregular regular rate, no murmurs, equal radial pulses bilaterally 2+  RESP: CTAB, no respiratory distress  ABD: Soft, non-tender, non-distended, no peritoneal signs, no HSM, no CVA tenderness]  EXT: Normal ROM x4.  Pedal edema present no calves tenderness  NEURO: Alert, oriented. CN2-12 intact, equal strength bilaterally, nl gait.  PSYCH: Cooperative, appropriate.

## 2024-05-28 NOTE — H&P ADULT - ASSESSMENT
Assessment:  Patient is a 83 year old male with PMHx of HTN, HLD, Afib (on eliquis 2.5 mg), hyperthyroidism, CAD s/p CABG x 4 in 2006, aortic valve s/p bioprostehtic aortic valve replacement in 2011, HfrEF (EF 36%), CKD, GI bleed, epistaxis (refused BILLY closure), severe pulmonary presents to [facility] after an outpatient appointment with Dr. Gracia in which the patient was in atrial flutter with RVR.  Patient is admitted to cardiac telemetry for medical optimization and rate control prior to BiV-ICD implant currently scheduled for Thursday 5/30.    Problems associated and discussed and plan:   #Aflutter RVR  - Continue eliquis 2.5 mg BID last dose 5/28 pm due to ICD implant for Thursday   - Start lopressor 50 mg BID (Patient on home Toprol XL 50 mg daily)  - EKG in am   - FU TSH     #HFrEF  - Start lopressor 50 mg PO BID  - Continue bumex 2 mg PO daily   - FU pro-BNP  - strict I&O's   - daily weight      #CAD s/p CABG x 4 2006  - Continue atorvastatin 20 mg at bedtime   - FU lipid panel     #AS  - s/p bioprostehtic aortic valve replacement in 2011,    #HTN  - Continue home hydralazine 10 mg TID    #HLD  - Continue home atorvastatin 20 mg at bedtime  Assessment:  Patient is a 83 year old male with PMHx of HTN, HLD, Afib (on eliquis 2.5 mg), hypothyroidism, CAD s/p CABG x 4 in 2006, aortic valve s/p bioprostehtic aortic valve replacement in 2011, HfrEF (EF 36%), CKD, GI bleed, epistaxis (refused BILLY closure), severe pulmonary presents to [facility] after an outpatient appointment with Dr. Gracia in which the patient was in atrial flutter with RVR.  Patient is admitted to cardiac telemetry for medical optimization and rate control prior to BiV-ICD implant currently scheduled for Thursday 5/30.    Problems associated and discussed and plan:   #Aflutter RVR  - Continue eliquis 2.5 mg BID last dose 5/28 pm due to ICD implant for Thursday   - Start lopressor 50 mg BID (Patient on home Toprol XL 50 mg daily)  - EKG in am   - FU TSH     #HFrEF  - Start lopressor 50 mg PO BID  - Continue bumex 2 mg PO daily   - FU pro-BNP  - strict I&O's   - daily weight      #CAD s/p CABG x 4 2006  - Continue atorvastatin 20 mg at bedtime   - FU lipid panel     #AS  - s/p bioprostehtic aortic valve replacement in 2011,    #HTN  - Continue home hydralazine 10 mg TID    #HLD  - Continue home atorvastatin 20 mg at bedtime     #Hypothyroidism  - Previously on levothyroxine 50 mcg daily but medication was stopped 2-3 months ago. Assessment:  Patient is a 83 year old male with PMHx of HTN, HLD, Afib (on eliquis 2.5 mg), hypothyroidism, CAD s/p CABG x 4 in 2006, aortic valve s/p bioprostehtic aortic valve replacement in 2011, HfrEF (EF 36%), CKD, GI bleed, epistaxis (refused BILLY closure), severe pulmonary presents to [facility] after an outpatient appointment with Dr. Gracia in which the patient was in atrial flutter with RVR.  Patient is admitted to cardiac telemetry for medical optimization and rate control prior to BiV-ICD implant currently scheduled for Thursday 5/30.    Problems associated and discussed and plan:   #Aflutter RVR  - Continue eliquis 2.5 mg BID last dose 5/28 pm due to ICD implant for Thursday   - Start lopressor 50 mg BID (Patient on home Toprol XL 50 mg daily)  - EKG in am   - FU TSH     #HFrEF  - Start lopressor 50 mg PO BID  - Continue bumex 2 mg PO daily   - BiV-ICD implant currently scheduled for Thursday 5/30.  - Consult anesthesia for pe-op evaluation FU recs  - FU pro-BNP  - strict I&O's   - daily weight      #CAD s/p CABG x 4 2006  - Continue atorvastatin 20 mg at bedtime   - FU lipid panel     #AS  - s/p bioprostehtic aortic valve replacement in 2011,    #HTN  - Continue home hydralazine 10 mg TID    #HLD  - Continue home atorvastatin 20 mg at bedtime     #Hypothyroidism  - Previously on levothyroxine 50 mcg daily but medication was stopped 2-3 months ago.

## 2024-05-28 NOTE — ED PROVIDER NOTE - OBJECTIVE STATEMENT
83-year-old male history of CHF, A-fib, CAD, requires AICD placement for arrhythmia.  Referred to the emergency department for admission for medical optimization prior to AICD placement in 2 days.  Patient complains of dyspnea on exertion which is chronic.  No chest pain o no fever.

## 2024-05-28 NOTE — CARDIOLOGY SUMMARY
[de-identified] : (05/28/2024): ECG. Atrial Flutter at 118 bpm, LBBB at 165 ms, isolated PVCs.   (03/12/2024): ECG. Sinus rhythm at 85 bpm, LBBB at 170 ms, frequent PVCs.   [de-identified] : (02/03/2022): Small reversible defect involving the anterior anteroseptal wall of the LV consistent with ischemic superimposed on scar. Diffused global LV hypokinesia. LVEF 25%.     [de-identified] : (5/14/2024) TTE: EF 29% - LA severely dilated.  (10/17/2023): 2D echo. LVEF 36%. Moderate to severe global decreased LV systolic function. Moderate concentric LVH. Moderately reduced RV systolic function. Severely enlarged right ventricle. Trace MR. Bioprosthesis in the aortic position. Severe pulmonary hypertension with PA pressure at 79 mmHg. Moderate LAE. Moderate to severe NERY.     (02/01/2022): 2D echo. EF 25-30%. Moderate enlargement of the RV. Severely reduced RV systolic function. Bioprosthesis in the aortic position. Moderate TR. Trace MR. Smoke in the left atrial appendage.   [de-identified] : (02/01/2022): Successful cardioversion.

## 2024-05-29 LAB
A1C WITH ESTIMATED AVERAGE GLUCOSE RESULT: 5.9 % — HIGH (ref 4–5.6)
ANION GAP SERPL CALC-SCNC: 13 MMOL/L — SIGNIFICANT CHANGE UP (ref 7–14)
BUN SERPL-MCNC: 41 MG/DL — HIGH (ref 10–20)
CALCIUM SERPL-MCNC: 9.3 MG/DL — SIGNIFICANT CHANGE UP (ref 8.4–10.4)
CHLORIDE SERPL-SCNC: 103 MMOL/L — SIGNIFICANT CHANGE UP (ref 98–110)
CO2 SERPL-SCNC: 23 MMOL/L — SIGNIFICANT CHANGE UP (ref 17–32)
CREAT SERPL-MCNC: 2.1 MG/DL — HIGH (ref 0.7–1.5)
EGFR: 31 ML/MIN/1.73M2 — LOW
ESTIMATED AVERAGE GLUCOSE: 123 MG/DL — HIGH (ref 68–114)
GLUCOSE SERPL-MCNC: 123 MG/DL — HIGH (ref 70–99)
HCT VFR BLD CALC: 36.6 % — LOW (ref 42–52)
HGB BLD-MCNC: 12.2 G/DL — LOW (ref 14–18)
MAGNESIUM SERPL-MCNC: 2.1 MG/DL — SIGNIFICANT CHANGE UP (ref 1.8–2.4)
MCHC RBC-ENTMCNC: 30 PG — SIGNIFICANT CHANGE UP (ref 27–31)
MCHC RBC-ENTMCNC: 33.3 G/DL — SIGNIFICANT CHANGE UP (ref 32–37)
MCV RBC AUTO: 90.1 FL — SIGNIFICANT CHANGE UP (ref 80–94)
NRBC # BLD: 0 /100 WBCS — SIGNIFICANT CHANGE UP (ref 0–0)
NT-PROBNP SERPL-SCNC: 2578 PG/ML — HIGH (ref 0–300)
PLATELET # BLD AUTO: 176 K/UL — SIGNIFICANT CHANGE UP (ref 130–400)
PMV BLD: 10.5 FL — HIGH (ref 7.4–10.4)
POTASSIUM SERPL-MCNC: 3.6 MMOL/L — SIGNIFICANT CHANGE UP (ref 3.5–5)
POTASSIUM SERPL-SCNC: 3.6 MMOL/L — SIGNIFICANT CHANGE UP (ref 3.5–5)
RBC # BLD: 4.06 M/UL — LOW (ref 4.7–6.1)
RBC # FLD: 14.6 % — HIGH (ref 11.5–14.5)
SODIUM SERPL-SCNC: 139 MMOL/L — SIGNIFICANT CHANGE UP (ref 135–146)
TSH SERPL-MCNC: 4.49 UIU/ML — HIGH (ref 0.27–4.2)
TSH SERPL-MCNC: 5.02 UIU/ML — HIGH (ref 0.27–4.2)
WBC # BLD: 6.72 K/UL — SIGNIFICANT CHANGE UP (ref 4.8–10.8)
WBC # FLD AUTO: 6.72 K/UL — SIGNIFICANT CHANGE UP (ref 4.8–10.8)

## 2024-05-29 PROCEDURE — 93010 ELECTROCARDIOGRAM REPORT: CPT

## 2024-05-29 PROCEDURE — 99223 1ST HOSP IP/OBS HIGH 75: CPT

## 2024-05-29 PROCEDURE — 99232 SBSQ HOSP IP/OBS MODERATE 35: CPT

## 2024-05-29 RX ORDER — POTASSIUM CHLORIDE 20 MEQ
40 PACKET (EA) ORAL ONCE
Refills: 0 | Status: COMPLETED | OUTPATIENT
Start: 2024-05-29 | End: 2024-05-29

## 2024-05-29 RX ADMIN — ATORVASTATIN CALCIUM 20 MILLIGRAM(S): 80 TABLET, FILM COATED ORAL at 21:26

## 2024-05-29 RX ADMIN — BUMETANIDE 2 MILLIGRAM(S): 0.25 INJECTION INTRAMUSCULAR; INTRAVENOUS at 15:00

## 2024-05-29 RX ADMIN — Medication 40 MILLIEQUIVALENT(S): at 21:57

## 2024-05-29 RX ADMIN — Medication 10 MILLIGRAM(S): at 05:45

## 2024-05-29 RX ADMIN — Medication 50 MILLIGRAM(S): at 17:11

## 2024-05-29 RX ADMIN — BUMETANIDE 2 MILLIGRAM(S): 0.25 INJECTION INTRAMUSCULAR; INTRAVENOUS at 05:45

## 2024-05-29 RX ADMIN — Medication 50 MILLIGRAM(S): at 05:46

## 2024-05-29 RX ADMIN — Medication 10 MILLIGRAM(S): at 15:00

## 2024-05-29 RX ADMIN — Medication 10 MILLIGRAM(S): at 21:26

## 2024-05-29 NOTE — PROGRESS NOTE ADULT - NS ATTEND AMEND GEN_ALL_CORE FT
Patient seen and examined. Pertinent labs, imaging and telemetry reviewed. I agree with the above:     Patient feeling well. No new complaints.   He feels fatigued at times but denies CP, SOB, SPARKS, palpitations.   -Patient was found to be in Aflutter with HR >100 at Dr. Gracia's office.   -Sent for rate control and optimization prior to BiV ICD placement.   -Pt with EF <35% with LBBB and some symptoms.   -Patient is currently in NSR with PVCs.   -Increased metoprolol 05/28. Will wait for full effect before dose change.   -Hold Eliquis.   -Further EP recs. Patient seen and examined. Pertinent labs, imaging and telemetry reviewed. I agree with the above:     Please see H&P for full assessment and plan.

## 2024-05-29 NOTE — CONSULT NOTE ADULT - SUBJECTIVE AND OBJECTIVE BOX
Patient is a 83y old  Male who presents with a chief complaint of Aflutter RVR (28 May 2024 17:26)      HPI:      PAST MEDICAL & SURGICAL HISTORY:  Chronic systolic congestive heart failure      HTN (hypertension)      Dyslipidemia      CAD (coronary artery disease)      AF (atrial fibrillation)      S/P CABG x 4              PREVIOUS DIAGNOSTIC TESTING:      ECHO  FINDINGS:    STRESS  FINDINGS:    CATHETERIZATION  FINDINGS:    ELECTROPHYSIOLOGY STUDY  FINDINGS:    CAROTID ULTRASOUND:  FINDINGS    VENOUS DUPLEX SCAN:  FINDINGS:    CHEST CT PULMONARY ANGIO with IV Contrast:  FINDINGS:    MEDICATIONS  (STANDING):  atorvastatin 20 milliGRAM(s) Oral at bedtime  buMETAnide 2 milliGRAM(s) Oral two times a day  hydrALAZINE 10 milliGRAM(s) Oral three times a day  metoprolol tartrate 50 milliGRAM(s) Oral two times a day    MEDICATIONS  (PRN):      FAMILY HISTORY:      SOCIAL HISTORY:    CIGARETTES:    ALCOHOL:    Past Surgical History:    Allergies:    No Known Allergies      REVIEW OF SYSTEMS:    CONSTITUTIONAL: No fever, weight loss, chills, shakes, or fatigue  EYES: No eye pain, visual disturbances, or discharge  ENMT:  No difficulty hearing, tinnitus, vertigo; No sinus or throat pain  NECK: No pain or stiffness  BREASTS: No pain, masses, or nipple discharge  RESPIRATORY: No cough, wheezing, hemoptysis, or shortness of breath  CARDIOVASCULAR: No chest pain, dyspnea, palpitations, dizziness, syncope, paroxysmal nocturnal dyspnea, orthopnea, or arm or leg swelling  GASTROINTESTINAL: No abdominal  or epigastric pain, nausea, vomiting, hematemesis, diarrhea, constipation, melena or bright red blood.  GENITOURINARY: No dysuria, nocturia, hematuria, or urinary incontinence  NEUROLOGICAL: No headaches, memory loss, slurred speech, limb weakness, loss of strength, numbness, or tremors  SKIN: No itching, burning, rashes, or lesions   LYMPH NODES: No enlarged glands  ENDOCRINE: No heat or cold intolerance, or hair loss  MUSCULOSKELETAL: No joint pain or swelling, muscle, back, or extremity pain  PSYCHIATRIC: No depression, anxiety, or difficulty sleeping  HEME/LYMPH: No easy bruising or bleeding gums  ALLERY AND IMMUNOLOGIC: No hives or rash.      Vital Signs Last 24 Hrs  T(C): 36.9 (28 May 2024 23:30), Max: 36.9 (28 May 2024 23:30)  T(F): 98.5 (28 May 2024 23:30), Max: 98.5 (28 May 2024 23:30)  HR: 68 (29 May 2024 07:58) (67 - 104)  BP: 132/62 (29 May 2024 07:58) (131/72 - 190/75)  BP(mean): 89 (29 May 2024 07:58) (89 - 139)  RR: 18 (29 May 2024 07:58) (18 - 20)  SpO2: 98% (28 May 2024 23:30) (97% - 98%)    Parameters below as of 28 May 2024 23:30  Patient On (Oxygen Delivery Method): room air        PHYSICAL EXAM:    GENERAL: In no apparent distress, well nourished, and hydrated.  HEART: Regular rate and rhythm; No murmurs, rubs, or gallops.  PULMONARY: Clear to auscultation and perfusion.  No rales, wheezing, or rhonchi bilaterally.  ABDOMEN: Soft, Nontender, Nondistended; Bowel sounds present  EXTREMITIES:  2+ Peripheral Pulses, No clubbing, cyanosis, or edema  NEUROLOGICAL: Grossly nonfocal    INTERPRETATION OF TELEMETRY:    ECG:      LABS:                        12.2   7.31  )-----------( 188      ( 28 May 2024 16:28 )             37.0     05-28    139  |  104  |  40<H>  ----------------------------<  89  3.9   |  21  |  2.1<H>    Ca    9.6      28 May 2024 16:28    TPro  7.1  /  Alb  4.4  /  TBili  0.3  /  DBili  x   /  AST  18  /  ALT  14  /  AlkPhos  65  05-28        PT/INR - ( 28 May 2024 16:28 )   PT: 12.70 sec;   INR: 1.11 ratio         PTT - ( 28 May 2024 16:28 )  PTT:35.7 sec  Urinalysis Basic - ( 28 May 2024 16:28 )    Color: x / Appearance: x / SG: x / pH: x  Gluc: 89 mg/dL / Ketone: x  / Bili: x / Urobili: x   Blood: x / Protein: x / Nitrite: x   Leuk Esterase: x / RBC: x / WBC x   Sq Epi: x / Non Sq Epi: x / Bacteria: x      BNP      RADIOLOGY & ADDITIONAL STUDIES: Patient is a 83y old  Male who presents with a chief complaint of Aflutter RVR (28 May 2024 17:26)      HPI: Hypertension, hyperlipidemia, hyperthyroidism, CAD s/p CABG in 2006, aortic valve with disease s/p bioprosthetic aortic valve replacement in 2011, paroxysmal atrial fibrillation, atrial fibrillation, CKD, anemia, severe pulmonary hypertension, severe cardiomyopathy, biventricular heart failure, history of GI bleed, history of epistaxis, not interested in BILLY closure.     He had history of anemia with Hgb dropping to 5.8. Hgb improved with Procrit injection. Patient had no recent GI bleeds. He has a history of nose bleeds on anticoagulation. He is currently on Eliquis 2.5 mg Q12.     Patient has no chest pain, no shortness of breath at rest, mild to moderate dyspnea on exertion, no dizziness, no lightheadedness, and no syncope. He presents for evaluation.          PAST MEDICAL & SURGICAL HISTORY:    Hypertension, hyperlipidemia, hyperthyroidism, CAD s/p CABG in 2006, aortic valve with disease s/p bioprosthetic aortic valve replacement in 2011, paroxysmal atrial fibrillation, atrial fibrillation, CKD, anemia, severe pulmonary hypertension, severe cardiomyopathy, biventricular heart failure, history of GI bleed, history of epistaxis      PREVIOUS DIAGNOSTIC TESTING:      ECG: (05/28/2024): ECG. Atrial Flutter at 118 bpm, LBBB at 165 ms, isolated PVCs.   (03/12/2024): ECG. Sinus rhythm at 85 bpm, LBBB at 170 ms, frequent PVCs.    Stress Test: (02/03/2022): Small reversible defect involving the anterior anteroseptal wall of the LV consistent with ischemic superimposed on scar. Diffused global LV hypokinesia. LVEF 25%.    Echo: (5/14/2024) TTE: EF 29% - LA severely dilated.   (10/17/2023): 2D echo. LVEF 36%. Moderate to severe global decreased LV systolic function. Moderate concentric LVH. Moderately reduced RV systolic function. Severely enlarged right ventricle. Trace MR. Bioprosthesis in the aortic position. Severe pulmonary hypertension with PA pressure at 79 mmHg. Moderate LAE. Moderate to severe NERY.   (02/01/2022): 2D echo. EF 25-30%. Moderate enlargement of the RV. Severely reduced RV systolic function. Bioprosthesis in the aortic position. Moderate TR. Trace MR. Smoke in the left atrial appendage.    EP: (02/01/2022): Successful cardioversion    MEDICATIONS  (STANDING):  atorvastatin 20 milliGRAM(s) Oral at bedtime  buMETAnide 2 milliGRAM(s) Oral two times a day  hydrALAZINE 10 milliGRAM(s) Oral three times a day  metoprolol tartrate 50 milliGRAM(s) Oral two times a day    MEDICATIONS  (PRN):      FAMILY HISTORY:  No pertinent family history : Mother, Father    SOCIAL HISTORY:  Former smoker   No alcohol use  No illicit drug use    Past Surgical History:    Allergies:    No Known Allergies      REVIEW OF SYSTEMS:    CONSTITUTIONAL: No fever, weight loss, chills, shakes, or fatigue  EYES: No eye pain, visual disturbances, or discharge  ENMT:  No difficulty hearing, tinnitus, vertigo; No sinus or throat pain  NECK: No pain or stiffness  BREASTS: No pain, masses, or nipple discharge  RESPIRATORY: No cough, wheezing, hemoptysis, or shortness of breath  CARDIOVASCULAR: No chest pain, dyspnea, palpitations, dizziness, syncope, paroxysmal nocturnal dyspnea, orthopnea, or arm or leg swelling  GASTROINTESTINAL: No abdominal  or epigastric pain, nausea, vomiting, hematemesis, diarrhea, constipation, melena or bright red blood.  GENITOURINARY: No dysuria, nocturia, hematuria, or urinary incontinence  NEUROLOGICAL: No headaches, memory loss, slurred speech, limb weakness, loss of strength, numbness, or tremors  SKIN: No itching, burning, rashes, or lesions   LYMPH NODES: No enlarged glands  ENDOCRINE: No heat or cold intolerance, or hair loss  MUSCULOSKELETAL: No joint pain or swelling, muscle, back, or extremity pain  PSYCHIATRIC: No depression, anxiety, or difficulty sleeping  HEME/LYMPH: No easy bruising or bleeding gums  ALLERY AND IMMUNOLOGIC: No hives or rash.      Vital Signs Last 24 Hrs  T(C): 36.9 (28 May 2024 23:30), Max: 36.9 (28 May 2024 23:30)  T(F): 98.5 (28 May 2024 23:30), Max: 98.5 (28 May 2024 23:30)  HR: 68 (29 May 2024 07:58) (67 - 104)  BP: 132/62 (29 May 2024 07:58) (131/72 - 190/75)  BP(mean): 89 (29 May 2024 07:58) (89 - 139)  RR: 18 (29 May 2024 07:58) (18 - 20)  SpO2: 98% (28 May 2024 23:30) (97% - 98%)    Parameters below as of 28 May 2024 23:30  Patient On (Oxygen Delivery Method): room air        PHYSICAL EXAM:    GENERAL: In no apparent distress, well nourished, and hydrated.  HEART: Regular rate and rhythm; No murmurs, rubs, or gallops.  PULMONARY: Clear to auscultation and perfusion.  No rales, wheezing, or rhonchi bilaterally.  ABDOMEN: Soft, Nontender, Nondistended; Bowel sounds present  EXTREMITIES:  2+ Peripheral Pulses, No clubbing, cyanosis, or edema  NEUROLOGICAL: Grossly nonfocal      LABS:                        12.2   7.31  )-----------( 188      ( 28 May 2024 16:28 )             37.0     05-28    139  |  104  |  40<H>  ----------------------------<  89  3.9   |  21  |  2.1<H>    Ca    9.6      28 May 2024 16:28    TPro  7.1  /  Alb  4.4  /  TBili  0.3  /  DBili  x   /  AST  18  /  ALT  14  /  AlkPhos  65  05-28        PT/INR - ( 28 May 2024 16:28 )   PT: 12.70 sec;   INR: 1.11 ratio         PTT - ( 28 May 2024 16:28 )  PTT:35.7 sec  Urinalysis Basic - ( 28 May 2024 16:28 )    Color: x / Appearance: x / SG: x / pH: x  Gluc: 89 mg/dL / Ketone: x  / Bili: x / Urobili: x   Blood: x / Protein: x / Nitrite: x   Leuk Esterase: x / RBC: x / WBC x   Sq Epi: x / Non Sq Epi: x / Bacteria: x      BNP      RADIOLOGY & ADDITIONAL STUDIES:  ACC: [id number] EXAM:  XR CHEST PORTABLE URGENT 1V   ORDERED BY: CECILE MANRIQUEZ     PROCEDURE DATE:  05/28/2024          INTERPRETATION:  CLINICAL HISTORY / REASON FOR EXAM: Shortness of breath.    COMPARISON: Chest radiograph from October 17, 2023.    TECHNIQUE/POSITIONING: Satisfactory. Single image, AP chest radiograph.    FINDINGS:    SUPPORT DEVICES: None.    CARDIAC/MEDIASTINUM/HILUM: Post sternotomy.    LUNG PARENCHYMA/PLEURA: Mildly prominent interstitial opacities. No   pneumothorax.    SKELETON/SOFT TISSUES: Unremarkable.      IMPRESSION:    Mildly prominent interstitial bilateral opacities. No focal consolidation.    --- End of Report ---            SHANA SHETH MD; Attending Radiologist  This document has been electronically signed. May 29 2024  7:44AM     Patient is a 83y old  Male who presents with a chief complaint of Aflutter RVR (28 May 2024 17:26)      HPI: Hypertension, hyperlipidemia, hyperthyroidism, CAD s/p CABG in 2006, aortic valve with disease s/p bioprosthetic aortic valve replacement in 2011, paroxysmal atrial fibrillation, atrial fibrillation, CKD, anemia, severe pulmonary hypertension, severe cardiomyopathy, biventricular heart failure, history of GI bleed, history of epistaxis, not interested in BILLY closure.     He had history of anemia with Hgb dropping to 5.8. Hgb improved with Procrit injection. Patient had no recent GI bleeds. He has a history of nose bleeds on anticoagulation. He is currently on Eliquis 2.5 mg Q12.     Patient has no chest pain, no shortness of breath at rest, mild to moderate dyspnea on exertion, no dizziness, no lightheadedness, and no syncope.         PAST MEDICAL & SURGICAL HISTORY:    Hypertension, hyperlipidemia, hyperthyroidism, CAD s/p CABG in 2006, aortic valve with disease s/p bioprosthetic aortic valve replacement in 2011, paroxysmal atrial fibrillation, atrial fibrillation, CKD, anemia, severe pulmonary hypertension, severe cardiomyopathy, biventricular heart failure, history of GI bleed, history of epistaxis      PREVIOUS DIAGNOSTIC TESTING:      ECG: (05/28/2024): ECG. Atrial Flutter at 118 bpm, LBBB at 165 ms, isolated PVCs.   (03/12/2024): ECG. Sinus rhythm at 85 bpm, LBBB at 170 ms, frequent PVCs.    Stress Test: (02/03/2022): Small reversible defect involving the anterior anteroseptal wall of the LV consistent with ischemic superimposed on scar. Diffused global LV hypokinesia. LVEF 25%.    Echo: (5/14/2024) TTE: EF 29% - LA severely dilated.   (10/17/2023): 2D echo. LVEF 36%. Moderate to severe global decreased LV systolic function. Moderate concentric LVH. Moderately reduced RV systolic function. Severely enlarged right ventricle. Trace MR. Bioprosthesis in the aortic position. Severe pulmonary hypertension with PA pressure at 79 mmHg. Moderate LAE. Moderate to severe NERY.   (02/01/2022): 2D echo. EF 25-30%. Moderate enlargement of the RV. Severely reduced RV systolic function. Bioprosthesis in the aortic position. Moderate TR. Trace MR. Smoke in the left atrial appendage.    EP: (02/01/2022): Successful cardioversion    MEDICATIONS  (STANDING):  atorvastatin 20 milliGRAM(s) Oral at bedtime  buMETAnide 2 milliGRAM(s) Oral two times a day  hydrALAZINE 10 milliGRAM(s) Oral three times a day  metoprolol tartrate 50 milliGRAM(s) Oral two times a day    MEDICATIONS  (PRN):      FAMILY HISTORY:  No pertinent family history : Mother, Father    SOCIAL HISTORY:  Former smoker   No alcohol use  No illicit drug use    Past Surgical History:    Allergies:    No Known Allergies      REVIEW OF SYSTEMS:    CONSTITUTIONAL: No fever, weight loss, chills, shakes, or fatigue  EYES: No eye pain, visual disturbances, or discharge  ENMT:  No difficulty hearing, tinnitus, vertigo; No sinus or throat pain  NECK: No pain or stiffness  BREASTS: No pain, masses, or nipple discharge  RESPIRATORY: No cough, wheezing, hemoptysis, or shortness of breath  CARDIOVASCULAR: No chest pain, dyspnea, palpitations, dizziness, syncope, paroxysmal nocturnal dyspnea, orthopnea  GASTROINTESTINAL: No abdominal  or epigastric pain, nausea, vomiting, hematemesis, diarrhea, constipation, melena or bright red blood.  GENITOURINARY: No dysuria, nocturia, hematuria, or urinary incontinence  NEUROLOGICAL: No headaches, memory loss, slurred speech, limb weakness, loss of strength, numbness, or tremors  SKIN: No itching, burning, rashes, or lesions   LYMPH NODES: No enlarged glands  ENDOCRINE: No heat or cold intolerance, or hair loss  MUSCULOSKELETAL: No joint pain or swelling, muscle, back, or extremity pain  PSYCHIATRIC: No depression, anxiety, or difficulty sleeping  HEME/LYMPH: No easy bruising or bleeding gums  ALLERY AND IMMUNOLOGIC: No hives or rash.      Vital Signs Last 24 Hrs  T(C): 36.9 (28 May 2024 23:30), Max: 36.9 (28 May 2024 23:30)  T(F): 98.5 (28 May 2024 23:30), Max: 98.5 (28 May 2024 23:30)  HR: 68 (29 May 2024 07:58) (67 - 104)  BP: 132/62 (29 May 2024 07:58) (131/72 - 190/75)  BP(mean): 89 (29 May 2024 07:58) (89 - 139)  RR: 18 (29 May 2024 07:58) (18 - 20)  SpO2: 98% (28 May 2024 23:30) (97% - 98%)    Parameters below as of 28 May 2024 23:30  Patient On (Oxygen Delivery Method): room air        PHYSICAL EXAM:    GENERAL: In no apparent distress, well nourished, and hydrated. oob to chair/ appears comfortable  HEART: Bigeminy  PULMONARY: Clear to auscultation and perfusion.  No rales, wheezing, or rhonchi bilaterally.  ABDOMEN: Soft, Nontender, Nondistended; Bowel sounds present  EXTREMITIES:  2+ Peripheral radial Pulses, B/L LE edema present  NEUROLOGICAL: Grossly nonfocal      LABS:                        12.2   7.31  )-----------( 188      ( 28 May 2024 16:28 )             37.0     05-28    139  |  104  |  40<H>  ----------------------------<  89  3.9   |  21  |  2.1<H>    Ca    9.6      28 May 2024 16:28    TPro  7.1  /  Alb  4.4  /  TBili  0.3  /  DBili  x   /  AST  18  /  ALT  14  /  AlkPhos  65  05-28        PT/INR - ( 28 May 2024 16:28 )   PT: 12.70 sec;   INR: 1.11 ratio         PTT - ( 28 May 2024 16:28 )  PTT:35.7 sec  Urinalysis Basic - ( 28 May 2024 16:28 )    Color: x / Appearance: x / SG: x / pH: x  Gluc: 89 mg/dL / Ketone: x  / Bili: x / Urobili: x   Blood: x / Protein: x / Nitrite: x   Leuk Esterase: x / RBC: x / WBC x   Sq Epi: x / Non Sq Epi: x / Bacteria: x      BNP      RADIOLOGY & ADDITIONAL STUDIES:  ACC: [id number] EXAM:  XR CHEST PORTABLE URGENT 1V   ORDERED BY: CECILE MANRIQUEZ     PROCEDURE DATE:  05/28/2024          INTERPRETATION:  CLINICAL HISTORY / REASON FOR EXAM: Shortness of breath.    COMPARISON: Chest radiograph from October 17, 2023.    TECHNIQUE/POSITIONING: Satisfactory. Single image, AP chest radiograph.    FINDINGS:    SUPPORT DEVICES: None.    CARDIAC/MEDIASTINUM/HILUM: Post sternotomy.    LUNG PARENCHYMA/PLEURA: Mildly prominent interstitial opacities. No   pneumothorax.    SKELETON/SOFT TISSUES: Unremarkable.      IMPRESSION:    Mildly prominent interstitial bilateral opacities. No focal consolidation.    --- End of Report ---            SHANA SHETH MD; Attending Radiologist  This document has been electronically signed. May 29 2024  7:44AM

## 2024-05-30 ENCOUNTER — TRANSCRIPTION ENCOUNTER (OUTPATIENT)
Age: 84
End: 2024-05-30

## 2024-05-30 LAB
ANION GAP SERPL CALC-SCNC: 11 MMOL/L — SIGNIFICANT CHANGE UP (ref 7–14)
BUN SERPL-MCNC: 45 MG/DL — HIGH (ref 10–20)
CALCIUM SERPL-MCNC: 9.3 MG/DL — SIGNIFICANT CHANGE UP (ref 8.4–10.4)
CHLORIDE SERPL-SCNC: 107 MMOL/L — SIGNIFICANT CHANGE UP (ref 98–110)
CHOLEST SERPL-MCNC: 176 MG/DL — SIGNIFICANT CHANGE UP
CO2 SERPL-SCNC: 23 MMOL/L — SIGNIFICANT CHANGE UP (ref 17–32)
CREAT SERPL-MCNC: 2.3 MG/DL — HIGH (ref 0.7–1.5)
EGFR: 28 ML/MIN/1.73M2 — LOW
GLUCOSE SERPL-MCNC: 108 MG/DL — HIGH (ref 70–99)
HCT VFR BLD CALC: 35.9 % — LOW (ref 42–52)
HDLC SERPL-MCNC: 39 MG/DL — LOW
HGB BLD-MCNC: 11.4 G/DL — LOW (ref 14–18)
LIPID PNL WITH DIRECT LDL SERPL: 113 MG/DL — HIGH
MAGNESIUM SERPL-MCNC: 2.4 MG/DL — SIGNIFICANT CHANGE UP (ref 1.8–2.4)
MCHC RBC-ENTMCNC: 29.4 PG — SIGNIFICANT CHANGE UP (ref 27–31)
MCHC RBC-ENTMCNC: 31.8 G/DL — LOW (ref 32–37)
MCV RBC AUTO: 92.5 FL — SIGNIFICANT CHANGE UP (ref 80–94)
NON HDL CHOLESTEROL: 137 MG/DL — HIGH
NRBC # BLD: 0 /100 WBCS — SIGNIFICANT CHANGE UP (ref 0–0)
PLATELET # BLD AUTO: 159 K/UL — SIGNIFICANT CHANGE UP (ref 130–400)
PMV BLD: 10.5 FL — HIGH (ref 7.4–10.4)
POTASSIUM SERPL-MCNC: 4.3 MMOL/L — SIGNIFICANT CHANGE UP (ref 3.5–5)
POTASSIUM SERPL-SCNC: 4.3 MMOL/L — SIGNIFICANT CHANGE UP (ref 3.5–5)
RBC # BLD: 3.88 M/UL — LOW (ref 4.7–6.1)
RBC # FLD: 14.8 % — HIGH (ref 11.5–14.5)
SODIUM SERPL-SCNC: 141 MMOL/L — SIGNIFICANT CHANGE UP (ref 135–146)
T4 FREE SERPL-MCNC: 1.1 NG/DL — SIGNIFICANT CHANGE UP (ref 0.9–1.8)
TRIGL SERPL-MCNC: 117 MG/DL — SIGNIFICANT CHANGE UP
WBC # BLD: 7.08 K/UL — SIGNIFICANT CHANGE UP (ref 4.8–10.8)
WBC # FLD AUTO: 7.08 K/UL — SIGNIFICANT CHANGE UP (ref 4.8–10.8)

## 2024-05-30 PROCEDURE — 33249 INSJ/RPLCMT DEFIB W/LEAD(S): CPT

## 2024-05-30 PROCEDURE — 71045 X-RAY EXAM CHEST 1 VIEW: CPT | Mod: 26

## 2024-05-30 PROCEDURE — 99233 SBSQ HOSP IP/OBS HIGH 50: CPT | Mod: 57

## 2024-05-30 PROCEDURE — 33225 L VENTRIC PACING LEAD ADD-ON: CPT

## 2024-05-30 PROCEDURE — 99233 SBSQ HOSP IP/OBS HIGH 50: CPT

## 2024-05-30 PROCEDURE — 93010 ELECTROCARDIOGRAM REPORT: CPT

## 2024-05-30 RX ORDER — VANCOMYCIN HCL 1 G
1000 VIAL (EA) INTRAVENOUS EVERY 12 HOURS
Refills: 0 | Status: COMPLETED | OUTPATIENT
Start: 2024-05-30 | End: 2024-05-31

## 2024-05-30 RX ORDER — BUMETANIDE 0.25 MG/ML
2 INJECTION INTRAMUSCULAR; INTRAVENOUS ONCE
Refills: 0 | Status: COMPLETED | OUTPATIENT
Start: 2024-05-30 | End: 2024-05-30

## 2024-05-30 RX ORDER — METOPROLOL TARTRATE 50 MG
25 TABLET ORAL ONCE
Refills: 0 | Status: COMPLETED | OUTPATIENT
Start: 2024-05-30 | End: 2024-05-30

## 2024-05-30 RX ORDER — BUMETANIDE 0.25 MG/ML
2 INJECTION INTRAMUSCULAR; INTRAVENOUS DAILY
Refills: 0 | Status: DISCONTINUED | OUTPATIENT
Start: 2024-05-31 | End: 2024-05-31

## 2024-05-30 RX ORDER — VANCOMYCIN HCL 1 G
1000 VIAL (EA) INTRAVENOUS ONCE
Refills: 0 | Status: DISCONTINUED | OUTPATIENT
Start: 2024-05-30 | End: 2024-05-31

## 2024-05-30 RX ORDER — ACETAMINOPHEN 500 MG
650 TABLET ORAL EVERY 6 HOURS
Refills: 0 | Status: DISCONTINUED | OUTPATIENT
Start: 2024-05-30 | End: 2024-05-31

## 2024-05-30 RX ORDER — METOPROLOL TARTRATE 50 MG
75 TABLET ORAL
Refills: 0 | Status: DISCONTINUED | OUTPATIENT
Start: 2024-05-31 | End: 2024-05-31

## 2024-05-30 RX ADMIN — Medication 650 MILLIGRAM(S): at 16:44

## 2024-05-30 RX ADMIN — Medication 650 MILLIGRAM(S): at 14:43

## 2024-05-30 RX ADMIN — Medication 10 MILLIGRAM(S): at 21:26

## 2024-05-30 RX ADMIN — BUMETANIDE 2 MILLIGRAM(S): 0.25 INJECTION INTRAMUSCULAR; INTRAVENOUS at 16:23

## 2024-05-30 RX ADMIN — Medication 50 MILLIGRAM(S): at 17:19

## 2024-05-30 RX ADMIN — Medication 25 MILLIGRAM(S): at 17:57

## 2024-05-30 RX ADMIN — Medication 10 MILLIGRAM(S): at 14:44

## 2024-05-30 RX ADMIN — ATORVASTATIN CALCIUM 20 MILLIGRAM(S): 80 TABLET, FILM COATED ORAL at 21:26

## 2024-05-30 RX ADMIN — Medication 50 MILLIGRAM(S): at 05:14

## 2024-05-30 RX ADMIN — Medication 250 MILLIGRAM(S): at 21:26

## 2024-05-30 RX ADMIN — Medication 10 MILLIGRAM(S): at 05:15

## 2024-05-30 NOTE — DISCHARGE NOTE PROVIDER - NSDCCPTREATMENT_GEN_ALL_CORE_FT
PRINCIPAL PROCEDURE  Procedure: Implantation of biv ICD  Findings and Treatment: - Please take Keflex 500 mg (Bactrim / doxycycline 100 mg) twice daily for 5 days.  - You should restart your Eliquis on 6/1 PM.  - Do not shower for 5 days   - Do not drive or operate heavy machinery for 1 week   - Do not submerge in water (example: baths, swimming) for 1 month.  - Do not lift your left arm greater than shoulder height for 6 weeks.  - Do not lift anything heavier than 5-10 lbs with your left arm for 6 weeks.  - Follow up with Dr. Gracia in 1 month .  - Any sudden swelling, redness, fever, discharge, or severe pain, call the Electrophysiology Office at [phone].     PRINCIPAL PROCEDURE  Procedure: Implantation of biv ICD  Findings and Treatment: - You should restart your Eliquis on 6/1 in the evening.  - Please take Metoprolol Succinate 100mg Twice daily  - Do not shower for 5 days   - Do not drive or operate heavy machinery for 1 week   - Do not submerge in water (example: baths, swimming) for 1 month.  - Do not lift your left arm greater than shoulder height for 6 weeks.  - Do not lift anything heavier than 5-10 lbs with your left arm for 6 weeks.  - Follow up with Dr. Gracia on 6/17 at 9am.   - Any sudden swelling, redness, fever, discharge, or severe pain, call the Electrophysiology Office at [phone].

## 2024-05-30 NOTE — DISCHARGE NOTE PROVIDER - PROVIDER TOKENS
PROVIDER:[TOKEN:[[id number]],FOLLOWUP:[1 month]] PROVIDER:[TOKEN:[[id number]],SCHEDULEDAPPT:[06/17/2024],SCHEDULEDAPPTTIME:[09:00 AM]],PROVIDER:[TOKEN:[[id number]],FOLLOWUP:[1 month]]

## 2024-05-30 NOTE — PRE-ANESTHESIA EVALUATION ADULT - HEIGHT IN INCHES
OT LTG   03/01/18 0730   Rehab Team Goals   ADL Team Goal Patient will be independent with ADLs with least restrictive device upon completion of rehab program   Cognitive Team Goal Patient will be independent for basic and complex tasks upon completion of rehab program   Rehab Team Interventions   OT Interventions Self Care;Home Management; Therapeutic Exercise;Community Reintegration; Energy Conservation;Patient/Family Education   Eating Goal   QI: Eating Goal 06  Independent - Patient completes the activity by him/herself with no assistance from a helper  FIM Eating Goal Moderate [place]   Meal Complete All meals   Status Target goal - two weeks   Grooming Goal   QI: Oral Hygiene Goal 06  Independent - Patient completes the activity by him/herself with no assistance from a helper  FIM Grooming Goal Moderate [place]   Task Wash/Dry Face;Wash/Dry Hands;Brush Teeth;Comb Hair; Shave;Acquire Items; Initiate Task;Complete Groom   Environment Stand at [facility] goal - two weeks   Intervention Assistive Device;Balance Work; Therapeutic Exercise; Tolerance Work   Bathing Goal   QI: Shower/bathe self Goal 06  Independent - Patient completes the activity by him/herself with no assistance from a helper  FIM Bathing Goal Moderate [place]   Environment Shower; Tub   Adaptive Equipment Long Handle Sponge;Seat with back;Grab Bar;Hand Held Shower   Status Target goal - two weeks   Intervention ADL Training;Assistive Device; Therapeutic Exercise   Upper Body Dressing Goal   QI: Upper body dressing Goal 06  Independent - Patient completes the activity by him/herself with no assistance from a helper  [facility] Upper Body Dressing Goal Moderate [place]   Task Upper Body;Arms in/out; Over Head;Button Down   Environment Seated;Standing   Status Target goal - two weeks   Intervention Assistive Device;Balance Work; Therapeutic Exercise; Tolerance Work   [place] Global Dressing Goal   QI: Lower body dressing Goal 06  Independent - Patient completes the activity by him/herself with no assistance from a helper  QI: Putting on/taking off footwear Goal 06  Independent - Patient completes the activity by him/herself with no assistance from a helper  FIM Lower Body Dressing Goal Moderate [place]   Task Lower Body;Shoe/Slipper;Socks;Pants; Undergarment; Fasteners   Adaptive Equipment Reacher; Shoe Horn;Elastic Laces; Sock Aide   Environment Seated;Standing   Safety Precautions Safety Precaution   Status Target goal - two weeks   Intervention Assistive Device;Balance Work; Therapeutic Exercise; Tolerance Work   Toileting Goal   QI: Toileting hygiene Goal 06  Independent - Patient completes the activity by him/herself with no assistance from a helper  FIM Toileting Goal Moderate [place]   Task Pants Up;Pants Down;Hygiene   Safety Balance   Status Target goal - two weeks   Intervention ADL Training;Balance Work;Assistive Device   Toileting Transfer Goal   QI: Toilet transfer Goal 06  Independent - Patient completes the activity by him/herself with no assistance from a helper  FIM Toilet Transfer Goal Moderate [place]   Assistive Device Grab Bar;Single [employer]   Status Target goal - two weeks   Intervention ADL Training;Balance Work;Assistive Device   Tub/Shower Transfer Goal   FIM Tub/ Shower Transfer Goal Modified [place]   Method Tub Shower   Assist Device Grab Bar   Status Target goal - two weeks   Interventions ADL Training;Assistive Device; Neuromuscular Education   Comprehension Goal   Comprehension Assist Level Moderate [place]   Function Demand Complex Needs   Status Target goal - two weeks   Interventions Receptive Language Tasks; Reading Tasks;Use of Gesture   Expression Goal   Expression Assist Level Moderate [place]   Function Demand Complex Needs   Status Target goal - two weeks   Social Interaction Goal   Social Interaction Assist Level Moderate [place]   Behaviors Appropriate; Cooperative; Safe   Status Target goal - two weeks   Intervention Social Skill Training;Stress Management;1:1 Counseling   Problem Solving Goal   Problem Solving Assist Level Moderate [place]   Executive Function Cause/Effect;Provide Solution;Reason/; Thought Organ;Sequencing   Status Target goal - two weeks   Intervention Cognitive Training; Safety Education   Memory Goal   Memory Assist Level Moderate [place]   Short-Term Memory Orientation; Recent Recall   Long-Term Memory Past Events; [address] [place] [address] Program;Medication; Safety Strategy   Status Target goal - two weeks   Intervention Precautions Review   Community Reintegration Goal   Goal Mod I    Status Ongoing; Target - two weeks   Interventions Activity Tolerance;Community Outing   Object Retrieval Goal   QI: Picking up object Goal 06  Independent - Patient completes the activity by him/herself with no assistance from a helper  Assistive Device  Reacher   Small Object Picked Up shoe   Home Management Goal   Assist Level Moderate [place]   Status Target - two weeks   Interventions Activity Tolerance;Money Management;Meal Preparation;House Cleaning;Leisure Skills; Clothing Care 8

## 2024-05-30 NOTE — DISCHARGE NOTE NURSING/CASE MANAGEMENT/SOCIAL WORK - PATIENT PORTAL LINK FT
You can access the FollowMyHealth Patient Portal offered by [facility] by registering at the following website: [email or web address]. By joining [employer]’s FollowMyHealth portal, you will also be able to view your health information using other applications (apps) compatible with our system.

## 2024-05-30 NOTE — DISCHARGE NOTE PROVIDER - NSDCMRMEDTOKEN_GEN_ALL_CORE_FT
atorvastatin 20 mg oral tablet: 1 tab(s) orally once a day (at bedtime)  bumetanide 2 mg oral tablet: 1 tab(s) orally START DATE: 10/19 TAKE ONE PILL TWO TIMES A DAY UNTIL END DATE: 10/21    START DATE 10/22: TAKE ONE PILL ONCE A DAY  Eliquis 2.5 mg oral tablet: 1 tab(s) orally 2 times a day  hydrALAZINE 10 mg oral tablet: 1 tab(s) orally 3 times a day  Levothroid 50 mcg (0.05 mg) oral tablet: 1 tab(s) orally once a day  Metoprolol Succinate ER 50 mg oral tablet, extended release: 1 tab(s) orally once a day    atorvastatin 20 mg oral tablet: 1 tab(s) orally once a day (at bedtime)  bumetanide 2 mg oral tablet: 1 tab(s) orally once a day  Eliquis 2.5 mg oral tablet: 1 tab(s) orally 2 times a day PLEASE RESUME 6/1 IN THE EVENING  hydrALAZINE 10 mg oral tablet: 1 tab(s) orally 3 times a day  Levothroid 50 mcg (0.05 mg) oral tablet: 1 tab(s) orally once a day  Toprol- mg oral tablet, extended release: 1 tab(s) orally 2 times a day

## 2024-05-30 NOTE — PROGRESS NOTE ADULT - ASSESSMENT
Assessment:  Patient is a 83 year old male with PMHx of HTN, HLD, Afib (on eliquis 2.5 mg), hypothyroidism, CAD s/p CABG x 4 in 2006, aortic valve s/p bioprostehtic aortic valve replacement in 2011, HfrEF (EF 36%), CKD, GI bleed, epistaxis (refused BILLY closure), severe pulmonary presents to [facility] after an outpatient appointment with Dr. Gracia in which the patient was in atrial flutter with RVR.  Patient is admitted to cardiac telemetry for medical optimization and rate control prior to BiV-ICD implant currently scheduled for Thursday 5/30.    Please contact me with any questions or concerns at [phone].  #Aflutter RVR now in sinus rhythm  - Hold eliquis 2.5 mg BID last dose 5/28 pm due to ICD implant for Thursday   - Continue lopressor 50 mg BID (Patient on home Toprol XL 50 mg daily)  - TSH 4.49      #HFrEF  - Continue lopressor 50 mg PO BID  - Bumex 2 mg PO changed to daily 5/30  - NPO for BiV-ICD today 5/30.  - BNP 2578  - strict I&O's   - daily weight    #CAD s/p CABG x 4 2006  - Continue atorvastatin 20 mg at bedtime   - LDL- 113, HDL 39,  Cholesterol 176    #AS  - s/p bioprostehtic aortic valve replacement in 2011,    #HTN  - Continue home hydralazine 10 mg TID    #HLD  - Continue home atorvastatin 20 mg at bedtime     #Hypothyroidism  - Previously on levothyroxine 50 mcg daily but medication was stopped 2-3 months ago.  
Assessment:  Patient is a 83 year old male with PMHx of HTN, HLD, Afib (on eliquis 2.5 mg), hypothyroidism, CAD s/p CABG x 4 in 2006, aortic valve s/p bioprostehtic aortic valve replacement in 2011, HfrEF (EF 36%), CKD, GI bleed, epistaxis (refused BILLY closure), severe pulmonary presents to [facility] after an outpatient appointment with Dr. Gracia in which the patient was in atrial flutter with RVR.  Patient is admitted to cardiac telemetry for medical optimization and rate control prior to BiV-ICD implant currently scheduled for Thursday 5/30.    Please contact me with any questions or concerns at [phone].  #Aflutter RVR now in sinus rhythm  - Hold eliquis 2.5 mg BID last dose 5/28 pm due to ICD implant for Thursday   - Continue lopressor 50 mg BID (Patient on home Toprol XL 50 mg daily)  - FU TSH       #HFrEF  - Continue lopressor 50 mg PO BID  - Continue bumex 2 mg PO daily   - BiV-ICD implant currently scheduled for Thursday 5/30.  - Consult anesthesia for pe-op evaluation FU recs  - FU pro-BNP  - strict I&O's   - daily weight    #CAD s/p CABG x 4 2006  - Continue atorvastatin 20 mg at bedtime   - FU lipid panel     #AS  - s/p bioprostehtic aortic valve replacement in 2011,    #HTN  - Continue home hydralazine 10 mg TID    #HLD  - Continue home atorvastatin 20 mg at bedtime     #Hypothyroidism  - Previously on levothyroxine 50 mcg daily but medication was stopped 2-3 months ago.
Systolic heart failure NYHA 3  Cardiomyopahty  persistent AF  LBBB    recommend BiVICD  A thorough discussion was held with the patient concerning all aspects of ICD therapy. We reviewed the data supporting ICD therapy and how it applies individually. We discussed the risks, nature of procedure, and follow up care after device is implanted, including outcomes of ICD implantation and living with an ICD. We discussed management of ICD therapy throughout life, including deactivation of the ICD. Patient is in agreement with proceeding with the device implant. We discussed the risks of bleeding, hematoma, injury to vessels and heart, perforation, tamponade, pneumothorax, infection, lead dislodgment, device malfunction, and rare risks of stroke/heart attack/death. Patient expressed understanding of the discussion. After all questions were answered, it was a shared decision to proceed with ICD therapy.

## 2024-05-30 NOTE — DISCHARGE NOTE PROVIDER - HOSPITAL COURSE
Patient is a 83y Male with PMHx of HTN, HLD, Afib (on eliquis 2.5 mg), hypothyroidism, CAD s/p CABG x 4 in 2006, aortic valve s/p bioprosthetic aortic valve replacement in 2011, HfrEF (EF 36%), CKD, GI bleed, epistaxis (refused BILLY closure), severe pulmonary presents to [facility] after an outpatient appointment with Dr. Gracia in which the patient was in atrial flutter with RVR. Patient was sent to [facility] for elective ICD implantation. On 5/30/2024 patient underwent successful Biventricular defibrillator implantation with Left bundle area pacing (LBAP) in-lieu of CS lead. Patient was monitored overnight. On POD 1 patient remains HD stable with no complaints. Examination of ICD pocket is C/D/I with no hematoma or erythema. Device interrogation reveals normal device function and CXR was negative for pneumothorax. Patient is being DC home in stable condition. Patient is a 83y Male with PMHx of HTN, HLD, Afib (on eliquis 2.5 mg), hypothyroidism, CAD s/p CABG x 4 in 2006, aortic valve s/p bioprosthetic aortic valve replacement in 2011, HfrEF (EF 36%), CKD, GI bleed, epistaxis (refused BILLY closure), severe pulmonary presents to [facility] after an outpatient appointment with Dr. Gracia in which the patient was in atrial flutter with RVR. Patient was sent to [facility] for optimization prior to elective ICD implantation. On 5/30/2024 patient underwent successful Biventricular defibrillator implantation with Left bundle area pacing (LBAP) in-lieu of CS lead. Patient was monitored overnight. On POD 1 patient remains HD stable with no complaints. Examination of ICD pocket is C/D/I with no hematoma or erythema. Device interrogation reveals normal device function and CXR was negative for pneumothorax. Patient is being DC home in stable condition. Fu with CHULA mares 6/17 9am. Toprol increased to 100mg BID.

## 2024-05-30 NOTE — CHART NOTE - NSCHARTNOTEFT_GEN_A_CORE
PACU ANESTHESIA ADMISSION NOTE      Procedure: biv icd implantation  Post op diagnosis:  heart failure    ____  Intubated  TV:______       Rate: ______      FiO2: ______    __x__  Patent Airway    __x__  Full return of protective reflexes    __x__  Full recovery from anesthesia / back to baseline     Vitals:   T:  98         R: 14                 BP: 137/82                 Sat:   97                P: 14      Mental Status:  _x__ Awake   __x__ Alert   _____ Drowsy   _____ Sedated    Nausea/Vomiting:  __x__ NO  ______Yes,   See Post - Op Orders          Pain Scale (0-10):  __0___    Treatment: ____ None    ___x_ See Post - Op/PCA Orders    Post - Operative Fluids:   ____ Oral   __x__ See Post - Op Orders    Plan: Discharge:   ____Home       __x___Floor     _____Critical Care    _____  Other:_________________    Comments:
Electrophysiology Brief Post-Operative Note    I have personally seen and examined the patient.  I agree with the history and physical which I have reviewed and noted any changes below.        DEVICE COMPANY:  -[employer]    PRE-OP DIAGNOSIS: Chronic systolic Heart Failure, LBBB    POST-OP DIAGNOSIS: Chronic systolic Heart Failure, LBBB    PROCEDURE:  Biventricular defibrillator implant   Left Bundle Area Pacing (LBAP) in-lieu of CS lead    Physician: ROJAS Gracia MD  Assistant: None    ANESTHESIA TYPE:  [  ]General Anesthesia  [X] Sedation  [X] Local/Regional    CONDITION  [  ] Critical  [  ] Serious  [  ]Fair  [X]Good    SPECIMENS REMOVED (IF APPLICABLE): None    IMPLANTS (IF APPLICABLE)  Biventricular Defibrillator implant, Left Bundle Area Pacing (LBAP) in-lieu of CS lead    FINDINGS (see below)   -Successful biventricular defibrillator implant with  Left Bundle Area Pacing (LBAP) in-lieu of CS lead   -Tortuous CS ostium, unsuccessful CS lead placement (unable to cannulate CS beyond CS ostium); use of LBAP lead instead of CS lead for resynchronization therapy.   -No immediate complications   -Estimated Blood Loss: 25  mL   -Contrast used: 37 cc    PLAN OF CARE  - Vancomyocin 1g IV q12 h for 2 doses  - ECG in cathlab  - Chest X-ray portable now  - Chest X-ray PA/Lat tomorrow at 6am  - Device interrogation tomorrow in am  - Discontinue Heparin, Lovenox, and NOACS for 48 hours  - No anticoagulation unless discussed with EP attending      FOLLOW-UP  -Outpatient follow-up with Electrophysiology in 3-4 weeks     [address] [place] [address] ([address])[facility] [phone]

## 2024-05-30 NOTE — DISCHARGE NOTE NURSING/CASE MANAGEMENT/SOCIAL WORK - NSDCVIVACCINE_GEN_ALL_CORE_FT
Tdap; 22-Jan-2019 21:12; Nathaniel Rodriguez (JACQUELINE); [employer]; [id number] (Exp. Date: 02-Nov-2020); IntraMuscular; Deltoid Right.; 0.5 milliLiter(s); VIS (VIS Published: 09-May-2013, VIS Presented: 22-Jan-2019);

## 2024-05-30 NOTE — DISCHARGE NOTE PROVIDER - NSDCFUSCHEDAPPT_GEN_ALL_CORE_FT
Alexandrea Reddy  [facility]  [facility] [address] [facility]  Scheduled Appointment: 06/17/2024

## 2024-05-30 NOTE — DISCHARGE NOTE PROVIDER - NSDCCPCAREPLAN_GEN_ALL_CORE_FT
PRINCIPAL DISCHARGE DIAGNOSIS  Diagnosis: Unspecified atrial fibrillation  Assessment and Plan of Treatment:

## 2024-05-30 NOTE — DISCHARGE NOTE PROVIDER - CARE PROVIDERS DIRECT ADDRESSES
,[email or web address]_Unknown

## 2024-05-30 NOTE — PROGRESS NOTE ADULT - NS ATTEND AMEND GEN_ALL_CORE FT
Patient seen and examined. Pertinent labs, imaging and telemetry reviewed. I agree with the above:     Patient feeling well. No new complaints.   -Pt s/p left bundle area pacing and defibrillator implant.     He feels fatigued at times but denies CP, SOB, SPARKS, palpitations.   -Patient was found to be in Aflutter with HR >100 at Dr. Gracia's office.   -Sent for rate control and optimization prior to BiV ICD placement.   -Pt with EF <35% with LBBB and some symptoms.   -Patient is currently in NSR with PVCs.   -Increased metoprolol 05/28. Will wait for full effect before dose change.   -Can increase metoprolol as tolerated.   -Hold Eliquis.   -Further EP recs.  -CXR tomorrow. Likely DC tomorrow.

## 2024-05-30 NOTE — DISCHARGE NOTE PROVIDER - CARE PROVIDER_API CALL
Sadiq Gracia  Cardiac Electrophysiology  [address] [place] [address], [address]  [place] [address]  Phone: [phone]  Fax: [phone]  Follow Up Time: 1 month   Sadiq Gracia  Cardiac Electrophysiology  [address] [facility], [address]  [place] [address]  Phone: [phone]  Fax: [phone]  Scheduled Appointment: 06/17/2024 09:00 AM    José Luis Pimentel  Nephrology  [address] [address]  Phone: [phone]  Fax: [phone]  Follow Up Time: 1 month

## 2024-05-31 VITALS
HEART RATE: 79 BPM | TEMPERATURE: 98 F | OXYGEN SATURATION: 97 % | SYSTOLIC BLOOD PRESSURE: 154 MMHG | RESPIRATION RATE: 18 BRPM | DIASTOLIC BLOOD PRESSURE: 96 MMHG

## 2024-05-31 LAB — T4 FREE SERPL-MCNC: 1 NG/DL — SIGNIFICANT CHANGE UP (ref 0.9–1.8)

## 2024-05-31 PROCEDURE — 93010 ELECTROCARDIOGRAM REPORT: CPT

## 2024-05-31 PROCEDURE — 99024 POSTOP FOLLOW-UP VISIT: CPT

## 2024-05-31 PROCEDURE — 99239 HOSP IP/OBS DSCHRG MGMT >30: CPT

## 2024-05-31 PROCEDURE — 93284 PRGRMG EVAL IMPLANTABLE DFB: CPT | Mod: 26

## 2024-05-31 PROCEDURE — 71046 X-RAY EXAM CHEST 2 VIEWS: CPT | Mod: 26

## 2024-05-31 RX ORDER — APIXABAN 2.5 MG/1
1 TABLET, FILM COATED ORAL
Qty: 0 | Refills: 0 | DISCHARGE

## 2024-05-31 RX ORDER — METOPROLOL TARTRATE 50 MG
1 TABLET ORAL
Qty: 60 | Refills: 0
Start: 2024-05-31 | End: 2024-06-29

## 2024-05-31 RX ORDER — METOPROLOL TARTRATE 50 MG
100 TABLET ORAL
Refills: 0 | Status: DISCONTINUED | OUTPATIENT
Start: 2024-05-31 | End: 2024-05-31

## 2024-05-31 RX ORDER — METOPROLOL TARTRATE 50 MG
25 TABLET ORAL ONCE
Refills: 0 | Status: DISCONTINUED | OUTPATIENT
Start: 2024-05-31 | End: 2024-05-31

## 2024-05-31 RX ORDER — BUMETANIDE 0.25 MG/ML
1 INJECTION INTRAMUSCULAR; INTRAVENOUS
Qty: 60 | Refills: 0
Start: 2024-05-31

## 2024-05-31 RX ADMIN — BUMETANIDE 2 MILLIGRAM(S): 0.25 INJECTION INTRAMUSCULAR; INTRAVENOUS at 05:36

## 2024-05-31 RX ADMIN — Medication 250 MILLIGRAM(S): at 09:01

## 2024-05-31 RX ADMIN — Medication 75 MILLIGRAM(S): at 05:36

## 2024-05-31 RX ADMIN — Medication 10 MILLIGRAM(S): at 05:36

## 2024-05-31 NOTE — CONSULT NOTE ADULT - SUBJECTIVE AND OBJECTIVE BOX
NEPHROLOGY CONSULTATION NOTE    Patient is a 83y old  Male who presents with a chief complaint of Aflutter RVR (28 May 2024 17:26)      HPI: Hypertension, hyperlipidemia, hyperthyroidism, CAD s/p CABG in 2006, aortic valve with disease s/p bioprosthetic aortic valve replacement in 2011, paroxysmal atrial fibrillation, atrial fibrillation, CKD, anemia, severe pulmonary hypertension, severe cardiomyopathy, biventricular heart failure, history of GI bleed, history of epistaxis, not interested in BILLY closure.     He had history of anemia with Hgb dropping to 5.8. Hgb improved with Procrit injection. Patient had no recent GI bleeds. He has a history of nose bleeds on anticoagulation. He is currently on Eliquis 2.5 mg Q12.     Patient has no chest pain, no shortness of breath at rest, mild to moderate dyspnea on exertion, no dizziness, no lightheadedness, and no syncope.    renal:  pt known to me.  s/p AICD/ PPM.  pt seen for f/u of CKD.  Cr at baseline.      PAST MEDICAL & SURGICAL HISTORY:  Chronic systolic congestive heart failure      HTN (hypertension)      Dyslipidemia      CAD (coronary artery disease)      AF (atrial fibrillation)      S/P CABG x 4        Allergies:  No Known Allergies    Home Medications Reviewed    SOCIAL HISTORY:  Denies ETOH,Smoking,   FAMILY HISTORY:        REVIEW OF SYSTEMS:  CONSTITUTIONAL: No weakness, fevers or chills  EYES/ENT: No visual changes;  No vertigo or throat pain   NECK: No pain or stiffness  RESPIRATORY: No cough, wheezing, hemoptysis; no shortness of breath  CARDIOVASCULAR: No chest pain or palpitations.  GASTROINTESTINAL: No abdominal or epigastric pain. No nausea, vomiting, or hematemesis; No diarrhea or constipation. No melena or hematochezia.  GENITOURINARY: No dysuria, frequency, foamy urine, urinary urgency, incontinence or hematuria  NEUROLOGICAL: No numbness or weakness  SKIN: No itching, burning, rashes, or lesions   VASCULAR: slight bilateral lower extremity edema.   All other review of systems is negative unless indicated above.    PHYSICAL EXAM:  Constitutional: NAD  HEENT: anicteric sclera, oropharynx clear, MMM  Neck: + JVD  Respiratory: b/l BS  Cardiovascular: S1, S2, irreg + PPM with dressing   Gastrointestinal: BS+, soft, NT/ND  Extremities: No cyanosis or clubbing. 1+ peripheral edema  Neurological: A/O x 3, no focal deficits  Psychiatric: Normal mood, normal affect  : No CVA tenderness. No covarrubias.   Skin: No rashes     Hospital Medications:   MEDICATIONS  (STANDING):  atorvastatin 20 milliGRAM(s) Oral at bedtime  buMETAnide 2 milliGRAM(s) Oral daily  hydrALAZINE 10 milliGRAM(s) Oral three times a day  metoprolol succinate  milliGRAM(s) Oral two times a day  vancomycin  IVPB 1000 milliGRAM(s) IV Intermittent once  vancomycin  IVPB 1000 milliGRAM(s) IV Intermittent once        VITALS:  T(F): 98.2 (05-31-24 @ 08:00), Max: 99.6 (05-31-24 @ 00:10)  HR: 79 (05-31-24 @ 08:00)  BP: 154/96 (05-31-24 @ 08:00)  RR: 18 (05-31-24 @ 08:00)  SpO2: 97% (05-31-24 @ 08:00)  Wt(kg): --    05-29 @ 07:01  -  05-30 @ 07:00  --------------------------------------------------------  IN: 0 mL / OUT: 2000 mL / NET: -2000 mL    05-30 @ 07:01  -  05-31 @ 07:00  --------------------------------------------------------  IN: 250 mL / OUT: 1900 mL / NET: -1650 mL    05-31 @ 07:01  -  05-31 @ 12:55  --------------------------------------------------------  IN: 0 mL / OUT: 760 mL / NET: -760 mL          LABS:  05-30    141  |  107  |  45<H>  ----------------------------<  108<H>  4.3   |  23  |  2.3<H>    Ca    9.3      30 May 2024 05:28  Mg     2.4     05-30                            11.4   7.08  )-----------( 159      ( 30 May 2024 05:28 )             35.9       Urine Studies:  Urinalysis Basic - ( 30 May 2024 05:28 )    Color:  / Appearance:  / SG:  / pH:   Gluc: 108 mg/dL / Ketone:   / Bili:  / Urobili:    Blood:  / Protein:  / Nitrite:    Leuk Esterase:  / RBC:  / WBC    Sq Epi:  / Non Sq Epi:  / Bacteria:           RADIOLOGY & ADDITIONAL STUDIES:

## 2024-05-31 NOTE — PROGRESS NOTE ADULT - SUBJECTIVE AND OBJECTIVE BOX
HPI:  Patient is a 83 year old male with PMHx of HTN, HLD, Afib (on eliquis 2.5 mg), hypothyroidism, CAD s/p CABG x 4 in 2006, aortic valve s/p bioprostehtic aortic valve replacement in 2011, HfrEF (EF 36%), CKD, GI bleed, epistaxis (refused BILLY closure), severe pulmonary presents to [facility] after an outpatient appointment with Dr. Gracia in which the patient was in atrial flutter with RVR.  Patient referred to the emergency room by Dr. Gracia for rate control and medical optimization prior to BiV-ICD placement on Thursday.  Patient planned for BiV-ICD due to primary prevent for sudden cardiac death, chronic HFrEF, and LBBB.  Patient denies Chest pain, palpitations, SOB, LE edema, dizziness, lightheadedness, syncope, orthopnea, PND, fever, chills, recent illness, cough, nausea, vomiting, and recent changes in weight.    In the ED:  VS: /88,  bpm, temp 97.7 degrees F, RR 18, O2 sat 97%  Labs: pending  EKG: Wide complex QRS tachycardia at 115 bpm with PVC and PACs, LBBB  Chest X-ray: pending     (28 May 2024 17:26)      MEDICATIONS  (STANDING):  atorvastatin 20 milliGRAM(s) Oral at bedtime  buMETAnide 2 milliGRAM(s) Oral two times a day  hydrALAZINE 10 milliGRAM(s) Oral three times a day  metoprolol tartrate 50 milliGRAM(s) Oral two times a day  vancomycin  IVPB 1000 milliGRAM(s) IV Intermittent once  vancomycin  IVPB 1000 milliGRAM(s) IV Intermittent once    MEDICATIONS  (PRN):      Allergies    No Known Allergies    Intolerances        Vital Signs Last 24 Hrs  T(C): 36.5 (30 May 2024 07:47), Max: 36.7 (29 May 2024 16:04)  T(F): 97.7 (30 May 2024 07:47), Max: 98.1 (29 May 2024 16:04)  HR: 66 (30 May 2024 07:47) (66 - 77)  BP: 196/88 (30 May 2024 07:47) (151/65 - 196/88)  BP(mean): 94 (30 May 2024 04:13) (94 - 123)  RR: 20 (30 May 2024 07:47) (17 - 20)  SpO2: 98% (30 May 2024 07:47) (95% - 99%)    Parameters below as of 30 May 2024 04:13  Patient On (Oxygen Delivery Method): room air          Physical exam:  General: Awake, alert and oriented.  Normal mood and affect.  No acute distress noted.    Psych: Oriented Mood and affect appropriate. CN intact  Head: No icterus or palor. NO Xanthalasma or jaundice  Neck: No JVD.  No carotid bruit. No palpable thyroid  Cardiology: Heart regular rate and rhythm.   S1 S2, no murmurs, clicks or rubs.    Respiratory: Respirations even and unlabored.  Lungs clear to auscultation bilaterally.  No use of accessory muscles.    Abdomen: Abdomen soft, non-distended.  Bowel sounds positive in 4 quadrants.  No abdominal aneurysm felt  Extremities: No edema noted to lower extremities.  No clubbing or cyanosis.        LABS:                        11.4   7.08  )-----------( 159      ( 30 May 2024 05:28 )             35.9     05-30    141  |  107  |  45<H>  ----------------------------<  108<H>  4.3   |  23  |  2.3<H>    Ca    9.3      30 May 2024 05:28  Mg     2.4     05-30    TPro  7.1  /  Alb  4.4  /  TBili  0.3  /  DBili  x   /  AST  18  /  ALT  14  /  AlkPhos  65  05-28    PT/INR - ( 28 May 2024 16:28 )   PT: 12.70 sec;   INR: 1.11 ratio         PTT - ( 28 May 2024 16:28 )  PTT:35.7 sec  Urinalysis Basic - ( 30 May 2024 05:28 )    Color: x / Appearance: x / SG: x / pH: x  Gluc: 108 mg/dL / Ketone: x  / Bili: x / Urobili: x   Blood: x / Protein: x / Nitrite: x   Leuk Esterase: x / RBC: x / WBC x   Sq Epi: x / Non Sq Epi: x / Bacteria: x        RADIOLOGY & ADDITIONAL TESTS:  
Chief complaint: Patient is a 83y old  Male who presents with a chief complaint of Aflutter RVR (30 May 2024 08:39)    HPI:  Patient is a 83 year old male with PMHx of HTN, HLD, Afib (on eliquis 2.5 mg), hypothyroidism, CAD s/p CABG x 4 in 2006, aortic valve s/p bioprostehtic aortic valve replacement in 2011, HfrEF (EF 36%), CKD, GI bleed, epistaxis (refused BILLY closure), severe pulmonary presents to [facility] after an outpatient appointment with Dr. Gracia in which the patient was in atrial flutter with RVR.  Patient referred to the emergency room by Dr. Gracia for rate control and medical optimization prior to BiV-ICD placement on Thursday.  Patient planned for BiV-ICD due to primary prevent for sudden cardiac death, chronic HFrEF, and LBBB.  Patient denies Chest pain, palpitations, SOB, LE edema, dizziness, lightheadedness, syncope, orthopnea, PND, fever, chills, recent illness, cough, nausea, vomiting, and recent changes in weight.    Interval history: Patient seen and examined at bedside. NPO for BIV-ICD today.     Review of systems: A complete 10-point review of systems was obtained and is negative except as stated in the interval history.    Vitals:  T(F): 97.7, Max: 98.1 (05-29 @ 16:04)  HR: 66 (66 - 77)  BP: 196/88 (151/65 - 196/88)  RR: 20 (17 - 20)  SpO2: 98% (95% - 99%)    Ins & outs:     05-28 @ 07:01  -  05-29 @ 07:00  --------------------------------------------------------  IN: 0 mL / OUT: 1200 mL / NET: -1200 mL    05-29 @ 07:01  -  05-30 @ 07:00  --------------------------------------------------------  IN: 0 mL / OUT: 2000 mL / NET: -2000 mL      Weight trend:  Weight (kg): 106.4 (05-30)    Physical exam:  General: No apparent distress  HEENT: Anicteric sclera. Moist mucous membranes. JVP -.   Cardiac: Regular rate and rhythm. No murmurs, rubs, or gallops.   Vascular: Symmetric radial pulses. Dorsalis pedis pulses palpable.   Respiratory: Normal effort.  Clear to auscultation.   Abdomen: Soft, nontender. Audible bowel sounds.   Extremities: Warm with 1+ bilateral LE edema. No cyanosis or clubbing.   Skin: Warm and dry. No rash.   Neurologic: Grossly normal motor function.   Psychiatric: Oriented to person, place, and time.     Data reviewed:  - Telemetry: SR at 66-77 bpm with frequent PVCs  - ECG (date 5/29/24): SR at 78 bpm with 1st degree AV block with frequent PVCs, LBBB   - TTE (date 10/17/23): EF 36%, moderately to severely decreased LV systolic function, moderate LVH, grade II diastolic dysfunction, moderately reduced RV systolic function, severely enlarged RV, mild thickening and calcification, trace MR, bioprostehsis in the aortic position, mild AR, severe pulmonary HTN (PASP 79.0mmHg RAP 15 mmHg), moderately enlarged LA, moderate to severe right atrial enlargement  - Chest x-ray (date 5/28/24):  mildly prominent interstitial bilateral opacities.  No focal consolidation    - Labs:                        11.4   7.08  )-----------( 159      ( 30 May 2024 05:28 )             35.9     05-30    141  |  107  |  45<H>  ----------------------------<  108<H>  4.3   |  23  |  2.3<H>    Ca    9.3      30 May 2024 05:28  Mg     2.4     05-30    TPro  7.1  /  Alb  4.4  /  TBili  0.3  /  DBili  x   /  AST  18  /  ALT  14  /  AlkPhos  65  05-28    PT/INR - ( 28 May 2024 16:28 )   PT: 12.70 sec;   INR: 1.11 ratio         PTT - ( 28 May 2024 16:28 )  PTT:35.7 sec        Triglycerides, Serum: 117 mg/dL (05-30-24 @ 05:28)  LDL Cholesterol Calculated: 113 mg/dL (05-30-24 @ 05:28)    Thyroid Stimulating Hormone, Serum: 4.49 uIU/mL (05-29-24 @ 17:20)  Thyroid Stimulating Hormone, Serum: 5.02 uIU/mL (05-29-24 @ 11:30)    Urinalysis Basic - ( 30 May 2024 05:28 )    Color: x / Appearance: x / SG: x / pH: x  Gluc: 108 mg/dL / Ketone: x  / Bili: x / Urobili: x   Blood: x / Protein: x / Nitrite: x   Leuk Esterase: x / RBC: x / WBC x   Sq Epi: x / Non Sq Epi: x / Bacteria: x        Medications:  atorvastatin 20 milliGRAM(s) Oral at bedtime  buMETAnide 2 milliGRAM(s) Oral two times a day  hydrALAZINE 10 milliGRAM(s) Oral three times a day  metoprolol tartrate 50 milliGRAM(s) Oral two times a day  vancomycin  IVPB 1000 milliGRAM(s) IV Intermittent once  vancomycin  IVPB 1000 milliGRAM(s) IV Intermittent once    Drips:    PRN:     Allergies    No Known Allergies    Intolerances    
INTERVAL HPI/OVERNIGHT EVENTS:    Patient s/p BiV- ICD implant  No event over night. Pt without complains    MEDICATIONS  (STANDING):  atorvastatin 20 milliGRAM(s) Oral at bedtime  buMETAnide 2 milliGRAM(s) Oral daily  hydrALAZINE 10 milliGRAM(s) Oral three times a day  metoprolol tartrate 100 milliGRAM(s) Oral two times a day  vancomycin  IVPB 1000 milliGRAM(s) IV Intermittent once  vancomycin  IVPB 1000 milliGRAM(s) IV Intermittent once    MEDICATIONS  (PRN):  acetaminophen     Tablet .. 650 milliGRAM(s) Oral every 6 hours PRN Mild Pain (1 - 3), Moderate Pain (4 - 6), Severe Pain (7 - 10)      Allergies    No Known Allergies    Intolerances          Vital Signs Last 24 Hrs  T(C): 36.8 (31 May 2024 08:00), Max: 37.6 (31 May 2024 00:10)  T(F): 98.2 (31 May 2024 08:00), Max: 99.6 (31 May 2024 00:10)  HR: 79 (31 May 2024 08:00) (75 - 106)  BP: 154/96 (31 May 2024 08:00) (97/57 - 164/69)  BP(mean): 118 (31 May 2024 08:00) (72 - 120)  RR: 18 (31 May 2024 08:00) (13 - 20)  SpO2: 97% (31 May 2024 08:00) (94% - 100%)    Parameters below as of 31 May 2024 08:00  Patient On (Oxygen Delivery Method): room air        GENERAL: In no apparent distress, well nourished, and hydrated.  HEART: Regular rate and rhythm; No murmurs, rubs, or gallops.  	Wound healing well; No hematoma; no bleeding  PULMONARY: Clear to auscultation and perfusion.  No rales, wheezing, or rhonchi bilaterally.  ABDOMEN: Soft, Nontender, Nondistended; Bowel sounds present  EXTREMITIES:  2+ Peripheral Pulses, No clubbing, cyanosis, or edema  NEUROLOGICAL: Grossly nonfocal    12 Lead ECG:   Ventricular Rate 98 BPM    Atrial Rate 98 BPM    QRS Duration 120 ms    Q-T Interval 386 ms    QTC Calculation(Bazett) 492 ms    R Axis 72 degrees    T Axis -88 degrees    Diagnosis Line Ventricular-paced rhythm with occasional Premature ventricular complexes  Abnormal ECG    Confirmed by Royzman, Quincy ([id number]) on 5/31/2024 8:44:24 AM (05-30-24 @ 12:54)  12 Lead ECG:   Ventricular Rate 78 BPM    Atrial Rate 78 BPM    P-R Interval 254 ms    QRS Duration 164 ms    Q-T Interval 448 ms    QTC Calculation(Bazett) 510 ms    P Axis 101 degrees    R Axis 64 degrees    T Axis 158 degrees    Diagnosis Line Sinus rhythm with 1st degree A-V block with frequent Premature ventricular  complexes  Left bundle branch block  Abnormal ECG    Confirmed by Quincy Lee ([id number]) on 5/30/2024 9:24:33 AM (05-29-24 @ 07:37)  12 Lead ECG:   Ventricular Rate 115 BPM    QRS Duration 162 ms    Q-T Interval 406 ms    QTC Calculation(Bazett) 561 ms    R Axis 79 degrees    T Axis -43 degrees    Diagnosis Line Sinus tachycardia with occasional Premature ventricular complexes and Fusion  complexes  Left bundle branch block  Abnormal ECG    Confirmed by DEBORAH MALONE, [facility] ([id number]) on 5/29/2024 10:56:37 PM (05-28-24 @ 16:02)      RADIOLOGY & ADDITIONAL TESTS:  Xray Chest 2 Views PA/Lat:   ACC: [id number] EXAM:  XR CHEST PA LAT 2V   ORDERED BY: LYNETTE BELTRAN     PROCEDURE DATE:  05/31/2024          INTERPRETATION:  Clinical History / Reason for exam: Post ICD placement    Comparison : Chest radiograph May 30, 2024.    Technique/Positioning: PA and lateral views of the chest.    Findings:    Support devices: Left chest wall AICD    Cardiac/mediastinum/hilum: Cardiomegaly poststernotomy, unchanged    Lung parenchyma/Pleura: Within normal limits.    Skeleton/soft tissues: Unchanged    Impression:    No acute pulmonary process        --- End of Report ---            ELLIOT LANDAU MD; Attending Radiologist  This document has been electronically signed. May 31 2024  9:21AM (05-31-24 @ 07:36)      A/P   Patient s/p BiV- ICD _____Medtronic_________ implant    - CXR as above, leads are in good position  - Device interrogation done, properly working device, review by attending    - FU in the EP office for wound check with __Alexandrea QUIROS 6/17 @9am  [address] [place] [address], [address]  [phone] _    No Heavy lifting >5 lbs. Do not raise your arm above shoulder level for 4-6 weeks.   No driving for 2weeks  No shower, bathtub, no wetting device site for 5 days.  Can take a shower on _Tue  _ , remove dressing at that time, leave Steri-strips in place  
Chief complaint: Patient is a 83y old  Male who presents with a chief complaint of Aflutter RVR (29 May 2024 08:31)    HPI:  Patient is a 83 year old male with PMHx of HTN, HLD, Afib (on eliquis 2.5 mg), hypothyroidism, CAD s/p CABG x 4 in 2006, aortic valve s/p bioprostehtic aortic valve replacement in 2011, HfrEF (EF 36%), CKD, GI bleed, epistaxis (refused BILLY closure), severe pulmonary presents to [facility] after an outpatient appointment with Dr. Gracia in which the patient was in atrial flutter with RVR.  Patient referred to the emergency room by Dr. Gracia for rate control and medical optimization prior to BiV-ICD placement on Thursday.  Patient planned for BiV-ICD due to primary prevent for sudden cardiac death, chronic HFrEF, and LBBB.  Patient denies Chest pain, palpitations, SOB, LE edema, dizziness, lightheadedness, syncope, orthopnea, PND, fever, chills, recent illness, cough, nausea, vomiting, and recent changes in weight.    Interval history:  - Patient seen and examined at bedside this am.  Patient NAD and denies any overnight events   - Patient denies CP, SOB, and palpitations    Review of systems: A complete 10-point review of systems was obtained and is negative except as stated in the interval history.    Vitals:  T(F): 98.5, Max: 98.5 (05-28 @ 23:30)  HR: 68 (67 - 104)  BP: 132/62 (131/72 - 190/75)  RR: 18 (18 - 20)  SpO2: 98% (97% - 98%)    Ins & outs:     05-28 @ 07:01 - 05-29 @ 07:00  --------------------------------------------------------  IN: 0 mL / OUT: 1200 mL / NET: -1200 mL    05-29 @ 07:01 - 05-29 @ 10:49  --------------------------------------------------------  IN: 0 mL / OUT: 600 mL / NET: -600 mL      Weight trend:  Weight (kg): 106.4 (05-28)    Physical exam:  General: No apparent distress  HEENT: Anicteric sclera. Moist mucous membranes. JVP -.   Cardiac: Regular rate and rhythm. No murmurs, rubs, or gallops.   Vascular: Symmetric radial pulses. Dorsalis pedis pulses palpable.   Respiratory: Normal effort.  Clear to auscultation.   Abdomen: Soft, nontender. Audible bowel sounds.   Extremities: Warm with 1+ bilateral LE edema. No cyanosis or clubbing.   Skin: Warm and dry. No rash.   Neurologic: Grossly normal motor function.   Psychiatric: Oriented to person, place, and time.     Data reviewed:  - Telemetry: SR at 74 bpm with frequent PVCs and multiple events of 3 beats NSVT   - ECG (date 5/29/24): SR at 78 bpm with 1st degree AV block with frequent PVCs, LBBB   - TTE (date 10/17/23): EF 36%, moderately to severely decreased LV systolic function, moderate LVH, grade II diastolic dysfunction, moderately reduced RV systolic function, severely enlarged RV, mild thickening and calcification, trace MR, bioprostehsis in the aortic position, mild AR, severe pulmonary HTN (PASP 79.0mmHg RAP 15 mmHg), moderately enlarged LA, moderate to severe right atrial enlargement  - Chest x-ray (date 5/28/24):  mildly prominent interstitial bilateral opacities.  No focal consolidation      - Labs:                        12.2   7.31  )-----------( 188      ( 28 May 2024 16:28 )             37.0     05-28    139  |  104  |  40<H>  ----------------------------<  89  3.9   |  21  |  2.1<H>    Ca    9.6      28 May 2024 16:28    TPro  7.1  /  Alb  4.4  /  TBili  0.3  /  DBili  x   /  AST  18  /  ALT  14  /  AlkPhos  65  05-28    PT/INR - ( 28 May 2024 16:28 )   PT: 12.70 sec;   INR: 1.11 ratio         PTT - ( 28 May 2024 16:28 )  PTT:35.7 sec            Urinalysis Basic - ( 28 May 2024 16:28 )    Color: x / Appearance: x / SG: x / pH: x  Gluc: 89 mg/dL / Ketone: x  / Bili: x / Urobili: x   Blood: x / Protein: x / Nitrite: x   Leuk Esterase: x / RBC: x / WBC x   Sq Epi: x / Non Sq Epi: x / Bacteria: x        Medications:  atorvastatin 20 milliGRAM(s) Oral at bedtime  buMETAnide 2 milliGRAM(s) Oral two times a day  hydrALAZINE 10 milliGRAM(s) Oral three times a day  metoprolol tartrate 50 milliGRAM(s) Oral two times a day    Drips:    PRN:     Allergies    No Known Allergies    Intolerances

## 2024-05-31 NOTE — PROGRESS NOTE ADULT - NS ATTEND AMEND GEN_ALL_CORE FT
-Successful biventricular defibrillator implant with  Left Bundle Area Pacing (LBAP) in-lieu of CS lead  -Tortuous CS ostium, unsuccessful CS lead placement (unable to cannulate CS beyond CS ostium); use of LBAP lead instead of CS lead for resynchronization therapy.  -No complications  -Stable for discharge

## 2024-05-31 NOTE — CONSULT NOTE ADULT - ASSESSMENT
CKD stage 4  s/p AICD/ PPM  chronic HFrEF  Afib / s/p AVR (procine) / CAD / CABG / s/p DCC  hx of DVT / PE    HTN   anxiety        plan:    for dc home   outpt renal f/u  ckd counselling  outpt consideration of [id number]  hx of RAAS-I NICA, but doesnt preclude retrial with lose dose   d/w PMD, Dr. Murphy, Dr. Ramon and  wife at bedside       
84 yo with hx of hypertension, hyperlipidemia, hyperthyroidism, CAD s/p CABG in 2006, aortic valve with disease s/p bioprosthetic aortic valve replacement in 2011, paroxysmal atrial fibrillation, atrial fibrillation, CKD, anemia, severe pulmonary hypertension, severe cardiomyopathy, biventricular heart failure, history of GI bleed, history of epistaxis, not interested in BILLY closure. Pt's EF on echo was 36%.  Pt presents for elective ICD for primary prevention of SCD    Plan;  BIV ICD tomorrow  NPO after midnight  Hold Eliquis tonight

## 2024-06-03 DIAGNOSIS — I48.92 UNSPECIFIED ATRIAL FLUTTER: ICD-10-CM

## 2024-06-03 DIAGNOSIS — I49.3 VENTRICULAR PREMATURE DEPOLARIZATION: ICD-10-CM

## 2024-06-03 DIAGNOSIS — Z79.82 LONG TERM (CURRENT) USE OF ASPIRIN: ICD-10-CM

## 2024-06-03 DIAGNOSIS — Z87.891 PERSONAL HISTORY OF NICOTINE DEPENDENCE: ICD-10-CM

## 2024-06-03 DIAGNOSIS — D64.9 ANEMIA, UNSPECIFIED: ICD-10-CM

## 2024-06-03 DIAGNOSIS — N18.4 CHRONIC KIDNEY DISEASE, STAGE 4 (SEVERE): ICD-10-CM

## 2024-06-03 DIAGNOSIS — I50.22 CHRONIC SYSTOLIC (CONGESTIVE) HEART FAILURE: ICD-10-CM

## 2024-06-03 DIAGNOSIS — Z95.1 PRESENCE OF AORTOCORONARY BYPASS GRAFT: ICD-10-CM

## 2024-06-03 DIAGNOSIS — I44.7 LEFT BUNDLE-BRANCH BLOCK, UNSPECIFIED: ICD-10-CM

## 2024-06-03 DIAGNOSIS — I25.10 ATHEROSCLEROTIC HEART DISEASE OF NATIVE CORONARY ARTERY WITHOUT ANGINA PECTORIS: ICD-10-CM

## 2024-06-03 DIAGNOSIS — Z86.711 PERSONAL HISTORY OF PULMONARY EMBOLISM: ICD-10-CM

## 2024-06-03 DIAGNOSIS — Z79.890 HORMONE REPLACEMENT THERAPY: ICD-10-CM

## 2024-06-03 DIAGNOSIS — I50.82 BIVENTRICULAR HEART FAILURE: ICD-10-CM

## 2024-06-03 DIAGNOSIS — E78.5 HYPERLIPIDEMIA, UNSPECIFIED: ICD-10-CM

## 2024-06-03 DIAGNOSIS — F41.9 ANXIETY DISORDER, UNSPECIFIED: ICD-10-CM

## 2024-06-03 DIAGNOSIS — E03.9 HYPOTHYROIDISM, UNSPECIFIED: ICD-10-CM

## 2024-06-03 DIAGNOSIS — Z95.3 PRESENCE OF XENOGENIC HEART VALVE: ICD-10-CM

## 2024-06-03 DIAGNOSIS — I13.0 HYPERTENSIVE HEART AND CHRONIC KIDNEY DISEASE WITH HEART FAILURE AND STAGE 1 THROUGH STAGE 4 CHRONIC KIDNEY DISEASE, OR UNSPECIFIED CHRONIC KIDNEY DISEASE: ICD-10-CM

## 2024-06-03 DIAGNOSIS — I48.91 UNSPECIFIED ATRIAL FIBRILLATION: ICD-10-CM

## 2024-06-03 DIAGNOSIS — I42.9 CARDIOMYOPATHY, UNSPECIFIED: ICD-10-CM

## 2024-06-03 DIAGNOSIS — Z86.718 PERSONAL HISTORY OF OTHER VENOUS THROMBOSIS AND EMBOLISM: ICD-10-CM

## 2024-06-03 DIAGNOSIS — I27.20 PULMONARY HYPERTENSION, UNSPECIFIED: ICD-10-CM

## 2024-06-05 LAB — T3 SERPL-MCNC: 91 NG/DL — SIGNIFICANT CHANGE UP

## 2024-06-17 ENCOUNTER — APPOINTMENT (OUTPATIENT)
Dept: ELECTROPHYSIOLOGY | Facility: CLINIC | Age: 84
End: 2024-06-17
Payer: MEDICARE

## 2024-06-17 VITALS
HEIGHT: 66 IN | TEMPERATURE: 97.1 F | WEIGHT: 230 LBS | DIASTOLIC BLOOD PRESSURE: 90 MMHG | HEART RATE: 68 BPM | SYSTOLIC BLOOD PRESSURE: 140 MMHG | RESPIRATION RATE: 16 BRPM | BODY MASS INDEX: 36.96 KG/M2

## 2024-06-17 DIAGNOSIS — I25.5 ISCHEMIC CARDIOMYOPATHY: ICD-10-CM

## 2024-06-17 DIAGNOSIS — Z48.89 ENCOUNTER FOR OTHER SPECIFIED SURGICAL AFTERCARE: ICD-10-CM

## 2024-06-17 DIAGNOSIS — Z45.02 ENCOUNTER FOR ADJUSTMENT AND MANAGEMENT OF AUTOMATIC IMPLANTABLE CARDIAC DEFIBRILLATOR: ICD-10-CM

## 2024-06-17 DIAGNOSIS — I48.0 PAROXYSMAL ATRIAL FIBRILLATION: ICD-10-CM

## 2024-06-17 PROCEDURE — 99024 POSTOP FOLLOW-UP VISIT: CPT

## 2024-06-17 PROCEDURE — 93284 PRGRMG EVAL IMPLANTABLE DFB: CPT

## 2024-06-17 PROCEDURE — 93000 ELECTROCARDIOGRAM COMPLETE: CPT | Mod: 59

## 2024-06-17 RX ORDER — APIXABAN 2.5 MG/1
2.5 TABLET, FILM COATED ORAL
Qty: 180 | Refills: 3 | Status: ACTIVE | COMMUNITY
Start: 2024-02-26

## 2024-06-17 RX ORDER — ALBUTEROL SULFATE 90 UG/1
108 (90 BASE) INHALANT RESPIRATORY (INHALATION)
Qty: 8 | Refills: 0 | Status: ACTIVE | COMMUNITY
Start: 2022-01-27

## 2024-06-17 RX ORDER — METOPROLOL SUCCINATE 50 MG/1
50 TABLET, EXTENDED RELEASE ORAL
Refills: 0 | Status: ACTIVE | COMMUNITY

## 2024-06-17 RX ORDER — ATORVASTATIN CALCIUM 20 MG/1
20 TABLET, FILM COATED ORAL DAILY
Refills: 0 | Status: ACTIVE | COMMUNITY

## 2024-06-17 RX ORDER — LEVOTHYROXINE SODIUM 0.05 MG/1
50 TABLET ORAL
Qty: 100 | Refills: 0 | Status: ACTIVE | COMMUNITY
Start: 2024-06-12

## 2024-06-17 RX ORDER — METOPROLOL SUCCINATE 100 MG/1
100 TABLET, EXTENDED RELEASE ORAL
Qty: 60 | Refills: 0 | Status: DISCONTINUED | COMMUNITY
Start: 2024-05-31

## 2024-06-17 RX ORDER — BUMETANIDE 2 MG/1
2 TABLET ORAL
Qty: 90 | Refills: 0 | Status: ACTIVE | COMMUNITY
Start: 2022-03-15

## 2024-06-17 RX ORDER — AMLODIPINE BESYLATE 5 MG/1
5 TABLET ORAL
Refills: 0 | Status: ACTIVE | COMMUNITY
Start: 2022-01-12

## 2024-06-17 RX ORDER — LATANOPROST/PF 0.005 %
0.01 DROPS OPHTHALMIC (EYE)
Qty: 10 | Refills: 0 | Status: ACTIVE | COMMUNITY
Start: 2024-05-20

## 2024-06-17 RX ORDER — ASPIRIN 81 MG/1
81 TABLET, CHEWABLE ORAL
Qty: 30 | Refills: 0 | Status: ACTIVE | COMMUNITY
Start: 2022-02-04

## 2024-06-17 NOTE — DISCUSSION/SUMMARY
[FreeTextEntry1] : Mr. Raúl Silva is a pleasant 83-year-old man with hypertension, hyperlipidemia, hypothyroidism, frequent PVCs, CAD s/p CABG in 2006, aortic valve disease s/p bioprosthetic aortic valve replacement in 2011, paroxysmal atrial fibrillation, atrial fluter with RVR, CKD, anemia, severe pulmonary hypertension, severe cardiomyopathy, biventricular heart failure, history of GI bleed, history of epistaxis.     He underwent placement of Medtronic CRT-D on 5/30/2024 for severe cardiomyopathy, LBBB, and heart failure.. I interrogated the device as described in procedure. The wound is healed properly, with no signs of inflammation, infection or bleeding.   He had a history of anemia with Hgb of 5.8. Hgb improved to 11 on Procrit. He was restarted on Eliquis recently by his cardiologist. Patient is taking Eliquis 2.5 mg Q12. A discussion was had regarding LAAC however patient is not sure if he wants to proceed with surgery at this time. Will discuss again NOV.   I recommend to continue Eliquis 2.5 mg Q12.   He has frequent ectopy during interrogation. I recommend to INCREASE metoprolol XL to 75mg BID.  I recommend to continue guideline-directed medical therapy.     Patient will follow with me in 3-4 months' time or earlier if symptoms develop worsen. Please do not hesitate to contact me at [phone] if you have any further questions regarding this patient care.

## 2024-06-17 NOTE — PROCEDURE
[No] : not [NSR] : normal sinus rhythm [See Device Printout] : See device printout [DDD] : DDD [Sensing Amplitude ___mv] : sensing amplitude was [unfilled] mv [Lead Imp:  ___ohms] : lead impedance was [unfilled] ohms [___V @] : [unfilled] V [___ ms] : [unfilled] ms [de-identified] : DEBBY [de-identified] : [id number] [de-identified] : [id number] [de-identified] : 70 [de-identified] : 5/30/2024 [de-identified] : Some alerts changed (see print out) [de-identified] : : 85.7% Effective: 75.7% AP: 69.6%

## 2024-06-17 NOTE — OBJECTIVE
Patient is a 89 year old woman, lives alone with 24hr aides, no PPH, PMH of R hip fracture s/p IMN on 6/25/19, CAD s/p CABGx3 >20 years ago, HFpEF, HTN, mild intermittent asthma, presents with increasing fatigue and found to have hgb 4.8 on outpatient labs, now hgb of 9.4 after transfusions, FOBT+, patient and family do not want additional procedures.  Psychiatry consulted for agitation and delirium.  Pt agitated and restless despite multiple Ativan and Haldol 2mg PRN.  Acute change from baseline per family c/w delirium.  Patient now with resolved agitation and confusion. [History reviewed] : History reviewed. [Medications and Allergies reviewed] : Medications and allergies reviewed. Patient is a 89 year old woman, lives alone with 24hr aides, no PPH, PMH of R hip fracture s/p IMN on 6/25/19, CAD s/p CABGx3 >20 years ago, HFpEF, HTN, mild intermittent asthma, presents with increasing fatigue and found to have hgb 4.8 on outpatient labs, now hgb of 9.4 after transfusions, FOBT+, patient and family do not want additional procedures.  Psychiatry consulted for agitation and delirium.  Pt agitated and restless despite multiple Ativan and Haldol 2mg PRN.  Acute change from baseline per family c/w delirium.  Patient now with resolved agitation and improving confusion.

## 2024-06-17 NOTE — HISTORY OF PRESENT ILLNESS
[FreeTextEntry1] : Hypertension, hyperlipidemia, hyperthyroidism, CAD s/p CABG in 2006, aortic valve with disease s/p bioprosthetic aortic valve replacement in 2011, paroxysmal atrial fibrillation, atrial fibrillation, CKD, anemia, severe pulmonary hypertension, severe cardiomyopathy, biventricular heart failure, history of GI bleed, history of epistaxis, not interested in BILLY closure.   He had history of anemia with Hgb dropping to 5.8. Hgb improved with Procrit injection. Patient had no recent GI bleeds. He has a history of nose bleeds on anticoagulation. He is currently on Eliquis 2.5 mg Q12.     He underwent placement of CRT-D and presents for WCK and follow up.   Patient has no chest pain, no shortness of breath at rest, mild to moderate dyspnea on exertion, no dizziness, no lightheadedness, and no syncope. He presents for evaluation.

## 2024-06-17 NOTE — PHYSICAL EXAM
[Well Developed] : well developed [Well Nourished] : well nourished [No Acute Distress] : no acute distress [Normal Conjunctiva] : normal conjunctiva [Normal Venous Pressure] : normal venous pressure [No Carotid Bruit] : no carotid bruit [Normal S1, S2] : normal S1, S2 [No Murmur] : no murmur [No Rub] : no rub [No Gallop] : no gallop [Clear Lung Fields] : clear lung fields [Good Air Entry] : good air entry [No Respiratory Distress] : no respiratory distress  [Soft] : abdomen soft [Non Tender] : non-tender [No Masses/organomegaly] : no masses/organomegaly [Normal Bowel Sounds] : normal bowel sounds [Normal Gait] : normal gait [No Edema] : no edema [No Cyanosis] : no cyanosis [No Clubbing] : no clubbing [No Varicosities] : no varicosities [No Rash] : no rash [No Skin Lesions] : no skin lesions [Moves all extremities] : moves all extremities [No Focal Deficits] : no focal deficits [Normal Speech] : normal speech [Alert and Oriented] : alert and oriented [Normal memory] : normal memory [General Appearance - Well Developed] : well developed [Normal Appearance] : normal appearance [Well Groomed] : well groomed [General Appearance - Well Nourished] : well nourished [No Deformities] : no deformities [General Appearance - In No Acute Distress] : no acute distress [Heart Rate And Rhythm] : heart rate and rhythm were normal [Heart Sounds] : normal S1 and S2 [] : no respiratory distress [Left Infraclavicular] : left infraclavicular area [Clean] : clean [Dry] : dry [Well-Healed] : well-healed [Abdomen Soft] : soft [Nail Clubbing] : no clubbing of the fingernails [Cyanosis, Localized] : no localized cyanosis [FreeTextEntry2] : Steri strips removed. Healing abrasion where tegaderm was under incision

## 2024-06-17 NOTE — CARDIOLOGY SUMMARY
[de-identified] : 6/17/2024: Sinus rhythm, BIV-paced 75 bpm, two isolated PVCs (05/28/2024): ECG. Atrial Flutter at 118 bpm, LBBB at 165 ms, isolated PVCs.   (03/12/2024): ECG. Sinus rhythm at 85 bpm, LBBB at 170 ms, frequent PVCs.   [de-identified] : (02/03/2022): Small reversible defect involving the anterior anteroseptal wall of the LV consistent with ischemic superimposed on scar. Diffused global LV hypokinesia. LVEF 25%.     [de-identified] : (5/14/2024) TTE: EF 29% - LA severely dilated.  (10/17/2023): 2D echo. LVEF 36%. Moderate to severe global decreased LV systolic function. Moderate concentric LVH. Moderately reduced RV systolic function. Severely enlarged right ventricle. Trace MR. Bioprosthesis in the aortic position. Severe pulmonary hypertension with PA pressure at 79 mmHg. Moderate LAE. Moderate to severe NERY.     (02/01/2022): 2D echo. EF 25-30%. Moderate enlargement of the RV. Severely reduced RV systolic function. Bioprosthesis in the aortic position. Moderate TR. Trace MR. Smoke in the left atrial appendage.   [de-identified] : (02/01/2022): Successful cardioversion.

## 2024-06-17 NOTE — REASON FOR VISIT
[Cardiac Failure] : cardiac failure [Arrhythmia/ECG Abnorrmalities] : arrhythmia/ECG abnormalities [New Patient Device Check] : is here today for a new patient device check visit for [FreeTextEntry3] : Dr. Brando Murphy - Dr. Sebastian Mendoza  [FreeTextEntry1] : [place] [phone]

## 2024-09-16 ENCOUNTER — APPOINTMENT (OUTPATIENT)
Dept: CARDIOLOGY | Facility: CLINIC | Age: 84
End: 2024-09-16
Payer: MEDICARE

## 2024-09-16 ENCOUNTER — NON-APPOINTMENT (OUTPATIENT)
Age: 84
End: 2024-09-16

## 2024-09-16 PROCEDURE — 93295 DEV INTERROG REMOTE 1/2/MLT: CPT

## 2024-09-16 PROCEDURE — 93296 REM INTERROG EVL PM/IDS: CPT

## 2024-10-14 ENCOUNTER — APPOINTMENT (OUTPATIENT)
Dept: ELECTROPHYSIOLOGY | Facility: CLINIC | Age: 84
End: 2024-10-14
Payer: MEDICARE

## 2024-10-14 VITALS
WEIGHT: 240 LBS | HEIGHT: 68 IN | SYSTOLIC BLOOD PRESSURE: 111 MMHG | TEMPERATURE: 97.1 F | DIASTOLIC BLOOD PRESSURE: 73 MMHG | HEART RATE: 104 BPM | BODY MASS INDEX: 36.37 KG/M2

## 2024-10-14 DIAGNOSIS — I50.22 CHRONIC SYSTOLIC (CONGESTIVE) HEART FAILURE: ICD-10-CM

## 2024-10-14 DIAGNOSIS — E03.9 HYPOTHYROIDISM, UNSPECIFIED: ICD-10-CM

## 2024-10-14 DIAGNOSIS — Z45.02 ENCOUNTER FOR ADJUSTMENT AND MANAGEMENT OF AUTOMATIC IMPLANTABLE CARDIAC DEFIBRILLATOR: ICD-10-CM

## 2024-10-14 DIAGNOSIS — I11.9 HYPERTENSIVE HEART DISEASE W/OUT HEART FAILURE: ICD-10-CM

## 2024-10-14 PROCEDURE — 99214 OFFICE O/P EST MOD 30 MIN: CPT | Mod: 25

## 2024-10-14 PROCEDURE — 93284 PRGRMG EVAL IMPLANTABLE DFB: CPT

## 2024-10-14 PROCEDURE — 93000 ELECTROCARDIOGRAM COMPLETE: CPT | Mod: 59

## 2024-10-16 ENCOUNTER — APPOINTMENT (OUTPATIENT)
Dept: CARDIOLOGY | Facility: CLINIC | Age: 84
End: 2024-10-16
Payer: MEDICARE

## 2024-10-16 VITALS
HEART RATE: 138 BPM | SYSTOLIC BLOOD PRESSURE: 130 MMHG | DIASTOLIC BLOOD PRESSURE: 70 MMHG | BODY MASS INDEX: 36.37 KG/M2 | HEIGHT: 68 IN | WEIGHT: 240 LBS

## 2024-10-16 DIAGNOSIS — G45.0 VERTEBRO-BASILAR ARTERY SYNDROME: ICD-10-CM

## 2024-10-16 DIAGNOSIS — I25.10 ATHEROSCLEROTIC HEART DISEASE OF NATIVE CORONARY ARTERY W/OUT ANGINA PECTORIS: ICD-10-CM

## 2024-10-16 DIAGNOSIS — E78.5 HYPERLIPIDEMIA, UNSPECIFIED: ICD-10-CM

## 2024-10-16 DIAGNOSIS — N18.9 CHRONIC KIDNEY DISEASE, UNSPECIFIED: ICD-10-CM

## 2024-10-16 DIAGNOSIS — Z95.2 PRESENCE OF PROSTHETIC HEART VALVE: ICD-10-CM

## 2024-10-16 DIAGNOSIS — I48.91 UNSPECIFIED ATRIAL FIBRILLATION: ICD-10-CM

## 2024-10-16 DIAGNOSIS — I25.5 ISCHEMIC CARDIOMYOPATHY: ICD-10-CM

## 2024-10-16 DIAGNOSIS — Z95.1 PRESENCE OF AORTOCORONARY BYPASS GRAFT: ICD-10-CM

## 2024-10-16 DIAGNOSIS — I10 ESSENTIAL (PRIMARY) HYPERTENSION: ICD-10-CM

## 2024-10-16 PROCEDURE — 93000 ELECTROCARDIOGRAM COMPLETE: CPT

## 2024-10-16 PROCEDURE — 99214 OFFICE O/P EST MOD 30 MIN: CPT | Mod: 25

## 2024-10-16 RX ORDER — HYDRALAZINE HYDROCHLORIDE 10 MG/1
10 TABLET ORAL
Qty: 270 | Refills: 3 | Status: ACTIVE | COMMUNITY

## 2024-10-17 ENCOUNTER — OUTPATIENT (OUTPATIENT)
Dept: OUTPATIENT SERVICES | Facility: HOSPITAL | Age: 84
LOS: 1 days | Discharge: HOME | End: 2024-10-17
Payer: MEDICARE

## 2024-10-17 ENCOUNTER — RESULT REVIEW (OUTPATIENT)
Age: 84
End: 2024-10-17

## 2024-10-17 VITALS
SYSTOLIC BLOOD PRESSURE: 137 MMHG | HEIGHT: 68 IN | RESPIRATION RATE: 16 BRPM | HEART RATE: 116 BPM | OXYGEN SATURATION: 96 % | WEIGHT: 237 LBS | DIASTOLIC BLOOD PRESSURE: 106 MMHG

## 2024-10-17 DIAGNOSIS — I48.19 OTHER PERSISTENT ATRIAL FIBRILLATION: ICD-10-CM

## 2024-10-17 DIAGNOSIS — Z95.1 PRESENCE OF AORTOCORONARY BYPASS GRAFT: Chronic | ICD-10-CM

## 2024-10-17 PROCEDURE — 93320 DOPPLER ECHO COMPLETE: CPT | Mod: 26

## 2024-10-17 PROCEDURE — 93312 ECHO TRANSESOPHAGEAL: CPT | Mod: 26,XU

## 2024-10-17 PROCEDURE — 93325 DOPPLER ECHO COLOR FLOW MAPG: CPT

## 2024-10-17 PROCEDURE — 93325 DOPPLER ECHO COLOR FLOW MAPG: CPT | Mod: 26

## 2024-10-17 PROCEDURE — 93312 ECHO TRANSESOPHAGEAL: CPT

## 2024-10-17 PROCEDURE — 92960 CARDIOVERSION ELECTRIC EXT: CPT

## 2024-10-17 PROCEDURE — 93320 DOPPLER ECHO COMPLETE: CPT

## 2024-10-17 PROCEDURE — 93010 ELECTROCARDIOGRAM REPORT: CPT

## 2024-10-17 PROCEDURE — 93005 ELECTROCARDIOGRAM TRACING: CPT

## 2024-10-17 NOTE — H&P CARDIOLOGY - HISTORY OF PRESENT ILLNESS
84 yo M with PMHx ofCABG 2006, Porcine Ao Valve Replacement 2011, HTN, HLD, CKD, Atrial Fibrillation on Eliquis presents for DINO/DCCV. 82 yo M with PMHx of CABG 2006, ICM s/p ICD, Porcine Ao Valve Replacement 2011, HTN, HLD, CKD, Atrial Fibrillation on Eliquis presents for DINO/DCCV.

## 2024-10-17 NOTE — CHART NOTE - NSCHARTNOTEFT_GEN_A_CORE
POST OPERATIVE PROCEDURAL DOCUMENTATION  PRE-OP DIAGNOSIS: Atrial Fibrillation    POST-OP DIAGNOSIS: NSR    PROCEDURE: Transesophageal Echocardiogram     Primary Physician: Dr. Lee  Cardiology Fellow: Hilary    Anesthesia:  Sedation as per documentation from anesthesia    CONDITION  [  ] Critical  [  ] Serious  [  ] Fair  [ x ] Good      ESTIMATED BLOOD LOSS: None    COMPLICATIONS: None    After risks and benefits of procedures were explained, informed consent was obtained and placed in chart.  Sedation as per documentation from anesthesia. The DINO probe was passed into the esophagus without difficulty.  Transesophageal images were obtained.  The DINO probe was removed without difficulty and examined.  There was no evidence for bleeding.  The patient tolerated the procedure well without any immediate DINO-related complications.      Preliminary Findings:  LA: Moderately enlarged  BILLY: Left atrial appendage was clear of clot and smoke. Normal doppler velocities   LV: LVEF was estimated at 30%  MV: Mild MR, No MS  AV: Bioprosthetic valve in aortic position; no paravalvular leak, mild intravalvular regurgitation, no AS  RA: Moderately enlarged  RV: Normal size and function; ICD lead visualized  TV: Mild TR  IAS: No R-> L shunt by color doppler  Aorta: There was mild, non-mobile atheroma seen in the thoracic aorta.    Patient successfully converted to sinus rhythm with 200 J of synchronized direct current cardioversion (DCCV) via AP pads.    Full report to follow    PLAN OF CARE:  [x] Discharge home      [x] Continue eliquis  [x] No eating or drinking for 1 hour    Results of procedure/ plan of care discussed with patient/  in detail. POST OPERATIVE PROCEDURAL DOCUMENTATION  PRE-OP DIAGNOSIS: Atrial Fibrillation    POST-OP DIAGNOSIS: NSR    PROCEDURE: Transesophageal Echocardiogram     Primary Physician: Dr. Lee  Cardiology Fellow: Hilary    Anesthesia:  Sedation as per documentation from anesthesia    CONDITION  [  ] Critical  [  ] Serious  [  ] Fair  [ x ] Good      ESTIMATED BLOOD LOSS: None    COMPLICATIONS: None    After risks and benefits of procedures were explained, informed consent was obtained and placed in chart.  Sedation as per documentation from anesthesia. The DINO probe was passed into the esophagus without difficulty.  Transesophageal images were obtained.  The DINO probe was removed without difficulty and examined.  There was no evidence for bleeding.  The patient tolerated the procedure well without any immediate DINO-related complications.      Preliminary Findings:    LA: Moderately enlarged  BILLY: Left atrial appendage was clear of clot and smoke. Normal doppler velocities   LV: LVEF was estimated at 30%  MV: Mild MR, No MS  AV: Bioprosthetic valve in aortic position; no paravalvular leak, mild intravalvular regurgitation, no AS  RA: Moderately enlarged  RV: Normal size and function; ICD lead visualized  TV: Mild TR  IAS: No R-> L shunt by color doppler  Aorta: There was mild, non-mobile atheroma seen in the thoracic aorta.    Patient successfully converted to sinus rhythm with 200 J of synchronized direct current cardioversion (DCCV) via AP pads.    Full report to follow    PLAN OF CARE:  [x] Discharge home      [x] Continue eliquis  [x] No eating or drinking for 1 hour    Results of procedure/ plan of care discussed with patient/  in detail.

## 2024-10-18 DIAGNOSIS — I48.91 UNSPECIFIED ATRIAL FIBRILLATION: ICD-10-CM

## 2024-11-13 ENCOUNTER — APPOINTMENT (OUTPATIENT)
Dept: CARDIOLOGY | Facility: CLINIC | Age: 84
End: 2024-11-13
Payer: MEDICARE

## 2024-11-13 ENCOUNTER — NON-APPOINTMENT (OUTPATIENT)
Age: 84
End: 2024-11-13

## 2024-11-13 VITALS
WEIGHT: 239 LBS | BODY MASS INDEX: 36.22 KG/M2 | HEART RATE: 73 BPM | SYSTOLIC BLOOD PRESSURE: 138 MMHG | HEIGHT: 68 IN | DIASTOLIC BLOOD PRESSURE: 74 MMHG

## 2024-11-13 DIAGNOSIS — I25.5 ISCHEMIC CARDIOMYOPATHY: ICD-10-CM

## 2024-11-13 DIAGNOSIS — I25.10 ATHEROSCLEROTIC HEART DISEASE OF NATIVE CORONARY ARTERY W/OUT ANGINA PECTORIS: ICD-10-CM

## 2024-11-13 DIAGNOSIS — I48.91 UNSPECIFIED ATRIAL FIBRILLATION: ICD-10-CM

## 2024-11-13 DIAGNOSIS — Z95.1 PRESENCE OF AORTOCORONARY BYPASS GRAFT: ICD-10-CM

## 2024-11-13 DIAGNOSIS — I63.9 CEREBRAL INFARCTION, UNSPECIFIED: ICD-10-CM

## 2024-11-13 DIAGNOSIS — I50.22 CHRONIC SYSTOLIC (CONGESTIVE) HEART FAILURE: ICD-10-CM

## 2024-11-13 DIAGNOSIS — Z95.2 PRESENCE OF PROSTHETIC HEART VALVE: ICD-10-CM

## 2024-11-13 DIAGNOSIS — N18.9 CHRONIC KIDNEY DISEASE, UNSPECIFIED: ICD-10-CM

## 2024-11-13 DIAGNOSIS — I11.9 HYPERTENSIVE HEART DISEASE W/OUT HEART FAILURE: ICD-10-CM

## 2024-11-13 PROCEDURE — 93000 ELECTROCARDIOGRAM COMPLETE: CPT

## 2024-11-13 PROCEDURE — 99214 OFFICE O/P EST MOD 30 MIN: CPT | Mod: 25

## 2025-01-14 ENCOUNTER — NON-APPOINTMENT (OUTPATIENT)
Age: 85
End: 2025-01-14

## 2025-01-14 ENCOUNTER — APPOINTMENT (OUTPATIENT)
Dept: CARDIOLOGY | Facility: CLINIC | Age: 85
End: 2025-01-14
Payer: MEDICARE

## 2025-01-14 PROCEDURE — 93296 REM INTERROG EVL PM/IDS: CPT

## 2025-01-14 PROCEDURE — 93295 DEV INTERROG REMOTE 1/2/MLT: CPT

## 2025-01-16 ENCOUNTER — NON-APPOINTMENT (OUTPATIENT)
Age: 85
End: 2025-01-16

## 2025-04-02 ENCOUNTER — APPOINTMENT (OUTPATIENT)
Dept: CARDIOLOGY | Facility: CLINIC | Age: 85
End: 2025-04-02
Payer: MEDICARE

## 2025-04-02 ENCOUNTER — NON-APPOINTMENT (OUTPATIENT)
Age: 85
End: 2025-04-02

## 2025-04-02 VITALS
SYSTOLIC BLOOD PRESSURE: 124 MMHG | BODY MASS INDEX: 36.98 KG/M2 | DIASTOLIC BLOOD PRESSURE: 82 MMHG | HEIGHT: 68 IN | WEIGHT: 244 LBS | HEART RATE: 82 BPM

## 2025-04-02 DIAGNOSIS — G45.0 VERTEBRO-BASILAR ARTERY SYNDROME: ICD-10-CM

## 2025-04-02 DIAGNOSIS — N18.9 CHRONIC KIDNEY DISEASE, UNSPECIFIED: ICD-10-CM

## 2025-04-02 DIAGNOSIS — I25.10 ATHEROSCLEROTIC HEART DISEASE OF NATIVE CORONARY ARTERY W/OUT ANGINA PECTORIS: ICD-10-CM

## 2025-04-02 DIAGNOSIS — I63.9 CEREBRAL INFARCTION, UNSPECIFIED: ICD-10-CM

## 2025-04-02 DIAGNOSIS — I48.91 UNSPECIFIED ATRIAL FIBRILLATION: ICD-10-CM

## 2025-04-02 DIAGNOSIS — I25.5 ISCHEMIC CARDIOMYOPATHY: ICD-10-CM

## 2025-04-02 DIAGNOSIS — Z95.1 PRESENCE OF AORTOCORONARY BYPASS GRAFT: ICD-10-CM

## 2025-04-02 DIAGNOSIS — Z95.2 PRESENCE OF PROSTHETIC HEART VALVE: ICD-10-CM

## 2025-04-02 DIAGNOSIS — I27.20 PULMONARY HYPERTENSION, UNSPECIFIED: ICD-10-CM

## 2025-04-02 PROCEDURE — 99214 OFFICE O/P EST MOD 30 MIN: CPT | Mod: 25

## 2025-04-02 PROCEDURE — 93000 ELECTROCARDIOGRAM COMPLETE: CPT

## 2025-04-02 RX ORDER — METOPROLOL SUCCINATE 50 MG/1
50 TABLET, EXTENDED RELEASE ORAL TWICE DAILY
Refills: 0 | Status: ACTIVE | COMMUNITY

## 2025-04-02 RX ORDER — METOPROLOL SUCCINATE 25 MG/1
25 TABLET, EXTENDED RELEASE ORAL DAILY
Refills: 0 | Status: ACTIVE | COMMUNITY

## 2025-04-15 ENCOUNTER — APPOINTMENT (OUTPATIENT)
Dept: CARDIOLOGY | Facility: CLINIC | Age: 85
End: 2025-04-15
Payer: MEDICARE

## 2025-04-15 ENCOUNTER — NON-APPOINTMENT (OUTPATIENT)
Age: 85
End: 2025-04-15

## 2025-04-15 PROCEDURE — 93296 REM INTERROG EVL PM/IDS: CPT

## 2025-04-15 PROCEDURE — 93295 DEV INTERROG REMOTE 1/2/MLT: CPT

## 2025-05-09 ENCOUNTER — RESULT REVIEW (OUTPATIENT)
Age: 85
End: 2025-05-09

## 2025-05-09 ENCOUNTER — INPATIENT (INPATIENT)
Facility: HOSPITAL | Age: 85
LOS: 0 days | Discharge: HOME | DRG: 308 | End: 2025-05-10
Attending: STUDENT IN AN ORGANIZED HEALTH CARE EDUCATION/TRAINING PROGRAM | Admitting: INTERNAL MEDICINE
Payer: MEDICARE

## 2025-05-09 VITALS
HEART RATE: 136 BPM | SYSTOLIC BLOOD PRESSURE: 146 MMHG | WEIGHT: 246.92 LBS | OXYGEN SATURATION: 96 % | RESPIRATION RATE: 20 BRPM | HEIGHT: 69 IN | TEMPERATURE: 98 F | DIASTOLIC BLOOD PRESSURE: 104 MMHG

## 2025-05-09 DIAGNOSIS — R00.0 TACHYCARDIA, UNSPECIFIED: ICD-10-CM

## 2025-05-09 DIAGNOSIS — Z95.1 PRESENCE OF AORTOCORONARY BYPASS GRAFT: Chronic | ICD-10-CM

## 2025-05-09 LAB
ALBUMIN SERPL ELPH-MCNC: 4.2 G/DL — SIGNIFICANT CHANGE UP (ref 3.5–5.2)
ALP SERPL-CCNC: 57 U/L — SIGNIFICANT CHANGE UP (ref 30–115)
ALT FLD-CCNC: 66 U/L — HIGH (ref 0–41)
ANION GAP SERPL CALC-SCNC: 15 MMOL/L — HIGH (ref 7–14)
AST SERPL-CCNC: 97 U/L — HIGH (ref 0–41)
BASOPHILS # BLD AUTO: 0.04 K/UL — SIGNIFICANT CHANGE UP (ref 0–0.2)
BASOPHILS NFR BLD AUTO: 0.5 % — SIGNIFICANT CHANGE UP (ref 0–1)
BILIRUB SERPL-MCNC: 0.4 MG/DL — SIGNIFICANT CHANGE UP (ref 0.2–1.2)
BUN SERPL-MCNC: 46 MG/DL — HIGH (ref 10–20)
CALCIUM SERPL-MCNC: 9.6 MG/DL — SIGNIFICANT CHANGE UP (ref 8.4–10.5)
CHLORIDE SERPL-SCNC: 104 MMOL/L — SIGNIFICANT CHANGE UP (ref 98–110)
CO2 SERPL-SCNC: 18 MMOL/L — SIGNIFICANT CHANGE UP (ref 17–32)
CREAT SERPL-MCNC: 2.6 MG/DL — HIGH (ref 0.7–1.5)
EGFR: 24 ML/MIN/1.73M2 — LOW
EGFR: 24 ML/MIN/1.73M2 — LOW
EOSINOPHIL # BLD AUTO: 0.03 K/UL — SIGNIFICANT CHANGE UP (ref 0–0.7)
EOSINOPHIL NFR BLD AUTO: 0.4 % — SIGNIFICANT CHANGE UP (ref 0–8)
GAS PNL BLDV: SIGNIFICANT CHANGE UP
GLUCOSE SERPL-MCNC: 139 MG/DL — HIGH (ref 70–99)
HCT VFR BLD CALC: 38.8 % — LOW (ref 42–52)
HGB BLD-MCNC: 12.6 G/DL — LOW (ref 14–18)
IMM GRANULOCYTES NFR BLD AUTO: 0.4 % — HIGH (ref 0.1–0.3)
LYMPHOCYTES # BLD AUTO: 0.72 K/UL — LOW (ref 1.2–3.4)
LYMPHOCYTES # BLD AUTO: 9.3 % — LOW (ref 20.5–51.1)
MAGNESIUM SERPL-MCNC: 2.4 MG/DL — SIGNIFICANT CHANGE UP (ref 1.8–2.4)
MCHC RBC-ENTMCNC: 29.6 PG — SIGNIFICANT CHANGE UP (ref 27–31)
MCHC RBC-ENTMCNC: 32.5 G/DL — SIGNIFICANT CHANGE UP (ref 32–37)
MCV RBC AUTO: 91.3 FL — SIGNIFICANT CHANGE UP (ref 80–94)
MONOCYTES # BLD AUTO: 0.65 K/UL — HIGH (ref 0.1–0.6)
MONOCYTES NFR BLD AUTO: 8.4 % — SIGNIFICANT CHANGE UP (ref 1.7–9.3)
NEUTROPHILS # BLD AUTO: 6.27 K/UL — SIGNIFICANT CHANGE UP (ref 1.4–6.5)
NEUTROPHILS NFR BLD AUTO: 81 % — HIGH (ref 42.2–75.2)
NRBC BLD AUTO-RTO: 0 /100 WBCS — SIGNIFICANT CHANGE UP (ref 0–0)
NT-PROBNP SERPL-SCNC: HIGH PG/ML (ref 0–300)
PLATELET # BLD AUTO: 187 K/UL — SIGNIFICANT CHANGE UP (ref 130–400)
PMV BLD: 10.9 FL — HIGH (ref 7.4–10.4)
POTASSIUM SERPL-MCNC: SIGNIFICANT CHANGE UP MMOL/L (ref 3.5–5)
POTASSIUM SERPL-SCNC: SIGNIFICANT CHANGE UP MMOL/L (ref 3.5–5)
PROT SERPL-MCNC: 7.3 G/DL — SIGNIFICANT CHANGE UP (ref 6–8)
RBC # BLD: 4.25 M/UL — LOW (ref 4.7–6.1)
RBC # FLD: 16 % — HIGH (ref 11.5–14.5)
SODIUM SERPL-SCNC: 137 MMOL/L — SIGNIFICANT CHANGE UP (ref 135–146)
TROPONIN T, HIGH SENSITIVITY RESULT: 55 NG/L — CRITICAL HIGH (ref 6–21)
WBC # BLD: 7.74 K/UL — SIGNIFICANT CHANGE UP (ref 4.8–10.8)
WBC # FLD AUTO: 7.74 K/UL — SIGNIFICANT CHANGE UP (ref 4.8–10.8)

## 2025-05-09 PROCEDURE — 80053 COMPREHEN METABOLIC PANEL: CPT

## 2025-05-09 PROCEDURE — 99223 1ST HOSP IP/OBS HIGH 75: CPT

## 2025-05-09 PROCEDURE — 85027 COMPLETE CBC AUTOMATED: CPT

## 2025-05-09 PROCEDURE — 83735 ASSAY OF MAGNESIUM: CPT

## 2025-05-09 PROCEDURE — 92960 CARDIOVERSION ELECTRIC EXT: CPT

## 2025-05-09 PROCEDURE — 99291 CRITICAL CARE FIRST HOUR: CPT

## 2025-05-09 PROCEDURE — 80061 LIPID PANEL: CPT

## 2025-05-09 PROCEDURE — 93312 ECHO TRANSESOPHAGEAL: CPT

## 2025-05-09 PROCEDURE — 71045 X-RAY EXAM CHEST 1 VIEW: CPT | Mod: 26

## 2025-05-09 PROCEDURE — 83036 HEMOGLOBIN GLYCOSYLATED A1C: CPT

## 2025-05-09 PROCEDURE — 84480 ASSAY TRIIODOTHYRONINE (T3): CPT

## 2025-05-09 PROCEDURE — 93308 TTE F-UP OR LMTD: CPT | Mod: 26

## 2025-05-09 PROCEDURE — 36415 COLL VENOUS BLD VENIPUNCTURE: CPT

## 2025-05-09 PROCEDURE — 93325 DOPPLER ECHO COLOR FLOW MAPG: CPT | Mod: 26

## 2025-05-09 PROCEDURE — 93308 TTE F-UP OR LMTD: CPT

## 2025-05-09 PROCEDURE — 93005 ELECTROCARDIOGRAM TRACING: CPT

## 2025-05-09 PROCEDURE — 93010 ELECTROCARDIOGRAM REPORT: CPT | Mod: 77

## 2025-05-09 PROCEDURE — 93312 ECHO TRANSESOPHAGEAL: CPT | Mod: 26,XU

## 2025-05-09 PROCEDURE — 93010 ELECTROCARDIOGRAM REPORT: CPT

## 2025-05-09 PROCEDURE — 93320 DOPPLER ECHO COMPLETE: CPT | Mod: 26

## 2025-05-09 PROCEDURE — 84443 ASSAY THYROID STIM HORMONE: CPT

## 2025-05-09 PROCEDURE — 93325 DOPPLER ECHO COLOR FLOW MAPG: CPT

## 2025-05-09 PROCEDURE — 93320 DOPPLER ECHO COMPLETE: CPT

## 2025-05-09 PROCEDURE — 93306 TTE W/DOPPLER COMPLETE: CPT

## 2025-05-09 PROCEDURE — 84436 ASSAY OF TOTAL THYROXINE: CPT

## 2025-05-09 RX ORDER — MAGNESIUM SULFATE 500 MG/ML
2 SYRINGE (ML) INJECTION ONCE
Refills: 0 | Status: COMPLETED | OUTPATIENT
Start: 2025-05-09 | End: 2025-05-09

## 2025-05-09 RX ORDER — ATORVASTATIN CALCIUM 80 MG/1
20 TABLET, FILM COATED ORAL AT BEDTIME
Refills: 0 | Status: DISCONTINUED | OUTPATIENT
Start: 2025-05-09 | End: 2025-05-10

## 2025-05-09 RX ORDER — BUMETANIDE 1 MG/1
2 TABLET ORAL
Refills: 0 | Status: DISCONTINUED | OUTPATIENT
Start: 2025-05-09 | End: 2025-05-10

## 2025-05-09 RX ORDER — AMIODARONE HYDROCHLORIDE 50 MG/ML
150 INJECTION, SOLUTION INTRAVENOUS ONCE
Refills: 0 | Status: COMPLETED | OUTPATIENT
Start: 2025-05-09 | End: 2025-05-09

## 2025-05-09 RX ORDER — AMIODARONE HYDROCHLORIDE 50 MG/ML
200 INJECTION, SOLUTION INTRAVENOUS
Refills: 0 | Status: DISCONTINUED | OUTPATIENT
Start: 2025-05-09 | End: 2025-05-10

## 2025-05-09 RX ORDER — LEVOTHYROXINE SODIUM 300 MCG
50 TABLET ORAL DAILY
Refills: 0 | Status: DISCONTINUED | OUTPATIENT
Start: 2025-05-09 | End: 2025-05-10

## 2025-05-09 RX ORDER — METOPROLOL SUCCINATE 50 MG/1
50 TABLET, EXTENDED RELEASE ORAL DAILY
Refills: 0 | Status: DISCONTINUED | OUTPATIENT
Start: 2025-05-09 | End: 2025-05-10

## 2025-05-09 RX ORDER — APIXABAN 2.5 MG/1
2.5 TABLET, FILM COATED ORAL
Refills: 0 | Status: DISCONTINUED | OUTPATIENT
Start: 2025-05-09 | End: 2025-05-10

## 2025-05-09 RX ORDER — PROCAINAMIDE HCL 500 MG
1000 TABLET, EXTENDED RELEASE ORAL ONCE
Refills: 0 | Status: COMPLETED | OUTPATIENT
Start: 2025-05-09 | End: 2025-05-09

## 2025-05-09 RX ADMIN — ATORVASTATIN CALCIUM 20 MILLIGRAM(S): 80 TABLET, FILM COATED ORAL at 21:24

## 2025-05-09 RX ADMIN — METOPROLOL SUCCINATE 50 MILLIGRAM(S): 50 TABLET, EXTENDED RELEASE ORAL at 21:25

## 2025-05-09 RX ADMIN — AMIODARONE HYDROCHLORIDE 200 MILLIGRAM(S): 50 INJECTION, SOLUTION INTRAVENOUS at 17:28

## 2025-05-09 RX ADMIN — BUMETANIDE 2 MILLIGRAM(S): 1 TABLET ORAL at 17:29

## 2025-05-09 RX ADMIN — APIXABAN 2.5 MILLIGRAM(S): 2.5 TABLET, FILM COATED ORAL at 17:28

## 2025-05-09 RX ADMIN — Medication 25 GRAM(S): at 09:03

## 2025-05-09 RX ADMIN — Medication 520 MILLIGRAM(S): at 09:45

## 2025-05-09 RX ADMIN — Medication 10 MILLIGRAM(S): at 21:25

## 2025-05-09 RX ADMIN — Medication 10 MILLIGRAM(S): at 17:28

## 2025-05-10 ENCOUNTER — TRANSCRIPTION ENCOUNTER (OUTPATIENT)
Age: 85
End: 2025-05-10

## 2025-05-10 ENCOUNTER — RESULT REVIEW (OUTPATIENT)
Age: 85
End: 2025-05-10

## 2025-05-10 VITALS
SYSTOLIC BLOOD PRESSURE: 134 MMHG | TEMPERATURE: 98 F | RESPIRATION RATE: 18 BRPM | DIASTOLIC BLOOD PRESSURE: 63 MMHG | HEART RATE: 73 BPM

## 2025-05-10 LAB
A1C WITH ESTIMATED AVERAGE GLUCOSE RESULT: 5.9 % — HIGH (ref 4–5.6)
ALBUMIN SERPL ELPH-MCNC: 3.9 G/DL — SIGNIFICANT CHANGE UP (ref 3.5–5.2)
ALP SERPL-CCNC: 60 U/L — SIGNIFICANT CHANGE UP (ref 30–115)
ALT FLD-CCNC: 51 U/L — HIGH (ref 0–41)
ANION GAP SERPL CALC-SCNC: 16 MMOL/L — HIGH (ref 7–14)
AST SERPL-CCNC: 23 U/L — SIGNIFICANT CHANGE UP (ref 0–41)
BILIRUB SERPL-MCNC: 0.4 MG/DL — SIGNIFICANT CHANGE UP (ref 0.2–1.2)
BUN SERPL-MCNC: 46 MG/DL — HIGH (ref 10–20)
CALCIUM SERPL-MCNC: 9.2 MG/DL — SIGNIFICANT CHANGE UP (ref 8.4–10.5)
CHLORIDE SERPL-SCNC: 105 MMOL/L — SIGNIFICANT CHANGE UP (ref 98–110)
CHOLEST SERPL-MCNC: 189 MG/DL — SIGNIFICANT CHANGE UP
CO2 SERPL-SCNC: 20 MMOL/L — SIGNIFICANT CHANGE UP (ref 17–32)
CREAT SERPL-MCNC: 2.5 MG/DL — HIGH (ref 0.7–1.5)
EGFR: 25 ML/MIN/1.73M2 — LOW
EGFR: 25 ML/MIN/1.73M2 — LOW
ESTIMATED AVERAGE GLUCOSE: 123 MG/DL — HIGH (ref 68–114)
GLUCOSE SERPL-MCNC: 105 MG/DL — HIGH (ref 70–99)
HCT VFR BLD CALC: 36.2 % — LOW (ref 42–52)
HDLC SERPL-MCNC: 36 MG/DL — LOW
HGB BLD-MCNC: 11.5 G/DL — LOW (ref 14–18)
LDLC SERPL-MCNC: 130 MG/DL — HIGH
LIPID PNL WITH DIRECT LDL SERPL: 130 MG/DL — HIGH
MAGNESIUM SERPL-MCNC: 2.5 MG/DL — HIGH (ref 1.8–2.4)
MCHC RBC-ENTMCNC: 29.4 PG — SIGNIFICANT CHANGE UP (ref 27–31)
MCHC RBC-ENTMCNC: 31.8 G/DL — LOW (ref 32–37)
MCV RBC AUTO: 92.6 FL — SIGNIFICANT CHANGE UP (ref 80–94)
NONHDLC SERPL-MCNC: 153 MG/DL — HIGH
NRBC BLD AUTO-RTO: 0 /100 WBCS — SIGNIFICANT CHANGE UP (ref 0–0)
PLATELET # BLD AUTO: 163 K/UL — SIGNIFICANT CHANGE UP (ref 130–400)
PMV BLD: 11 FL — HIGH (ref 7.4–10.4)
POTASSIUM SERPL-MCNC: 3.6 MMOL/L — SIGNIFICANT CHANGE UP (ref 3.5–5)
POTASSIUM SERPL-SCNC: 3.6 MMOL/L — SIGNIFICANT CHANGE UP (ref 3.5–5)
PROT SERPL-MCNC: 6.7 G/DL — SIGNIFICANT CHANGE UP (ref 6–8)
RBC # BLD: 3.91 M/UL — LOW (ref 4.7–6.1)
RBC # FLD: 15.9 % — HIGH (ref 11.5–14.5)
SODIUM SERPL-SCNC: 141 MMOL/L — SIGNIFICANT CHANGE UP (ref 135–146)
T3 SERPL-MCNC: 100 NG/DL — SIGNIFICANT CHANGE UP (ref 80–200)
T4 AB SER-ACNC: 7.9 UG/DL — SIGNIFICANT CHANGE UP (ref 4.6–12)
TRIGL SERPL-MCNC: 125 MG/DL — SIGNIFICANT CHANGE UP
TSH SERPL-MCNC: 5.91 UIU/ML — HIGH (ref 0.27–4.2)
WBC # BLD: 7.91 K/UL — SIGNIFICANT CHANGE UP (ref 4.8–10.8)
WBC # FLD AUTO: 7.91 K/UL — SIGNIFICANT CHANGE UP (ref 4.8–10.8)

## 2025-05-10 PROCEDURE — 93306 TTE W/DOPPLER COMPLETE: CPT | Mod: 26

## 2025-05-10 PROCEDURE — 99239 HOSP IP/OBS DSCHRG MGMT >30: CPT

## 2025-05-10 RX ORDER — AMIODARONE HYDROCHLORIDE 50 MG/ML
1 INJECTION, SOLUTION INTRAVENOUS
Qty: 60 | Refills: 0
Start: 2025-05-10 | End: 2025-06-08

## 2025-05-10 RX ADMIN — BUMETANIDE 2 MILLIGRAM(S): 1 TABLET ORAL at 06:54

## 2025-05-10 RX ADMIN — Medication 10 MILLIGRAM(S): at 13:12

## 2025-05-10 RX ADMIN — Medication 1 APPLICATION(S): at 06:56

## 2025-05-10 RX ADMIN — BUMETANIDE 2 MILLIGRAM(S): 1 TABLET ORAL at 13:14

## 2025-05-10 RX ADMIN — Medication 10 MILLIGRAM(S): at 06:53

## 2025-05-10 RX ADMIN — AMIODARONE HYDROCHLORIDE 200 MILLIGRAM(S): 50 INJECTION, SOLUTION INTRAVENOUS at 06:53

## 2025-05-10 RX ADMIN — Medication 50 MICROGRAM(S): at 06:54

## 2025-05-10 RX ADMIN — APIXABAN 2.5 MILLIGRAM(S): 2.5 TABLET, FILM COATED ORAL at 06:54

## 2025-05-10 RX ADMIN — Medication 40 MILLIEQUIVALENT(S): at 13:13

## 2025-05-14 DIAGNOSIS — I25.5 ISCHEMIC CARDIOMYOPATHY: ICD-10-CM

## 2025-05-14 DIAGNOSIS — I25.10 ATHEROSCLEROTIC HEART DISEASE OF NATIVE CORONARY ARTERY WITHOUT ANGINA PECTORIS: ICD-10-CM

## 2025-05-14 DIAGNOSIS — I13.0 HYPERTENSIVE HEART AND CHRONIC KIDNEY DISEASE WITH HEART FAILURE AND STAGE 1 THROUGH STAGE 4 CHRONIC KIDNEY DISEASE, OR UNSPECIFIED CHRONIC KIDNEY DISEASE: ICD-10-CM

## 2025-05-14 DIAGNOSIS — I50.23 ACUTE ON CHRONIC SYSTOLIC (CONGESTIVE) HEART FAILURE: ICD-10-CM

## 2025-05-14 DIAGNOSIS — I24.89 OTHER FORMS OF ACUTE ISCHEMIC HEART DISEASE: ICD-10-CM

## 2025-05-14 DIAGNOSIS — Z79.890 HORMONE REPLACEMENT THERAPY: ICD-10-CM

## 2025-05-14 DIAGNOSIS — E78.5 HYPERLIPIDEMIA, UNSPECIFIED: ICD-10-CM

## 2025-05-14 DIAGNOSIS — Z95.3 PRESENCE OF XENOGENIC HEART VALVE: ICD-10-CM

## 2025-05-14 DIAGNOSIS — Z95.5 PRESENCE OF CORONARY ANGIOPLASTY IMPLANT AND GRAFT: ICD-10-CM

## 2025-05-14 DIAGNOSIS — E87.5 HYPERKALEMIA: ICD-10-CM

## 2025-05-14 DIAGNOSIS — N20.0 CALCULUS OF KIDNEY: ICD-10-CM

## 2025-05-14 DIAGNOSIS — F41.9 ANXIETY DISORDER, UNSPECIFIED: ICD-10-CM

## 2025-05-14 DIAGNOSIS — Z95.810 PRESENCE OF AUTOMATIC (IMPLANTABLE) CARDIAC DEFIBRILLATOR: ICD-10-CM

## 2025-05-14 DIAGNOSIS — I48.0 PAROXYSMAL ATRIAL FIBRILLATION: ICD-10-CM

## 2025-05-14 DIAGNOSIS — N18.4 CHRONIC KIDNEY DISEASE, STAGE 4 (SEVERE): ICD-10-CM

## 2025-05-14 DIAGNOSIS — E05.90 THYROTOXICOSIS, UNSPECIFIED WITHOUT THYROTOXIC CRISIS OR STORM: ICD-10-CM

## 2025-05-14 DIAGNOSIS — Z79.01 LONG TERM (CURRENT) USE OF ANTICOAGULANTS: ICD-10-CM

## 2025-07-06 NOTE — PATIENT PROFILE ADULT - FUNCTIONAL SCREEN CURRENT LEVEL: COMMUNICATION, MLM
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 and/or recent surgery. Small   volume ascites adjacent to the distal duodenum. Small volume complex free   fluid left retrocolic gutter and deep pelvis which could be complex ascites   and/or blood products from surgery.   6. No discrete abscess. No free air/perforation   7. Dilated proximal jejunal loop up to 4.9 cm although no obstructive   finding. Favor this to be a localized postsurgical ileus   8. Hepatic steatosis   9. Bilateral nonobstructing 5 mm renal calculi            Electronically Signed by: Andrea Leon   Signed on: 6/5/2025 12:15 PM   Created on Workstation ID: [id number]   Signed on Workstation ID: [id number]      XR CHEST AP OR PA   Final Result by Daphnie Henley MD (06/04 1120)   IMPRESSION:      Enteric tube terminates in the stomach. Lungs are underinflated. Similar   patchy bibasilar opacities, possibly atelectasis. No significant pleural   effusion. No pneumothorax. Heart size and mediastinal contours are   unchanged.      Electronically Signed by: Daphnie Henley  MD   Signed on: 6/4/2025 11:20 AM   Created on Workstation ID: [id number]   Signed on Workstation ID: [id number]      XR ABDOMEN 1 VIEW   Final Result by Maikel Wheeler MD (06/04 1122)   IMPRESSION:      LINES OR TUBES: Enteric tube terminating in the gastric body.      LOWER CHEST: No focal consolidation at the lung bases.      BOWEL GAS PATTERN: Stable diffuse gaseous distention of the small bowel and   colon, small bowel again measuring up to about 5.5 cm, likely ileus. No   significant stool burden.      OTHER: Pang catheter in the bladder.      MUSCULOSKELETAL: No acute osseous abnormality.         Electronically Signed by: Maikel Wheeler MD   Signed on: 6/4/2025 11:22 AM   Created on Workstation ID: [id number]   Signed on Workstation ID: [id number]      XR NASOGASTRIC TUBE CHECK ABDOMEN   Final Result by Nayeli Prasad MD (06/04 0317)   IMPRESSION:      1.  Enteric tube courses beneath the diaphragm, terminating over the region   of the gastric body.   2.  Dilated small bowel loops again are again identified but are less   pronounced than the earlier study from 6/3/2025      I, Attending Radiologist Nayeli Prasad MD, have reviewed the images   and report and concur with these findings interpreted by Resident   Radiologist, Kavon Dickson MD.      Electronically Signed by: Nayeli Prasad MD   Signed on: 6/4/2025 3:17 AM   Created on Workstation ID: [id number]   Signed on Workstation ID: [id number]      XR ABDOMEN 1 VIEW   Final Result by Da Win MD (06/03 1316)   IMPRESSION:      1.  Dilated gas-filled small bowel loops in the right and central abdomen   up to 5.9 cm, which may be seen with an ileus. Developing bowel obstruction   not excluded.   2.  Gastric tube well-positioned in the stomach.      I, Attending Radiologist Da Win MD, have reviewed the images and   report and concur with these findings interpreted by Resident Radiologist,   Rome Walton MD.       Electronically Signed by: Da Win MD   Signed on: 6/3/2025 1:16 PM   Created on Workstation ID: [id number]   Signed on Workstation ID: [id number]      XR CHEST AP OR PA   Final Result by Tom Modi MD (06/03 0644)   IMPRESSION:      Low level of inspiration with bibasilar probable atelectasis.      I, Attending Radiologist Tom Modi MD, have reviewed the images and   report and concur with these findings interpreted by Resident Radiologist,   Kavon Dickson MD.      Electronically Signed by: Tom Modi MD   Signed on: 6/3/2025 6:44 AM   Created on Workstation ID: [id number]   Signed on Workstation ID: [id number]      XR CHEST AP OR PA   Final Result by Sherron Valdes MD (06/02 1633)   IMPRESSION:      Low lung lungs with mild cardiomegaly. Patchy interstitial and groundglass   opacity bilaterally could reflect atelectasis or component of mild edema.   There may be trace effusions. No pneumothorax.               Electronically Signed by: Sherron Fonseca MD   Signed on: 6/2/2025 4:33 PM   Created on Workstation ID: [id number]   Signed on Workstation ID: [id number]           EXPECTED DATE OF DISCHARGE    7/08/25    DISCHARGE DESTINATION    MAXINE    BARRIERS TO DISCHARGE    IV antibiotics.      Last Risk of Hospital Admission or ED Visit Score: 31     MARIAM Richards  7/6/2025    0 = understands/communicates without difficulty

## 2025-07-15 ENCOUNTER — NON-APPOINTMENT (OUTPATIENT)
Age: 85
End: 2025-07-15

## 2025-07-15 ENCOUNTER — APPOINTMENT (OUTPATIENT)
Dept: CARDIOLOGY | Facility: CLINIC | Age: 85
End: 2025-07-15
Payer: MEDICARE

## 2025-07-15 PROCEDURE — 93295 DEV INTERROG REMOTE 1/2/MLT: CPT

## 2025-07-15 PROCEDURE — 93296 REM INTERROG EVL PM/IDS: CPT

## 2025-07-16 NOTE — GOALS OF CARE CONVERSATION - ADVANCED CARE PLANNING - CONVERSATION/DISCUSSION
Diagnosis/Prognosis/MOLST Discussed/Treatment Options I will STOP taking the medications listed below when I get home from the hospital:  None

## 2025-07-28 ENCOUNTER — APPOINTMENT (OUTPATIENT)
Dept: ELECTROPHYSIOLOGY | Facility: CLINIC | Age: 85
End: 2025-07-28
Payer: MEDICARE

## 2025-07-28 VITALS
HEART RATE: 75 BPM | WEIGHT: 240 LBS | HEIGHT: 68 IN | DIASTOLIC BLOOD PRESSURE: 90 MMHG | SYSTOLIC BLOOD PRESSURE: 180 MMHG | BODY MASS INDEX: 36.37 KG/M2

## 2025-07-28 DIAGNOSIS — Z45.02 ENCOUNTER FOR ADJUSTMENT AND MANAGEMENT OF AUTOMATIC IMPLANTABLE CARDIAC DEFIBRILLATOR: ICD-10-CM

## 2025-07-28 DIAGNOSIS — I25.10 ATHEROSCLEROTIC HEART DISEASE OF NATIVE CORONARY ARTERY W/OUT ANGINA PECTORIS: ICD-10-CM

## 2025-07-28 DIAGNOSIS — E78.5 HYPERLIPIDEMIA, UNSPECIFIED: ICD-10-CM

## 2025-07-28 DIAGNOSIS — I48.92 UNSPECIFIED ATRIAL FLUTTER: ICD-10-CM

## 2025-07-28 DIAGNOSIS — I10 ESSENTIAL (PRIMARY) HYPERTENSION: ICD-10-CM

## 2025-07-28 DIAGNOSIS — Z86.73 PERSONAL HISTORY OF TRANSIENT ISCHEMIC ATTACK (TIA), AND CEREBRAL INFARCTION W/OUT RESIDUAL DEFICITS: ICD-10-CM

## 2025-07-28 DIAGNOSIS — I50.22 CHRONIC SYSTOLIC (CONGESTIVE) HEART FAILURE: ICD-10-CM

## 2025-07-28 DIAGNOSIS — I48.91 UNSPECIFIED ATRIAL FIBRILLATION: ICD-10-CM

## 2025-07-28 PROCEDURE — 99215 OFFICE O/P EST HI 40 MIN: CPT

## 2025-07-28 PROCEDURE — 93000 ELECTROCARDIOGRAM COMPLETE: CPT | Mod: 59

## 2025-07-28 PROCEDURE — 93284 PRGRMG EVAL IMPLANTABLE DFB: CPT

## 2025-07-28 RX ORDER — AMIODARONE HYDROCHLORIDE 200 MG/1
200 TABLET ORAL DAILY
Qty: 90 | Refills: 3 | Status: ACTIVE | COMMUNITY
Start: 2025-07-28